# Patient Record
Sex: FEMALE | Race: WHITE | NOT HISPANIC OR LATINO | Employment: OTHER | URBAN - METROPOLITAN AREA
[De-identification: names, ages, dates, MRNs, and addresses within clinical notes are randomized per-mention and may not be internally consistent; named-entity substitution may affect disease eponyms.]

---

## 2017-01-04 ENCOUNTER — OFFICE VISIT - HEALTHEAST (OUTPATIENT)
Dept: PHYSICAL THERAPY | Facility: CLINIC | Age: 77
End: 2017-01-04

## 2017-01-04 DIAGNOSIS — M54.16 LEFT LUMBAR RADICULITIS: ICD-10-CM

## 2017-01-04 DIAGNOSIS — M48.061 SPINAL STENOSIS OF LUMBAR REGION: ICD-10-CM

## 2017-01-04 DIAGNOSIS — M25.552 LEFT HIP PAIN: ICD-10-CM

## 2017-01-04 DIAGNOSIS — M16.12 ARTHRITIS OF LEFT HIP: ICD-10-CM

## 2017-01-04 DIAGNOSIS — M79.18 MYOFASCIAL PAIN: ICD-10-CM

## 2017-01-11 ENCOUNTER — COMMUNICATION - HEALTHEAST (OUTPATIENT)
Dept: PHYSICAL THERAPY | Facility: CLINIC | Age: 77
End: 2017-01-11

## 2017-01-17 ENCOUNTER — COMMUNICATION - HEALTHEAST (OUTPATIENT)
Dept: PHYSICAL THERAPY | Facility: CLINIC | Age: 77
End: 2017-01-17

## 2017-01-24 ENCOUNTER — COMMUNICATION - HEALTHEAST (OUTPATIENT)
Dept: FAMILY MEDICINE | Facility: CLINIC | Age: 77
End: 2017-01-24

## 2017-01-26 ENCOUNTER — COMMUNICATION - HEALTHEAST (OUTPATIENT)
Dept: FAMILY MEDICINE | Facility: CLINIC | Age: 77
End: 2017-01-26

## 2017-02-28 ENCOUNTER — COMMUNICATION - HEALTHEAST (OUTPATIENT)
Dept: PHYSICAL THERAPY | Facility: CLINIC | Age: 77
End: 2017-02-28

## 2017-03-09 ENCOUNTER — COMMUNICATION - HEALTHEAST (OUTPATIENT)
Dept: FAMILY MEDICINE | Facility: CLINIC | Age: 77
End: 2017-03-09

## 2017-03-09 DIAGNOSIS — I10 ESSENTIAL HYPERTENSION: ICD-10-CM

## 2017-04-11 ENCOUNTER — COMMUNICATION - HEALTHEAST (OUTPATIENT)
Dept: FAMILY MEDICINE | Facility: CLINIC | Age: 77
End: 2017-04-11

## 2017-04-14 ENCOUNTER — COMMUNICATION - HEALTHEAST (OUTPATIENT)
Dept: PHYSICAL THERAPY | Facility: CLINIC | Age: 77
End: 2017-04-14

## 2017-04-28 ENCOUNTER — COMMUNICATION - HEALTHEAST (OUTPATIENT)
Dept: FAMILY MEDICINE | Facility: CLINIC | Age: 77
End: 2017-04-28

## 2017-04-28 DIAGNOSIS — I10 ESSENTIAL HYPERTENSION: ICD-10-CM

## 2017-06-02 ENCOUNTER — COMMUNICATION - HEALTHEAST (OUTPATIENT)
Dept: FAMILY MEDICINE | Facility: CLINIC | Age: 77
End: 2017-06-02

## 2017-06-28 ENCOUNTER — COMMUNICATION - HEALTHEAST (OUTPATIENT)
Dept: FAMILY MEDICINE | Facility: CLINIC | Age: 77
End: 2017-06-28

## 2017-07-31 ENCOUNTER — COMMUNICATION - HEALTHEAST (OUTPATIENT)
Dept: FAMILY MEDICINE | Facility: CLINIC | Age: 77
End: 2017-07-31

## 2017-07-31 DIAGNOSIS — I10 ESSENTIAL HYPERTENSION: ICD-10-CM

## 2017-08-18 ENCOUNTER — COMMUNICATION - HEALTHEAST (OUTPATIENT)
Dept: FAMILY MEDICINE | Facility: CLINIC | Age: 77
End: 2017-08-18

## 2017-09-08 ENCOUNTER — OFFICE VISIT - HEALTHEAST (OUTPATIENT)
Dept: FAMILY MEDICINE | Facility: CLINIC | Age: 77
End: 2017-09-08

## 2017-09-08 DIAGNOSIS — I10 ESSENTIAL HYPERTENSION: ICD-10-CM

## 2017-09-08 DIAGNOSIS — Z01.818 PREOPERATIVE EXAMINATION: ICD-10-CM

## 2017-09-08 LAB
ATRIAL RATE - MUSE: 60 BPM
CREAT SERPL-MCNC: 0.74 MG/DL (ref 0.6–1.1)
DIASTOLIC BLOOD PRESSURE - MUSE: NORMAL MMHG
GFR SERPL CREATININE-BSD FRML MDRD: >60 ML/MIN/1.73M2
INTERPRETATION ECG - MUSE: NORMAL
P AXIS - MUSE: 19 DEGREES
PR INTERVAL - MUSE: 208 MS
QRS DURATION - MUSE: 104 MS
QT - MUSE: 424 MS
QTC - MUSE: 424 MS
R AXIS - MUSE: 31 DEGREES
SYSTOLIC BLOOD PRESSURE - MUSE: NORMAL MMHG
T AXIS - MUSE: 12 DEGREES
VENTRICULAR RATE- MUSE: 60 BPM

## 2017-09-08 ASSESSMENT — MIFFLIN-ST. JEOR: SCORE: 1353.59

## 2017-09-12 ENCOUNTER — COMMUNICATION - HEALTHEAST (OUTPATIENT)
Dept: FAMILY MEDICINE | Facility: CLINIC | Age: 77
End: 2017-09-12

## 2017-09-12 DIAGNOSIS — I10 ESSENTIAL HYPERTENSION: ICD-10-CM

## 2017-09-13 ENCOUNTER — COMMUNICATION - HEALTHEAST (OUTPATIENT)
Dept: FAMILY MEDICINE | Facility: CLINIC | Age: 77
End: 2017-09-13

## 2017-09-28 ASSESSMENT — MIFFLIN-ST. JEOR: SCORE: 1353.59

## 2017-10-03 ENCOUNTER — ANESTHESIA - HEALTHEAST (OUTPATIENT)
Dept: SURGERY | Facility: CLINIC | Age: 77
End: 2017-10-03

## 2017-10-03 ENCOUNTER — SURGERY - HEALTHEAST (OUTPATIENT)
Dept: SURGERY | Facility: CLINIC | Age: 77
End: 2017-10-03

## 2017-10-03 ASSESSMENT — MIFFLIN-ST. JEOR
SCORE: 1362.66
SCORE: 1363.79

## 2017-10-09 ENCOUNTER — OFFICE VISIT - HEALTHEAST (OUTPATIENT)
Dept: FAMILY MEDICINE | Facility: CLINIC | Age: 77
End: 2017-10-09

## 2017-10-09 DIAGNOSIS — Z96.642 STATUS POST LEFT HIP REPLACEMENT: ICD-10-CM

## 2017-10-09 DIAGNOSIS — I10 HTN (HYPERTENSION): ICD-10-CM

## 2017-10-09 DIAGNOSIS — E83.42 HYPOMAGNESEMIA: ICD-10-CM

## 2017-10-09 DIAGNOSIS — Z00.00 HEALTHCARE MAINTENANCE: ICD-10-CM

## 2017-10-10 ENCOUNTER — COMMUNICATION - HEALTHEAST (OUTPATIENT)
Dept: FAMILY MEDICINE | Facility: CLINIC | Age: 77
End: 2017-10-10

## 2017-10-30 ENCOUNTER — COMMUNICATION - HEALTHEAST (OUTPATIENT)
Dept: FAMILY MEDICINE | Facility: CLINIC | Age: 77
End: 2017-10-30

## 2017-10-30 DIAGNOSIS — I10 ESSENTIAL HYPERTENSION: ICD-10-CM

## 2017-11-02 ENCOUNTER — COMMUNICATION - HEALTHEAST (OUTPATIENT)
Dept: FAMILY MEDICINE | Facility: CLINIC | Age: 77
End: 2017-11-02

## 2017-11-05 ENCOUNTER — COMMUNICATION - HEALTHEAST (OUTPATIENT)
Dept: FAMILY MEDICINE | Facility: CLINIC | Age: 77
End: 2017-11-05

## 2017-11-05 DIAGNOSIS — I10 HYPERTENSION GOAL BP (BLOOD PRESSURE) < 140/90: ICD-10-CM

## 2017-12-06 ENCOUNTER — COMMUNICATION - HEALTHEAST (OUTPATIENT)
Dept: FAMILY MEDICINE | Facility: CLINIC | Age: 77
End: 2017-12-06

## 2017-12-21 ENCOUNTER — COMMUNICATION - HEALTHEAST (OUTPATIENT)
Dept: FAMILY MEDICINE | Facility: CLINIC | Age: 77
End: 2017-12-21

## 2017-12-21 ENCOUNTER — OFFICE VISIT - HEALTHEAST (OUTPATIENT)
Dept: FAMILY MEDICINE | Facility: CLINIC | Age: 77
End: 2017-12-21

## 2017-12-21 DIAGNOSIS — M48.061 SPINAL STENOSIS OF LUMBAR REGION, UNSPECIFIED WHETHER NEUROGENIC CLAUDICATION PRESENT: ICD-10-CM

## 2017-12-21 DIAGNOSIS — J40 BRONCHITIS: ICD-10-CM

## 2018-02-12 ENCOUNTER — COMMUNICATION - HEALTHEAST (OUTPATIENT)
Dept: FAMILY MEDICINE | Facility: CLINIC | Age: 78
End: 2018-02-12

## 2018-03-19 ENCOUNTER — RECORDS - HEALTHEAST (OUTPATIENT)
Dept: ADMINISTRATIVE | Facility: OTHER | Age: 78
End: 2018-03-19

## 2018-04-14 ENCOUNTER — COMMUNICATION - HEALTHEAST (OUTPATIENT)
Dept: FAMILY MEDICINE | Facility: CLINIC | Age: 78
End: 2018-04-14

## 2018-06-14 ENCOUNTER — COMMUNICATION - HEALTHEAST (OUTPATIENT)
Dept: FAMILY MEDICINE | Facility: CLINIC | Age: 78
End: 2018-06-14

## 2018-06-14 DIAGNOSIS — M48.061 SPINAL STENOSIS OF LUMBAR REGION, UNSPECIFIED WHETHER NEUROGENIC CLAUDICATION PRESENT: ICD-10-CM

## 2018-06-22 ENCOUNTER — COMMUNICATION - HEALTHEAST (OUTPATIENT)
Dept: FAMILY MEDICINE | Facility: CLINIC | Age: 78
End: 2018-06-22

## 2018-08-02 ENCOUNTER — OFFICE VISIT - HEALTHEAST (OUTPATIENT)
Dept: FAMILY MEDICINE | Facility: CLINIC | Age: 78
End: 2018-08-02

## 2018-08-02 DIAGNOSIS — Z00.00 HEALTH CARE MAINTENANCE: ICD-10-CM

## 2018-08-02 DIAGNOSIS — Z86.73 HISTORY OF CVA (CEREBROVASCULAR ACCIDENT): ICD-10-CM

## 2018-08-02 DIAGNOSIS — I10 ESSENTIAL HYPERTENSION: ICD-10-CM

## 2018-08-10 ENCOUNTER — AMBULATORY - HEALTHEAST (OUTPATIENT)
Dept: NURSING | Facility: CLINIC | Age: 78
End: 2018-08-10

## 2018-08-10 ENCOUNTER — AMBULATORY - HEALTHEAST (OUTPATIENT)
Dept: FAMILY MEDICINE | Facility: CLINIC | Age: 78
End: 2018-08-10

## 2018-08-10 ENCOUNTER — COMMUNICATION - HEALTHEAST (OUTPATIENT)
Dept: FAMILY MEDICINE | Facility: CLINIC | Age: 78
End: 2018-08-10

## 2018-08-10 DIAGNOSIS — I10 ESSENTIAL HYPERTENSION: ICD-10-CM

## 2018-08-10 DIAGNOSIS — I10 HYPERTENSION GOAL BP (BLOOD PRESSURE) < 140/90: ICD-10-CM

## 2018-08-22 ENCOUNTER — COMMUNICATION - HEALTHEAST (OUTPATIENT)
Dept: FAMILY MEDICINE | Facility: CLINIC | Age: 78
End: 2018-08-22

## 2018-08-22 DIAGNOSIS — R47.01 EXPRESSIVE APHASIA: ICD-10-CM

## 2018-08-31 ENCOUNTER — AMBULATORY - HEALTHEAST (OUTPATIENT)
Dept: NURSING | Facility: CLINIC | Age: 78
End: 2018-08-31

## 2018-08-31 ENCOUNTER — COMMUNICATION - HEALTHEAST (OUTPATIENT)
Dept: FAMILY MEDICINE | Facility: CLINIC | Age: 78
End: 2018-08-31

## 2018-08-31 DIAGNOSIS — I10 HYPERTENSION GOAL BP (BLOOD PRESSURE) < 140/90: ICD-10-CM

## 2018-09-05 ENCOUNTER — COMMUNICATION - HEALTHEAST (OUTPATIENT)
Dept: FAMILY MEDICINE | Facility: CLINIC | Age: 78
End: 2018-09-05

## 2018-09-06 ENCOUNTER — COMMUNICATION - HEALTHEAST (OUTPATIENT)
Dept: FAMILY MEDICINE | Facility: CLINIC | Age: 78
End: 2018-09-06

## 2018-09-25 ENCOUNTER — COMMUNICATION - HEALTHEAST (OUTPATIENT)
Dept: FAMILY MEDICINE | Facility: CLINIC | Age: 78
End: 2018-09-25

## 2018-10-02 ENCOUNTER — OFFICE VISIT - HEALTHEAST (OUTPATIENT)
Dept: FAMILY MEDICINE | Facility: CLINIC | Age: 78
End: 2018-10-02

## 2018-10-02 DIAGNOSIS — H26.9 CATARACT: ICD-10-CM

## 2018-10-02 DIAGNOSIS — Z01.818 PREOPERATIVE EXAMINATION: ICD-10-CM

## 2018-10-02 LAB — POTASSIUM BLD-SCNC: 4.2 MMOL/L (ref 3.5–5)

## 2018-10-03 ENCOUNTER — COMMUNICATION - HEALTHEAST (OUTPATIENT)
Dept: FAMILY MEDICINE | Facility: CLINIC | Age: 78
End: 2018-10-03

## 2018-11-10 ENCOUNTER — COMMUNICATION - HEALTHEAST (OUTPATIENT)
Dept: FAMILY MEDICINE | Facility: CLINIC | Age: 78
End: 2018-11-10

## 2018-11-10 DIAGNOSIS — I10 ESSENTIAL HYPERTENSION: ICD-10-CM

## 2018-11-19 ENCOUNTER — COMMUNICATION - HEALTHEAST (OUTPATIENT)
Dept: FAMILY MEDICINE | Facility: CLINIC | Age: 78
End: 2018-11-19

## 2018-12-04 ENCOUNTER — COMMUNICATION - HEALTHEAST (OUTPATIENT)
Dept: FAMILY MEDICINE | Facility: CLINIC | Age: 78
End: 2018-12-04

## 2018-12-04 DIAGNOSIS — I10 ESSENTIAL HYPERTENSION: ICD-10-CM

## 2018-12-10 ENCOUNTER — COMMUNICATION - HEALTHEAST (OUTPATIENT)
Dept: FAMILY MEDICINE | Facility: CLINIC | Age: 78
End: 2018-12-10

## 2018-12-10 DIAGNOSIS — I10 ESSENTIAL HYPERTENSION: ICD-10-CM

## 2018-12-11 ENCOUNTER — COMMUNICATION - HEALTHEAST (OUTPATIENT)
Dept: FAMILY MEDICINE | Facility: CLINIC | Age: 78
End: 2018-12-11

## 2019-02-16 ENCOUNTER — COMMUNICATION - HEALTHEAST (OUTPATIENT)
Dept: FAMILY MEDICINE | Facility: CLINIC | Age: 79
End: 2019-02-16

## 2019-02-22 ENCOUNTER — COMMUNICATION - HEALTHEAST (OUTPATIENT)
Dept: FAMILY MEDICINE | Facility: CLINIC | Age: 79
End: 2019-02-22

## 2019-02-22 DIAGNOSIS — I10 HYPERTENSION GOAL BP (BLOOD PRESSURE) < 140/90: ICD-10-CM

## 2019-03-29 ENCOUNTER — COMMUNICATION - HEALTHEAST (OUTPATIENT)
Dept: FAMILY MEDICINE | Facility: CLINIC | Age: 79
End: 2019-03-29

## 2019-04-22 ENCOUNTER — COMMUNICATION - HEALTHEAST (OUTPATIENT)
Dept: FAMILY MEDICINE | Facility: CLINIC | Age: 79
End: 2019-04-22

## 2019-04-22 DIAGNOSIS — F41.9 ANXIETY: ICD-10-CM

## 2019-05-16 ENCOUNTER — AMBULATORY - HEALTHEAST (OUTPATIENT)
Dept: FAMILY MEDICINE | Facility: CLINIC | Age: 79
End: 2019-05-16

## 2019-05-16 DIAGNOSIS — I10 ESSENTIAL HYPERTENSION: ICD-10-CM

## 2019-06-05 ENCOUNTER — COMMUNICATION - HEALTHEAST (OUTPATIENT)
Dept: FAMILY MEDICINE | Facility: CLINIC | Age: 79
End: 2019-06-05

## 2019-06-19 ENCOUNTER — COMMUNICATION - HEALTHEAST (OUTPATIENT)
Dept: FAMILY MEDICINE | Facility: CLINIC | Age: 79
End: 2019-06-19

## 2019-06-19 DIAGNOSIS — F41.9 ANXIETY: ICD-10-CM

## 2019-07-26 ENCOUNTER — COMMUNICATION - HEALTHEAST (OUTPATIENT)
Dept: FAMILY MEDICINE | Facility: CLINIC | Age: 79
End: 2019-07-26

## 2019-08-04 ENCOUNTER — COMMUNICATION - HEALTHEAST (OUTPATIENT)
Dept: FAMILY MEDICINE | Facility: CLINIC | Age: 79
End: 2019-08-04

## 2019-08-04 DIAGNOSIS — R47.01 EXPRESSIVE APHASIA: ICD-10-CM

## 2019-08-07 ENCOUNTER — COMMUNICATION - HEALTHEAST (OUTPATIENT)
Dept: FAMILY MEDICINE | Facility: CLINIC | Age: 79
End: 2019-08-07

## 2019-08-16 ENCOUNTER — COMMUNICATION - HEALTHEAST (OUTPATIENT)
Dept: FAMILY MEDICINE | Facility: CLINIC | Age: 79
End: 2019-08-16

## 2019-08-16 ENCOUNTER — AMBULATORY - HEALTHEAST (OUTPATIENT)
Dept: NURSING | Facility: CLINIC | Age: 79
End: 2019-08-16

## 2019-08-16 DIAGNOSIS — I10 HYPERTENSION GOAL BP (BLOOD PRESSURE) < 140/90: ICD-10-CM

## 2019-08-29 ENCOUNTER — AMBULATORY - HEALTHEAST (OUTPATIENT)
Dept: NURSING | Facility: CLINIC | Age: 79
End: 2019-08-29

## 2019-08-29 ENCOUNTER — COMMUNICATION - HEALTHEAST (OUTPATIENT)
Dept: FAMILY MEDICINE | Facility: CLINIC | Age: 79
End: 2019-08-29

## 2019-08-29 DIAGNOSIS — I10 HYPERTENSION GOAL BP (BLOOD PRESSURE) < 140/90: ICD-10-CM

## 2019-08-29 DIAGNOSIS — F41.9 ANXIETY: ICD-10-CM

## 2019-08-30 ENCOUNTER — COMMUNICATION - HEALTHEAST (OUTPATIENT)
Dept: FAMILY MEDICINE | Facility: CLINIC | Age: 79
End: 2019-08-30

## 2019-09-02 ENCOUNTER — COMMUNICATION - HEALTHEAST (OUTPATIENT)
Dept: FAMILY MEDICINE | Facility: CLINIC | Age: 79
End: 2019-09-02

## 2019-09-02 DIAGNOSIS — I10 ESSENTIAL HYPERTENSION: ICD-10-CM

## 2019-10-17 ENCOUNTER — COMMUNICATION - HEALTHEAST (OUTPATIENT)
Dept: FAMILY MEDICINE | Facility: CLINIC | Age: 79
End: 2019-10-17

## 2019-10-17 DIAGNOSIS — I10 ESSENTIAL HYPERTENSION: ICD-10-CM

## 2019-10-25 ENCOUNTER — AMBULATORY - HEALTHEAST (OUTPATIENT)
Dept: FAMILY MEDICINE | Facility: CLINIC | Age: 79
End: 2019-10-25

## 2019-10-27 ENCOUNTER — COMMUNICATION - HEALTHEAST (OUTPATIENT)
Dept: FAMILY MEDICINE | Facility: CLINIC | Age: 79
End: 2019-10-27

## 2019-10-27 DIAGNOSIS — F41.9 ANXIETY: ICD-10-CM

## 2019-10-30 ENCOUNTER — COMMUNICATION - HEALTHEAST (OUTPATIENT)
Dept: FAMILY MEDICINE | Facility: CLINIC | Age: 79
End: 2019-10-30

## 2019-10-30 DIAGNOSIS — I10 ESSENTIAL HYPERTENSION: ICD-10-CM

## 2019-11-11 ENCOUNTER — COMMUNICATION - HEALTHEAST (OUTPATIENT)
Dept: FAMILY MEDICINE | Facility: CLINIC | Age: 79
End: 2019-11-11

## 2019-11-11 DIAGNOSIS — I10 ESSENTIAL HYPERTENSION: ICD-10-CM

## 2019-11-16 ENCOUNTER — COMMUNICATION - HEALTHEAST (OUTPATIENT)
Dept: FAMILY MEDICINE | Facility: CLINIC | Age: 79
End: 2019-11-16

## 2019-11-16 DIAGNOSIS — I10 ESSENTIAL HYPERTENSION: ICD-10-CM

## 2019-12-12 ENCOUNTER — COMMUNICATION - HEALTHEAST (OUTPATIENT)
Dept: LAB | Facility: CLINIC | Age: 79
End: 2019-12-12

## 2019-12-12 DIAGNOSIS — Z00.00 ROUTINE HISTORY AND PHYSICAL EXAMINATION OF ADULT: ICD-10-CM

## 2019-12-16 ENCOUNTER — AMBULATORY - HEALTHEAST (OUTPATIENT)
Dept: LAB | Facility: CLINIC | Age: 79
End: 2019-12-16

## 2019-12-16 DIAGNOSIS — Z00.00 ROUTINE HISTORY AND PHYSICAL EXAMINATION OF ADULT: ICD-10-CM

## 2019-12-16 LAB
ALBUMIN SERPL-MCNC: 3.8 G/DL (ref 3.5–5)
ALP SERPL-CCNC: 75 U/L (ref 45–120)
ALT SERPL W P-5'-P-CCNC: 26 U/L (ref 0–45)
ANION GAP SERPL CALCULATED.3IONS-SCNC: 11 MMOL/L (ref 5–18)
AST SERPL W P-5'-P-CCNC: 18 U/L (ref 0–40)
BILIRUB SERPL-MCNC: 1 MG/DL (ref 0–1)
BUN SERPL-MCNC: 19 MG/DL (ref 8–28)
CALCIUM SERPL-MCNC: 9.6 MG/DL (ref 8.5–10.5)
CHLORIDE BLD-SCNC: 105 MMOL/L (ref 98–107)
CHOLEST SERPL-MCNC: 139 MG/DL
CO2 SERPL-SCNC: 27 MMOL/L (ref 22–31)
CREAT SERPL-MCNC: 0.73 MG/DL (ref 0.6–1.1)
ERYTHROCYTE [DISTWIDTH] IN BLOOD BY AUTOMATED COUNT: 12 % (ref 11–14.5)
FASTING STATUS PATIENT QL REPORTED: YES
GFR SERPL CREATININE-BSD FRML MDRD: >60 ML/MIN/1.73M2
GLUCOSE BLD-MCNC: 124 MG/DL (ref 70–125)
HCT VFR BLD AUTO: 40.8 % (ref 35–47)
HDLC SERPL-MCNC: 61 MG/DL
HGB BLD-MCNC: 13.6 G/DL (ref 12–16)
LDLC SERPL CALC-MCNC: 62 MG/DL
MCH RBC QN AUTO: 29.2 PG (ref 27–34)
MCHC RBC AUTO-ENTMCNC: 33.3 G/DL (ref 32–36)
MCV RBC AUTO: 88 FL (ref 80–100)
PLATELET # BLD AUTO: 321 THOU/UL (ref 140–440)
PMV BLD AUTO: 7.6 FL (ref 7–10)
POTASSIUM BLD-SCNC: 4.3 MMOL/L (ref 3.5–5)
PROT SERPL-MCNC: 6.8 G/DL (ref 6–8)
RBC # BLD AUTO: 4.65 MILL/UL (ref 3.8–5.4)
SODIUM SERPL-SCNC: 143 MMOL/L (ref 136–145)
TRIGL SERPL-MCNC: 82 MG/DL
WBC: 8.1 THOU/UL (ref 4–11)

## 2019-12-17 ENCOUNTER — OFFICE VISIT - HEALTHEAST (OUTPATIENT)
Dept: FAMILY MEDICINE | Facility: CLINIC | Age: 79
End: 2019-12-17

## 2019-12-17 ENCOUNTER — COMMUNICATION - HEALTHEAST (OUTPATIENT)
Dept: FAMILY MEDICINE | Facility: CLINIC | Age: 79
End: 2019-12-17

## 2019-12-17 DIAGNOSIS — E66.01 MORBID OBESITY (H): ICD-10-CM

## 2019-12-17 DIAGNOSIS — F43.9 STRESS AT HOME: ICD-10-CM

## 2019-12-17 DIAGNOSIS — E78.2 MIXED HYPERLIPIDEMIA: ICD-10-CM

## 2019-12-17 DIAGNOSIS — I10 ESSENTIAL HYPERTENSION: ICD-10-CM

## 2019-12-17 DIAGNOSIS — J45.21 MILD INTERMITTENT ASTHMA WITH EXACERBATION: ICD-10-CM

## 2019-12-17 DIAGNOSIS — F41.9 ANXIETY: ICD-10-CM

## 2019-12-17 DIAGNOSIS — Z00.00 HEALTH CARE MAINTENANCE: ICD-10-CM

## 2019-12-17 RX ORDER — ALBUTEROL SULFATE 0.83 MG/ML
2.5 SOLUTION RESPIRATORY (INHALATION) EVERY 4 HOURS PRN
Qty: 75 ML | Refills: 1 | Status: SHIPPED | OUTPATIENT
Start: 2019-12-17

## 2019-12-17 ASSESSMENT — MIFFLIN-ST. JEOR: SCORE: 1404.96

## 2019-12-23 ENCOUNTER — OFFICE VISIT - HEALTHEAST (OUTPATIENT)
Dept: FAMILY MEDICINE | Facility: CLINIC | Age: 79
End: 2019-12-23

## 2019-12-23 DIAGNOSIS — H65.91 OME (OTITIS MEDIA WITH EFFUSION), RIGHT: ICD-10-CM

## 2019-12-23 ASSESSMENT — MIFFLIN-ST. JEOR: SCORE: 1401.21

## 2020-03-09 ENCOUNTER — COMMUNICATION - HEALTHEAST (OUTPATIENT)
Dept: FAMILY MEDICINE | Facility: CLINIC | Age: 80
End: 2020-03-09

## 2020-03-09 DIAGNOSIS — F41.9 ANXIETY: ICD-10-CM

## 2020-03-18 ENCOUNTER — COMMUNICATION - HEALTHEAST (OUTPATIENT)
Dept: FAMILY MEDICINE | Facility: CLINIC | Age: 80
End: 2020-03-18

## 2020-03-18 DIAGNOSIS — I10 ESSENTIAL HYPERTENSION: ICD-10-CM

## 2020-04-21 ENCOUNTER — COMMUNICATION - HEALTHEAST (OUTPATIENT)
Dept: FAMILY MEDICINE | Facility: CLINIC | Age: 80
End: 2020-04-21

## 2020-04-21 DIAGNOSIS — I10 ESSENTIAL HYPERTENSION: ICD-10-CM

## 2020-05-11 ENCOUNTER — COMMUNICATION - HEALTHEAST (OUTPATIENT)
Dept: FAMILY MEDICINE | Facility: CLINIC | Age: 80
End: 2020-05-11

## 2020-05-11 DIAGNOSIS — F41.9 ANXIETY: ICD-10-CM

## 2020-05-12 ENCOUNTER — COMMUNICATION - HEALTHEAST (OUTPATIENT)
Dept: FAMILY MEDICINE | Facility: CLINIC | Age: 80
End: 2020-05-12

## 2020-05-12 DIAGNOSIS — I10 ESSENTIAL HYPERTENSION: ICD-10-CM

## 2020-05-12 DIAGNOSIS — R47.01 EXPRESSIVE APHASIA: ICD-10-CM

## 2020-06-12 ENCOUNTER — COMMUNICATION - HEALTHEAST (OUTPATIENT)
Dept: FAMILY MEDICINE | Facility: CLINIC | Age: 80
End: 2020-06-12

## 2020-06-12 DIAGNOSIS — I10 ESSENTIAL HYPERTENSION: ICD-10-CM

## 2020-06-16 ENCOUNTER — COMMUNICATION - HEALTHEAST (OUTPATIENT)
Dept: FAMILY MEDICINE | Facility: CLINIC | Age: 80
End: 2020-06-16

## 2020-06-16 DIAGNOSIS — I10 ESSENTIAL HYPERTENSION: ICD-10-CM

## 2020-06-27 ENCOUNTER — COMMUNICATION - HEALTHEAST (OUTPATIENT)
Dept: FAMILY MEDICINE | Facility: CLINIC | Age: 80
End: 2020-06-27

## 2020-06-27 DIAGNOSIS — F41.9 ANXIETY: ICD-10-CM

## 2020-09-02 ENCOUNTER — COMMUNICATION - HEALTHEAST (OUTPATIENT)
Dept: FAMILY MEDICINE | Facility: CLINIC | Age: 80
End: 2020-09-02

## 2020-09-02 DIAGNOSIS — F41.9 ANXIETY: ICD-10-CM

## 2020-11-17 ENCOUNTER — COMMUNICATION - HEALTHEAST (OUTPATIENT)
Dept: FAMILY MEDICINE | Facility: CLINIC | Age: 80
End: 2020-11-17

## 2020-11-17 DIAGNOSIS — F41.9 ANXIETY: ICD-10-CM

## 2020-12-15 ENCOUNTER — COMMUNICATION - HEALTHEAST (OUTPATIENT)
Dept: FAMILY MEDICINE | Facility: CLINIC | Age: 80
End: 2020-12-15

## 2020-12-15 DIAGNOSIS — I10 ESSENTIAL HYPERTENSION: ICD-10-CM

## 2021-01-14 ENCOUNTER — COMMUNICATION - HEALTHEAST (OUTPATIENT)
Dept: FAMILY MEDICINE | Facility: CLINIC | Age: 81
End: 2021-01-14

## 2021-01-14 DIAGNOSIS — F41.9 ANXIETY: ICD-10-CM

## 2021-02-01 ENCOUNTER — COMMUNICATION - HEALTHEAST (OUTPATIENT)
Dept: FAMILY MEDICINE | Facility: CLINIC | Age: 81
End: 2021-02-01

## 2021-02-01 DIAGNOSIS — R47.01 EXPRESSIVE APHASIA: ICD-10-CM

## 2021-02-01 DIAGNOSIS — I10 ESSENTIAL HYPERTENSION: ICD-10-CM

## 2021-02-08 ENCOUNTER — AMBULATORY - HEALTHEAST (OUTPATIENT)
Dept: NURSING | Facility: CLINIC | Age: 81
End: 2021-02-08

## 2021-03-01 ENCOUNTER — AMBULATORY - HEALTHEAST (OUTPATIENT)
Dept: NURSING | Facility: CLINIC | Age: 81
End: 2021-03-01

## 2021-03-11 ENCOUNTER — COMMUNICATION - HEALTHEAST (OUTPATIENT)
Dept: FAMILY MEDICINE | Facility: CLINIC | Age: 81
End: 2021-03-11

## 2021-03-11 DIAGNOSIS — I10 ESSENTIAL HYPERTENSION: ICD-10-CM

## 2021-03-16 ENCOUNTER — COMMUNICATION - HEALTHEAST (OUTPATIENT)
Dept: FAMILY MEDICINE | Facility: CLINIC | Age: 81
End: 2021-03-16

## 2021-03-16 DIAGNOSIS — F41.9 ANXIETY: ICD-10-CM

## 2021-03-22 ENCOUNTER — COMMUNICATION - HEALTHEAST (OUTPATIENT)
Dept: FAMILY MEDICINE | Facility: CLINIC | Age: 81
End: 2021-03-22

## 2021-03-22 DIAGNOSIS — I10 ESSENTIAL HYPERTENSION: ICD-10-CM

## 2021-04-19 ENCOUNTER — OFFICE VISIT - HEALTHEAST (OUTPATIENT)
Dept: FAMILY MEDICINE | Facility: CLINIC | Age: 81
End: 2021-04-19

## 2021-04-19 DIAGNOSIS — Z00.00 HEALTH CARE MAINTENANCE: ICD-10-CM

## 2021-04-19 DIAGNOSIS — I63.9 ACUTE CVA (CEREBROVASCULAR ACCIDENT) (H): ICD-10-CM

## 2021-04-19 DIAGNOSIS — E66.01 MORBID OBESITY (H): ICD-10-CM

## 2021-04-19 DIAGNOSIS — I10 ESSENTIAL HYPERTENSION: ICD-10-CM

## 2021-04-19 RX ORDER — ATENOLOL 50 MG/1
75 TABLET ORAL DAILY
Qty: 135 TABLET | Refills: 2 | Status: SHIPPED | OUTPATIENT
Start: 2021-04-19 | End: 2022-02-02

## 2021-04-19 RX ORDER — AMLODIPINE BESYLATE 10 MG/1
10 TABLET ORAL DAILY
Qty: 90 TABLET | Refills: 2 | Status: SHIPPED | OUTPATIENT
Start: 2021-04-19 | End: 2022-02-02

## 2021-04-19 RX ORDER — LISINOPRIL AND HYDROCHLOROTHIAZIDE 20; 25 MG/1; MG/1
2 TABLET ORAL DAILY
Qty: 180 TABLET | Refills: 2 | Status: SHIPPED | OUTPATIENT
Start: 2021-04-19 | End: 2022-02-02

## 2021-04-19 RX ORDER — ATORVASTATIN CALCIUM 20 MG/1
20 TABLET, FILM COATED ORAL DAILY
Qty: 90 TABLET | Refills: 2 | Status: SHIPPED | OUTPATIENT
Start: 2021-04-19 | End: 2022-02-02

## 2021-04-19 RX ORDER — POTASSIUM CHLORIDE 1500 MG/1
TABLET, EXTENDED RELEASE ORAL
Qty: 270 TABLET | Refills: 2 | Status: SHIPPED | OUTPATIENT
Start: 2021-04-19 | End: 2022-02-02

## 2021-04-19 ASSESSMENT — MIFFLIN-ST. JEOR: SCORE: 1430.7

## 2021-04-21 ENCOUNTER — AMBULATORY - HEALTHEAST (OUTPATIENT)
Dept: LAB | Facility: CLINIC | Age: 81
End: 2021-04-21

## 2021-04-21 DIAGNOSIS — Z00.00 HEALTH CARE MAINTENANCE: ICD-10-CM

## 2021-04-21 LAB
ALBUMIN SERPL-MCNC: 3.8 G/DL (ref 3.5–5)
ALP SERPL-CCNC: 76 U/L (ref 45–120)
ALT SERPL W P-5'-P-CCNC: 22 U/L (ref 0–45)
ANION GAP SERPL CALCULATED.3IONS-SCNC: 14 MMOL/L (ref 5–18)
AST SERPL W P-5'-P-CCNC: 19 U/L (ref 0–40)
BILIRUB SERPL-MCNC: 1.1 MG/DL (ref 0–1)
BUN SERPL-MCNC: 15 MG/DL (ref 8–28)
CALCIUM SERPL-MCNC: 8.8 MG/DL (ref 8.5–10.5)
CHLORIDE BLD-SCNC: 105 MMOL/L (ref 98–107)
CHOLEST SERPL-MCNC: 151 MG/DL
CO2 SERPL-SCNC: 23 MMOL/L (ref 22–31)
CREAT SERPL-MCNC: 0.69 MG/DL (ref 0.6–1.1)
ERYTHROCYTE [DISTWIDTH] IN BLOOD BY AUTOMATED COUNT: 13 % (ref 11–14.5)
FASTING STATUS PATIENT QL REPORTED: YES
GFR SERPL CREATININE-BSD FRML MDRD: >60 ML/MIN/1.73M2
GLUCOSE BLD-MCNC: 120 MG/DL (ref 70–125)
HCT VFR BLD AUTO: 43.5 % (ref 35–47)
HDLC SERPL-MCNC: 67 MG/DL
HGB BLD-MCNC: 14.3 G/DL (ref 12–16)
LDLC SERPL CALC-MCNC: 61 MG/DL
MCH RBC QN AUTO: 29.1 PG (ref 27–34)
MCHC RBC AUTO-ENTMCNC: 32.9 G/DL (ref 32–36)
MCV RBC AUTO: 88 FL (ref 80–100)
PLATELET # BLD AUTO: 258 THOU/UL (ref 140–440)
PMV BLD AUTO: 10 FL (ref 7–10)
POTASSIUM BLD-SCNC: 3.5 MMOL/L (ref 3.5–5)
PROT SERPL-MCNC: 6.7 G/DL (ref 6–8)
RBC # BLD AUTO: 4.92 MILL/UL (ref 3.8–5.4)
SODIUM SERPL-SCNC: 142 MMOL/L (ref 136–145)
TRIGL SERPL-MCNC: 114 MG/DL
WBC: 6.7 THOU/UL (ref 4–11)

## 2021-05-11 ENCOUNTER — COMMUNICATION - HEALTHEAST (OUTPATIENT)
Dept: FAMILY MEDICINE | Facility: CLINIC | Age: 81
End: 2021-05-11

## 2021-05-11 DIAGNOSIS — I10 ESSENTIAL HYPERTENSION: ICD-10-CM

## 2021-05-26 ENCOUNTER — RECORDS - HEALTHEAST (OUTPATIENT)
Dept: ADMINISTRATIVE | Facility: CLINIC | Age: 81
End: 2021-05-26

## 2021-05-27 ENCOUNTER — COMMUNICATION - HEALTHEAST (OUTPATIENT)
Dept: FAMILY MEDICINE | Facility: CLINIC | Age: 81
End: 2021-05-27

## 2021-05-27 DIAGNOSIS — F41.9 ANXIETY: ICD-10-CM

## 2021-05-28 RX ORDER — DIAZEPAM 5 MG
5 TABLET ORAL DAILY PRN
Qty: 15 TABLET | Refills: 0 | Status: SHIPPED | OUTPATIENT
Start: 2021-05-28 | End: 2021-07-21

## 2021-05-28 ASSESSMENT — ASTHMA QUESTIONNAIRES
ACT_TOTALSCORE: 25
ACT_TOTALSCORE: 19

## 2021-05-28 NOTE — TELEPHONE ENCOUNTER
Controlled Substance Refill Request  Medication:   Requested Prescriptions     Pending Prescriptions Disp Refills     diazePAM (VALIUM) 5 MG tablet [Pharmacy Med Name: DIAZEPAM 5 MG TABLET] 15 tablet 0     Sig: TAKE 1 TABLET (5 MG TOTAL) BY MOUTH DAILY AS NEEDED.     Date Last Fill: 2/20/19  Ph[facility]   Submit electronically to pharmacy    Controlled Substance Agreement on File:   Encounter-Level CSA Scan Date:    There are no encounter-level csa scan date.       Last office visit with primary: 8/2/2018

## 2021-05-29 ENCOUNTER — RECORDS - HEALTHEAST (OUTPATIENT)
Dept: ADMINISTRATIVE | Facility: CLINIC | Age: 81
End: 2021-05-29

## 2021-05-29 NOTE — TELEPHONE ENCOUNTER
Reason contacted:  Refill  Information relayed:  Relayed message below from patient, PAtient states understanding and and will schedule an ov.   Additional questions:  No  Further follow-up needed:  No  Okay to leave a detailed message:  No

## 2021-05-29 NOTE — TELEPHONE ENCOUNTER
Controlled Substance Refill Request  Medication:   Requested Prescriptions     Pending Prescriptions Disp Refills     diazePAM (VALIUM) 5 MG tablet [Pharmacy Med Name: DIAZEPAM 5 MG TABLET] 15 tablet 0     Sig: TAKE 1 TABLET (5 MG TOTAL) BY MOUTH DAILY AS NEEDED.     Date Last Fill: 4/23/19  [id number]   Submit electronically to pharmacy  Controlled Substance Agreement on File:   Encounter-Level CSA Scan Date:    There are no encounter-level csa scan date.       Last office visit: Last office visit pertaining to requested medication was 10/2/18.

## 2021-05-31 VITALS — BODY MASS INDEX: 37.91 KG/M2 | HEIGHT: 62 IN | WEIGHT: 206 LBS

## 2021-05-31 VITALS — BODY MASS INDEX: 36.28 KG/M2 | WEIGHT: 198.38 LBS

## 2021-05-31 VITALS — WEIGHT: 204 LBS | BODY MASS INDEX: 37.54 KG/M2 | HEIGHT: 62 IN

## 2021-05-31 VITALS — BODY MASS INDEX: 37.95 KG/M2 | WEIGHT: 207.5 LBS

## 2021-05-31 NOTE — TELEPHONE ENCOUNTER
Refill Request  Did you contact pharmacy: No  Medication name:   Requested Prescriptions     Pending Prescriptions Disp Refills     diazePAM (VALIUM) 5 MG tablet 15 tablet 0     Sig: Take 1 tablet (5 mg total) by mouth daily as needed.     Who prescribed the medication: Joe  [facility]  Is patient out of medication: no  Patient notified refills processed in 72 hours:  no  Okay to leave a detailed message: no

## 2021-05-31 NOTE — TELEPHONE ENCOUNTER
----- Message from Eddie Diaz MD sent at 8/16/2019  3:08 PM CDT -----      ----- Message -----  From: Kaylie Garvin [facility]  Sent: 8/16/2019   2:25 PM  To: Eddie Diaz MD

## 2021-05-31 NOTE — TELEPHONE ENCOUNTER
----- Message from Eddie Diaz MD sent at 8/30/2019  6:58 AM CDT -----      ----- Message -----  From: Amee De La Torre CMA  Sent: 8/29/2019   9:27 AM  To: Eddie Diaz MD

## 2021-05-31 NOTE — TELEPHONE ENCOUNTER
Patient Returning Call  Reason for call:  Patient is returning a call  Information relayed to patient:  Message below with recommendations to double the Atenolol for 25 mg to 50 mg, as well as instruction to have a blood pressure recheck in 2 weeks relayed to the patient.    Patient was transferred to scheduling at the end of the call.  Patient has additional questions:  No  If YES, what are your questions/concerns:  N/a  Okay to leave a detailed message?: No call back needed

## 2021-05-31 NOTE — TELEPHONE ENCOUNTER
Refill Approved    Rx renewed per Medication Renewal Policy. Medication was last renewed on 12/12/18.    Sanam Marc, [facility] Triage/Med Refill 9/2/2019     Requested Prescriptions   Pending Prescriptions Disp Refills     atenolol (TENORMIN) 25 MG tablet 90 tablet 3     Sig: Take 1 tablet (25 mg total) by mouth daily.       Beta-Blockers Refill Protocol Passed - 9/2/2019  2:05 PM        Passed - PCP or prescribing provider visit in past 12 months or next 3 months     Last office visit with prescriber/PCP: 8/2/2018 Eddie Diaz MD OR same dept: Visit date not found OR same specialty: 8/2/2018 Eddie Diaz MD  Last physical: 10/2/2018 Last MTM visit: Visit date not found   Next visit within 3 mo: Visit date not found  Next physical within 3 mo: Visit date not found  Prescriber OR PCP: Eddie Diaz MD  Last diagnosis associated with med order: 1. Essential hypertension  - atenolol (TENORMIN) 25 MG tablet; Take 1 tablet (25 mg total) by mouth daily.  Dispense: 90 tablet; Refill: 3    If protocol passes may refill for 12 months if within 3 months of last provider visit (or a total of 15 months).             Passed - Blood pressure filed in past 12 months     BP Readings from Last 1 Encounters:   08/29/19 119/70

## 2021-05-31 NOTE — PROGRESS NOTES
I met with Lluvia Marrufo at the request of Joe to recheck her blood pressure.  Blood pressure medications on the MAR were reviewed with patient.    Patient has taken all medications as per usual regimen: Yes -increased atenolol since last bp check to 50mg  Patient reports tolerating them without any issues or concerns: Yes    Vitals:    08/29/19 0902 08/29/19 0911   BP: 144/74 119/70   Patient Site: Right Arm Right Arm   Patient Position: Sitting Sitting   Cuff Size: Adult Large Adult Large   Pulse: 62 62       After 5 minutes, the patient's blood pressure was < 140/90, the previous encounter was reviewed, recorded blood pressure below 140/90.  Patient was discharged and the note will be sent to the provider for final review.

## 2021-05-31 NOTE — TELEPHONE ENCOUNTER
Reason contacted:  Bp check  Information relayed:  Relayed message below from . Patient states understanding.   Additional questions:  No  Further follow-up needed:  No  Okay to leave a detailed message:  No      Eddie Diaz MD at 8/30/2019  6:58 AM     Status: Signed      Blood pressure at goal range continue with current medications

## 2021-05-31 NOTE — TELEPHONE ENCOUNTER
Left message to call back for: Bp check  Information to relay to patient:  LMTCB #1, Please relay message below from .                 Eddie Diaz MD at 8/16/2019  3:08 PM     Status: Signed      Please have patient double her dose of atenolol to 50 mg and recheck blood pressure in 2 weeks

## 2021-05-31 NOTE — TELEPHONE ENCOUNTER
Refill Approved    Rx renewed per Medication Renewal Policy. Medication was last renewed on 8/22/18.  OV 10/2/18  Hilda Owens, [facility] Triage/Med Refill 8/4/2019     Requested Prescriptions   Pending Prescriptions Disp Refills     atorvastatin (LIPITOR) 20 MG tablet 90 tablet 3     Sig: Take 1 tablet (20 mg total) by mouth daily.       Statins Refill Protocol (Hmg CoA Reductase Inhibitors) Passed - 8/4/2019 10:08 PM        Passed - PCP or prescribing provider visit in past 12 months      Last office visit with prescriber/PCP: 8/2/2018 Eddie Diaz MD OR same dept: Visit date not found OR same specialty: 8/2/2018 Eddie Diaz MD  Last physical: 10/2/2018 Last MTM visit: Visit date not found   Next visit within 3 mo: Visit date not found  Next physical within 3 mo: Visit date not found  Prescriber OR PCP: Eddie Diaz MD  Last diagnosis associated with med order: 1. Expressive aphasia  - atorvastatin (LIPITOR) 20 MG tablet; Take 1 tablet (20 mg total) by mouth daily.  Dispense: 90 tablet; Refill: 3    If protocol passes may refill for 12 months if within 3 months of last provider visit (or a total of 15 months).

## 2021-05-31 NOTE — PROGRESS NOTES
Follow Up Blood Pressure Check    Lluvia Marrufo is a 79 y.o. female recommended to follow up for blood pressure check by Eddie Diaz MD. Anihypertensive medications and adherence were verified: Yes.     Reason for visit: High blood pressure at last OV on 10/02/18     OV note from 10/02/18: Patient's blood pressure is running slightly elevated she is given a bring her blood pressure cuff in to clinic for nurse blood pressure check and will assess its accuracy-her numbers at home have been running in the normal range but she is elevated here today.  If she continues to run elevated consideration for increasing clonidine.       Today's Vitals:   Vitals:    08/16/19 1404 08/16/19 1418   BP: 157/86 151/74   Patient Site: Left Arm Left Arm   Patient Position: Sitting Sitting   Cuff Size: Adult Large Adult Large   Pulse: 79 72           Lowest blood pressure today is 151/74 and they deny signs or symptoms of new onset: none   Please inform patient of his/her blood pressure today.  If they are asymptomatic, the patient is to continue current medications.  This message will be routed to their provider, and they will be notified if a change in medication is recommended.        Kaylie Garvin    Current Outpatient Medications   Medication Sig Dispense Refill     albuterol (PROVENTIL HFA;VENTOLIN HFA) 90 mcg/actuation inhaler Inhale 2 puffs every 4 (four) hours as needed for wheezing or shortness of breath. 3 Inhaler 5     albuterol (PROVENTIL) 2.5 mg /3 mL (0.083 %) nebulizer solution Take 3 mL (2.5 mg total) by nebulization every 4 (four) hours as needed for wheezing or shortness of breath. 75 mL 1     amLODIPine (NORVASC) 10 MG tablet Take 1 tablet (10 mg total) by mouth daily. 90 tablet 3     aspirin 325 MG tablet Take 1 tablet (325 mg total) by mouth daily. 30 tablet 0     atenolol (TENORMIN) 25 MG tablet Take 25 mg by mouth daily.       atenolol (TENORMIN) 25 MG tablet TAKE 1 TABLET (25 MG TOTAL) BY MOUTH  DAILY. 90 tablet 3     atorvastatin (LIPITOR) 20 MG tablet Take 1 tablet (20 mg total) by mouth daily. 90 tablet 2     diazePAM (VALIUM) 5 MG tablet TAKE 1 TABLET (5 MG TOTAL) BY MOUTH DAILY AS NEEDED. 15 tablet 0     diphenhydrAMINE (BENADRYL) 25 mg tablet Take 25 mg by mouth at bedtime as needed for sleep.       glucosamine-chondroitin 500-400 mg cap Take 2 capsules by mouth daily.       lisinopril-hydrochlorothiazide (PRINZIDE,ZESTORETIC) 20-25 mg per tablet Take 2 tablets by mouth daily. 180 tablet 1     multivitamin with minerals (THERA-M) 9 mg iron-400 mcg Tab tablet Take 1 tablet by mouth daily.       potassium chloride (KLOR-CON M20) 20 MEQ tablet TAKE 1 TABLET 3 TIMES DAILY.. 270 tablet 3     No current facility-administered medications for this visit.

## 2021-06-01 VITALS — WEIGHT: 209.4 LBS | BODY MASS INDEX: 37.09 KG/M2

## 2021-06-02 VITALS — WEIGHT: 211 LBS | BODY MASS INDEX: 37.38 KG/M2

## 2021-06-02 NOTE — TELEPHONE ENCOUNTER
RN cannot approve Refill Request    RN can NOT refill this medication PCP messaged that patient is overdue for Labs and Office Visit. Last office visit: 8/2/2018 Eddie Diaz MD Last Physical: 10/2/2018 Last MTM visit: Visit date not found Last visit same specialty: 8/2/2018 Eddie Diaz MD.  Next visit within 3 mo: Visit date not found  Next physical within 3 mo: Visit date not found      Pool Epstein, [facility] Triage/Med Refill 10/30/2019    Requested Prescriptions   Pending Prescriptions Disp Refills     potassium chloride (KLOR-CON M20) 20 MEQ tablet 270 tablet 3     Sig: TAKE 1 TABLET 3 TIMES DAILY.       Potassium Supplements Refill Protocol Failed - 10/30/2019 10:24 AM        Failed - PCP or prescribing provider visit in past 12 months       Last office visit with prescriber/PCP: 8/2/2018 Eddie Diaz MD OR same dept: Visit date not found OR same specialty: 8/2/2018 Eddie Diaz MD  Last physical: 10/2/2018 Last MTM visit: Visit date not found   Next visit within 3 mo: Visit date not found  Next physical within 3 mo: Visit date not found  Prescriber OR PCP: Eddie Diaz MD  Last diagnosis associated with med order: 1. Essential hypertension  - potassium chloride (KLOR-CON M20) 20 MEQ tablet; TAKE 1 TABLET 3 TIMES DAILY.  Dispense: 270 tablet; Refill: 3    If protocol passes may refill for 12 months if within 3 months of last provider visit (or a total of 15 months).             Failed - Potassium level in last 12 months     No results found for: LN-POTASSIUM

## 2021-06-02 NOTE — TELEPHONE ENCOUNTER
Medication Request    Medication name:    Disp Refills Start End    atenolol (TENORMIN) 25 MG tablet            Pharmacy Name and Location:   [id number] - [place] [address]       Reason for request: Patient states dose change to 25mg, 2 tabs daily.  Needs dosing instruction corrected and new Rx sent to pharmacy listed.    When did you use medication last?:  10/25/19    Patient offered appointment:  Medication dose change     Okay to leave a detailed message: yes

## 2021-06-02 NOTE — TELEPHONE ENCOUNTER
Medication: diazepam 5 mg tab    Last Date Filled 08/29/19     pulled: No, [place] not authorized    Only PCP Prescribing?: unknown    Taken as prescribed from physician notes?: unknown and no recent offic visit note regarding requested RX.     CSA in last year: NO    Random Utox in last year: NO    Opioids + benzodiazepines? NO

## 2021-06-02 NOTE — TELEPHONE ENCOUNTER
Patient states that she has been taking 50 mg daily.     Received call from pharmacy. They wanted to clarify if they should fill  25 mg tablets or 50 mg. Per  patient can do the 50 mg tablets

## 2021-06-02 NOTE — TELEPHONE ENCOUNTER
Please clarify with patient- she has been taking 25mg daily or 50 mg daily?  Active on our list was 25mg daily with 50mg being historical?  Dr. Diaz

## 2021-06-02 NOTE — TELEPHONE ENCOUNTER
Controlled Substance Refill Request  Medication:   Requested Prescriptions     Pending Prescriptions Disp Refills     diazePAM (VALIUM) 5 MG tablet [Pharmacy Med Name: DIAZEPAM 5 MG TABLET] 15 tablet 0     Sig: TAKE 1 TABLET (5 MG TOTAL) BY MOUTH DAILY AS NEEDED.     Date Last Fill: 8/29/19  [id number]   Submit electronically to pharmacy  Controlled Substance Agreement on File:   Encounter-Level CSA Scan Date:    There are no encounter-level csa scan date.       Last office visit: Last office visit pertaining to requested medication was 10/2/18.

## 2021-06-02 NOTE — TELEPHONE ENCOUNTER
RN cannot approve Refill Request    RN can NOT refill this medication Protocol failed and NO refill given.       Polly Ryan, [facility] Triage/Med Refill 10/18/2019    Requested Prescriptions   Pending Prescriptions Disp Refills     atenolol (TENORMIN) 25 MG tablet [Pharmacy Med Name: ATENOLOL 25 MG TABLET] 90 tablet 0     Sig: TAKE 1 TABLET BY MOUTH EVERY DAY       Beta-Blockers Refill Protocol Failed - 10/17/2019  9:24 AM        Failed - PCP or prescribing provider visit in past 12 months or next 3 months     Last office visit with prescriber/PCP: 8/2/2018 Eddie Diaz MD OR same dept: Visit date not found OR same specialty: 8/2/2018 Eddie Diaz MD  Last physical: 10/2/2018 Last MTM visit: Visit date not found   Next visit within 3 mo: Visit date not found  Next physical within 3 mo: Visit date not found  Prescriber OR PCP: Eddie Diaz MD  Last diagnosis associated with med order: 1. Essential hypertension  - atenolol (TENORMIN) 25 MG tablet [Pharmacy Med Name: ATENOLOL 25 MG TABLET]; TAKE 1 TABLET BY MOUTH EVERY DAY  Dispense: 90 tablet; Refill: 0    If protocol passes may refill for 12 months if within 3 months of last provider visit (or a total of 15 months).             Passed - Blood pressure filed in past 12 months     BP Readings from Last 1 Encounters:   08/29/19 119/70

## 2021-06-03 ENCOUNTER — HOSPITAL ENCOUNTER (OUTPATIENT)
Dept: PHYSICAL MEDICINE AND REHAB | Facility: CLINIC | Age: 81
Discharge: HOME | End: 2021-06-03
Attending: NURSE PRACTITIONER

## 2021-06-03 DIAGNOSIS — G89.29 CHRONIC BILATERAL LOW BACK PAIN WITH RIGHT-SIDED SCIATICA: ICD-10-CM

## 2021-06-03 DIAGNOSIS — M43.16 SPONDYLOLISTHESIS OF LUMBAR REGION: ICD-10-CM

## 2021-06-03 DIAGNOSIS — M54.16 RIGHT LUMBAR RADICULITIS: ICD-10-CM

## 2021-06-03 DIAGNOSIS — M48.061 SPINAL STENOSIS OF LUMBAR REGION WITHOUT NEUROGENIC CLAUDICATION: ICD-10-CM

## 2021-06-03 DIAGNOSIS — M54.41 CHRONIC BILATERAL LOW BACK PAIN WITH RIGHT-SIDED SCIATICA: ICD-10-CM

## 2021-06-03 DIAGNOSIS — M51.369 DDD (DEGENERATIVE DISC DISEASE), LUMBAR: ICD-10-CM

## 2021-06-03 RX ORDER — GABAPENTIN 300 MG/1
300 CAPSULE ORAL 3 TIMES DAILY
Qty: 90 CAPSULE | Refills: 3 | Status: SHIPPED | OUTPATIENT
Start: 2021-06-03 | End: 2021-10-25

## 2021-06-03 ASSESSMENT — MIFFLIN-ST. JEOR: SCORE: 1455.65

## 2021-06-03 NOTE — TELEPHONE ENCOUNTER
RN cannot approve Refill Request    RN can NOT refill this medication PCP messaged that patient is overdue for Labs and Office Visit.     Last office visit:  Last Physical: 10/2/2018       Sara Toro, [facility] Connection Triage/Med Refill 11/12/2019    Requested Prescriptions   Pending Prescriptions Disp Refills     amLODIPine (NORVASC) 10 MG tablet 90 tablet 3     Sig: Take 1 tablet (10 mg total) by mouth daily.       Calcium-Channel Blockers Protocol Failed - 11/11/2019  2:48 PM        Failed - PCP or prescribing provider visit in past 12 months or next 3 months     Last office visit with prescriber/PCP: 8/2/2018 Eddie Diaz MD OR same dept: Visit date not found OR same specialty: 8/2/2018 Eddie Diaz MD  Last physical: 10/2/2018 Last MTM visit: Visit date not found   Next visit within 3 mo: Visit date not found  Next physical within 3 mo: Visit date not found  Prescriber OR PCP: Eddie Diaz MD  Last diagnosis associated with med order: There are no diagnoses linked to this encounter.  If protocol passes may refill for 12 months if within 3 months of last provider visit (or a total of 15 months).             Passed - Blood pressure filed in past 12 months     BP Readings from Last 1 Encounters:   08/29/19 119/70

## 2021-06-03 NOTE — TELEPHONE ENCOUNTER
First Attempt: LM for patient to call back to schedule an Annual Wellness Visit with fasting lab work.

## 2021-06-03 NOTE — TELEPHONE ENCOUNTER
RN cannot approve Refill Request    RN can NOT refill this medication Protocol failed and NO refill given.      Polly Ryan, [facility] Triage/Med Refill 11/17/2019    Requested Prescriptions   Pending Prescriptions Disp Refills     atenolol (TENORMIN) 50 MG tablet [Pharmacy Med Name: ATENOLOL 50 MG TABLET] 90 tablet 3     Sig: TAKE 1 TABLET BY MOUTH EVERY DAY       Beta-Blockers Refill Protocol Failed - 11/16/2019  9:32 AM        Failed - PCP or prescribing provider visit in past 12 months or next 3 months     Last office visit with prescriber/PCP: 8/2/2018 Eddie Diaz MD OR same dept: Visit date not found OR same specialty: 8/2/2018 Eddie Diaz MD  Last physical: 10/2/2018 Last MTM visit: Visit date not found   Next visit within 3 mo: Visit date not found  Next physical within 3 mo: Visit date not found  Prescriber OR PCP: Eddie Diaz MD  Last diagnosis associated with med order: There are no diagnoses linked to this encounter.  If protocol passes may refill for 12 months if within 3 months of last provider visit (or a total of 15 months).             Passed - Blood pressure filed in past 12 months     BP Readings from Last 1 Encounters:   08/29/19 119/70

## 2021-06-03 NOTE — TELEPHONE ENCOUNTER
Patient Returning Call  Reason for call:  Returning call  Information relayed to patient:    Relayed below information to patient.  Patient has additional questions:  No  If YES, what are your questions/concerns:    No additional questions at this time.  Okay to leave a detailed message?: No call back needed

## 2021-06-04 VITALS
WEIGHT: 216.2 LBS | HEIGHT: 62 IN | DIASTOLIC BLOOD PRESSURE: 107 MMHG | SYSTOLIC BLOOD PRESSURE: 147 MMHG | BODY MASS INDEX: 39.79 KG/M2 | HEART RATE: 91 BPM | TEMPERATURE: 97.8 F | RESPIRATION RATE: 16 BRPM

## 2021-06-04 VITALS
HEIGHT: 62 IN | WEIGHT: 215.38 LBS | RESPIRATION RATE: 16 BRPM | SYSTOLIC BLOOD PRESSURE: 156 MMHG | HEART RATE: 60 BPM | OXYGEN SATURATION: 95 % | TEMPERATURE: 97.6 F | DIASTOLIC BLOOD PRESSURE: 87 MMHG | BODY MASS INDEX: 39.63 KG/M2

## 2021-06-04 NOTE — TELEPHONE ENCOUNTER
----- Message from Eddie Diaz MD sent at 12/17/2019  7:22 AM CST -----  Please inform patient that kidney function and liver function are normal.  No signs of diabetes.  Glucose levels are in the prediabetic range.  Continue to work on getting regular exercise and lowering carbohydrate intake in the diet.  Cholesterol levels are at goal range.

## 2021-06-04 NOTE — PATIENT INSTRUCTIONS - HE
Fluid seen behind ears - occurs after cough/congestion illness.  Tylenol as needed.        Patient Education     Otitis Media (Middle-Ear Infection) in Adults  Otitis media is another name for a middle-ear infection. It means an infection behind your eardrum. This kind of ear infection can happen after any condition that keeps fluid from draining from the middle ear. These conditions include allergies, a cold, a sore throat, or a respiratory infection.  Middle-ear infections are common in children, but they can also happen in adults. An ear infection in an adult may mean a more serious problem than in a child. So you may need additional tests. If you have an ear infection, you should see your health care provider for treatment.  What are the types of middle-ear infections?  Infections can affect the middle ear in several ways. They are:    Acute otitis media. This middle-ear infection occurs suddenly. It causes swelling and redness. Fluid and mucus become trapped inside the ear. You can have a fever and ear pain.    Otitis media with effusion. Fluid (effusion) and mucus build up in the middle ear after the infection goes away. You may feel like your middle ear is full. This can continue for months and may affect your hearing.    Chronic otitis media with effusion. Fluid (effusion) remains in the middle ear for a long time. Or it builds up again and again, even though there is no infection. This type of middle-ear infection may be hard to treat. It may also affect your hearing.  Who is more likely to get a middle-ear infection?  You are more likely to get an ear infection if you:    Smoke or are around someone who smokes    Have seasonal or year-round allergy symptoms    Have a cold or other upper respiratory infection  What causes a middle-ear infection?  The middle ear connects to the throat by a canal called the eustachian tube. This tube helps even out the pressure between the outer ear and the inner ear. A cold  or allergy can irritate the tube or cause the area around it to swell. This can keep fluid from draining from the middle ear. The fluid builds up behind the eardrum. Bacteria and viruses can grow in this fluid. The bacteria and viruses cause the middle-ear infection.  What are the symptoms of a middle-ear infection?  Common symptoms of a middle-ear infection in adults are:    Pain in 1 or both ears    Drainage from the ear    Muffled hearing    Sore throat   You may also have a fever. Rarely, your balance can be affected.  These symptoms may be the same as for other conditions. It s important to talk with your health care provider if you think you have a middle-ear infection. If you have a high fever, severe pain behind your ear, or paralysis in your face, see your provider as soon as you can.  How is a middle-ear infection diagnosed?  Your health care provider will take a medical history and do a physical exam. He or she will look at the outer ear and eardrum with an otoscope. The otoscope is a lighted tool that lets your provider see inside the ear. A pneumatic otoscope blows a puff of air into the ear to check how well your eardrum moves. If you eardrum doesn t move well, it may mean you have fluid behind it.  Your provider may also do a test called tympanometry. This test tells how well the middle ear is working. It can find any changes in pressure in the middle ear. Your provider may test your hearing with a tuning fork.  How is a middle-ear infection treated?  A middle-ear infection may be treated with:    Antibiotics, taken by mouth or as ear drops    Medication for pain    Decongestants, antihistamines, or nasal steroids  Your health care provider may also have you try autoinsufflation. This helps adjust the air pressure in your ear. For this, you pinch your nose and gently exhale. This forces air back through the eustachian tube.  The exact treatment for your ear infection will depend on the type of  infection you have. In general, if your symptoms don t get better in 48 to 72 hours, contact your health care provider.  Middle-ear infections can cause long-term problems if not treated. They can lead to:    Infection in other parts of the head    Permanent hearing loss    Paralysis of a nerve in your face  If you have a middle-ear infection that doesn t get better, you may need to see an ear, nose, and throat specialist (otolaryngologist). You may need a CT scan or MRI to check for head and neck cancer.  Ear tubes  Sometimes fluid stays in the middle ear even after you take antibiotics and the infection goes away. In this case, your health care provider may suggest that a small tube be placed in your ear. The tube is put at the opening of the eardrum. The tube keeps fluid from building up and relieves pressure in the middle ear. It can also help you hear better. This surgery is called myringotomy. It is not often done in adults.  The tubes usually fall out on their own after 6 months to a year.    1534-0494 [employer]. [address] [place] [address], [place] [address]. All rights reserved. This information is not intended as a substitute for professional medical care. Always follow your healthcare professional's instructions.

## 2021-06-04 NOTE — TELEPHONE ENCOUNTER
Patient Returning Call  Reason for call:  Patient called back.  Information relayed to patient:  Informed of Dr Diaz's message listed below.  Patient states understanding and agrees with plan.  Patient has additional questions:  No  If YES, what are your questions/concerns:    Okay to leave a detailed message?: No call back needed

## 2021-06-04 NOTE — PROGRESS NOTES
Chief Complaint   Patient presents with     Ear Pain     x1d, bilateral, ST       ASSESSMENT & PLAN:   Diagnoses and all orders for this visit:    OME (otitis media with effusion), right        MDM:  Month-long cough/cold URI which has resolved.  Now having OME symptoms x a few hours.  Tylenol.  Reassurance.  Recheck if worsening, more steady pain, fevers.  Otherwise, in 8 weeks if ear pain is persistent and intermittent.    Supportive care discussed.  See discharge instructions below for specific recommendations given.    At the end of the encounter, I discussed results, diagnosis, medications. Discussed red flags for immediate return to clinic/ER, as well as indications for follow up if no improvement. Patient and/or caregiver understood and agreed to plan. Patient was stable for discharge.    SUBJECTIVE    HPI:  HPI  Lluvia Marrufo presents to the walk-in clinic with   Chief Complaint   Patient presents with     Ear Pain     x1d, bilateral, ST     Worried about infection due to Christmas holiday with multiple family members coming into town.    Associated with: stabbing pain in ears woke her up last night.  Cough around Thanksgiving, improved.      Symptoms started: last night     Denies: cough/congestion, fever    Known exposures: none     See ROS for additional symptoms and/or pertinent negatives.       History obtained from the patient.    Past Medical History:   Diagnosis Date     Arthritis      Asthma      Bronchitis      Earache      Fracture, foot      Hypertension      UTI (urinary tract infection)        Active Ambulatory (Non-Hospital) Problems    Diagnosis     Obesity (BMI 35.0-39.9) with comorbidity (H)     Acute CVA (cerebrovascular accident) (H)     Expressive aphasia     Hypomagnesemia     Hypokalemia     Primary osteoarthritis of left hip     Spinal stenosis of lumbar region     Mild intermittent asthma without complication     Obesity     Hyperlipidemia     Prediabetes     Lower Back Pain      "Headache     Hypertension goal BP (blood pressure) < 140/90     Dermatitis     Joint Pain, Localized In The Shoulder       Family History   Problem Relation Age of Onset     Hypertension Mother      Alcohol abuse Father      Cirrhosis Father      Anesthesia problems Neg Hx        Social History     Tobacco Use     Smoking status: Never Smoker     Smokeless tobacco: Never Used   Substance Use Topics     Alcohol use: Yes     Comment: 1 drink per day       Review of Systems   All other systems reviewed and are negative.      OBJECTIVE    Vitals:    19 1204   BP: 156/87   Patient Site: Right Arm   Patient Position: Sitting   Cuff Size: Adult Large   Pulse: 60   Resp: 16   Temp: 97.6  F (36.4  C)   TempSrc: Oral   SpO2: 95%   Weight: 215 lb 6 oz (97.7 kg)   Height: 5' 1.75\" (1.568 m)       Physical Exam  Constitutional:       General: She is not in acute distress.     Appearance: She is well-developed.   HENT:      Right Ear: External ear normal. A middle ear effusion is present.      Left Ear: Tympanic membrane and external ear normal.      Mouth/Throat:      Pharynx: No posterior oropharyngeal erythema.      Tonsils: No tonsillar exudate or tonsillar abscesses. Swellin+ on the right. 1+ on the left.   Eyes:      General:         Right eye: No discharge.         Left eye: No discharge.      Conjunctiva/sclera: Conjunctivae normal.   Pulmonary:      Effort: Pulmonary effort is normal.   Musculoskeletal: Normal range of motion.   Skin:     General: Skin is warm and dry.      Capillary Refill: Capillary refill takes less than 2 seconds.   Neurological:      Mental Status: She is alert and oriented to person, place, and time.   Psychiatric:         Mood and Affect: Mood normal.         Behavior: Behavior normal.         Thought Content: Thought content normal.         Judgment: Judgment normal.         Labs:        Radiology:    No results found.    PATIENT INSTRUCTIONS:   Patient Instructions   Fluid seen behind " ears - occurs after cough/congestion illness.  Tylenol as needed.        Patient Education     Otitis Media (Middle-Ear Infection) in Adults  Otitis media is another name for a middle-ear infection. It means an infection behind your eardrum. This kind of ear infection can happen after any condition that keeps fluid from draining from the middle ear. These conditions include allergies, a cold, a sore throat, or a respiratory infection.  Middle-ear infections are common in children, but they can also happen in adults. An ear infection in an adult may mean a more serious problem than in a child. So you may need additional tests. If you have an ear infection, you should see your health care provider for treatment.  What are the types of middle-ear infections?  Infections can affect the middle ear in several ways. They are:    Acute otitis media. This middle-ear infection occurs suddenly. It causes swelling and redness. Fluid and mucus become trapped inside the ear. You can have a fever and ear pain.    Otitis media with effusion. Fluid (effusion) and mucus build up in the middle ear after the infection goes away. You may feel like your middle ear is full. This can continue for months and may affect your hearing.    Chronic otitis media with effusion. Fluid (effusion) remains in the middle ear for a long time. Or it builds up again and again, even though there is no infection. This type of middle-ear infection may be hard to treat. It may also affect your hearing.  Who is more likely to get a middle-ear infection?  You are more likely to get an ear infection if you:    Smoke or are around someone who smokes    Have seasonal or year-round allergy symptoms    Have a cold or other upper respiratory infection  What causes a middle-ear infection?  The middle ear connects to the throat by a canal called the eustachian tube. This tube helps even out the pressure between the outer ear and the inner ear. A cold or allergy can  irritate the tube or cause the area around it to swell. This can keep fluid from draining from the middle ear. The fluid builds up behind the eardrum. Bacteria and viruses can grow in this fluid. The bacteria and viruses cause the middle-ear infection.  What are the symptoms of a middle-ear infection?  Common symptoms of a middle-ear infection in adults are:    Pain in 1 or both ears    Drainage from the ear    Muffled hearing    Sore throat   You may also have a fever. Rarely, your balance can be affected.  These symptoms may be the same as for other conditions. It s important to talk with your health care provider if you think you have a middle-ear infection. If you have a high fever, severe pain behind your ear, or paralysis in your face, see your provider as soon as you can.  How is a middle-ear infection diagnosed?  Your health care provider will take a medical history and do a physical exam. He or she will look at the outer ear and eardrum with an otoscope. The otoscope is a lighted tool that lets your provider see inside the ear. A pneumatic otoscope blows a puff of air into the ear to check how well your eardrum moves. If you eardrum doesn t move well, it may mean you have fluid behind it.  Your provider may also do a test called tympanometry. This test tells how well the middle ear is working. It can find any changes in pressure in the middle ear. Your provider may test your hearing with a tuning fork.  How is a middle-ear infection treated?  A middle-ear infection may be treated with:    Antibiotics, taken by mouth or as ear drops    Medication for pain    Decongestants, antihistamines, or nasal steroids  Your health care provider may also have you try autoinsufflation. This helps adjust the air pressure in your ear. For this, you pinch your nose and gently exhale. This forces air back through the eustachian tube.  The exact treatment for your ear infection will depend on the type of infection you have. In  general, if your symptoms don t get better in 48 to 72 hours, contact your health care provider.  Middle-ear infections can cause long-term problems if not treated. They can lead to:    Infection in other parts of the head    Permanent hearing loss    Paralysis of a nerve in your face  If you have a middle-ear infection that doesn t get better, you may need to see an ear, nose, and throat specialist (otolaryngologist). You may need a CT scan or MRI to check for head and neck cancer.  Ear tubes  Sometimes fluid stays in the middle ear even after you take antibiotics and the infection goes away. In this case, your health care provider may suggest that a small tube be placed in your ear. The tube is put at the opening of the eardrum. The tube keeps fluid from building up and relieves pressure in the middle ear. It can also help you hear better. This surgery is called myringotomy. It is not often done in adults.  The tubes usually fall out on their own after 6 months to a year.    8739-6794 [employer]. [address] [place] [address], [place] [address]. All rights reserved. This information is not intended as a substitute for professional medical care. Always follow your healthcare professional's instructions.

## 2021-06-04 NOTE — TELEPHONE ENCOUNTER
Left message to call back for: Results  Information to relay to patient:  LMTCB #2, Please relay message below from Dr. Diaz.

## 2021-06-04 NOTE — TELEPHONE ENCOUNTER
Left message to call back for: Results   Information to relay to patient:  LMTCB, please relay results and recommendations. Letter mailed.

## 2021-06-04 NOTE — PROGRESS NOTES
Assessment and Plan:       1. Health care maintenance  Patient here for annual exam  She is recently obtained her blood work and we reviewed the findings with her today  Discussed her elevated glucose levels and the need to lower carbohydrate intake in the diet  Discussed her status of prediabetes      2. Essential hypertension  Blood pressure is elevated  Discussed increasing atenolol to 75 mg and rechecking blood pressure in 2 weeks  - lisinopril-hydrochlorothiazide (PRINZIDE,ZESTORETIC) 20-25 mg per tablet; Take 2 tablets by mouth daily.  Dispense: 180 tablet; Refill: 1  - potassium chloride (KLOR-CON M20) 20 MEQ tablet; TAKE 1 TABLET 3 TIMES DAILY.  Dispense: 270 tablet; Refill: 0  - atenolol (TENORMIN) 50 MG tablet; Take 1.5 tablets (75 mg total) by mouth daily.  Dispense: 135 tablet; Refill: 0    3. Mild intermittent asthma with exacerbation  Refills and nebulizer sent to the pharmacy  - albuterol (PROVENTIL) 2.5 mg /3 mL (0.083 %) nebulizer solution; Take 3 mL (2.5 mg total) by nebulization every 4 (four) hours as needed for wheezing or shortness of breath.  Dispense: 75 mL; Refill: 1    4. 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  Dispense: 15 tablet; Refill: 0    5. Mixed hyperlipidemia  Patient is on statin therapy and we will check cholesterol levels which are at goal range continue with current dosing    6. Morbid obesity (H)  Discussed with patient her  weight and the need for weight loss  Discussed diet and exercise    7. Stress at home  Discussed with patient multiple stressors at home mainly including the health of her daughter  Her daughter is living with them at this time and she is helping care for her  She is aware of her likely advancing disease moving forward    The patient's current medical problems were reviewed.    The following high BMI interventions were performed this visit: encouragement to exercise and weight monitoring  The following health maintenance schedule was reviewed with the patient and provided in printed form in the after visit summary:   Health Maintenance   Topic Date Due     ASTHMA ACTION PLAN  1940     DXA SCAN  04/02/2007     ZOSTER VACCINES (2 of 3) 07/25/2008     MEDICARE ANNUAL WELLNESS VISIT  09/15/2017     ASTHMA CONTROL TEST  08/02/2019     FALL RISK ASSESSMENT  08/02/2019     ADVANCE CARE PLANNING  09/15/2021     LIPID  12/16/2024     TD 18+ HE  10/09/2027     PNEUMOCOCCAL IMMUNIZATION 65+ LOW/MEDIUM RISK  Completed     INFLUENZA VACCINE RULE BASED  Completed        Subjective:   Chief Complaint: Lluvia Marrufo is an 79 y.o. female here for an Annual Wellness visit.   HPI: Patient is here for annual exam.  Patient has elevated blood pressure outside of goal range-discussed with her increasing atenolol dosing.  Return in 2 weeks for blood pressure check  Patient on statin therapy and cholesterol levels are at goal range continue with current dosing  Patient has been under increased stress we discussed the health of her daughter who is living with him at this time and undergoing treatment for brain cancer  She had noted some increasing anxiety and has diazepam available-after recent seizures that her daughter has had she has noted herself to be sometimes hyperventilating and having increasing anxiety she is used half a tablet of diazepam during these times.  Discussed with her this possible side effects and addiction  and tolerance with these medications and if they are infrequent and use I think it is okay but otherwise if becoming more regular we should consider an SSRI daily.  She is requesting refills of albuterol nebulizer solution for possible asthma flareup  Patient is aware of her weight and the need for weight loss we discussed diet and exercise    Review of Systems:    Please see above.  The rest of the review of systems are negative for all systems.    Patient Care Team:  Eddie Diaz MD as PCP - General (Family Medicine)  Eddie Diaz MD as Assigned PCP     Patient Active Problem List   Diagnosis     Obesity     Hyperlipidemia     Prediabetes     Lower Back Pain     Headache     Hypertension goal BP (blood pressure) < 140/90     Dermatitis     Joint Pain, Localized In The Shoulder     Mild intermittent asthma without complication     Spinal stenosis of lumbar region     Primary osteoarthritis of left hip     Hypomagnesemia     Hypokalemia     Expressive aphasia     Acute CVA (cerebrovascular accident) (H)     Obesity (BMI 35.0-39.9) with comorbidity (H)     Past Medical History:   Diagnosis Date     Arthritis      Asthma      Bronchitis      Earache      Fracture, foot      Hypertension      UTI (urinary tract infection)       Past Surgical History:   Procedure Laterality Date     APPENDECTOMY       EYE SURGERY      Growth on lateral left eyelid removed.     HYSTERECTOMY      age 57     JOINT REPLACEMENT Left     knee     OOPHORECTOMY       NE HALLUX RIGIDUS W/CHEILECTOMY 1ST MP JT W/O IMPLT      Description: Hallux Rigidus Correction W/ Cheilectomy 1st MTP Joint;  Proc Date: 12/03/2010;     NE RECONSTR TOTAL SHOULDER IMPLANT Right 10/27/2015    Procedure: RIGHT SHOULDER TOTAL ARTHROPLASTY;  Surgeon: Eddie Payne MD;  Location: [facility];  Service: Orthopedics     NE TOTAL HIP ARTHROPLASTY Left 10/3/2017    Procedure:  LEFT TOTAL HIP ARTHROPLASTY;  Surgeon: Mahin Monsalve MD;   Location: [facility] OR;  Service: Orthopedics     TONSILLECTOMY       TOTAL KNEE ARTHROPLASTY      right     TOTAL SHOULDER ARTHROPLASTY      left      Family History   Problem Relation Age of Onset     Hypertension Mother      Alcohol abuse Father      Cirrhosis Father      Anesthesia problems Neg Hx       Social History     Socioeconomic History     Marital status:      Spouse name: Not on file     Number of children: Not on file     Years of education: Not on file     Highest education level: Not on file   Occupational History     Not on file   Social Needs     Financial resource strain: Not on file     Food insecurity:     Worry: Not on file     Inability: Not on file     Transportation needs:     Medical: Not on file     Non-medical: Not on file   Tobacco Use     Smoking status: Never Smoker     Smokeless tobacco: Never Used   Substance and Sexual Activity     Alcohol use: Yes     Comment: 1 drink per day     Drug use: No     Sexual activity: Not on file   Lifestyle     Physical activity:     Days per week: Not on file     Minutes per session: Not on file     Stress: Not on file   Relationships     Social connections:     Talks on phone: Not on file     Gets together: Not on file     Attends [employer] service: Not on file     Active member of club or organization: Not on file     Attends meetings of clubs or organizations: Not on file     Relationship status: Not on file     Intimate partner violence:     Fear of current or ex partner: Not on file     Emotionally abused: Not on file     Physically abused: Not on file     Forced sexual activity: Not on file   Other Topics Concern     Not on file   Social History Narrative    [place]  Son-in-law is a .      Current Outpatient Medications   Medication Sig Dispense Refill     albuterol (PROVENTIL) 2.5 mg /3 mL (0.083 %) nebulizer solution Take 3 mL (2.5 mg total) by nebulization every 4 (four) hours as needed for  "wheezing or shortness of breath. 75 mL 1     amLODIPine (NORVASC) 10 MG tablet Take 1 tablet (10 mg total) by mouth daily. 90 tablet 0     aspirin 325 MG tablet Take 1 tablet (325 mg total) by mouth daily. 30 tablet 0     atenolol (TENORMIN) 50 MG tablet Take 1.5 tablets (75 mg total) by mouth daily. 135 tablet 0     atorvastatin (LIPITOR) 20 MG tablet Take 1 tablet (20 mg total) by mouth daily. 90 tablet 2     diazePAM (VALIUM) 5 MG tablet Take 1 tablet (5 mg total) by mouth daily as needed. 15 tablet 0     diphenhydrAMINE (BENADRYL) 25 mg tablet Take 25 mg by mouth at bedtime as needed for sleep.       glucosamine-chondroitin 500-400 mg cap Take 2 capsules by mouth daily.       multivitamin with minerals (THERA-M) 9 mg iron-400 mcg Tab tablet Take 1 tablet by mouth daily.       lisinopril-hydrochlorothiazide (PRINZIDE,ZESTORETIC) 20-25 mg per tablet Take 2 tablets by mouth daily. 180 tablet 1     potassium chloride (KLOR-CON M20) 20 MEQ tablet TAKE 1 TABLET 3 TIMES DAILY. 270 tablet 0     No current facility-administered medications for this visit.       Objective:   Vital Signs:   Visit Vitals  BP (!) 147/107 (Patient Site: Left Arm, Patient Position: Sitting, Cuff Size: Adult Large)   Pulse 91   Temp 97.8  F (36.6  C) (Oral)   Resp 16   Ht 5' 1.75\" (1.568 m)   Wt 216 lb 3.2 oz (98.1 kg)   BMI 39.86 kg/m         VisionScreening:  No exam data present     PHYSICAL EXAM  Physical Examination: General appearance - alert, well appearing, and in no distress  Mental status - alert, oriented to person, place, and time  Eyes - pupils equal and reactive, extraocular eye movements intact  Ears - bilateral TM's and external ear canals normal  Neck - supple, no significant adenopathy  Chest - clear to auscultation, no wheezes, rales or rhonchi, symmetric air entry  Heart - normal rate, regular rhythm, normal S1, S2, no murmurs, rubs, clicks or gallops  Abdomen - soft, nontender, nondistended, no masses or " organomegaly  Neurological - alert, oriented, normal speech, no focal findings or movement disorder noted  Extremities - peripheral pulses normal, no pedal edema, no clubbing or cyanosis  Skin - normal coloration and turgor, no rashes, no suspicious skin lesions noted        Assessment Results 12/17/2019   Activities of Daily Living No help needed   Instrumental Activities of Daily Living No help needed   Mini Cog Total Score 5   Some recent data might be hidden     A Mini-Cog score of 0-2 suggests the possibility of dementia, score of 3-5 suggests no dementia    Identified Health Risks:     The patient was counseled and encouraged to consider modifying their diet and eating habits. She was provided with information on recommended healthy diet options.  The patient was provided with written information regarding signs of hearing loss.  Information on urinary incontinence and treatment options given to patient.  Patient's advanced directive was discussed and I am comfortable with the patient's wishes.

## 2021-06-04 NOTE — TELEPHONE ENCOUNTER
Patient coming in 12/16 for fasting labs prior to CPX with PD on 12/17. Please place orders, thanks!

## 2021-06-05 VITALS
HEART RATE: 90 BPM | WEIGHT: 221 LBS | BODY MASS INDEX: 40.67 KG/M2 | RESPIRATION RATE: 16 BRPM | HEIGHT: 62 IN | DIASTOLIC BLOOD PRESSURE: 85 MMHG | SYSTOLIC BLOOD PRESSURE: 150 MMHG

## 2021-06-06 NOTE — TELEPHONE ENCOUNTER
Controlled Substance Refill Request  Medication Name:   Requested Prescriptions     Pending Prescriptions Disp Refills     diazePAM (VALIUM) 5 MG tablet 15 tablet 0     Sig: Take 1 tablet (5 mg total) by mouth daily as needed.     Date Last Fill: 12/17/19  Is patient out of medication?:  Yes  Patient notified refills processed within 3 business days:  Yes  Requested Pharmacy: [facility] [place]   Submit electronically to pharmacy  Controlled Substance Agreement on file:   Encounter-Level CSA Scan Date:    There are no encounter-level csa scan date.        Last office visit:  12/17/19

## 2021-06-06 NOTE — TELEPHONE ENCOUNTER
Refill Request  Did you contact pharmacy: No  Medication name:   Requested Prescriptions     Pending Prescriptions Disp Refills     amLODIPine (NORVASC) 10 MG tablet [Pharmacy Med Name: AMLODIPINE BESYLATE 10 MG TAB] 90 tablet 0     Sig: TAKE 1 TABLET BY MOUTH EVERY DAY     Who prescribed the medication: Dr. Diaz  Requested Pharmacy: [facility]     Last OV: 12/23/19

## 2021-06-07 NOTE — TELEPHONE ENCOUNTER
Refill Approved    Rx renewed per Medication Renewal Policy. Medication was last renewed on 12/17/19.    Polly Ryan, [facility] Connection Triage/Med Refill 4/22/2020     Requested Prescriptions   Pending Prescriptions Disp Refills     atenoloL (TENORMIN) 50 MG tablet 135 tablet 0     Sig: Take 1.5 tablets (75 mg total) by mouth daily.       Beta-Blockers Refill Protocol Passed - 4/21/2020 11:47 AM        Passed - PCP or prescribing provider visit in past 12 months or next 3 months     Last office visit with prescriber/PCP: 8/2/2018 Eddie Diaz MD OR same dept: Visit date not found OR same specialty: 8/2/2018 Eddie Diaz MD  Last physical: 12/17/2019 Last MTM visit: Visit date not found   Next visit within 3 mo: Visit date not found  Next physical within 3 mo: Visit date not found  Prescriber OR PCP: Eddie Diaz MD  Last diagnosis associated with med order: 1. Essential hypertension  - atenoloL (TENORMIN) 50 MG tablet; Take 1.5 tablets (75 mg total) by mouth daily.  Dispense: 135 tablet; Refill: 0    If protocol passes may refill for 12 months if within 3 months of last provider visit (or a total of 15 months).             Passed - Blood pressure filed in past 12 months     BP Readings from Last 1 Encounters:   12/23/19 156/87

## 2021-06-08 NOTE — PROGRESS NOTES
[facility] Daily Progress     Patient Name: Lluvia Marrufo  Date: 1/4/2017  Visit #: 5  PTA visit #:0    Referral Diagnosis:   724.2 (ICD-9-CM) - M54.5 (ICD-10-CM) - Lumbalgia   724.4 (ICD-9-CM) - M54.16 (ICD-10-CM) - Lumbar radiculitis   719.45 (ICD-9-CM) - M25.552 (ICD-10-CM) - Left hip pain   716.95 (ICD-9-CM) - M19.90 (ICD-10-CM) - Arthritis of left hip   729.1 (ICD-9-CM) - M79.1 (ICD-10-CM) - Myofascial pain   780.50 (ICD-9-CM) - G47.9 (ICD-10-CM) - Sleep disturbance       Referring provider: Beverly Chaparro MD  Visit Diagnosis:     ICD-10-CM    1. Cervical radiculitis M54.12    2. Spinal stenosis of lumbar region M48.06    3. Left lumbar radiculitis M54.16    4. Left hip pain M25.552    5. Myofascial pain M79.1    6. Arthritis of left hip M19.90          Assessment:     The patient came in today with a mild antalgic gait.slight right rotation L5 very mild.  Mid to upper thoracic spine the patient has tightness in her thoracic paraspinals.  The patient has Increased tightness in the left buttock more than the right and slightly tilted sacrum with the left side beng posterior.        Tightness in her left buttock gluteus medius, sacrotuberous ligament, sacrospinous ligament, and sciatic nerve on the left today.    HEP/POC compliance is  good .  Patient demonstrates understanding/independence with home program.       Goal Status:  Pt. will be independent with home exercise program in : Progressing toward;12 weeks  Pt. will have improved quality of sleep: waking less times/night;getting 75-90% of required amount;in 12 weeks;Progressing toward  Patient will stand : 60 minutes;with no pain;in 12 weeks;Progressing toward  Patient will ascend / descend: stairs;with recipricol gait;with no pain;in 12 weeks;Partially met  Patient will sit: 60 minutes;with no pain;in 12 weeks;for driving;Comment  Comment: no change      Plan / Patient Education:     Continue with initial plan of care.  Progress with home  program as tolerated.   Progress her to more core stabilization as able and reassess the patient's spine position and mobility entire back.  Reasssess patient's hip mobility and check LE flexibility.  Progress to stabilization and hip strengthening as able especially gluteus medius. Check nerve gliding and add in adherent nerve stretch and or slump slider.  Subjective:   Had an injection Thursday December 15. Injection helped and is still happening.  She reports that she can tie her boots.  She reports that it helped about 50-60% better after the injection.  The patient reports that she was hanging for 2 hours and then had increased pain 2-3/10 pain.  The patient reports that she will be on the airplane on Saturday.  Discussed pushing versus pulling and to try to pull with her left hand more and see if that helps.   Pain Ratin/10 pain today.  When she first stands up and when she first stands up out of the bed she has increased pain        Objective:   Slight rotation to the right in the lumbar spine L5 and slight tightness in the upper back.  Significantly tight left buttock    Left buttock pain when walking properly lateral and some posterior hip pain. Mid to upper thoracic spine the patient has tightness in her thoracic paraspinals.  The patient has Increased tightness in the left buttock more than the right.      STM MFR left L5 S1 region and entire left side of the sacrum also neural mobilization to the sacrospinous ligament and sacrotuberous ligament as well as the sciatic nerve.  Treatment Today     TREATMENT MINUTES COMMENTS   Evaluation     Self-care/ Home management     Manual therapy 35 STM MFR left L5 S1 region and entire left side of the sacrum, also neural mobilization to the sacrospinous ligament and sacrotuberous ligament as well as the sciatic nerve.       Neuromuscular Re-education     Therapeutic Activity     Therapeutic Exercises 25 See HEP    Gait training     Modality__________________                 Total 60    Blank areas are intentional and mean the treatment did not include these items.       Josephine Heller PT  1/4/2017

## 2021-06-08 NOTE — TELEPHONE ENCOUNTER
Refill Approved    Rx renewed per Medication Renewal Policy. Medication was last renewed on 12/17/19.    Polly Ryan, [facility] Triage/Med Refill 6/15/2020     Requested Prescriptions   Pending Prescriptions Disp Refills     lisinopriL-hydrochlorothiazide (PRINZIDE,ZESTORETIC) 20-25 mg per tablet 180 tablet 1     Sig: Take 2 tablets by mouth daily.       Diuretics/Combination Diuretics Refill Protocol  Passed - 6/12/2020  3:22 PM        Passed - Visit with PCP or prescribing provider visit in past 12 months     Last office visit with prescriber/PCP: 8/2/2018 Eddie Diaz MD OR same dept: Visit date not found OR same specialty: 8/2/2018 Eddie Diaz MD  Last physical: 12/17/2019 Last MTM visit: Visit date not found   Next visit within 3 mo: Visit date not found  Next physical within 3 mo: Visit date not found  Prescriber OR PCP: Eddie Diaz MD  Last diagnosis associated with med order: 1. Essential hypertension  - lisinopriL-hydrochlorothiazide (PRINZIDE,ZESTORETIC) 20-25 mg per tablet; Take 2 tablets by mouth daily.  Dispense: 180 tablet; Refill: 1    If protocol passes may refill for 12 months if within 3 months of last provider visit (or a total of 15 months).             Passed - Serum Potassium in past 12 months      Lab Results   Component Value Date    Potassium 4.3 12/16/2019             Passed - Serum Sodium in past 12 months      Lab Results   Component Value Date    Sodium 143 12/16/2019             Passed - Blood pressure on file in past 12 months     BP Readings from Last 1 Encounters:   12/23/19 156/87             Passed - Serum Creatinine in past 12 months      Creatinine   Date Value Ref Range Status   12/16/2019 0.73 0.60 - 1.10 mg/dL Final

## 2021-06-08 NOTE — TELEPHONE ENCOUNTER
FYI - Status Update  Who is Calling: Patient  Update: Patient stated that she will be out on 5/15.  Okay to leave a detailed message?:  No return call needed

## 2021-06-08 NOTE — TELEPHONE ENCOUNTER
Controlled Substance Refill Request  Medication Name:   Requested Prescriptions     Pending Prescriptions Disp Refills     diazePAM (VALIUM) 5 MG tablet 15 tablet 0     Sig: Take 1 tablet (5 mg total) by mouth daily as needed.     Date Last Fill: 3/9/20  Is patient out of medication?: N/A - electronic request- The pharmacy is calling.  Patient notified refills processed within 3 business days: N/A - electronic request  Requested Pharmacy: [facility]  Submit electronically to pharmacy  Controlled Substance Agreement on file:   Encounter-Level CSA Scan Date:    There are no encounter-level csa scan date.        Last office visit:  12/23/19

## 2021-06-09 NOTE — TELEPHONE ENCOUNTER
Controlled Substance Refill Request  Medication Name:   Requested Prescriptions     Pending Prescriptions Disp Refills     diazePAM (VALIUM) 5 MG tablet [Pharmacy Med Name: DIAZEPAM 5 MG TABLET] 15 tablet 0     Sig: TAKE 1 TABLET (5 MG TOTAL) BY MOUTH DAILY AS NEEDED.     Date Last Fill: 5/12/20  Requested Pharmacy: [facility]  Submit electronically to pharmacy  Controlled Substance Agreement on file:   Encounter-Level CSA Scan Date:    There are no encounter-level csa scan date.        Last office visit:  12/17/19

## 2021-06-09 NOTE — TELEPHONE ENCOUNTER
Medication: diazepam 5 mg    Last Date Filled 05/12/20     pulled: No- [place] not authorized      Only PCP Prescribing?: YES    Taken as prescribed from physician notes?: YES  Patient uses diazepam infrequently for increased anxiety  She has not been using the medication on a regular basis but with increasing stress with her daughter who she is helping care for with brain cancer she is found herself periodically using a half a tablet after she has a seizure  She is requesting refills today  Discussed possible side effects tolerance and addiction with this medication  Discussed with patient if this becomes more frequent and use consideration for daily controlling medication  - diazePAM (VALIUM) 5 MG tablet; Take 1 tablet (5 mg total) by mouth daily as needed.  Dispense: 15 tablet; Refill: 0    CSA in last year: NO    Random Utox in last year: NO    Opioids + benzodiazepines? NO

## 2021-06-11 ENCOUNTER — OFFICE VISIT - HEALTHEAST (OUTPATIENT)
Dept: PHYSICAL THERAPY | Facility: REHABILITATION | Age: 81
End: 2021-06-11

## 2021-06-11 DIAGNOSIS — M54.41 CHRONIC BILATERAL LOW BACK PAIN WITH RIGHT-SIDED SCIATICA: ICD-10-CM

## 2021-06-11 DIAGNOSIS — M48.061 SPINAL STENOSIS OF LUMBAR REGION WITHOUT NEUROGENIC CLAUDICATION: ICD-10-CM

## 2021-06-11 DIAGNOSIS — M51.369 DDD (DEGENERATIVE DISC DISEASE), LUMBAR: ICD-10-CM

## 2021-06-11 DIAGNOSIS — G89.29 CHRONIC BILATERAL LOW BACK PAIN WITH RIGHT-SIDED SCIATICA: ICD-10-CM

## 2021-06-11 DIAGNOSIS — M54.50 LOW BACK PAIN POTENTIALLY ASSOCIATED WITH RADICULOPATHY: ICD-10-CM

## 2021-06-11 DIAGNOSIS — M54.16 RIGHT LUMBAR RADICULITIS: ICD-10-CM

## 2021-06-11 NOTE — TELEPHONE ENCOUNTER
Controlled Substance Refill Request  Medication Name:   Requested Prescriptions     Pending Prescriptions Disp Refills     diazePAM (VALIUM) 5 MG tablet [Pharmacy Med Name: DIAZEPAM 5 MG TABLET] 15 tablet 0     Sig: TAKE 1 TABLET (5 MG TOTAL) BY MOUTH DAILY AS NEEDED.     Date Last Fill: 6/29/20  Requested Pharmacy: [facility]  Submit electronically to pharmacy  Controlled Substance Agreement on file:   Encounter-Level CSA Scan Date:    There are no encounter-level csa scan date.        Last office visit:  12/17/19

## 2021-06-12 NOTE — PROGRESS NOTES
[facility] Discharge Summary  Patient Name: Lluvia Marrufo  Date: 9/1/2017  Referral Diagnosis: lumbar radiculitis, left hip pain , myofascial pain, spinal stenosis   Referring provider: Beverly Chaparro MD  Visit Diagnosis:   1. Left lumbar radiculitis     2. Spinal stenosis of lumbar region     3. Left hip pain     4. Myofascial pain     5. Arthritis of left hip         Goals:  See below for goal assessment    Patient was seen for 5 visits from 10/12/16 to 1/4/17 with 1 missed appointments.      Therapy will be discontinued at this time.  The patient will need a new referral to resume.    Thank you for your referral.  Josephine Licea  9/1/2017  1:15 PM

## 2021-06-12 NOTE — PROGRESS NOTES
Assessment:   Surgery: Left hip arthroplasty  Surgeon: Dr. Monsalve  Date of surgery: 10/3/2017  77 y.o.  female  with planned surgery as above.    Known risk factors for perioperative complications: None    Difficulty with intubation is not anticipated.    Cardiac Risk Estimation: low  Patient is cleared for surgery at this time with no anticipated complication.    Current medications which may produce withdrawal symptoms if withheld perioperatively: none      Plan:      1. Preoperative workup as follows ECG, hemoglobin, electrolytes, creatinine.  2. Change in medication regimen before surgery: Patient will hold aspirin 1 week prior to surgery and will not take lisinopril medication day of surgery.  3. Prophylaxis for cardiac events with perioperative beta-blockers: not indicated.  4. Deep vein thrombosis prophylaxis postoperatively:regimen to be chosen by surgical team.     Subjective:      Lluvia Marrufo is a 77 y.o.  female who presents to the office today for a preoperative consultation at the request of surgeon Gricel who plans on performing above surgery on October 3. This consultation is requested for the specific conditions prompting preoperative evaluation (i.e. because of potential affect on operative risk): none. Planned anesthesia: spinal. The patient has the following known anesthesia issues: none. Patients bleeding risk: no recent abnormal bleeding. .  Patient has had increasing pain in her left hip despite physical therapy and injections continues to have irritation starting to limit daily activities.  Patient is elected for surgical correction for improvement.  She has tolerated previous surgeries multiple times without complication.  The following portions of the patient's history were reviewed and updated as appropriate: allergies, current medications, past family history, past medical history, past social history, past surgical history and problem list.  Allergies   Allergen Reactions      Azithromycin Nausea Only     Review of Systems  Pertinent items are noted in HPI.      Objective:     Current Outpatient Prescriptions   Medication Sig Dispense Refill     albuterol (PROVENTIL HFA;VENTOLIN HFA) 90 mcg/actuation inhaler Inhale 2 puffs every 4 (four) hours as needed for wheezing or shortness of breath. 3 Inhaler 5     albuterol (PROVENTIL) 2.5 mg /3 mL (0.083 %) nebulizer solution Take 3 mL (2.5 mg total) by nebulization every 4 (four) hours as needed for wheezing or shortness of breath. 75 mL 1     amLODIPine (NORVASC) 10 MG tablet TAKE 1 TABLET (10 MG TOTAL) BY MOUTH DAILY. 90 tablet 3     atenolol (TENORMIN) 25 MG tablet TAKE 1 TABLET (25 MG TOTAL) BY MOUTH DAILY. 90 tablet 3     diazePAM (VALIUM) 5 MG tablet TAKE 1 TABLET BY MOUTH DAILY AS NEEDED 15 tablet 0     gabapentin (NEURONTIN) 100 MG capsule Increase dose as directed by chart.  May increase to 1 tabs 3 times daily 180 capsule 2     glucosamine-chondroitin 500-400 mg cap Take 2 capsules by mouth daily.       ibuprofen (ADVIL,MOTRIN) 200 MG tablet Take 400-600 mg by mouth every 6 (six) hours as needed for pain.       ibuprofen-diphenhydramine cit (ADVIL PM) 200-38 mg Tab Take 1 tablet by mouth bedtime as needed.       KLOR-CON M20 20 mEq tablet TAKE 1 TABLET 3 TIMES DAILY. 270 tablet 1     lisinopril-hydrochlorothiazide (PRINZIDE,ZESTORETIC) 20-25 mg per tablet Take 1 tablet by mouth daily. 90 tablet 3     multivitamin with minerals (THERA-M) 9 mg iron-400 mcg Tab tablet Take 1 tablet by mouth daily.       nebulizer accessories Misc use as directed, mask and tubing 1 each 1     No current facility-administered medications for this visit.      Past Medical History:   Diagnosis Date     Asthma      Bronchitis      Earache      Fracture, foot      Hypertension      UTI (urinary tract infection)      /88 (Patient Site: Left Arm, Patient Position: Sitting, Cuff Size: Adult Large)  Pulse 66  Temp 98.2  F (36.8  C) (Oral)   Resp 18  Ht 5'  "2\" (1.575 m)  Wt 204 lb (92.5 kg)  BMI 37.31 kg/m2       General Appearance:    Alert, cooperative, no distress, appears stated age, able to the aid of a cane   Head:    Normocephalic, without obvious abnormality, atraumatic   Eyes:    PERRL, conjunctiva/corneas clear, EOM's intact, fundi     benign, both eyes   Ears:    Normal TM's and external ear canals, both ears   Nose:   Nares normal, septum midline, mucosa normal, no drainage    or sinus tenderness   Throat:   Lips, mucosa, and tongue normal; teeth and gums normal   Neck:   Supple, symmetrical, trachea midline, no adenopathy;     thyroid:  no enlargement/tenderness/nodules; no carotid    bruit or JVD   Back:     Symmetric, no curvature, ROM normal, no CVA tenderness   Lungs:     Clear to auscultation bilaterally, respirations unlabored   Chest Wall:    No tenderness or deformity    Heart:    Regular rate and rhythm, S1 and S2 normal, no murmur, rub   or gallop       Abdomen:     Soft, non-tender, bowel sounds active all four quadrants,     no masses, no organomegaly           Extremities:   Extremities normal, atraumatic, no cyanosis or edema   Pulses:   2+ and symmetric all extremities   Skin:   Skin color, texture, turgor normal, no rashes or lesions   Lymph nodes:   Cervical, supraclavicular, and axillary nodes normal   Neurologic:   CNII-XII intact, normal strength, sensation and reflexes     throughout         Predictors of intubation difficulty:   Morbid obesity? no   Anatomically abnormal facies? no   Neck range of motion: normal   Dentition: No chipped, loose, or missing teeth.    Cardiographics  ECG: normal sinus rhythm, no blocks or conduction defects, no ischemic changes    Imaging  Reviewed x-ray imaging from last year    Lab Review Heme, Cr and K pending         "

## 2021-06-13 NOTE — ANESTHESIA POSTPROCEDURE EVALUATION
Patient: Lluvia Marrufo  #3 LEFT TOTAL HIP ARTHROPLASTY  Anesthesia type: spinal    Patient location: PACU  Last vitals:   Vitals:    10/03/17 1400   BP: 128/61   Pulse: 76   Resp: 21   Temp:    SpO2: 98%     Post vital signs: stable  Level of consciousness: awake and responds to simple questions  Post-anesthesia pain: pain controlled  Post-anesthesia nausea and vomiting: no  Pulmonary: unassisted, return to baseline  Cardiovascular: stable and blood pressure at baseline  Hydration: adequate  Anesthetic events: no    QCDR Measures:  ASA# 11 - Dora-op Cardiac Arrest: ASA11B - Patient did NOT experience unanticipated cardiac arrest  ASA# 12 - Dora-op Mortality Rate: ASA12B - Patient did NOT die  ASA# 13 - PACU Re-Intubation Rate: NA - No ETT / LMA used for case  ASA# 10 - Composite Anes Safety: ASA10A - No serious adverse event    Additional Notes:

## 2021-06-13 NOTE — ANESTHESIA PROCEDURE NOTES
Spinal Block    Patient location during procedure: OR  Start time: 10/3/2017 12:17 PM  End time: 10/3/2017 12:27 PM  Reason for block: primary anesthetic    Staffing:  Performing  Anesthesiologist: STEFANIA CORBETT    Preanesthetic Checklist  Completed: patient identified, risks, benefits, and alternatives discussed, timeout performed, consent obtained, airway assessed, oxygen available, suction available, emergency drugs available and hand hygiene performed  Spinal Block  Patient position: sitting  Prep: ChloraPrep and site prepped and draped  Patient monitoring: heart rate, cardiac monitor, continuous pulse ox and blood pressure  Approach: midline  Location: L3-4  Injection technique: single-shot  Needle type: pencil-tip   Needle gauge: 24 G

## 2021-06-13 NOTE — TELEPHONE ENCOUNTER
Due to be seen with labs    Rx renewed per Medication Renewal Policy. Medication was last renewed on 6/17/220.4/22/20.    Polly Ryan, [facility] Triage/Med Refill 12/16/2020     Requested Prescriptions   Pending Prescriptions Disp Refills     atenoloL (TENORMIN) 50 MG tablet [Pharmacy Med Name: ATENOLOL 50 MG TABLET] 135 tablet 2     Sig: TAKE 1 AND 1/2 TABLETS BY MOUTH DAILY       Beta-Blockers Refill Protocol Passed - 12/15/2020  3:14 PM        Passed - PCP or prescribing provider visit in past 12 months or next 3 months     Last office visit with prescriber/PCP: 8/2/2018 Eddie Diaz MD OR same dept: Visit date not found OR same specialty: 8/2/2018 Eddie Diaz MD  Last physical: 12/17/2019 Last MTM visit: Visit date not found   Next visit within 3 mo: Visit date not found  Next physical within 3 mo: Visit date not found  Prescriber OR PCP: Eddie Diaz MD  Last diagnosis associated with med order: 1. Essential hypertension  - atenoloL (TENORMIN) 50 MG tablet [Pharmacy Med Name: ATENOLOL 50 MG TABLET]; TAKE 1 AND 1/2 TABLETS BY MOUTH DAILY  Dispense: 135 tablet; Refill: 2  - amLODIPine (NORVASC) 10 MG tablet [Pharmacy Med Name: AMLODIPINE BESYLATE 10 MG TAB]; TAKE 1 TABLET BY MOUTH EVERY DAY  Dispense: 90 tablet; Refill: 1  - lisinopriL-hydrochlorothiazide (PRINZIDE,ZESTORETIC) 20-25 mg per tablet [Pharmacy Med Name: LISINOPRIL-HCTZ 20-25 MG TAB]; TAKE 2 TABLETS BY MOUTH EVERY DAY  Dispense: 180 tablet; Refill: 1    If protocol passes may refill for 12 months if within 3 months of last provider visit (or a total of 15 months).             Passed - Blood pressure filed in past 12 months     BP Readings from Last 1 Encounters:   12/23/19 156/87                amLODIPine (NORVASC) 10 MG tablet [Pharmacy Med Name: AMLODIPINE BESYLATE 10 MG TAB] 90 tablet 1     Sig: TAKE 1 TABLET BY MOUTH EVERY DAY       Calcium-Channel Blockers Protocol Passed - 12/15/2020  3:14 PM        Passed -  PCP or prescribing provider visit in past 12 months or next 3 months     Last office visit with prescriber/PCP: 8/2/2018 Eddie Diaz MD OR same dept: Visit date not found OR same specialty: 8/2/2018 Eddie Diaz MD  Last physical: 12/17/2019 Last MTM visit: Visit date not found   Next visit within 3 mo: Visit date not found  Next physical within 3 mo: Visit date not found  Prescriber OR PCP: Eddie Diaz MD  Last diagnosis associated with med order: 1. Essential hypertension  - atenoloL (TENORMIN) 50 MG tablet [Pharmacy Med Name: ATENOLOL 50 MG TABLET]; TAKE 1 AND 1/2 TABLETS BY MOUTH DAILY  Dispense: 135 tablet; Refill: 2  - amLODIPine (NORVASC) 10 MG tablet [Pharmacy Med Name: AMLODIPINE BESYLATE 10 MG TAB]; TAKE 1 TABLET BY MOUTH EVERY DAY  Dispense: 90 tablet; Refill: 1  - lisinopriL-hydrochlorothiazide (PRINZIDE,ZESTORETIC) 20-25 mg per tablet [Pharmacy Med Name: LISINOPRIL-HCTZ 20-25 MG TAB]; TAKE 2 TABLETS BY MOUTH EVERY DAY  Dispense: 180 tablet; Refill: 1    If protocol passes may refill for 12 months if within 3 months of last provider visit (or a total of 15 months).             Passed - Blood pressure filed in past 12 months     BP Readings from Last 1 Encounters:   12/23/19 156/87                lisinopriL-hydrochlorothiazide (PRINZIDE,ZESTORETIC) 20-25 mg per tablet [Pharmacy Med Name: LISINOPRIL-HCTZ 20-25 MG TAB] 180 tablet 1     Sig: TAKE 2 TABLETS BY MOUTH EVERY DAY       Diuretics/Combination Diuretics Refill Protocol  Passed - 12/15/2020  3:14 PM        Passed - Visit with PCP or prescribing provider visit in past 12 months     Last office visit with prescriber/PCP: 8/2/2018 Eddie Diaz MD OR same dept: Visit date not found OR same specialty: 8/2/2018 Eddie Diaz MD  Last physical: 12/17/2019 Last MTM visit: Visit date not found   Next visit within 3 mo: Visit date not found  Next physical within 3 mo: Visit date not found  Prescriber OR PCP:  Eddie Diaz MD  Last diagnosis associated with med order: 1. Essential hypertension  - atenoloL (TENORMIN) 50 MG tablet [Pharmacy Med Name: ATENOLOL 50 MG TABLET]; TAKE 1 AND 1/2 TABLETS BY MOUTH DAILY  Dispense: 135 tablet; Refill: 2  - amLODIPine (NORVASC) 10 MG tablet [Pharmacy Med Name: AMLODIPINE BESYLATE 10 MG TAB]; TAKE 1 TABLET BY MOUTH EVERY DAY  Dispense: 90 tablet; Refill: 1  - lisinopriL-hydrochlorothiazide (PRINZIDE,ZESTORETIC) 20-25 mg per tablet [Pharmacy Med Name: LISINOPRIL-HCTZ 20-25 MG TAB]; TAKE 2 TABLETS BY MOUTH EVERY DAY  Dispense: 180 tablet; Refill: 1    If protocol passes may refill for 12 months if within 3 months of last provider visit (or a total of 15 months).             Passed - Serum Potassium in past 12 months      No results found for: LN-POTASSIUM          Passed - Serum Sodium in past 12 months      No results found for: LN-SODIUM          Passed - Blood pressure on file in past 12 months     BP Readings from Last 1 Encounters:   12/23/19 156/87             Passed - Serum Creatinine in past 12 months      Creatinine   Date Value Ref Range Status   12/16/2019 0.73 0.60 - 1.10 mg/dL Final

## 2021-06-13 NOTE — PROGRESS NOTES
ASSESSMENT:  1. Healthcare maintenance  - Td, Preservative Free (green label)    2. Hypomagnesemia  She has not started her magnesium supplement due to some loose stools she experienced 4 nights ago.  Stools are now back to normal.  Encouraged her to start magnesium replacement supplement and we will recheck magnesium today as baseline.  She was instructed on examples of high magnesium foods.  - Magnesium    3. HTN (hypertension)  Blood pressure slightly elevated today, suspect related to pain.  Reports her blood pressure is typically well controlled.  Continue current medication regimen, and continue to monitor blood pressure.  - Basic Metabolic Panel    4.  Status post left total hip replacement  She has her orthopedics follow-up appointment scheduled.  Has some home exercises she is supposed to do until that time.  Recheck her blood count today due to postop anemia.    I have reconciled all of the medications.     PLAN:  Patient Instructions   For magnesium level, try increasing consumption of foods such as avocado, nuts, legumes, tofu, seeds, and whole grains.       Orders Placed This Encounter   Procedures     Td, Preservative Free (green label)     Magnesium     Basic Metabolic Panel     HM2(CBC w/o Differential)     There are no discontinued medications.    No Follow-up on file.    CHIEF COMPLAINT:  Chief Complaint   Patient presents with     Hospital Visit Follow Up     [facility] D/C 10/5/2017; Total Hip Replacement       HISTORY OF PRESENT ILLNESS:  Lluvia is a 77 y.o. female presenting to the clinic today for a hospital follow up. She was admitted on 10/3/2017 for left total hip arthroplasty. She was contacted after discharge on 10/5/2017 and was advised to follow up post discharge. She has been surprised by the amount of pain she has been in. She has had two shoulder surgeries and one knee replacement, and she tolerated those much better. She has been taking some oxycodone for pain control, but she tries not  to take too much of it. She has been doing basic exercises at home, but she will start formal therapy at [facility] on 10/23. She has not had a post operative check yet. She does not have any concerns about the incision other than the pain. Her pain is the worst in the mornings. She has been taking aspirin twice daily for anticoagulation.    Hypertension: Her systolic blood pressure is usually around 120-130. It is slightly higher today, but she attributes that to stress and pain.     Health Maintenance: She will get a tetanus booster today. She already got a flu shot this year.    REVIEW OF SYSTEMS:   She has not taken the extra magnesium since discharge because of diarrhea. She has been regular since then but will have a magnesium level rechecked. She has not been taking an iron supplement. All other systems are negative.    PFSH:  She had a great grandchild born this morning, but the bay is in the NICU.     TOBACCO USE:  History   Smoking Status     Never Smoker   Smokeless Tobacco     Never Used       VITALS:  Vitals:    10/09/17 1120 10/09/17 1148   BP: 142/78 142/68   Patient Site: Right Arm    Patient Position: Sitting    Cuff Size: Adult Large    Pulse: 92    Resp: 16    Temp: 97.7  F (36.5  C)    TempSrc: Oral    Weight: 207 lb 8 oz (94.1 kg)      Wt Readings from Last 3 Encounters:   10/09/17 207 lb 8 oz (94.1 kg)   10/03/17 206 lb (93.4 kg)   09/08/17 204 lb (92.5 kg)       PHYSICAL EXAM:  GENERAL:  Pleasant, well-appearing patient in no acute distress.  REPEAT BP BY MD: 142/68, RUE, sitting.   VITAL SIGNS:  Reviewed.  CARDIOVASCULAR:  Heart regular rate and rhythm without murmur.  Normal S1 and  S2.  LUNGS:  Clear to auscultation bilaterally without wheezes or crackles.  Good  air movement throughout.    EXTREMITIES:  Warm and well perfused without edema. Dorsalis pedis pulses easily palpable and symmetric bilaterally.  NEURO: Alert and oriented. Grossly nonfocal.  PSYCHIATRIC: Presents on time and well  groomed.  Normal speech and thought content.  Full affect.  No abnormal movements or behaviors noted.   Musculoskeletal: Exam of the left hip reveals adherent dressing in place along left hip incision, dry.    ADDITIONAL HISTORY SUMMARIZED (2): Reviewed hospital notes from 10/3 - 10/5 regarding NARCISO   DECISION TO OBTAIN EXTRA INFORMATION (1): None.   RADIOLOGY TESTS SUMMARIZED OR ORDERED (1): None.  LABS REVIEWED OR ORDERED (1): Labs from 10/3 - 10/5  reviewed. Labs ordered.   MEDICINE TESTS SUMMARIZED OR ORDERED (1): None.  INDEPENDENT REVIEW OF EKG OR X-RAY (2 each): None.     The visit lasted a total of 13 minutes face to face with the patient. Over 50% of the time was spent counseling and educating the patient about her recent NARCISO.    IOmar, am scribing for and in the presence of, Dr. Huber.    IDr. Huber, personally performed the services described in this documentation, as scribed by Omar Harmon in my presence, and it is both accurate and complete.    MEDICATIONS:  Current Outpatient Prescriptions   Medication Sig Dispense Refill     acetaminophen (TYLENOL) 500 MG tablet Take 2 tablets (1,000 mg total) by mouth 3 (three) times a day.  0     albuterol (PROVENTIL HFA;VENTOLIN HFA) 90 mcg/actuation inhaler Inhale 2 puffs every 4 (four) hours as needed for wheezing or shortness of breath. 3 Inhaler 5     albuterol (PROVENTIL) 2.5 mg /3 mL (0.083 %) nebulizer solution Take 3 mL (2.5 mg total) by nebulization every 4 (four) hours as needed for wheezing or shortness of breath. 75 mL 1     amLODIPine (NORVASC) 10 MG tablet Take 10 mg by mouth daily.       aspirin 325 MG tablet Take 1 tablet (325 mg total) by mouth 2 (two) times a day with meals. 60 tablet 0     atenolol (TENORMIN) 25 MG tablet Take 25 mg by mouth daily.       diazePAM (VALIUM) 5 MG tablet Take 5 mg by mouth daily as needed for anxiety or sleep.       diphenhydrAMINE (BENADRYL) 25 mg tablet Take 25 mg by mouth at bedtime as needed for  sleep.       docusate sodium (COLACE) 100 MG capsule Take 1 capsule (100 mg total) by mouth 2 (two) times a day as needed for constipation.  0     gabapentin (NEURONTIN) 100 MG capsule Take 100 mg by mouth 3 (three) times a day.       glucosamine-chondroitin 500-400 mg cap Take 2 capsules by mouth daily.       lisinopril-hydrochlorothiazide (PRINZIDE,ZESTORETIC) 20-25 mg per tablet Take 1 tablet by mouth daily. 90 tablet 3     multivitamin with minerals (THERA-M) 9 mg iron-400 mcg Tab tablet Take 1 tablet by mouth daily.       nebulizer accessories Misc use as directed, mask and tubing 1 each 1     oxyCODONE (ROXICODONE) 5 MG immediate release tablet Take 1-2 tablets (5-10 mg total) by mouth every 6 (six) hours as needed for pain. 60 tablet 0     polyethylene glycol (MIRALAX) 17 gram packet Take 1 packet (17 g total) by mouth daily as needed.  0     potassium chloride SA (K-DUR,KLOR-CON) 20 MEQ tablet Take 20 mEq by mouth 3 (three) times a day.       No current facility-administered medications for this visit.        Total data points: 3

## 2021-06-13 NOTE — TELEPHONE ENCOUNTER
Controlled Substance Refill Request  Medication Name:   Requested Prescriptions     Pending Prescriptions Disp Refills     diazePAM (VALIUM) 5 MG tablet [Pharmacy Med Name: DIAZEPAM 5 MG TABLET] 15 tablet 0     Sig: TAKE 1 TABLET (5 MG TOTAL) BY MOUTH DAILY AS NEEDED.     Date Last Fill: 9/9/20  Requested [facility]acy: [facility]  Submit electronically to pharmacy  Controlled Substance Agreement on file:   Encounter-Level CSA Scan Date:    There are no encounter-level csa scan date.        Last office visit:  12/17/19

## 2021-06-13 NOTE — ANESTHESIA CARE TRANSFER NOTE
Last vitals:   Vitals:    10/03/17 1353   BP: 128/61   Pulse: 73   Resp: 14   Temp: 37  C (98.6  F)   SpO2: 100%     Patient's level of consciousness is drowsy  Spontaneous respirations: yes  Maintains airway independently: yes  Dentition unchanged: yes  Oropharynx: oropharynx clear of all foreign objects    QCDR Measures:  ASA# 20 - Surgical Safety Checklist: WHO surgical safety checklist completed prior to induction  PQRS# 430 - Adult PONV Prevention: 4558F - Pt received => 2 anti-emetic agents (different classes) preop & intraop  ASA# 8 - Peds PONV Prevention: NA - Not pediatric patient, not GA or 2 or more risk factors NOT present  PQRS# 424 - Dora-op Temp Management: 4559F - At least one body temp DOCUMENTED => 35.5C or 95.9F within required timeframe  PQRS# 426 - PACU Transfer Protocol: - Transfer of care checklist used  ASA# 14 - Acute Post-op Pain: ASA14B - Patient did NOT experience pain >= 7 out of 10

## 2021-06-14 ENCOUNTER — HOSPITAL ENCOUNTER (OUTPATIENT)
Dept: MRI IMAGING | Facility: HOSPITAL | Age: 81
Discharge: HOME | End: 2021-06-14
Payer: COMMERCIAL

## 2021-06-14 DIAGNOSIS — G89.29 CHRONIC BILATERAL LOW BACK PAIN WITH RIGHT-SIDED SCIATICA: ICD-10-CM

## 2021-06-14 DIAGNOSIS — M51.369 DDD (DEGENERATIVE DISC DISEASE), LUMBAR: ICD-10-CM

## 2021-06-14 DIAGNOSIS — M54.16 RIGHT LUMBAR RADICULITIS: ICD-10-CM

## 2021-06-14 DIAGNOSIS — M48.061 SPINAL STENOSIS OF LUMBAR REGION WITHOUT NEUROGENIC CLAUDICATION: ICD-10-CM

## 2021-06-14 DIAGNOSIS — M43.16 SPONDYLOLISTHESIS OF LUMBAR REGION: ICD-10-CM

## 2021-06-14 DIAGNOSIS — M54.41 CHRONIC BILATERAL LOW BACK PAIN WITH RIGHT-SIDED SCIATICA: ICD-10-CM

## 2021-06-14 NOTE — TELEPHONE ENCOUNTER
RN cannot approve Refill Request    RN can NOT refill this medication Protocol failed and NO refill given. Last office visit: 8/2/2018 Eddie Diaz MD Last Physical: 12/17/2019 Last MTM visit: Visit date not found Last visit same specialty: 8/2/2018 Eddie Diaz MD.  Next visit within 3 mo: Visit date not found  Next physical within 3 mo: Visit date not found      Jamie Qiu, [facility] Triage/Med Refill 2/1/2021    Requested Prescriptions   Pending Prescriptions Disp Refills     potassium chloride (KLOR-CON M20) 20 MEQ tablet [Pharmacy Med Name: KLOR-CON M20 TABLET] 270 tablet 2     Sig: TAKE 1 TABLET BY MOUTH THREE TIMES A DAY       Potassium Supplements Refill Protocol Failed - 2/1/2021 12:40 AM        Failed - PCP or prescribing provider visit in past 12 months       Last office visit with prescriber/PCP: 8/2/2018 Eddie Diaz MD OR same dept: Visit date not found OR same specialty: 8/2/2018 Eddie Diaz MD  Last physical: 12/17/2019 Last MTM visit: Visit date not found   Next visit within 3 mo: Visit date not found  Next physical within 3 mo: Visit date not found  Prescriber OR PCP: Eddie Diaz MD  Last diagnosis associated with med order: 1. Essential hypertension  - KLOR-CON M20 20 mEq tablet [Pharmacy Med Name: KLOR-CON M20 TABLET]; TAKE 1 TABLET BY MOUTH THREE TIMES A DAY  Dispense: 270 tablet; Refill: 2    2. Expressive aphasia  - atorvastatin (LIPITOR) 20 MG tablet [Pharmacy Med Name: ATORVASTATIN 20 MG TABLET]; TAKE 1 TABLET BY MOUTH EVERY DAY  Dispense: 90 tablet; Refill: 2    If protocol passes may refill for 12 months if within 3 months of last provider visit (or a total of 15 months).             Failed - Potassium level in last 12 months     No results found for: LN-POTASSIUM             atorvastatin (LIPITOR) 20 MG tablet [Pharmacy Med Name: ATORVASTATIN 20 MG TABLET] 90 tablet 2     Sig: TAKE 1 TABLET BY MOUTH EVERY DAY       Statins Refill  Protocol (Hmg CoA Reductase Inhibitors) Failed - 2/1/2021 12:40 AM        Failed - PCP or prescribing provider visit in past 12 months      Last office visit with prescriber/PCP: 8/2/2018 Eddie Diaz MD OR same dept: Visit date not found OR same specialty: 8/2/2018 Eddie Diaz MD  Last physical: 12/17/2019 Last MTM visit: Visit date not found   Next visit within 3 mo: Visit date not found  Next physical within 3 mo: Visit date not found  Prescriber OR PCP: Eddie Diaz MD  Last diagnosis associated with med order: 1. Essential hypertension  - KLOR-CON M20 20 mEq tablet [Pharmacy Med Name: KLOR-CON M20 TABLET]; TAKE 1 TABLET BY MOUTH THREE TIMES A DAY  Dispense: 270 tablet; Refill: 2    2. Expressive aphasia  - atorvastatin (LIPITOR) 20 MG tablet [Pharmacy Med Name: ATORVASTATIN 20 MG TABLET]; TAKE 1 TABLET BY MOUTH EVERY DAY  Dispense: 90 tablet; Refill: 2    If protocol passes may refill for 12 months if within 3 months of last provider visit (or a total of 15 months).

## 2021-06-14 NOTE — TELEPHONE ENCOUNTER
Controlled Substance Refill Request  Medication Name:   Requested Prescriptions     Pending Prescriptions Disp Refills     diazePAM (VALIUM) 5 MG tablet [Pharmacy Med Name: DIAZEPAM 5 MG TABLET] 15 tablet 0     Sig: TAKE 1 TABLET (5 MG TOTAL) BY MOUTH DAILY AS NEEDED.     Date Last Fill:  11/18/2020  Is patient out of medication?: N/A - electronic request  Patient notified refills processed within 3 business days: N/A - electronic request  Requested Pharmacy: [id number], [place], [place]   Submit electronically to pharmacy  Controlled Substance Agreement on file:   Encounter-Level CSA Scan Date:    There are no encounter-level csa scan date.        Last office visit:  12/23/2019 with JOVITA Villegas CNP @ [facility]

## 2021-06-14 NOTE — PROGRESS NOTES
Assessment & Plan:  1. Bronchitis  - benzonatate (TESSALON) 200 MG capsule; Take 1 capsule (200 mg total) by mouth 3 (three) times a day as needed for cough.  Dispense: 30 capsule; Refill: 0  - doxycycline (MONODOX) 100 MG capsule; Take 1 capsule (100 mg total) by mouth 2 (two) times a day for 7 days.  Dispense: 14 capsule; Refill: 0  - predniSONE (DELTASONE) 10 mg tablet; Take 1 tablet (10 mg total) by mouth daily for 5 days.  Dispense: 5 tablet; Refill: 0    2. Spinal stenosis of lumbar region, unspecified whether neurogenic claudication present  - gabapentin (NEURONTIN) 100 MG capsule; Take 100 mg by mouth 3 (three) times a day.  Dispense: 270 capsule; Refill: 1    Here today with bronchial infection.  Poor air movement, given antibiotic, prednisone pack and cough suppressant.  Return if fever develops or if worsening symptoms.  Refill provided for gabapentin which patient takes for history of spinal stenosis with disc issues.    There are no Patient Instructions on file for this visit.    No orders of the defined types were placed in this encounter.    Medications Discontinued During This Encounter   Medication Reason     gabapentin (NEURONTIN) 100 MG capsule Reorder           Chief Complaint:   Chief Complaint   Patient presents with     URI     C/O cough, chest tightness, sore throat x 5 days.       History of Present Illness:  Lluvia is a 77 y.o. female presenting to the clinic today with 7-8 days of harsh nonproductive cough.  She has not had fever chills but typically gets bronchial infections fairly frequently.  Patient has a history of asthma which is typically well controlled with as needed inhaler use, however inhalers not controlling symptoms at this time and she has been using on a fairly regular basis about every 4 hours as needed.  Cough is harsh, barky.  She does feel short of breath with exertion.  She has been wheezing at times.  Review of Systems:  All other systems are negative except as  noted above.     PFSH:  Reviewed and updated.     Tobacco Use:  History   Smoking Status     Never Smoker   Smokeless Tobacco     Never Used       Vitals:  Vitals:    12/21/17 1337   BP: 142/76   Patient Site: Right Arm   Patient Position: Sitting   Cuff Size: Adult Large   Pulse: (!) 52   Resp: 14   Temp: 97.7  F (36.5  C)   TempSrc: Oral   Weight: 198 lb 6 oz (90 kg)     Wt Readings from Last 3 Encounters:   12/21/17 198 lb 6 oz (90 kg)   10/09/17 207 lb 8 oz (94.1 kg)   10/03/17 206 lb (93.4 kg)       Physical Exam:  Constitutional:  Reveals an alert, cooperative, 77 year old female in no acute distress.  Vitals:  Per nursing notes.  Eyes: PERRLA, funduscopic exam within normal limits.  HENT: TMs clear bilaterally,Oropharynx with erythema, clear posterior nasal drainage, no thrush. Neck supple,  thyroid not palpable.  Cardiovascular:  Regular rate and rhythm without murmurs, rubs, or gallops. Carotids without bruits. Legs without edema.   Respiratory: Clear.  Respiratory effort normal.  Lymph: Anterior cervical lymphadenopathy present, Posterior cervical lymphadenopathy not present, lymph nodes non tender to palpation.   Psychiatric:  Mood appropriate, alert and oriented x3.    Data Reviewed:  Additional History from Old Records or Another Person Summarized (2 total): None.     Decision to Obtain Extra information (1 total): None.     Radiology Tests Summarized and Ordered (XRAY/CT/MRI/DXA) (1 total): None.    Labs Reviewed and Ordered (1 total):0    Medicine Tests Summarized and Ordered (EKG/ECHO/COLONOSCOPY/EGD) (1 total): None.    Independent Review of EKG or X-Ray (2 each): None.    The visit lasted a total of 20 minutes face to face with the patient. Over 50% of the time was spent counseling and educating the patient about plan of care.    Medications:  Current Outpatient Prescriptions   Medication Sig Dispense Refill     albuterol (PROVENTIL HFA;VENTOLIN HFA) 90 mcg/actuation inhaler Inhale 2 puffs every 4  (four) hours as needed for wheezing or shortness of breath. 3 Inhaler 5     albuterol (PROVENTIL) 2.5 mg /3 mL (0.083 %) nebulizer solution Take 3 mL (2.5 mg total) by nebulization every 4 (four) hours as needed for wheezing or shortness of breath. 75 mL 1     amLODIPine (NORVASC) 10 MG tablet Take 10 mg by mouth daily.       atenolol (TENORMIN) 25 MG tablet Take 25 mg by mouth daily.       diazePAM (VALIUM) 5 MG tablet Take 5 mg by mouth daily as needed for anxiety or sleep.       gabapentin (NEURONTIN) 100 MG capsule Take 100 mg by mouth 3 (three) times a day. 270 capsule 1     glucosamine-chondroitin 500-400 mg cap Take 2 capsules by mouth daily.       lisinopril-hydrochlorothiazide (PRINZIDE,ZESTORETIC) 20-25 mg per tablet Take 1 tablet by mouth daily. 90 tablet 3     multivitamin with minerals (THERA-M) 9 mg iron-400 mcg Tab tablet Take 1 tablet by mouth daily.       nebulizer accessories Misc use as directed, mask and tubing 1 each 1     potassium chloride SA (K-DUR,KLOR-CON) 20 MEQ tablet Take 20 mEq by mouth 3 (three) times a day.       acetaminophen (TYLENOL) 500 MG tablet Take 2 tablets (1,000 mg total) by mouth 3 (three) times a day.  0     aspirin 325 MG tablet Take 1 tablet (325 mg total) by mouth 2 (two) times a day with meals. 60 tablet 0     atenolol (TENORMIN) 25 MG tablet TAKE 1 TABLET (25 MG TOTAL) BY MOUTH DAILY. 90 tablet 3     benzonatate (TESSALON) 200 MG capsule Take 1 capsule (200 mg total) by mouth 3 (three) times a day as needed for cough. 30 capsule 0     diazePAM (VALIUM) 5 MG tablet TAKE 1 TABLET BY MOUTH DAILY AS NEEDED 15 tablet 0     diphenhydrAMINE (BENADRYL) 25 mg tablet Take 25 mg by mouth at bedtime as needed for sleep.       docusate sodium (COLACE) 100 MG capsule Take 1 capsule (100 mg total) by mouth 2 (two) times a day as needed for constipation.  0     doxycycline (MONODOX) 100 MG capsule Take 1 capsule (100 mg total) by mouth 2 (two) times a day for 7 days. 14 capsule 0      oxyCODONE (ROXICODONE) 5 MG immediate release tablet Take 1-2 tablets (5-10 mg total) by mouth every 6 (six) hours as needed for pain. 60 tablet 0     polyethylene glycol (MIRALAX) 17 gram packet Take 1 packet (17 g total) by mouth daily as needed.  0     potassium chloride SA (KLOR-CON M20) 20 MEQ tablet Take 1 tablet (20 mEq total) by mouth 3 (three) times a day. 270 tablet 0     predniSONE (DELTASONE) 10 mg tablet Take 1 tablet (10 mg total) by mouth daily for 5 days. 5 tablet 0     No current facility-administered medications for this visit.        Total Data Points:     ARIELLA Barnes, CNP    This note has been dictated using voice recognition software. Any grammatical or context distortions are unintentional and inherent to the software

## 2021-06-15 NOTE — TELEPHONE ENCOUNTER
RN cannot approve Refill Request    RN can NOT refill this medication PCP messaged that patient is overdue for Office Visit. Last office visit: 8/2/2018 Eddie Diaz MD Last Physical: 12/17/2019 Last MTM visit: Visit date not found Last visit same specialty: 8/2/2018 Eddie Diaz MD.  Next visit within 3 mo: Visit date not found  Next physical within 3 mo: Visit date not found      Love Sanchez, [facility] Triage/Med Refill 3/11/2021    Requested Prescriptions   Pending Prescriptions Disp Refills     amLODIPine (NORVASC) 10 MG tablet [Pharmacy Med Name: AMLODIPINE BESYLATE 10 MG TAB] 90 tablet 0     Sig: TAKE 1 TABLET BY MOUTH EVERY DAY       Calcium-Channel Blockers Protocol Failed - 3/11/2021 12:55 AM        Failed - PCP or prescribing provider visit in past 12 months or next 3 months     Last office visit with prescriber/PCP: 8/2/2018 Eddie Diaz MD OR same dept: Visit date not found OR same specialty: 8/2/2018 Eddie Diaz MD  Last physical: 12/17/2019 Last MTM visit: Visit date not found   Next visit within 3 mo: Visit date not found  Next physical within 3 mo: Visit date not found  Prescriber OR PCP: Eddie Diaz MD  Last diagnosis associated with med order: 1. Essential hypertension  - amLODIPine (NORVASC) 10 MG tablet [Pharmacy Med Name: AMLODIPINE BESYLATE 10 MG TAB]; TAKE 1 TABLET BY MOUTH EVERY DAY  Dispense: 90 tablet; Refill: 0    If protocol passes may refill for 12 months if within 3 months of last provider visit (or a total of 15 months).             Failed - Blood pressure filed in past 12 months     BP Readings from Last 1 Encounters:   12/23/19 156/87

## 2021-06-16 ENCOUNTER — COMMUNICATION - HEALTHEAST (OUTPATIENT)
Dept: PHYSICAL MEDICINE AND REHAB | Facility: CLINIC | Age: 81
End: 2021-06-16

## 2021-06-16 ENCOUNTER — HOSPITAL ENCOUNTER (OUTPATIENT)
Dept: MAMMOGRAPHY | Facility: CLINIC | Age: 81
Discharge: HOME | End: 2021-06-16
Attending: FAMILY MEDICINE
Payer: COMMERCIAL

## 2021-06-16 DIAGNOSIS — N64.89 BREAST ASYMMETRY: ICD-10-CM

## 2021-06-16 DIAGNOSIS — Z12.31 SCREENING MAMMOGRAM, ENCOUNTER FOR: ICD-10-CM

## 2021-06-16 PROBLEM — I63.9 ACUTE CVA (CEREBROVASCULAR ACCIDENT) (H): Status: ACTIVE | Noted: 2018-07-30

## 2021-06-16 PROBLEM — R47.01 EXPRESSIVE APHASIA: Status: ACTIVE | Noted: 2018-07-29

## 2021-06-16 PROBLEM — M16.12 PRIMARY OSTEOARTHRITIS OF LEFT HIP: Status: ACTIVE | Noted: 2017-10-03

## 2021-06-16 PROBLEM — E66.01 MORBID OBESITY (H): Status: ACTIVE | Noted: 2019-12-17

## 2021-06-16 NOTE — TELEPHONE ENCOUNTER
Controlled Substance Refill Request  Medication Name:   Requested Prescriptions     Pending Prescriptions Disp Refills     diazePAM (VALIUM) 5 MG tablet [Pharmacy Med Name: DIAZEPAM 5 MG TABLET] 15 tablet 0     Sig: TAKE 1 TABLET (5 MG TOTAL) BY MOUTH DAILY AS NEEDED.     Date Last Fill: 1/15/21  [facility]ed [facility]  Submit electronically to pharmacy  Controlled Substance Agreement on file:   Encounter-Level CSA Scan Date:    There are no encounter-level csa scan date.        Last office visit:  12/17/19

## 2021-06-16 NOTE — TELEPHONE ENCOUNTER
Telephone Encounter by Kaylie Garvin CMA at 3/9/2020 11:05 AM     Author: Kaylie Garvin CMA Service: -- Author Type: Certified Medical Assistant    Filed: 3/9/2020 11:08 AM Encounter Date: 3/9/2020 Status: Signed    : Kaylie Garvin CMA (Certified Medical Assistant)       Medication:   diazePAM (VALIUM) 5 MG tablet  15 tablet         Last Date Filled 12/17/19     pulled: YES      Only PCP Prescribing?: YES    Taken as prescribed from physician notes?: YES  4. 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  Dispense: 15 tablet; Refill: 0     CSA in last year: NO    Random Utox in last year: NO    Opioids + benzodiazepines? NO

## 2021-06-16 NOTE — TELEPHONE ENCOUNTER
RN cannot approve Refill Request    RN can NOT refill this medication PCP messaged that patient is overdue for Office Visit. Last office visit: 8/2/2018 Eddie Diaz MD Last Physical: 12/17/2019 Last MTM visit: Visit date not found Last visit same specialty: 8/2/2018 Eddie Diaz MD.  Next visit within 3 mo: Visit date not found  Next physical within 3 mo: Visit date not found      Brook Lockett, [facility] Triage/Med Refill 3/22/2021    Requested Prescriptions   Pending Prescriptions Disp Refills     atenoloL (TENORMIN) 50 MG tablet 135 tablet 0     Sig: Take 1.5 tablets (75 mg total) by mouth daily.       Beta-Blockers Refill Protocol Failed - 3/22/2021 11:47 AM        Failed - PCP or prescribing provider visit in past 12 months or next 3 months     Last office visit with prescriber/PCP: 8/2/2018 Eddie Diaz MD OR same dept: Visit date not found OR same specialty: 8/2/2018 Eddie Diaz MD  Last physical: 12/17/2019 Last MTM visit: Visit date not found   Next visit within 3 mo: Visit date not found  Next physical within 3 mo: Visit date not found  Prescriber OR PCP: Eddie Diaz MD  Last diagnosis associated with med order: 1. Essential hypertension  - atenoloL (TENORMIN) 50 MG tablet; Take 1.5 tablets (75 mg total) by mouth daily.  Dispense: 135 tablet; Refill: 0    If protocol passes may refill for 12 months if within 3 months of last provider visit (or a total of 15 months).             Failed - Blood pressure filed in past 12 months     BP Readings from Last 1 Encounters:   12/23/19 156/87

## 2021-06-17 NOTE — TELEPHONE ENCOUNTER
Telephone Encounter by Latasha Yeh CMA at 9/8/2020 11:03 AM     Author: Latasha Yeh CMA Service: -- Author Type: Certified Medical Assistant    Filed: 9/8/2020 11:05 AM Encounter Date: 9/2/2020 Status: Signed    : Latasha Yeh CMA (Certified Medical Assistant)       Medication: Diazepam     Last Date Filled 6/29/2020      pulled: YES    Only PCP Prescribing?: NO    Taken as prescribed from physician notes?: YES  4. 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  Dispense: 15 tablet; Refill: 0    CSA in last year: NO    Random Utox in last year: NO    Opioids + benzodiazepines? NO

## 2021-06-17 NOTE — PROGRESS NOTES
Assessment and Plan:     Patient has been advised of split billing requirements and indicates understanding: Yes  1. Health care maintenance  Patient will return when fasting  Completed Covid vaccine  - Comprehensive Metabolic Panel; Future  - HM2(CBC w/o Differential); Future  - Lipid Cascade; Future  - Mammo Screening Bilateral; Future    2. Essential hypertension  Blood pressure not at goal range  Patient feels this is whitecoat hypertension and would like to monitor blood pressure levels at home she is send me readings in a couple weeks  - amLODIPine (NORVASC) 10 MG tablet; Take 1 tablet (10 mg total) by mouth daily.  Dispense: 90 tablet; Refill: 2  - atenoloL (TENORMIN) 50 MG tablet; Take 1.5 tablets (75 mg total) by mouth daily.  Dispense: 135 tablet; Refill: 2  - lisinopriL-hydrochlorothiazide (PRINZIDE,ZESTORETIC) 20-25 mg per tablet; Take 2 tablets by mouth daily.  Dispense: 180 tablet; Refill: 2  - potassium chloride (KLOR-CON M20) 20 MEQ tablet; TAKE 1 TABLET BY MOUTH THREE TIMES A DAY  Dispense: 270 tablet; Refill: 2    3. Acute CVA (cerebrovascular accident) (H)  History of a CVA  Discussed keeping tight blood pressure control and maintenance with cholesterol levels  - atorvastatin (LIPITOR) 20 MG tablet; Take 1 tablet (20 mg total) by mouth daily.  Dispense: 90 tablet; Refill: 2    4. Morbid obesity (H)  Discussed diet and exercise    The patient's current medical problems were reviewed.    The following high BMI interventions were performed this visit: encouragement to exercise  The following health maintenance schedule was reviewed with the patient and provided in printed form in the after visit summary:   Health Maintenance   Topic Date Due     ASTHMA ACTION PLAN  Never done     HEPATITIS C SCREENING  Never done     DEXA SCAN  Never done     Asthma Control Test  04/19/2022     MEDICARE ANNUAL WELLNESS VISIT  04/19/2022     FALL RISK ASSESSMENT  04/19/2022     ADVANCE CARE PLANNING  12/17/2024      LIPID  04/21/2026     TD 18+ HE  10/09/2027     Pneumococcal Vaccine: Pediatrics (0 to 5 Years) and At-Risk Patients (6 to 64 Years)  Completed     Pneumococcal Vaccine: 65+ Years  Completed     INFLUENZA VACCINE RULE BASED  Completed     ZOSTER VACCINES  Completed     COVID-19 Vaccine  Completed     HEPATITIS B VACCINES  Aged Out       Subjective:   Chief Complaint: Lluvia Marrufo is an 81 y.o. female here for an Annual Wellness visit.   HPI: She is here for annual exam.  Patient has elevated blood pressure today she feels this is whitecoat hypertension she like to monitor over the next couple weeks prior to making any changes.  Patient continues to have osteoarthritic changes in multiple joints.  They have been operated an rental room out of their home in the local area.  Patient is aware of her weight and we discussed diet and exercise.  Discussed Covid pandemic and vaccine.  Patient is completed to of 2 Pfizer vaccines.  Patient has a history of CVA we discussed the importance of tight cholesterol and blood pressure control.  Discussed diet and exercise.    Review of Systems:    Please see above.  The rest of the review of systems are negative for all systems.    Patient Care Team:  Eddie Diaz MD as PCP - General (Family Medicine)  Eddie Diaz MD as Assigned PCP     Patient Active Problem List   Diagnosis     Obesity     Hyperlipidemia     Prediabetes     Lower Back Pain     Headache     Hypertension goal BP (blood pressure) < 140/90     Dermatitis     Joint Pain, Localized In The Shoulder     Mild intermittent asthma without complication     Spinal stenosis of lumbar region     Primary osteoarthritis of left hip     Hypomagnesemia     Hypokalemia     Expressive aphasia     Acute CVA (cerebrovascular accident) (H)     Obesity (BMI 35.0-39.9) with comorbidity (H)     Past Medical History:   Diagnosis Date     Arthritis      Asthma      Bronchitis      Earache      Fracture, foot       Hypertension      UTI (urinary tract infection)       Past Surgical History:   Procedure Laterality Date     APPENDECTOMY       EYE SURGERY      Growth on lateral left eyelid removed.     HYSTERECTOMY      age 57     JOINT REPLACEMENT Left     knee     OOPHORECTOMY       MO HALLUX RIGIDUS W/CHEILECTOMY 1ST MP JT W/O IMPLT      Description: Hallux Rigidus Correction W/ Cheilectomy 1st MTP Joint;  Proc Date: 12/03/2010;     MO RECONSTR TOTAL SHOULDER IMPLANT Right 10/27/2015    Procedure: RIGHT SHOULDER TOTAL ARTHROPLASTY;  Surgeon: Eddie Payne MD;  Location: [facility] OR;  Service: Orthopedics     MO TOTAL HIP ARTHROPLASTY Left 10/3/2017    Procedure:  LEFT TOTAL HIP ARTHROPLASTY;  Surgeon: Mahin Monsalve MD;  Location: [facility] OR;  Service: Orthopedics     TONSILLECTOMY       TOTAL KNEE ARTHROPLASTY      right     TOTAL SHOULDER ARTHROPLASTY      left      Family History   Problem Relation Age of Onset     Hypertension Mother      Alcohol abuse Father      Cirrhosis Father      Anesthesia problems Neg Hx       Social History     Socioeconomic History     Marital status:      Spouse name: Not on file     Number of children: Not on file     Years of education: Not on file     Highest education level: Not on file   Occupational History     Not on file   Social Needs     Financial resource strain: Not on file     Food insecurity     Worry: Not on file     Inability: Not on file     Transportation needs     Medical: Not on file     Non-medical: Not on file   Tobacco Use     Smoking status: Never Smoker     Smokeless tobacco: Never Used   Substance and Sexual Activity     Alcohol use: Yes     Comment: 1 drink per day     Drug use: No     Sexual activity: Not on file   Lifestyle     Physical activity     Days per week: Not on file     Minutes per session: Not on file     Stress: Not on file   Relationships     Social connections     Talks on phone: Not on file     Gets together: Not on file      Attends Christian service: Not on file     Active member of club or organization: Not on file     Attends meetings of clubs or organizations: Not on file     Relationship status: Not on file     Intimate partner violence     Fear of current or ex partner: Not on file     Emotionally abused: Not on file     Physically abused: Not on file     Forced sexual activity: Not on file   Other Topics Concern     Not on file   Social History Narrative    [place]  Son-in-law is a .      Current Outpatient Medications   Medication Sig Dispense Refill     albuterol (PROVENTIL) 2.5 mg /3 mL (0.083 %) nebulizer solution Take 3 mL (2.5 mg total) by nebulization every 4 (four) hours as needed for wheezing or shortness of breath. 75 mL 1     amLODIPine (NORVASC) 10 MG tablet Take 1 tablet (10 mg total) by mouth daily. 90 tablet 2     aspirin 325 MG tablet Take 1 tablet (325 mg total) by mouth daily. 30 tablet 0     atenoloL (TENORMIN) 50 MG tablet Take 1.5 tablets (75 mg total) by mouth daily. 135 tablet 2     atorvastatin (LIPITOR) 20 MG tablet Take 1 tablet (20 mg total) by mouth daily. 90 tablet 2     diazePAM (VALIUM) 5 MG tablet TAKE 1 TABLET (5 MG TOTAL) BY MOUTH DAILY AS NEEDED. 15 tablet 0     diphenhydrAMINE (BENADRYL) 25 mg tablet Take 25 mg by mouth at bedtime as needed for sleep.       glucosamine-chondroitin 500-400 mg cap Take 2 capsules by mouth daily.       lisinopriL-hydrochlorothiazide (PRINZIDE,ZESTORETIC) 20-25 mg per tablet Take 2 tablets by mouth daily. 180 tablet 2     multivitamin with minerals (THERA-M) 9 mg iron-400 mcg Tab tablet Take 1 tablet by mouth daily.       potassium chloride (KLOR-CON M20) 20 MEQ tablet TAKE 1 TABLET BY MOUTH THREE TIMES A  tablet 2     No current facility-administered medications for this visit.       Objective:   Vital Signs:   Visit Vitals  /85 (Patient Site: Left Arm, Patient Position: Sitting, Cuff Size: Adult Large)   Pulse  "90   Resp 16   Ht 5' 2\" (1.575 m)   Wt 221 lb (100.2 kg)   BMI 40.42 kg/m           VisionScreening:  No exam data present     PHYSICAL EXAM  Physical Examination: General appearance - alert, well appearing, and in no distress  Mental status - alert, oriented to person, place, and time  Eyes - pupils equal and reactive, extraocular eye movements intact  Chest - clear to auscultation, no wheezes, rales or rhonchi, symmetric air entry  Heart - normal rate, regular rhythm, normal S1, S2, no murmurs, rubs, clicks or gallops  Abdomen - soft, nontender, nondistended, no masses or organomegaly  Neurological - alert, oriented, normal speech, no focal findings or movement disorder noted  Musculoskeletal - no joint tenderness, deformity or swelling  Skin - normal coloration and turgor, no rashes, no suspicious skin lesions noted      Assessment Results 4/19/2021   Activities of Daily Living No help needed   Instrumental Activities of Daily Living No help needed   Get Up and Go Score Less than 12 seconds   Mini Cog Total Score 5   Some recent data might be hidden     A Mini-Cog score of 0-2 suggests the possibility of dementia, score of 3-5 suggests no dementia    Identified Health Risks:     The patient was provided with written information regarding signs of hearing loss.  Patient's advanced directive was discussed and I am comfortable with the patient's wishes.        "

## 2021-06-17 NOTE — TELEPHONE ENCOUNTER
Telephone Encounter by Latasha Yeh CMA at 5/11/2020  3:16 PM     Author: Latasha Yeh CMA Service: -- Author Type: Certified Medical Assistant    Filed: 5/11/2020  3:18 PM Encounter Date: 5/11/2020 Status: Signed    : Latasha Yeh CMA (Certified Medical Assistant)       Medication: Diazepam    Last Date Filled 3/9/20      pulled: YES      Only PCP Prescribing?: NO    Taken as prescribed from physician notes?: YES  4. 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  Dispense: 15 tablet; Refill: 0    CSA in last year: NO    Random Utox in last year: NO    Opioids + benzodiazepines? NO

## 2021-06-17 NOTE — PATIENT INSTRUCTIONS - HE
Patient Instructions by Eddie Diaz MD at 12/17/2019  1:10 PM     Author: Eddie Diaz MD Service: -- Author Type: Physician    Filed: 12/17/2019  4:50 PM Encounter Date: 12/17/2019 Status: Addendum    : Eddie Diaz MD (Physician)    Related Notes: Original Note by Eddie Diaz MD (Physician) filed at 12/17/2019  1:52 PM       Recheck blood pressure in 2 weeks  Increasing atenolol to 75mg  Patient Education   Understanding [employer] MyPlate  The USDA (US Department of Agriculture) has guidelines to help you make healthy food choices. These are called MyPlate. MyPlate shows the food groups that make up healthy meals using the image of a place setting. Before you eat, think about the healthiest choices for what to put onto your plate or into your cup or bowl. To learn more about building a healthy plate, visit www.choosemyplate.gov.       The Food Groups    Fruits: Any fruit or 100% fruit juice counts as part of the Fruit Group. Fruits may be fresh, canned, frozen, or dried, and may be whole, cut-up, or pureed. Make half your plate fruits and vegetables.    Vegetables: Any vegetable or 100% vegetable juice counts as a member of the Vegetable Group. Vegetables may be fresh, frozen, canned, or dried. They can be served raw or cooked and may be whole, cut-up, or mashed. Make half your plate fruits and vegetables.     Grains: All foods made from grains are part of the Grains Group. These include wheat, rice, oats, cornmeal, and barley such as bread, pasta, oatmeal, cereal, tortillas, and grits. Grains should be no more than a quarter of your plate. At least half of your grains should be whole grains.    Protein: This group includes meat, poultry, seafood, beans and peas, eggs, processed soy products (like tofu), nuts (including nut butters), and seeds. Make protein choices no more than a quarter of your plate. Meat and poultry choices should be lean or low fat.    Dairy: All  fluid milk products and foods made from milk that contain calcium, like yogurt and cheese are part of the Dairy Group. (Foods that have little calcium, such as cream, butter, and cream cheese, are not part of the group.) Most dairy choices should be low-fat or fat-free.    Oils: These are fats that are liquid at room temperature. They include canola, corn, olive, soybean, and sunflower oil. Foods that are mainly oil include mayonnaise, certain salad dressings, and soft margarines. You should have only 5 to 7 teaspoons of oils a day. You probably already get this much from the food you eat.  Use [employer] to Help Build Your Meals  The SuperTracker can help you plan and track your meals and activity. You can look up individual foods to see or compare their nutritional value. You can get guidelines for what and how much you should eat. You can compare your food choices. And you can assess personal physical activities and see ways you can improve. Go to www.My Dog Bowl.gov/AudiencePointcker/.    3911-5794 [employer]. [address] [place] [address]. All rights reserved. This information is not intended as a substitute for professional medical care. Always follow your healthcare professional's instructions.           Patient Education   Signs of Hearing Loss  Hearing loss is a problem shared by many people. In fact, it is one of the most common health conditions, particularly as people age. Most people over age 65 have some hearing loss, and by age 80, almost everyone does. Because hearing loss usually occurs slowly over the years, you may not realize your hearing ability has gotten worse.       Have your hearing checked  Contact your [facility] care provider if you:    Have to strain to hear normal conversation.    Have to watch other peoples faces very carefully to follow what theyre saying.    Need to ask people to repeat what theyve said.    Often misunderstand what people are saying.    Turn the  volume of the television or radio up so high that others complain.    Feel that people are mumbling when theyre talking to you.    Find that the effort to hear leaves you feeling tired and irritated.    Notice, when using the phone, that you hear better with 1 ear than the other.    4526-6358 [employer]. [address] [place] [address]. All rights reserved. This information is not intended as a substitute for professional medical care. Always follow your healthcare professional's instructions.         Patient Education   Urinary Incontinence, Female (Adult)  Urinary incontinence means loss of control of the bladder. This problem affects many women, especially as they get older. If you have incontinence, you may be embarrassed to ask for help. But know that this problem can be treated.  Types of Incontinence  There are different types of incontinence. Two of the main types are described here. You can have more than one type.    Stress incontinence. With this type, urine leaks when pressure (stress) is put on the bladder. This may happen when you cough, sneeze, or laugh. Stress incontinence most often occurs because the pelvic floor muscles that support the bladder and urethra are weak. This can happen after pregnancy and vaginal childbirth or a hysterectomy. It can also be due to excess body weight or hormone changes.    Urge incontinence (also called overactive bladder). With this type, a sudden urge to urinate is felt often. This may happen even though there may not be much urine in the bladder. The need to urinate often during the night is common. Urge incontinence most often occurs because of bladder spasms. This may be due to bladder irritation or infection. Damage to bladder nerves or pelvic muscles, constipation, and certain medicines can also lead to urge incontinence.  Treatment of urinary incontinence depends on the cause. Further evaluation is needed to find the type you have. This  will likely include an exam and certain tests. Based on the results, you and your healthcare provider can then plan treatment. Until a diagnosis is made, the home care tips below can help relieve symptoms.  Home care    Do pelvic floor muscle exercises, if they are prescribed. The pelvic floor muscles help support the bladder and urethra. Many women find that their symptoms improve when doing special exercises that strengthen these muscles. To do the exercises contract the muscles you would use to stop your stream of urine, but do this when youre not urinating. Hold for 10 seconds, then relax. Repeat 10 to 20 times in a row, at least 3 times a day. Your provider may give you other instructions for how to do the exercises and how often.    Keep a bladder diary. This helps track how often and how much you urinate over a set period of time. Bring this diary with you to your next visit with the provider. The information can help your provider learn more about your bladder problem.    Lose weight, if advised to by your provider. Excess weight puts pressure on the bladder. Your provider can help you create a weight-loss plan thats right for you. This may include exercising more and making certain diet changes.    Don't consume foods and drinks that may irritate the bladder. These can include alcohol and caffeinated drinks.    Quit smoking. Smoking and other tobacco use can lead to chronic cough that strains the pelvic floor muscles. Smoking may also damage the bladder and urethra. Talk with your provider about treatments or methods you can use to quit smoking.    If drinking large amounts of fluid causes you to have symptoms, you may be advised to limit your fluid intake. You may also be advised to drink most of your fluids during the day and to limit fluids at night.    If youre worried about urine leakage or accidents, you may wear absorbent pads to catch urine. Change the pads often. This helps reduce discomfort. It  may also reduce the risk of skin or bladder infections.  Follow-up care  Follow up with your healthcare provider, or as directed. It may take some to find the right treatment for your problem. Your treatment plan may include special therapies or medicines. Certain procedures or surgery may also be options. Be sure to discuss any questions you have with your provider.  When to seek medical advice  Call the healthcare provider right away if any of these occur:    Fever of 100.4 F (38 C) or higher, or as directed by your provider    Bladder pain or fullness    Abdominal swelling    Nausea or vomiting    Back pain    Weakness, dizziness or fainting  Date Last Reviewed: 10/1/2017    3226-1204 [employer]. [address] [place] [address]. All rights reserved. This information is not intended as a substitute for professional medical care. Always follow your healthcare professional's instructions.       Advance Directive  Patients advance directive was discussed and I am comfortable with the patients wishes.  Patient Education   Personalized Prevention Plan  You are due for the preventive services outlined below.  Your care team is available to assist you in scheduling these services.  If you have already completed any of these items, please share that information with your care team to update in your medical record.  Health Maintenance   Topic Date Due   ? ASTHMA ACTION PLAN  1940   ? DXA SCAN  04/02/2007   ? ZOSTER VACCINES (2 of 3) 07/25/2008   ? MEDICARE ANNUAL WELLNESS VISIT  09/15/2017   ? ASTHMA CONTROL TEST  08/02/2019   ? FALL RISK ASSESSMENT  08/02/2019   ? ADVANCE CARE PLANNING  09/15/2021   ? LIPID  12/16/2024   ? TD 18+ HE  10/09/2027   ? PNEUMOCOCCAL IMMUNIZATION 65+ LOW/MEDIUM RISK  Completed   ? INFLUENZA VACCINE RULE BASED  Completed

## 2021-06-18 NOTE — PATIENT INSTRUCTIONS - HE
Patient Instructions by Eddie Diaz MD at 4/19/2021  3:10 PM     Author: Eddie Diaz MD Service: -- Author Type: Physician    Filed: 4/26/2021  1:36 PM Encounter Date: 4/19/2021 Status: Addendum    : Eddie Diaz MD (Physician)    Related Notes: Original Note by Eddie Diaz MD (Physician) filed at 4/19/2021  3:48 PM       Send Dr. Diaz Blood pressure readings in 2 weeks or when you return from the trip  Patient Education   Signs of Hearing Loss  Hearing loss is a problem shared by many people. In fact, it is one of the most common health conditions, particularly as people age. Most people over age 65 have some hearing loss, and by age 80, almost everyone does. Because hearing loss usually occurs slowly over the years, you may not realize your hearing ability has gotten worse.       Have your hearing checked  Contact your [facility] care provider if you:    Have to strain to hear normal conversation.    Have to watch other peoples faces very carefully to follow what theyre saying.    Need to ask people to repeat what theyve said.    Often misunderstand what people are saying.    Turn the volume of the television or radio up so high that others complain.    Feel that people are mumbling when theyre talking to you.    Find that the effort to hear leaves you feeling tired and irritated.    Notice, when using the phone, that you hear better with 1 ear than the other.    9905-1495 [employer]. [address] [place] [address], [place] [address]. All rights reserved. This information is not intended as a substitute for professional medical care. Always follow your healthcare professional's instructions.           Advance Directive  Patients advance directive was discussed and I am comfortable with the patients wishes.  Patient Education   Personalized Prevention Plan  You are due for the preventive services outlined below.  Your care team is available to assist you in  scheduling these services.  If you have already completed any of these items, please share that information with your care team to update in your medical record.  Health Maintenance Due   Topic Date Due   ? ASTHMA ACTION PLAN  Never done   ? HEPATITIS C SCREENING  Never done   ? DEXA SCAN  Never done

## 2021-06-19 ENCOUNTER — OFFICE VISIT - HEALTHEAST (OUTPATIENT)
Dept: FAMILY MEDICINE | Facility: CLINIC | Age: 81
End: 2021-06-19

## 2021-06-19 DIAGNOSIS — I49.9 CARDIAC ARRHYTHMIA, UNSPECIFIED CARDIAC ARRHYTHMIA TYPE: ICD-10-CM

## 2021-06-19 DIAGNOSIS — R05.9 COUGH: ICD-10-CM

## 2021-06-19 DIAGNOSIS — I48.91 ATRIAL FIBRILLATION, NEW ONSET (H): ICD-10-CM

## 2021-06-19 LAB
SARS-COV-2 PCR COMMENT: NORMAL
SARS-COV-2 RNA SPEC QL NAA+PROBE: NEGATIVE
SARS-COV-2 VIRUS SPECIMEN SOURCE: NORMAL

## 2021-06-19 RX ORDER — CODEINE PHOSPHATE AND GUAIFENESIN 10; 100 MG/5ML; MG/5ML
10 SOLUTION ORAL
Qty: 70 ML | Refills: 0 | Status: ON HOLD | OUTPATIENT
Start: 2021-06-19 | End: 2021-08-11

## 2021-06-19 NOTE — LETTER
Letter by Eddie Diaz MD at      Author: Eddie Diaz MD Service: -- Author Type: --    Filed:  Encounter Date: 3/29/2019 Status: (Other)       [place]lu[place] [address]rufo  [address] [facility] [address]                 March 29, 2019      Dear Lluvia:     At your previous appointment with us your blood pressure was elevated and I would like for you to schedule a nurse only appointment to recheck your blood pressure.    We have included a personalized hypertension report card on the following page that lists your most recent blood pressure readings along with your ideal values. Please message us through [employer] or call us at [phone] to schedule your nurse only appointment.    Thank you for including us as members of your health care team.      Sincerely,         Eddie Diaz MD    Enclosure    Hypertension Report Card for Lluvia Marrufo  March 29, 2019:     Below is a summary of recent tests related to your hypertension.     Blood Pressure:   Normal blood pressure values are 120/80 or lower. Your blood pressure values should be less than 120/80.     Your most recent blood pressure readings at our clinic were:   BP Readings from Last 3 Encounters:   10/02/18 153/84   08/31/18 140/88   08/10/18 160/78

## 2021-06-19 NOTE — PROGRESS NOTES
I met with Lluvia Marrufo at the request of Dr. Diaz to recheck her blood pressure.  Blood pressure medications on the MAR were reviewed with patient.    Patient has taken all medications as per usual regimen: Yes  Patient reports tolerating them without any issues or concerns: Yes    Vitals:    08/10/18 0920 08/10/18 0932   BP: 179/86 160/78   Patient Site: Left Arm Left Arm   Patient Position: Sitting Sitting   Cuff Size: Adult Large Adult Large   Pulse: 61 62       Blood pressure was taken, previous encounter was reviewed, recorded blood pressure below 140/90.  Patient was discharged and the note will be sent to the provider for final review.

## 2021-06-19 NOTE — PROGRESS NOTES
Medication will be returned to previous dosing due to side effects at higher dosing.  Blood pressure not improved will start clonidine 0.1 mg twice daily

## 2021-06-19 NOTE — LETTER
Letter by Eddie Diaz MD at      Author: Eddie Diaz MD Service: -- Author Type: --    Filed:  Encounter Date: 6/5/2019 Status: (Other)       Lluvia Marrufo  [address] [facility] [address]                 June 5, 2019      Dear Lluvia:     At your previous appointment with us your blood pressure was elevated and I would like for you to schedule a nurse only appointment to recheck your blood pressure.    We have included a personalized hypertension report card on the following page that lists your most recent blood pressure readings along with your ideal values. Please message us through [employer] or call us at [phone] to schedule your nurse only appointment.    Thank you for including us as members of your health care team.      Sincerely,         Eddie Diaz MD    Enclosure    Hypertension Report Card for Lluvia Marrufo  June 5, 2019:     Below is a summary of recent tests related to your hypertension.     Blood Pressure:   Normal blood pressure values are 120/80 or lower. Your blood pressure values should be less than 120/80.      Your most recent blood pressure readings at our clinic were:   BP Readings from Last 3 Encounters:   10/02/18 153/84   08/31/18 140/88   08/10/18 160/78

## 2021-06-19 NOTE — PROGRESS NOTES
ASSESSMENT/PLAN  1. Health care maintenance  Patient is due for bone density will order this for her today  - DXA Bone Density Scan; Future    2. History of CVA (cerebrovascular accident)  Patient is here for hospital follow-up  Reviewed hospitalization consultation notes laboratory results and imaging with her today  She is motivated but also concerned about prevention and not have a recurrence obviously of future strokes  She has been fortunate that she has very little residual physical effect from the stroke she has a little bit of weakness in her right hand but it is improving  We discussed the importance of the aspirin blood pressure control and cholesterol  With a long discussion over her levels of cholesterol and why she needs statin therapy-her question mainly related around the aspect that her cholesterol levels are in the normal range  We need to adjust her blood pressure medication today see #3 below  All medications were reconciled  All questions answered by the patient today    3. Essential hypertension  Blood pressure not at goal we discussed the necessity of tight blood pressure control  We will increase her atenolol from 25 mg to 50 mg and have her follow-up in 7-10 days for blood pressure recheck      All medications were reconciled        SUBJECTIVE:   Chief Complaint   Patient presents with     Hospital Visit Follow Up     [facility] 7/29/18-7/30/18 CVA, Concerns of BP today     Lluvia COLE Marileealexandre 78 y.o. female    Current Outpatient Prescriptions   Medication Sig Dispense Refill     albuterol (PROVENTIL) 2.5 mg /3 mL (0.083 %) nebulizer solution Take 3 mL (2.5 mg total) by nebulization every 4 (four) hours as needed for wheezing or shortness of breath. 75 mL 1     amLODIPine (NORVASC) 10 MG tablet Take 10 mg by mouth daily.       aspirin 325 MG tablet Take 1 tablet (325 mg total) by mouth daily. 30 tablet 0     atenolol (TENORMIN) 25 MG tablet Take 50 mg by mouth daily.        atorvastatin (LIPITOR) 20 MG tablet Take 1 tablet (20 mg total) by mouth daily. 30 tablet 0     diazePAM (VALIUM) 5 MG tablet Take 5 mg by mouth daily as needed for sleep or muscle spasms.        diphenhydrAMINE (BENADRYL) 25 mg tablet Take 25 mg by mouth at bedtime as needed for sleep.       glucosamine-chondroitin 500-400 mg cap Take 2 capsules by mouth daily.       lisinopril-hydrochlorothiazide (PRINZIDE,ZESTORETIC) 20-25 mg per tablet Take 1 tablet by mouth daily. 90 tablet 3     multivitamin with minerals (THERA-M) 9 mg iron-400 mcg Tab tablet Take 1 tablet by mouth daily.       potassium chloride SA (K-DUR,KLOR-CON) 20 MEQ tablet Take 20 mEq by mouth 3 (three) times a day.       albuterol (PROVENTIL HFA;VENTOLIN HFA) 90 mcg/actuation inhaler Inhale 2 puffs every 4 (four) hours as needed for wheezing or shortness of breath. 3 Inhaler 5     No current facility-administered medications for this visit.      Allergies: Azithromycin   No LMP recorded. Patient has had a hysterectomy.    HPI:   Patient is here for hospital follow-up  Patient was seen after an episode of slight ataxia and expressive aphasia-was admitted from the ER after visualization of an acute stroke noted on CTA and MRI imaging  Reviewed imaging notes labs and consultations with the patient today she had questions about her new medications that we discussed today and the reasoning for having them on board to reduce her risk of stroke events in the future  Discussed her blood pressure not being at goal range and we discussed increasing beta-blocker today which is in agreement  We discussed close follow-up in another week for blood pressure recheck  Reviewed all her medications  All medications reconciled    Patient had questions in regards to exercising and other ways to prevent strokes in the future  We discussed exercise and the need for weight loss    ROS: negative except as per HPI    OBJECTIVE:   The patient appears well, alert, oriented x 3,  in no distress.  /90 (Patient Site: Left Arm, Patient Position: Sitting, Cuff Size: Adult Large) Comment: Manual BP Cuff  Pulse 65  Temp (!) 96.4  F (35.8  C) (Oral)   Resp 16  Wt 209 lb 6.4 oz (95 kg)  BMI 37.09 kg/m2    Lungs: clear, good air entry, no wheezes, rhonchi or rales.   Cardiac: S1 and S2 normal, no murmurs, regular rate and rhythm.   Abdomen: normal bowel sounds, soft without tenderness, guarding, mass or organomegaly.   Extremities: show no edema, normal peripheral pulses.   Neurological: normal, no focal findings.  Patient expresses some slight abnormalities that she feels that her right hand but she has good range of motion and strength on examination  Skin: clear, dry, no rashes/lesions  Psych- normal mood and affect      Pt states an understanding and agrees to the above plan.  Greater than 40 minutes was spent today in interview and examination with Lluvia Marrufo with more than 50% of that time in counseling and coordination of care.

## 2021-06-19 NOTE — LETTER
Letter by Eddie Diaz MD at      Author: Eddie Diaz MD Service: -- Author Type: --    Filed:  Encounter Date: 7/26/2019 Status: (Other)       [place] [facility]  [address] [facility] [address]    July 26, 2019    Dear Lluvia    In reviewing your records, we have determined a gap in your preventive services. Based on your age and health history, we recommend the follow service.     ? General Physical  ? Physical with a Pap Smear  ? Colon cancer screening  ? Mammogram  ? Immunization  ? Diabetic check  ? Blood pressure/cardiovascular check  ? Asthma check  ? Cholesterol test  ? Lab work  ? Med check    If you have had the service elsewhere, please contact us so we can update our records. Please let us know if you have transferred your care to another clinic.    Please call [phone] to schedule a nurse only appointment.    We believe that a strong preventive care program, including regular physicals and follow-up care is an important part of a healthy lifestyle and we are committed to helping you maintain your health.    Thank you for choosing us as your health care provider.    Sincerely,   [facility]  [address] [facility] [address]  Phone Number:  [phone]

## 2021-06-19 NOTE — LETTER
Letter by Eddie Diaz MD at      Author: Eddie Diaz MD Service: -- Author Type: --    Filed:  Encounter Date: 8/7/2019 Status: (Other)       [place]luvia Marrufo  [address] [facility] [address]    August 7, 2019    Dear Lluvia    In reviewing your records, we have determined a gap in your preventive services. Based on your age and health history, we recommend the follow service.     ? General Physical  ? Physical with a Pap Smear  ? Colon cancer screening  ? Mammogram  ? Immunization  ? Diabetic check  ? Blood pressure  ? Asthma check  ? Cholesterol test  ? Lab work  ? Med check    If you have had the service elsewhere, please contact us so we can update our records. Please let us know if you have transferred your care to another clinic.    Please call [phone] to schedule a nurse only appointment.    We believe that a strong preventive care program, including regular physicals and follow-up care is an important part of a healthy lifestyle and we are committed to helping you maintain your health.    Thank you for choosing us as your health care provider.    Sincerely,   [facility]  [address] [facility] [address]  Phone Number:  [phone]

## 2021-06-20 ENCOUNTER — COMMUNICATION - HEALTHEAST (OUTPATIENT)
Dept: SCHEDULING | Facility: CLINIC | Age: 81
End: 2021-06-20

## 2021-06-20 NOTE — PROGRESS NOTES
Please inform patient that blood pressure is still running too elevated  I have sent a refill of the medication to her pharmacy but have changed the dose to 0.2 mg  Please have a recheck blood pressure in a couple weeks.

## 2021-06-20 NOTE — PROGRESS NOTES
Preoperative Exam    Scheduled Procedure: Bilateral cataract surgery   Surgery Date:  Left eye 10/8/2018, Right eye 10/22/2018   Surgery Location: [facility], [place]     Surgeon:  Dr Banda     Assessment/Plan:     1. Preoperative examination  Patient is cleared for surgery at this time with no anticipated complication.  - Potassium    2. Cataract  Due to worsening vision patient has elected for surgical correction of bilateral cataracts    Surgical Procedure Risk: Low (reported cardiac risk generally < 1%)  Have you had prior anesthesia?: Yes  Have you or any family members had a previous anesthesia reaction:  No  Do you or any family members have a history of a clotting or bleeding disorder?: No  Cardiac Risk Assessment: no increased risk for major cardiac complications    Patient approved for surgery with general or local anesthesia.      Functional Status: Independent  Patient plans to recover at home with family.     Subjective:      Lluvia Marrufo is a 78 y.o. female who presents for a preoperative consultation.  Patient is tolerated previous surgeries without complication.  Patient has noted worsening vision in the last 6 months and found to have cataracts-patient has elected for surgical correction.  Patient has started an exercise regimen with a focus for weight loss.  Patient's blood pressure is running slightly elevated she is given a bring her blood pressure cuff in to clinic for nurse blood pressure check and will assess its accuracy-her numbers at home have been running in the normal range but she is elevated here today.  If she continues to run elevated consideration for increasing clonidine.  Patient has been under considerable amount of stress recently due to a multitude of different factors.  Patient is aware of the need to tightly control her blood pressure due to her history of cerebrovascular disease.    All other systems reviewed and are negative, other than those listed in the  HPI.    Pertinent History  Do you have difficulty breathing or chest pain after walking up a flight of stairs: Yes: pt has started exrecising and feels deconditioned  History of obstructive sleep apnea: No  Steroid use in the last 6 months: No  Frequent Aspirin/NSAID use: Yes: will hold for surgery  Prior Blood Transfusion: No  Prior Blood Transfusion Reaction: No  If for some reason prior to, during or after the procedure, if it is medically indicated, would you be willing to have a blood transfusion?:  There is no transfusion refusal.    Current Outpatient Prescriptions   Medication Sig Dispense Refill     albuterol (PROVENTIL HFA;VENTOLIN HFA) 90 mcg/actuation inhaler Inhale 2 puffs every 4 (four) hours as needed for wheezing or shortness of breath. 3 Inhaler 5     albuterol (PROVENTIL) 2.5 mg /3 mL (0.083 %) nebulizer solution Take 3 mL (2.5 mg total) by nebulization every 4 (four) hours as needed for wheezing or shortness of breath. 75 mL 1     amLODIPine (NORVASC) 10 MG tablet Take 10 mg by mouth daily.       aspirin 325 MG tablet Take 1 tablet (325 mg total) by mouth daily. 30 tablet 0     atenolol (TENORMIN) 25 MG tablet Take 25 mg by mouth daily.       atorvastatin (LIPITOR) 20 MG tablet Take 1 tablet (20 mg total) by mouth daily. 90 tablet 3     cloNIDine HCl (CATAPRES) 0.2 MG tablet Take 1 tablet (0.2 mg total) by mouth 2 (two) times a day. 180 tablet 1     diazePAM (VALIUM) 5 MG tablet TAKE 1 TABLET BY MOUTH EVERY DAY AS NEEDED 15 tablet 0     diphenhydrAMINE (BENADRYL) 25 mg tablet Take 25 mg by mouth at bedtime as needed for sleep.       glucosamine-chondroitin 500-400 mg cap Take 2 capsules by mouth daily.       KLOR-CON M20 20 mEq tablet TAKE 1 TABLET 3 TIMES DAILY. 270 tablet 0     lisinopril-hydrochlorothiazide (PRINZIDE,ZESTORETIC) 20-25 mg per tablet Take 1 tablet by mouth daily. 90 tablet 3     multivitamin with minerals (THERA-M) 9 mg iron-400 mcg Tab tablet Take 1 tablet by mouth daily.        diazePAM (VALIUM) 5 MG tablet Take 5 mg by mouth daily as needed for sleep or muscle spasms.        potassium chloride SA (K-DUR,KLOR-CON) 20 MEQ tablet Take 20 mEq by mouth 3 (three) times a day.       No current facility-administered medications for this visit.         Allergies   Allergen Reactions     Azithromycin Nausea Only       Patient Active Problem List   Diagnosis     Obesity     Hyperlipidemia     Prediabetes     Lower Back Pain     Facial Pain     Hypertension goal BP (blood pressure) < 140/90     Dermatitis     Joint Pain, Localized In The Shoulder     Mild intermittent asthma without complication     Spinal stenosis of lumbar region     Primary osteoarthritis of left hip     Hypomagnesemia     Hypokalemia     Expressive aphasia     Acute CVA (cerebrovascular accident) (H)       Past Medical History:   Diagnosis Date     Arthritis      Asthma      Bronchitis      Earache      Fracture, foot      Hypertension      UTI (urinary tract infection)        Past Surgical History:   Procedure Laterality Date     APPENDECTOMY       EYE SURGERY      Growth on lateral left eyelid removed.     HYSTERECTOMY      age 57     JOINT REPLACEMENT Left     knee     OOPHORECTOMY       ID HALLUX RIGIDUS W/CHEILECTOMY 1ST MP JT W/O IMPLT      Description: Hallux Rigidus Correction W/ Cheilectomy 1st MTP Joint;  Proc Date: 12/03/2010;     ID RECONSTR TOTAL SHOULDER IMPLANT Right 10/27/2015    Procedure: RIGHT SHOULDER TOTAL ARTHROPLASTY;  Surgeon: Eddie Payne MD;  Location: [facility] OR;  Service: Orthopedics     ID TOTAL HIP ARTHROPLASTY Left 10/3/2017    Procedure:  LEFT TOTAL HIP ARTHROPLASTY;  Surgeon: Mahin Monsalve MD;  Location: [facility] OR;  Service: Orthopedics     TONSILLECTOMY       TOTAL KNEE ARTHROPLASTY      right     TOTAL SHOULDER ARTHROPLASTY      left       Social History     Social History     Marital status:      Spouse name: N/A     Number of children: N/A     Years of education:  N/A     Occupational History     Not on file.     Social History Main Topics     Smoking status: Never Smoker     Smokeless tobacco: Never Used     Alcohol use Yes      Comment: 1 drink per day     Drug use: No     Sexual activity: Not on file     Other Topics Concern     Not on file     Social History Narrative    [place]  Son-in-law is a .       Patient Care Team:  Eddie Diaz MD as PCP - General (Family Medicine)          Objective:     Vitals:    10/02/18 1241   BP: 153/84   Pulse: 62   Resp: 16   Temp: 97  F (36.1  C)   TempSrc: Oral   Weight: 211 lb (95.7 kg)         Physical Exam:  Physical Examination: General appearance - alert, well appearing, and in no distress  Mental status - alert, oriented to person, place, and time  Eyes - pupils equal and reactive, extraocular eye movements intact  Ears - bilateral TM's and external ear canals normal  Nose - normal and patent, no erythema, discharge or polyps  Mouth - mucous membranes moist, pharynx normal without lesions  Neck - supple, no significant adenopathy  Lymphatics - no palpable lymphadenopathy, no hepatosplenomegaly  Chest - clear to auscultation, no wheezes, rales or rhonchi, symmetric air entry  Heart - normal rate, regular rhythm, normal S1, S2, no murmurs, rubs, clicks or gallops  Abdomen - soft, nontender, nondistended, no masses or organomegaly  Back exam - full range of motion, no tenderness, palpable spasm or pain on motion  Neurological - alert, oriented, normal speech, no focal findings or movement disorder noted  Musculoskeletal - no joint tenderness, deformity or swelling  Extremities - peripheral pulses normal, no pedal edema, no clubbing or cyanosis  Skin - normal coloration and turgor, no rashes, no suspicious skin lesions noted      There are no Patient Instructions on file for this visit.    Labs:  Labs pending at this time.  Results will be reviewed when available.    Immunization  History   Administered Date(s) Administered     DT (pediatric) 07/09/1991     Hep A, historic 05/30/2008, 12/26/2008     Influenza high dose, seasonal 10/11/2016, 10/05/2017     Influenza, inj, historic,unspecified 09/24/2014     Influenza, seasonal,quad inj 36+ mos 09/21/2015     Influenza, seasonal,quad inj 6-35 mos 11/13/2008, 10/05/2009, 10/27/2010, 10/07/2011, 09/27/2012, 10/04/2013, 09/24/2014     Pneumo Conj 13-V (2010&after) 10/21/2015     Pneumo Polysac 23-V 01/01/2000, 10/30/2003, 10/27/2010     Td, adult adsorbed, PF 10/09/2017     Tdap 10/11/2006     ZOSTER, LIVE 05/30/2008           Electronically signed by Eddie Diaz MD 10/02/18 12:43 PM

## 2021-06-20 NOTE — LETTER
Letter by Eddie Diaz MD at      Author: Eddie Diaz MD Service: -- Author Type: --    Filed:  Encounter Date: 12/17/2019 Status: Signed         Lluvia Marrufo  [address] [facility] [address]             December 17, 2019         Dear Ms. Marrufo,    Below are the results from your recent visit:    Resulted Orders   Comprehensive Metabolic Panel   Result Value Ref Range    Sodium 143 136 - 145 mmol/L    Potassium 4.3 3.5 - 5.0 mmol/L    Chloride 105 98 - 107 mmol/L    CO2 27 22 - 31 mmol/L    Anion Gap, Calculation 11 5 - 18 mmol/L    Glucose 124 70 - 125 mg/dL    BUN 19 8 - 28 mg/dL    Creatinine 0.73 0.60 - 1.10 mg/dL    GFR MDRD Af Amer >60 >60 mL/min/1.73m2    GFR MDRD Non Af Amer >60 >60 mL/min/1.73m2    Bilirubin, Total 1.0 0.0 - 1.0 mg/dL    Calcium 9.6 8.5 - 10.5 mg/dL    Protein, Total 6.8 6.0 - 8.0 g/dL    Albumin 3.8 3.5 - 5.0 g/dL    Alkaline Phosphatase 75 45 - 120 U/L    AST 18 0 - 40 U/L    ALT 26 0 - 45 U/L    Narrative    Fasting Glucose reference range is 70-99 mg/dL per  American Diabetes Association (ADA) guidelines.   HM2(CBC w/o Differential)   Result Value Ref Range    WBC 8.1 4.0 - 11.0 thou/uL    RBC 4.65 3.80 - 5.40 mill/uL    Hemoglobin 13.6 12.0 - 16.0 g/dL    Hematocrit 40.8 35.0 - 47.0 %    MCV 88 80 - 100 fL    MCH 29.2 27.0 - 34.0 pg    MCHC 33.3 32.0 - 36.0 g/dL    RDW 12.0 11.0 - 14.5 %    Platelets 321 140 - 440 thou/uL    MPV 7.6 7.0 - 10.0 fL   Lipid Cascade   Result Value Ref Range    Cholesterol 139 <=199 mg/dL    Triglycerides 82 <=149 mg/dL    HDL Cholesterol 61 >=50 mg/dL    LDL Calculated 62 <=129 mg/dL    Patient Fasting > 8hrs? Yes        Your kidney function and liver function are normal.  No signs of diabetes.  Glucose levels are in the prediabetic range.  Continue to work on getting regular exercise and lowering carbohydrate intake in the diet.  Cholesterol levels are at goal range.    Please call with questions or contact us  using [employer].    Sincerely,        Electronically signed by Eddie Diaz MD

## 2021-06-20 NOTE — PROGRESS NOTES
I met with Lluvia Marrufo at the request of Dr Diaz to recheck her blood pressure.  Blood pressure medications on the MAR were reviewed with patient.    Patient has taken all medications as per usual regimen: Yes; Clonidine 0.1 mg two times a day was added 8/10/18   Patient reports tolerating them without any issues or concerns: Yes    Vitals:    08/31/18 1140 08/31/18 1149   BP: 164/74 140/88   Patient Site: Right Arm Right Arm   Patient Position: Sitting Sitting   Cuff Size: Adult Large Adult Large   Pulse: (!) 59 (!) 58       After 5 minutes, the patient's blood pressure remained greater than or equal to 140/90.    Is the patient currently having any chest pain?  No  Does the patient currently have a headache?   No  Does the patient currently have any vision changes? No  Does the patient currently have any nausea? No  Does the patient currently have any abdominal pain? No     The previous encounter was reviewed.  The patient was discharged and the note will be sent to the provider for final review.

## 2021-06-21 LAB
ATRIAL RATE - MUSE: 326 BPM
DIASTOLIC BLOOD PRESSURE - MUSE: NORMAL
INTERPRETATION ECG - MUSE: NORMAL
P AXIS - MUSE: NORMAL
PR INTERVAL - MUSE: NORMAL
QRS DURATION - MUSE: 102 MS
QT - MUSE: 394 MS
QTC - MUSE: 445 MS
R AXIS - MUSE: 73 DEGREES
SYSTOLIC BLOOD PRESSURE - MUSE: NORMAL
T AXIS - MUSE: 27 DEGREES
VENTRICULAR RATE- MUSE: 77 BPM

## 2021-06-22 ENCOUNTER — COMMUNICATION - HEALTHEAST (OUTPATIENT)
Dept: SCHEDULING | Facility: CLINIC | Age: 81
End: 2021-06-22

## 2021-06-24 NOTE — TELEPHONE ENCOUNTER
Controlled Substance Refill Request  Medication:   Requested Prescriptions     Pending Prescriptions Disp Refills     diazePAM (VALIUM) 5 MG tablet [Pharmacy Med Name: DIAZEPAM 5 MG TABLET] 15 tablet 0     Sig: TAKE 1 TABLET (5 MG TOTAL) BY MOUTH DAILY AS NEEDED.     Date Last Fill: 12/11/18  Ph[id number]   Submit electronically to pharmacy  Controlled Substance Agreement on File:   Encounter-Level CSA Scan Date:    There are no encounter-level csa scan date.       Last office visit: Last office visit pertaining to requested medication was 10/2/18.

## 2021-06-24 NOTE — TELEPHONE ENCOUNTER
Refill Approved    Rx renewed per Medication Renewal Policy. Medication was last renewed on 8/31/18.    Polly Ryan, [facility] Triage/Med Refill 2/22/2019     Requested Prescriptions   Pending Prescriptions Disp Refills     cloNIDine HCl (CATAPRES) 0.2 MG tablet 180 tablet 1     Sig: Take 1 tablet (0.2 mg total) by mouth 2 (two) times a day.    Clonidine/Hydralazine Refill Protocol Passed - 2/22/2019 12:55 PM       Passed - PCP or prescribing provider visit in past 12 months      Last office visit with prescriber/PCP: 8/2/2018 Eddie Diaz MD OR same dept: 8/2/2018 Eddie Diaz MD OR same specialty: 8/2/2018 Eddie Diaz MD  Last physical: 10/2/2018 Last MTM visit: Visit date not found   Next visit within 3 mo: Visit date not found  Next physical within 3 mo: Visit date not found  Prescriber OR PCP: Eddie Diaz MD  Last diagnosis associated with med order: There are no diagnoses linked to this encounter.  If protocol passes may refill for 12 months if within 3 months of last provider visit (or a total of 15 months).            Passed - Blood pressure filed in past 12 months    BP Readings from Last 1 Encounters:   10/02/18 153/84

## 2021-06-25 NOTE — TELEPHONE ENCOUNTER
Phone call to patient to review results and provider's recommendations. Results given and explained. Discussed treatment options of continuing with PT, injections if no relief with PT and then surgical consultation if conservative treatments fail to improve symptoms. Stated understanding. She will continue with PT at this time.     Encouraged pt to call nurse navigation line if questions or concerns arise or he pain worsens/fails to improve with PT. Stated understanding.

## 2021-06-25 NOTE — TELEPHONE ENCOUNTER
----- Message from Reyna Weldon CNP sent at 6/16/2021  8:17 AM CDT -----  Please call patient and notify her that I did review her lumbar spine MRI.  There is degenerative changes greatest at L3-4 with advanced arthritis contributing to severe spinal stenosis likely contributing to her symptoms.  If symptoms or not improving with physical therapy we could consider injection options at any time.  If symptoms or not improving with conservative treatment she would be a surgical candidate.

## 2021-06-25 NOTE — PROGRESS NOTES
ASSESSMENT: Lluvia Marrufo is a 81 y.o. female who presents for consultation at the request of PCP Eddie Diaz MD, with a past medical history significant for hyperlipidemia, hypertension, mild intermittent asthma, prediabetes, hypomagnesia, hypokalemia, acute CVA 2018 with expressive aphasia, obesity, headache, history of right total knee replacement, left hip replacement, history of bilateral shoulder replacement who presents today for new patient evaluation of:    -Chronic bilateral low back pain lower lumbar spine with right lumbar radiculitis L5 dermatomal pattern, progressive in the last 4 months.    No increased pain with SI provocative maneuvers today however she does have some tenderness over the SI notch today as well as slightly elevated left iliac crest on exam.    Patient is neurologically intact on exam. No myelopathic or red flag symptoms.     JACKELIN Score: 24    WHO 5: 14     Diagnoses and all orders for this visit:    Chronic bilateral low back pain with right-sided sciatica  -     MR Lumbar Spine Without Contrast; Future; Expected date: 06/03/2021  -     Ambulatory referral to PT/OT  -     gabapentin (NEURONTIN) 300 MG capsule; Take 1 capsule (300 mg total) by mouth 3 (three) times a day.  Dispense: 90 capsule; Refill: 3    DDD (degenerative disc disease), lumbar  -     MR Lumbar Spine Without Contrast; Future; Expected date: 06/03/2021  -     Ambulatory referral to PT/OT    Spondylolisthesis of lumbar region  -     MR Lumbar Spine Without Contrast; Future; Expected date: 06/03/2021  -     Ambulatory referral to PT/OT    Spinal stenosis of lumbar region without neurogenic claudication  -     MR Lumbar Spine Without Contrast; Future; Expected date: 06/03/2021  -     Ambulatory referral to PT/OT    Right lumbar radiculitis  -     MR Lumbar Spine Without Contrast; Future; Expected date: 06/03/2021  -     Ambulatory referral to PT/OT  -     gabapentin (NEURONTIN) 300 MG capsule; Take 1  capsule (300 mg total) by mouth 3 (three) times a day.  Dispense: 90 capsule; Refill: 3      PLAN:  Reviewed spine anatomy and disease process. Discussed diagnosis and treatment options with the patient today. A shared decision making model was used.  The patient's values and choices were respected. The following represents what was discussed and decided upon by the provider and the patient.      -DIAGNOSTIC TESTS:  Images were personally reviewed and interpreted and explained to patient today using spine model.   --Ordered updated lumbar spine MRI to further evaluate radicular pain, for potential intervention.  --Lumbar spine MRI 2016 with multilevel spondylolisthesis L2-3, L3-4, L4-5.  Advanced disc height loss at T12-L1.  Moderate to advanced disc height loss at L5-S1, however no nerve compression noted at this level.  L3-4 moderate spinal stenosis with left disc bulge along with facet arthropathy.  L4-5 mild spinal stenosis with mild left foraminal stenosis.    -PHYSICAL THERAPY: Referral to physical therapy also placed to [facility] to establish home exercises for lumbar core strengthening and nerve glides.  Discussed the importance of core strengthening, ROM, stretching exercises with the patient and how each of these entities is important in decreasing pain.  Explained to the patient that the purpose of physical therapy is to teach the patient a home exercise program.  These exercises need to be performed every day in order to decrease pain and prevent future occurrences of pain.        -MEDICATIONS: Prescribed gabapentin 300 mg to titrate up to 1 tablet 3 times daily as tolerated.  She was previously on 1 tablet twice daily with benefit years ago.  Discussed multiple medication options today with patient. Discussed risks, side effects, and proper use of medications. Patient verbalized understanding.    -INTERVENTIONS: Could consider right lumbar WILBUR pending MRI review.  Discussed risks and benefits of  injections with patient today.    -PATIENT EDUCATION:  Total time of 48 minutes spent with the patient, reviewing the chart, placing orders, and documenting.   -Today we also discussed the issues related to the current COVID-19 pandemic, the pros and cons of the current treatment plan, the CDC guidelines such as social distancing, washing hands, masking, and covering the cough.    -FOLLOW-UP:   Follow-up after obtaining MRI to review results and ongoing plan.  For many years however progressive in the last 4 months bilaterally at the belt line with radiation of pain into the right lower extremity posterior thigh posterior calf as well as radiating pain into the lateral    Advised patient to call the [facility] if symptoms worsen or you have problems controlling bladder and bowel function.   ______________________________________________________________________    SUBJECTIVE:  HPI:  Lluvia Marrufo  Is a 81 y.o. female who presents today for new patient evaluation of low back pain ongoing for many years that has been progressive in the last 4 months with radiation of pain into the right lower extremity into the posterior buttock posterior thigh with lateral lower extremity pain and anterior shin pain that stops at the ankle she does report that any bending or twisting is aggravating as well as standing in 1 spot and walking, pain does not completely go away with sitting but does improve with sitting and she usually feels very comfortable with minimal pain in a sitting position.  Currently her pain with movement today is a 7/10 up to 9 at its worst, 2 at its best typically in a sitting position.  Patient otherwise denies any lower extremity weakness, denies numbness or tingling sensations, denies recent trips falls or balance changes.    Patient reports that she has had a right total knee replacement and has had some instability in that knee ever since surgery that is unchanged.    -Treatment to Date: No prior  spinal surgery.  Physical therapy 2017.    Left L5-S1 TFESI 12/15/2016.    -Medications:  Advil at bedtime    Current Outpatient Medications on File Prior to Encounter   Medication Sig Dispense Refill     albuterol (PROVENTIL) 2.5 mg /3 mL (0.083 %) nebulizer solution Take 3 mL (2.5 mg total) by nebulization every 4 (four) hours as needed for wheezing or shortness of breath. 75 mL 1     amLODIPine (NORVASC) 10 MG tablet Take 1 tablet (10 mg total) by mouth daily. 90 tablet 2     aspirin 325 MG tablet Take 1 tablet (325 mg total) by mouth daily. 30 tablet 0     atenoloL (TENORMIN) 50 MG tablet Take 1.5 tablets (75 mg total) by mouth daily. 135 tablet 2     atorvastatin (LIPITOR) 20 MG tablet Take 1 tablet (20 mg total) by mouth daily. 90 tablet 2     diazePAM (VALIUM) 5 MG tablet TAKE 1 TABLET (5 MG TOTAL) BY MOUTH DAILY AS NEEDED. 15 tablet 0     diphenhydrAMINE (BENADRYL) 25 mg tablet Take 25 mg by mouth at bedtime as needed for sleep.       glucosamine-chondroitin 500-400 mg cap Take 2 capsules by mouth daily.       lisinopriL-hydrochlorothiazide (PRINZIDE,ZESTORETIC) 20-25 mg per tablet Take 2 tablets by mouth daily. 180 tablet 2     multivitamin with minerals (THERA-M) 9 mg iron-400 mcg Tab tablet Take 1 tablet by mouth daily.       potassium chloride (KLOR-CON M20) 20 MEQ tablet TAKE 1 TABLET BY MOUTH THREE TIMES A  tablet 2     No current facility-administered medications on file prior to encounter.        Allergies   Allergen Reactions     Azithromycin Nausea Only     Other Drug Allergy (See Comments)      Zicam       Past Medical History:   Diagnosis Date     Arthritis      Asthma      Bronchitis      Earache      Fracture, foot      Hypertension      UTI (urinary tract infection)         Patient Active Problem List   Diagnosis     Obesity     Hyperlipidemia     Prediabetes     Lower Back Pain     Headache     Hypertension goal BP (blood pressure) < 140/90     Dermatitis     Joint Pain, Localized In  The Shoulder     Mild intermittent asthma without complication     Spinal stenosis of lumbar region     Primary osteoarthritis of left hip     Hypomagnesemia     Hypokalemia     Expressive aphasia     Acute CVA (cerebrovascular accident) (H)     Obesity (BMI 35.0-39.9) with comorbidity (H)       Past Surgical History:   Procedure Laterality Date     APPENDECTOMY       EYE SURGERY      Growth on lateral left eyelid removed.     HYSTERECTOMY      age 57     JOINT REPLACEMENT Left     knee     OOPHORECTOMY       ME HALLUX RIGIDUS W/CHEILECTOMY 1ST MP JT W/O IMPLT      Description: Hallux Rigidus Correction W/ Cheilectomy 1st MTP Joint;  Proc Date: 12/03/2010;     ME RECONSTR TOTAL SHOULDER IMPLANT Right 10/27/2015    Procedure: RIGHT SHOULDER TOTAL ARTHROPLASTY;  Surgeon: Eddie Payne MD;  Location: [facility] OR;  Service: Orthopedics     ME TOTAL HIP ARTHROPLASTY Left 10/3/2017    Procedure:  LEFT TOTAL HIP ARTHROPLASTY;  Surgeon: Mahin Monsalve MD;  Location: [facility] OR;  Service: Orthopedics     TONSILLECTOMY       TOTAL KNEE ARTHROPLASTY      right     TOTAL SHOULDER ARTHROPLASTY      left       Family History   Problem Relation Age of Onset     Hypertension Mother      Alcohol abuse Father      Cirrhosis Father      Anesthesia problems Neg Hx        Reviewed past medical, surgical, and family history with patient found on new patient intake packet located in EMR Media tab.     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  She is .  Patient denies smoking/tobacco use.  Does report drinking 1 cocktail a day.  Denies history being heavy drinker, denies recreational drug use.    ROS: Patient does report joint pain, depression, cough, wheezing, weight gain in the last 1 year.  Specifically negative for bowel/bladder dysfunction, balance changes, headache, dizziness, foot drop, fevers, chills, appetite changes,  "nausea/vomiting, unexplained weight loss. Otherwise 13 systems reviewed are negative. Please see the patient's intake questionnaire from today for details.    OBJECTIVE:  /72 (Patient Site: Right Arm, Patient Position: Sitting)   Ht 5' 3\" (1.6 m)   Wt (!) 223 lb (101.2 kg)   BMI 39.50 kg/m      PHYSICAL EXAMINATION:    --CONSTITUTIONAL:  Vital signs as above.  No acute distress.  The patient is well nourished and well groomed.  --PSYCHIATRIC:  Appropriate mood and affect. The patient is awake, alert, oriented to person, place, time and answering questions appropriately with clear speech.    --SKIN:  Skin over the face, bilateral lower extremities, and posterior torso is clean, dry, intact without rashes.    --RESPIRATORY: Normal rhythm and effort. No abnormal accessory muscle breathing patterns noted.   --ABDOMINAL:  Non-distended.  --STANDING EXAMINATION:  Normal lumbar lordosis noted, no lateral shift.  --MUSCULOSKELETAL: Lumbar spine inspection reveals no evidence of deformity. Range of motion is not limited in lumbar flexion, extension, lateral rotation. No tenderness to palpation lumbar spine. Straight leg raising in the supine position is negative to radicular pain. Sciatic notch non-tender.  --SACROILIAC JOINT: Negative distraction.  Negative Bill's with reproduction of pain to affected extremity.  Negative Gaenslen's Test with reproduction of pain to affected extremity. One Finger point test negative.  --GROSS MOTOR: Gait is non-antalgic. Easily arises from a seated position. Toe walking and heel walking are normal without significant difficulty.    --LOWER EXTREMITY MOTOR TESTING:  Plantar flexion left 5/5, right 5/5   Dorsiflexion left 5/5, right 5/5   Great toe MTP extension left 5/5, right 5/5  Knee flexion left 5/5, right 5/5  Knee extension left 5/5, right 5/5   Hip flexion left 5/5, right 5/5  Hip abduction left 5/5, right 5/5  Hip adduction left 5/5, right 5/5   --HIPS: Full range of motion " bilaterally. Negative FABERs on the involved lower extremity.  Negative scour maneuver.  --NEUROLOGICAL:  2/4 patellar, medial hamstring, and achilles reflexes bilaterally.  Sensation to light touch is intact in the bilateral L4, L5, and S1 dermatomes. Babinski is negative. No clonus.  Negative Gisselle reflex bilaterally.  --VASCULAR:  2/4 dorsalis pedis and posterior tibialsi pulses bilaterally.  Bilateral lower extremities are warm.  There is no pitting edema of the bilateral lower extremities.    RESULTS: Prior medical records from [facility] were reviewed today.    Imaging: Lumbar spine Imaging was personally reviewed and interpreted today. The images were shown to the patient and the findings were explained using a spine model.

## 2021-06-25 NOTE — TELEPHONE ENCOUNTER
Controlled Substance Refill Request  Medication Name:   Requested Prescriptions     Pending Prescriptions Disp Refills     diazePAM (VALIUM) 5 MG tablet [Pharmacy Med Name: DIAZEPAM 5 MG TABLET] 15 tablet 0     Sig: TAKE 1 TABLET (5 MG TOTAL) BY MOUTH DAILY AS NEEDED.     Date Last Fill: 3/17/21  Requested Pharmacy: [facility]  Submit electronically to pharmacy  Controlled Substance Agreement on file:   Encounter-Level CSA Scan Date:    There are no encounter-level csa scan date.        Last office visit:  4/19/21         Love Sanchez RN, BSN Nurse Triage Advisor 12:01 PM 5/27/2021

## 2021-06-26 NOTE — PATIENT INSTRUCTIONS - HE
~Please call our [facility] Nurse Navigation line [phone] with any questions or concerns about your treatment plan, if symptoms worsen and you would like to be seen urgently, or if you have problems controlling bladder and bowel function.      You have been referred for Physical Therapy to [facility]. They will call you to schedule an appointment.  Scheduling phone number is [phone].  Discussed the importance of core strengthening, ROM, stretching exercises and how each of these entities is important in decreasing pain and improving long term spine health.  The purpose of physical therapy is to teach you an individualized home exercise program.  These exercises need to be performed every day in order to decrease pain and prevent future occurrences of pain.          Imaging has been ordered. Radiology will call you to schedule. Please call below if you do not hear from them in the next couple of days.     [facility] Radiology Scheduling  Please call [phone] to schedule your image(s) (select option #1). There are 2 different locations, see below.     [facility]  [address] [place] [address]      Prescribed Gabapentin today, 300 mg tablets, to be titrated up to 1 tablets 3 times a day as tolerated for your nerve pain. Please follow Gabapentin dosing chart below.    **Okay to stop at 1 tablet twice daily as well.    Gabapentin 300mg Dosing Chart    DATE  MORNING AFTERNOON BEDTIME    Day 1 0 0 1    Day 2 0 0 1    Day 3 0 0 1    Day 4 1 0 1    Day 5 1 0 1    Day 6 1 0 1    Day 7 1 1 1     Continue medication, taking 1 capsules three times daily  Please call if you have any questions regarding how to take your medication

## 2021-06-26 NOTE — PROGRESS NOTES
Chief Complaint   Patient presents with     Cough     COUGH WORSENED IN THE LAST FEW DAYS      Sore Throat     DUE TO COUGH          Clinical Decision Making: Patient has relatively sudden onset of cough 2 days ago.  Patient is vitally stable and lungs are clear to auscultation.  I have low suspicion for any bacterial infection such as pneumonia.  Patient does have history of bronchitis and asthma, but there are no wheezes noted on examination today.  Patient has a rescue inhaler.  Incidental finding of irregular rhythm noted on heart exam.  EKG shows new diagnosis of a fib. Patient referred to cardiology for further evaluation. Patient is currently taking daily ASA. Patient treated for her cough with Prednisone and Guaifenesin AC.     1. Atrial fibrillation, new onset (H)  Ambulatory referral to Rapid Access Clin   2. Cardiac arrhythmia, unspecified cardiac arrhythmia type  Electrocardiogram Perform - Clinic   3. Cough  predniSONE (DELTASONE) 20 MG tablet    codeine-guaiFENesin (GUAIFENESIN AC)  mg/5 mL liquid    Symptomatic COVID-19 Virus (CORONAVIRUS) PCR         Patient Instructions   1.  Recommend increasing fluids and rest.  2.  If you have a significant cough and or feeling rundown I do not advise having a gathering for Father's Day.  3.  You may continue to use Mucinex to help keep your mucus thin and easy to clear.  4. Your EKG shows a new diagnosis of A fib. This is an irregular rhythm. Currently your heart rate is under control. I am placing a referral for you to be seen by our cardiac rapid access clinic. Someone should contact you to set up an appointment. Please call the main clinic number if nobody had contacted you by Monday.   5. Begin taking Prednisone in the morning with food.   6. May take Guaifenesin with codeine at night.   7. Your covid test result will be available in about 24 hours on your MyChart.        HPI:  Lluvia Marrufo is a 81 y.o. female with PMHx of prediabetes, CVA,  asthma, HTN, and bronchitis who presents today complaining of cough x 2 days. Patient also reports a ST that's secondary to the cough.  Patient states that she is fully vaccinated against COVID-19.  Her cough was mucousy, but that she started taking Mucinex and it seemed to dry out.  She reports some runny nose and nasal congestion.  Her mucus is light yellow color.  She reports some nausea, but has been equating that to her dieting.  She has not had any episodes of vomiting or severe abdominal pain.  She denies any known fevers.  She has history of environmental allergies, but she is not exactly sure what she is allergic to.  She denies any ear pain, sinus pain, or sinus pressure.  She states that previously when she gets a cough like this is under a lot less she has been given an oral antibiotic.  Upon chart review not seeing any antibiotics within the past few years.  Patient has azithromycin listed as an allergy.  I did explain that given the duration of her symptoms it is unlikely for there to be a bacterial infection.  Patient did state that she just wants to feel better for Father's Day.    History obtained from the patient.    Problem List:  2019-12: Obesity (BMI 35.0-39.9) with comorbidity (H)  2018-07: Acute CVA (cerebrovascular accident) (H)  2018-07: Expressive aphasia  2017-10: Primary osteoarthritis of left hip  2016-09: Spinal stenosis of lumbar region  2015-10: Acute blood loss anemia  2015-10: Status post total shoulder arthroplasty, right  2015-10: Mild intermittent asthma without complication  Earache In The Left Ear  Obesity  Hyperlipidemia  Pain During Urination (Dysuria)  Prediabetes  Lower Back Pain  Headache  Acute bronchitis  Urinary Tract Infection  Hypertension goal BP (blood pressure) < 140/90  Cellulitis  Dermatitis  Otitis Externa  Joint Pain, Localized In The Shoulder  Hypomagnesemia  Hypokalemia      Past Medical History:   Diagnosis Date     Arthritis      Asthma      Bronchitis       Earache      Fracture, foot      Hypertension      UTI (urinary tract infection)        Social History     Tobacco Use     Smoking status: Never Smoker     Smokeless tobacco: Never Used   Substance Use Topics     Alcohol use: Yes     Comment: 1 drink per day       Review of Systems   Constitutional: Positive for fatigue. Negative for fever.   HENT: Positive for congestion, rhinorrhea and sore throat. Negative for ear pain, sinus pressure and sinus pain.    Respiratory: Positive for cough and wheezing.    Cardiovascular: Negative for chest pain.   Gastrointestinal: Positive for nausea. Negative for abdominal pain and vomiting.   Allergic/Immunologic: Positive for environmental allergies.       Vitals:    06/19/21 0919   BP: 118/75   Patient Site: Right Arm   Patient Position: Sitting   Cuff Size: Adult Large   Pulse: 90   Resp: 20   Temp: 98.1  F (36.7  C)   SpO2: 96%       Physical Exam  Vitals signs and nursing note reviewed.   Constitutional:       General: She is not in acute distress.     Appearance: She is well-developed. She is not diaphoretic.   HENT:      Head: Normocephalic and atraumatic.      Right Ear: External ear normal.      Left Ear: External ear normal.   Eyes:      General:         Right eye: No discharge.         Left eye: No discharge.      Conjunctiva/sclera: Conjunctivae normal.   Cardiovascular:      Rate and Rhythm: Normal rate. Rhythm irregular.      Heart sounds: Normal heart sounds. No murmur.   Pulmonary:      Effort: Pulmonary effort is normal. No respiratory distress.      Breath sounds: Normal breath sounds. No wheezing, rhonchi or rales.      Comments: Intermittent hacking dry cough.  Neurological:      Mental Status: She is alert.   Psychiatric:         Behavior: Behavior normal.         Thought Content: Thought content normal.         Judgment: Judgment normal.         Labs:    I have personally ordered and preliminarily reviewed the following EKG, and noted new A fib with  controlled rate. No signs of new ischemic changes.     Recent Results (from the past 72 hour(s))   Electrocardiogram Perform - Clinic   Result Value Ref Range    SYSTOLIC BLOOD PRESSURE      DIASTOLIC BLOOD PRESSURE      VENTRICULAR RATE 77 BPM    ATRIAL RATE 326 BPM    P-R INTERVAL      QRS DURATION 102 ms    Q-T INTERVAL 394 ms    QTC CALCULATION (BEZET) 445 ms    P Axis      R AXIS 73 degrees    T AXIS 27 degrees    MUSE DIAGNOSIS       Atrial fibrillation  Cannot rule out Anterior infarct (cited on or before 29-JUL-2018)  Abnormal ECG  When compared with ECG of 29-JUL-2018 20:15,  Atrial fibrillation has replaced Sinus rhythm  Nonspecific T wave abnormality, worse in Inferior leads  Nonspecific T wave abnormality now evident in Lateral leads         At the end of the encounter, I discussed results, diagnosis, medications. Discussed red flags for immediate return to clinic/ER, as well as indications for follow up if no improvement. Patient understood and agreed to plan. Patient was stable for discharge.

## 2021-06-26 NOTE — PATIENT INSTRUCTIONS - HE
1.  Recommend increasing fluids and rest.  2.  If you have a significant cough and or feeling rundown I do not advise having a gathering for Father's Day.  3.  You may continue to use Mucinex to help keep your mucus thin and easy to clear.  4. Your EKG shows a new diagnosis of A fib. This is an irregular rhythm. Currently your heart rate is under control. I am placing a referral for you to be seen by our cardiac rapid access clinic. Someone should contact you to set up an appointment. Please call the main clinic number if nobody had contacted you by Monday.   5. Begin taking Prednisone in the morning with food.   6. May take Guaifenesin with codeine at night.   7. Your covid test result will be available in about 24 hours on your MyChart.

## 2021-06-27 ENCOUNTER — HEALTH MAINTENANCE LETTER (OUTPATIENT)
Age: 81
End: 2021-06-27

## 2021-06-30 ENCOUNTER — COMMUNICATION - HEALTHEAST (OUTPATIENT)
Dept: CARDIOLOGY | Facility: CLINIC | Age: 81
End: 2021-06-30

## 2021-07-03 NOTE — ANESTHESIA PREPROCEDURE EVALUATION
Anesthesia Preprocedure Evaluation by Crystal Galicia MD at 10/3/2017 11:24 AM     Author: Crystal Galicia MD Service: -- Author Type: Physician    Filed: 10/3/2017 12:05 PM Date of Service: 10/3/2017 11:24 AM Status: Signed    : Crystal Galicia MD (Physician)       Anesthesia Evaluation      Patient summary reviewed   No history of anesthetic complications     Airway   Mallampati: II  Neck ROM: full   Pulmonary - normal exam    breath sounds clear to auscultation  (+) asthma  mild,  well controlled,   (-) shortness of breath, sleep apnea, not a smoker                         Cardiovascular   Exercise tolerance: > or = 4 METS  (+) hypertension, , hypercholesterolemia,     (-) angina, murmur  ECG reviewed  Rhythm: regular  Rate: normal,    no murmur      Neuro/Psych      Comments: SS    Endo/Other    (+) obesity,   (-) not pregnant     GI/Hepatic/Renal    (+) GERD well controlled,             Dental    (+) chipped                           Anesthesia Plan  Planned anesthetic: spinal    ASA 3   Induction: intravenous   Anesthetic plan and risks discussed with: patient and spouse  Anesthesia plan special considerations: antiemetics,   Post-op plan: routine recovery

## 2021-07-04 NOTE — TELEPHONE ENCOUNTER
Telephone Encounter by Polly Matthews, RN at 6/30/2021  1:00 PM     Author: Polly Matthews, RN Service: -- Author Type: Registered Nurse    Filed: 6/30/2021  1:01 PM Encounter Date: 6/30/2021 Status: Signed    : Polly Matthews, RN (Registered Nurse)       Pt will use 30 day free card and will pay out her deductible and will eval insurance in the fall for next year.

## 2021-07-04 NOTE — PROGRESS NOTES
Progress Notes by Josephine Heller PT at 6/11/2021  8:30 AM     Author: Josephine Heller PT Service: -- Author Type: Physical Therapist    Filed: 6/11/2021  3:52 PM Encounter Date: 6/11/2021 Status: Attested    : Josephine Heller PT (Physical Therapist) Cosigner: Eddie Diaz MD at 6/14/2021 10:44 AM    Attestation signed by Eddie Diaz MD at 6/14/2021 10:44 AM    agree                    [facility] Certification Request    June 11, 2021      Patient: Lluvia Marrufo  MR Number: [id number]  YOB: 1940  Date of Visit: 6/11/2021      Dear Eddie Ortega,:    Thank you for this referral.   We are seeing Lluvia Marrufo for Physical Therapy of   M54.41, G89.29 (ICD-10-CM) - Chronic bilateral low back pain with right-sided sciatica   M51.36 (ICD-10-CM) - DDD (degenerative disc disease), lumbar   M43.16 (ICD-10-CM) - Spondylolisthesis of lumbar region   M48.061 (ICD-10-CM) - Spinal stenosis of lumbar region without neurogenic claudication   M54.16 (ICD-10-CM) - Right lumbar radiculitis   .    Medicare and/or Medicaid requires physician review and approval of the treatment plan. Please review the plan of care and verify that you agree with the therapy plan of care by co-signing this note.      Plan of Care  Authorization / Certification Start Date: 06/11/21  Authorization / Certification End Date: 09/09/21  Authorization / Certification Number of Visits: 12  Communication with: Referral Source  Patient Related Instruction: Nature of Condition;Treatment plan and rationale;Self Care instruction;Basis of treatment;Body mechanics;Posture;Precautions;Next steps;Expected outcome  Times per Week: 1-2  Number of Weeks: 6-12  Number of Visits: 12  Discharge Planning: When goals are met or plateau of progress  Precautions / Restrictions : none  Therapeutic Exercise: ROM;Stretching;Strengthening  Neuromuscular Reeducation: kinesio  tape;posture;balance/proprioception;TNE;core  Manual Therapy: soft tissue mobilization;myofascial release;joint mobilization;muscle energy;strain counterstrain;other  Manual Therapy: neural mobilization  Modalities: electrical stimulation;traction;TENS;ultrasound;cold pack;hot pack;other  Modalities: assess precautions before starting any modalities if needed.  Gait Training: decrease left hard heel strike initially and eventually changing as needed  Equipment: theraband      Goals:  Pt. will demonstrate/verbalize independence in self-management of condition in : 12 weeks;Comment  Comment:: To aid in home management of symptoms  Pt. will be independent with home exercise program in : 12 weeks;Comment  Comment:: To aid in home management of symptoms.  Pt. will improve posture : and maintain posture for;30 minutes;in sitting;for eating;in 6 weeks;in 12 weeks;Comment  Comment:: patient will not have increased pain when going from sit to stnad after sitting 30-45 minutes  Pt. will be able to walk : 30 minutes;with FWB;with less pain;with less difficulty;for community mobility;in 12 weeks;Comment  Comment:: The patient will have pain at a 3/10 or less to be able to walk outside for exercise.  Patient will stand : other time;with no pain;with less difficultty;for home chores;in 12 weeks  Other Time: 15 minutes        If you have any questions or concerns, please don't hesitate to call.    Sincerely,      Josephine Heller, PT        Physician recommendation:     ___ Follow therapist's recommendation        ___ Modify therapy      *Physician co-signature indicates they certify the need for these services furnished within this plan and while under their care.  Evaluate and Treat     Treatment Goals:  Improve posture  Increase strength  Decrease pain  Increase ROM  Improve function     1-2 x/week for 6-12 weeks     Exercise:  As indicated by therapy evaluation     Modalities:  As indicated by therapy  evaluation     Procedures:  As indicated by therapy evaluation           M54.41, G89.29 (ICD-10-CM) - Chronic bilateral low back pain with right-sided sciatica   M51.36 (ICD-10-CM) - DDD (degenerative disc disease), lumbar   M43.16 (ICD-10-CM) - Spondylolisthesis of lumbar region   M48.061 (ICD-10-CM) - Spinal stenosis of lumbar region without neurogenic claudication   M54.16 (ICD-10-CM) - Right lumbar radiculitis       [facility]   [place] Initial Evaluation    Patient Name: Lluvia Marrufo  Date of evaluation: 6/11/2021  Referral Diagnosis: Chronic bilateral low back pain with right-sided sciatica     M54.41, G89.29 (ICD-10-CM) - Chronic bilateral low back pain with right-sided sciatica   M51.36 (ICD-10-CM) - DDD (degenerative disc disease), lumbar   M43.16 (ICD-10-CM) - Spondylolisthesis of lumbar region   M48.061 (ICD-10-CM) - Spinal stenosis of lumbar region without neurogenic claudication   M54.16 (ICD-10-CM) - Right lumbar radiculitis     Referring provider: Eddie Diaz, *  Visit Diagnosis:     ICD-10-CM    1. Low back pain potentially associated with radiculopathy  M54.5        Assessment:    The patient has decreased nerve gliding on the right with a +SLR on the right. She has limitation in making beds walking and standing and transitional movements.  Also reaching and climbing stairs cause increased pain.  Today she presented with a pubic tubercle dysfunction and low back radiculopathy.  Tightness in her hamstrings and poor posture in sitting and tendency to put her left foot down much harder than the right.    Pt. is appropriate for skilled PT intervention as outlined in the Plan of Care (POC).  Pt. is a good candidate for skilled PT services to improve pain levels and function.  Skilled Physical Therapy needed to increase ROM, increase strength, decrease pain and improve functional status.     Goals:  Pt. will demonstrate/verbalize independence in self-management  of condition in : 12 weeks;Comment  Comment:: To aid in home management of symptoms  Pt. will be independent with home exercise program in : 12 weeks;Comment  Comment:: To aid in home management of symptoms.  Pt. will improve posture : and maintain posture for;30 minutes;in sitting;for eating;in 6 weeks;in 12 weeks;Comment  Comment:: patient will not have increased pain when going from sit to stnad after sitting 30-45 minutes  Pt. will be able to walk : 30 minutes;with FWB;with less pain;with less difficulty;for community mobility;in 12 weeks;Comment  Comment:: The patient will have pain at a 3/10 or less to be able to walk outside for exercise.  Patient will stand : other time;with no pain;with less difficultty;for home chores;in 12 weeks  Other Time: 15 minutes    The patient was involved in setting the goals.    Patient's expectations/goals are realistic.    Barriers to Learning or Achieving Goals:  Injections work for her but she does not know when she had the last injection.    No Barriers.  Got worse in Feb 2021,  Gained weigt gained 30 pounds walking on ice causes increased pain.  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     currently pain 2-3/10 pain bad when she got up in the AM 6/10 pain.  Stand 30 minutes is pushing it take weight off then it is better.      Sometime sharp pain when moving too quick and pressure pain.    Plan / Patient Instructions:       Plan of Care:   Authorization / Certification Start Date: 06/11/21  Authorization / Certification End Date: 09/09/21  Authorization / Certification Number of Visits: 12  Communication with: Referral Source  Patient Related Instruction: Nature of Condition;Treatment plan and rationale;Self Care instruction;Basis of treatment;Body mechanics;Posture;Precautions;Next steps;Expected outcome  Times per Week: 1-2  Number of Weeks: 6-12  Number of Visits: 12  Discharge Planning: When goals are met or plateau of progress  Precautions / Restrictions : none  Therapeutic  Exercise: ROM;Stretching;Strengthening  Neuromuscular Reeducation: kinesio tape;posture;balance/proprioception;TNE;core  Manual Therapy: soft tissue mobilization;myofascial release;joint mobilization;muscle energy;strain counterstrain;other  Manual Therapy: neural mobilization  Modalities: electrical stimulation;traction;TENS;ultrasound;cold pack;hot pack;other  Modalities: assess precautions before starting any modalities if needed.  Gait Training: decrease left hard heel strike initially and eventually changing as needed  Equipment: theraband      Plan for next visit:  Check leg length supine as well as standing flexed forward.  Check latissimus dorsi. Check repeated flexion and extension.  Check spinal mobility.  Check dural gliding.  Add in nerve gliding, reassess spine position.  Add posture and body mechanics as well as core as needed.     Subjective:           History of Present Illness:    Lluvia is a 81 y.o. female who presents to therapy today with complaints of 1.5 years ago daughter  from terminal brain cancer also in a short period of time had a relative with a still born baby and her dog .  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 They are now doing a [employer]. 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   2 beds.  Went to the spine clinic. Increased pain in 2021 Pain is progressing into the right buttock and into the right leg posterior and anterior and lateral thigh and sometimes into the posterior calf. Retired end of September.. Date of onset/duration of symptoms is 2021. Onset was gradual.  Pain Ratin  Pain rating at best: 3  Pain rating at worst: 8  Pain description:   Pressure pain and some sharp pain.  Walk 15- 20 minutes outside or up and down hills is harder due to her asthma more than her back.    Functional limitations are described as occurring with:   ascending stairs or curbs  lifting  reaching overhead  standing 15  minutes  transitional movements sit to stand  walking 20-30 minutes then increased pain    Patient reports benefit from:  anti-inflammatory     advil and scotch  Sitting is ok when she is there but when she gets up after sitting 45 minutes -1 hour and then will have pain.        Objective:      Note: Items left blank indicates the item was not performed or not indicated at the time of the evaluation.    Patient Outcome Measures :    Modified Oswestry Low Back Pain Disablity Questionnaire  in %: 30     Scores range from 0-100%, where a score of 0% represents minimal pain and maximal function. The minimal clinically important difference is a score reduction of 12%.    Examination  1. Low back pain potentially associated with radiculopathy       Involved side: Right  Posture Observation:      General sitting posture is  poor.  General standing posture is fair.    T2 - T7 left rotated and   Right rotatted T11 and T10  Right SI stuck + forward bend  Right posterior SI  Right high pubic tubercle  Tight lower abdominals just above the pubic tubercles.  Lumbar ROM:    Date: 6/11/21     *Indicate scale AROM AROM AROM   Lumbar Flexion One hand above knees  Major movement loss  2-3/10     Lumbar Extension Moderate movement loss pain 4/10       Right Left Right Left Right Left   Lumbar Sidebending         Lumbar Rotation         Thoracic Flexion      Thoracic Extension      Thoracic Sidebending         Thoracic Rotation           Lower Extremity Strength:       General listening to the deep abdomins and back      Date:      LE strength/5 Right Left Right Left Right Left   Hip Flexion (L1-3) 5/5 5/5       Hip Extension (L5-S1)         Hip Abduction (L4-5)         Hip Adduction (L2-3)         Hip External Rotation         Hip Internal Rotation         Knee Extension (L3-4) 5/5 5/5       Knee Flexion 5/5 5/5       Ankle Dorsiflexion (L4-5) 5/5 5/5       Great Toe Extension (L5) 5/5 5/5       Ankle Plantar flexion (S1)          Abdominals      ER hip pain in the lateral hip on the right.  Pain with hamstrings on the right.    Sensation           Reflex Testing  Lumbar Dermatomes Right Left UE Reflexes Right Left   Iliac Crest and Groin (L1)   Biceps (C5-6)     Anterior Medial Thigh (L2)   Brachioradialis (C5-6)     Anterior Thigh, Medial Epicondyle Femur (L3)   Triceps (C7-8)     Lateral Thigh, Anterior Knee, Medial Leg/Malleolus (L4)   Hoffmanns test     Lateral Leg, Dorsal Foot (L5)   LE Reflexes     Lateral Foot (S1)   Patellar (L3-4)     Posterior Leg (S2)   Achilles (S1-2)     Other:   Babinski Response       Palpation:    Lumbar Special Tests:     Lumbar Special Tests Right Left SI Tests Right  Left   Quadrant test   SI Compression     Straight leg raise 50 pain in the back 80   SI Distraction     Crossover response   POSH Test     Slump   Sacral Thrust     Sit-up test  FADIR     Trunk extensor endurance test  Resisted Abduction     Prone instability test  Other:FW bend + -   Pubic shotgun  Other:         Gait:  Stomping on her left foot  With walking short steps but going past her other foot.     Treatment Today     TREATMENT MINUTES COMMENTS   Evaluation 30 See evaluation   Self-care/ Home management     Manual therapy 15 STM,MFR to lower abdomen   Neuromuscular Re-education     Therapeutic Activity     Therapeutic Exercises 15 Standing abdominal and gluteal isometrics, proper posture in sitting/ pelvic tilt, prone over a pillow, LIBERTY    Gait training     Modality__________________                Total 60    Blank areas are intentional and mean the treatment did not include these items.     PT Evaluation Code: (Please list factors)  Patient History/Comorbidities: asthma  Examination: SI dysfunction and LBP with radiculopathy  Clinical Presentation: stable  Clinical Decision Making: low    Patient History/  Comorbidities Examination  (body structures and functions, activity limitations, and/or participation restrictions) Clinical  Presentation Clinical Decision Making (Complexity)   No documented Comorbidities or personal factors 1-2 Elements Stable and/or uncomplicated Low   1-2 documented comorbidities or personal factor 3 Elements Evolving clinical presentation with changing characteristics Moderate   3-4 documented comorbidities or personal factors 4 or more Unstable and unpredictable High              Josephine Heller PT  6/11/2021  8:41 AM

## 2021-07-04 NOTE — TELEPHONE ENCOUNTER
Telephone Encounter by Polly Matthews, RN at 6/30/2021  9:08 AM     Author: Polly Matthews, RN Service: -- Author Type: Registered Nurse    Filed: 6/30/2021  9:11 AM Encounter Date: 6/30/2021 Status: Signed    : Polly Matthews, RN (Registered Nurse)       Received note from pharm that Eliquis was expensive and when looking into with pharmacist    [9:07 AM] Hilda Barraza      well it looks like she's meeting a deductible or in the gap of coverage her cost will be $322 now but after her part is met it will be $47, [phone] but honestly the only other drug is coumadin/warfarin, Xarelto is the same exact copay as Eliquis. wish I had better info.  Left  for pt.

## 2021-07-05 PROBLEM — R07.2 PRECORDIAL PAIN: Status: ACTIVE | Noted: 2021-06-29

## 2021-07-05 PROBLEM — I48.0 PAF (PAROXYSMAL ATRIAL FIBRILLATION) (H): Status: ACTIVE | Noted: 2021-06-29

## 2021-07-05 PROBLEM — R06.09 DYSPNEA ON EXERTION: Status: ACTIVE | Noted: 2021-06-29

## 2021-07-06 VITALS — HEIGHT: 63 IN | BODY MASS INDEX: 39.51 KG/M2 | WEIGHT: 223 LBS

## 2021-07-06 VITALS
RESPIRATION RATE: 20 BRPM | SYSTOLIC BLOOD PRESSURE: 118 MMHG | OXYGEN SATURATION: 96 % | HEART RATE: 90 BPM | TEMPERATURE: 98.1 F | DIASTOLIC BLOOD PRESSURE: 75 MMHG

## 2021-07-13 ENCOUNTER — HOSPITAL ENCOUNTER (OUTPATIENT)
Dept: PHYSICAL THERAPY | Facility: REHABILITATION | Age: 81
End: 2021-07-13
Payer: COMMERCIAL

## 2021-07-13 ENCOUNTER — RECORDS - HEALTHEAST (OUTPATIENT)
Dept: ADMINISTRATIVE | Facility: CLINIC | Age: 81
End: 2021-07-13

## 2021-07-13 DIAGNOSIS — M54.41 CHRONIC BILATERAL LOW BACK PAIN WITH RIGHT-SIDED SCIATICA: ICD-10-CM

## 2021-07-13 DIAGNOSIS — M54.16 RIGHT LUMBAR RADICULITIS: ICD-10-CM

## 2021-07-13 DIAGNOSIS — M54.50 LOW BACK PAIN POTENTIALLY ASSOCIATED WITH RADICULOPATHY: ICD-10-CM

## 2021-07-13 DIAGNOSIS — M51.369 DDD (DEGENERATIVE DISC DISEASE), LUMBAR: Primary | ICD-10-CM

## 2021-07-13 DIAGNOSIS — M48.061 SPINAL STENOSIS OF LUMBAR REGION WITHOUT NEUROGENIC CLAUDICATION: ICD-10-CM

## 2021-07-13 DIAGNOSIS — G89.29 CHRONIC BILATERAL LOW BACK PAIN WITH RIGHT-SIDED SCIATICA: ICD-10-CM

## 2021-07-13 PROCEDURE — 97140 MANUAL THERAPY 1/> REGIONS: CPT | Mod: GP | Performed by: PHYSICAL THERAPIST

## 2021-07-13 PROCEDURE — 97110 THERAPEUTIC EXERCISES: CPT | Mod: GP | Performed by: PHYSICAL THERAPIST

## 2021-07-13 NOTE — PROGRESS NOTES
07/13/21 0700   Signing Clinician's Name / Credentials   Signing clinician's name / credentials Lauren Wallteri PT   Session Number   Session Number 2   Authorization status 9/9/21   Session Tracking, Supervision and Quick Adds   PT Assistant Visit Number 0   AT Visit Number 0   Progress Note/Recertification   Progress Note Due Date 07/11/21   Recertification Due Date 09/09/21   Adult Goals   PT Ortho Eval Goals 3   Goal 1   Goal Identifier sit 30 minutes wiht good posture   Goal Description Pt. will improve posture : and maintain posture for;30 minutes;in sitting;for eating;in 6 weeks;in 12 weeks;Comment   Target Date 09/13/21   Goal 3   Goal Identifier walk 30 minutes with only slight increase in pain   Goal Description Pt. will be able to walk : 30 minutes;with FWB;with less pain;with less difficulty;for community mobility;in 12 weeks;Comment   Target Date 10/11/21   Ortho Goal 3   Goal Identifier get up after sitting 30-45 minutes with pain only 3/10 pain   Goal Description Comment:: patient will not have increased pain when going from sit to stnad after sitting 30-45 minutes   Target Date 09/13/21   Subjective Report   Subjective Report Pain in the neck at C7 and having difficulty with cervical extension and it radoated to the shoulders bilaterally.  Pain in the low back side bending causes increased pain.pain currently 3/10 with standing.    Objective Measures   Objective Measures Objective Measure 1   Objective Measure 1   Objective Measure Lumbar ROM   Details pain with right and left sidebending today   Treatment Interventions   Interventions Therapeutic Procedure/Exercise;Manual Therapy   Therapeutic Procedure/exercise   Therapeutic Procedures: strength, endurance, ROM, flexibillity minutes (51657) 15   Skilled Intervention patient was instructed in the [place] cervical thoracic joint mobilization, sidelying lateral fascial line stretching as well as anterior and posterior fascial X stretching    Patient Response felt a strong stretch and needed to flex elbow as well as the knee with stretching only doing the [place] gently as not to overdo.   Manual Therapy   Manual Therapy: Mobilization, MFR, MLD, friction massage minutes (13401) 40   Skilled Intervention dural mobilization medially and laterally to the entire spine as well as superior and inferior from rectus capitus posterior minor  to sacral base.  STM,MFR to lateral fascial line especially the superior portion of the illiac crest.     Patient Response significant decrease in tightness in the dura as well as the fascia in all the areas treated.   Plan   Home program patient was instructed in the [place] cervical thoracic joint mobilization, sidelying lateral fascial line stretching as well as anterior and posterior fascial X stretching   Plan for next session assess leg length and see of a ell lift is needed.  Reassess dura mobility as well as lateral fascial lines.  check latissimus length as able to go over posture and body mechanics, assess spine position and mobility.   Comments   Comments right rotated L4 up to T11.  SI on the left sacral base is posterior.  + FW bend on the left. General listening to left lateral hip.The patient stands on her right leg in standing.   Total Session Time   Total Treatment Time (sum of timed and untimed services) 55

## 2021-07-16 DIAGNOSIS — F41.9 ANXIETY: ICD-10-CM

## 2021-07-20 ENCOUNTER — HOSPITAL ENCOUNTER (OUTPATIENT)
Dept: PHYSICAL THERAPY | Facility: REHABILITATION | Age: 81
End: 2021-07-20
Payer: COMMERCIAL

## 2021-07-20 DIAGNOSIS — M48.061 SPINAL STENOSIS OF LUMBAR REGION WITHOUT NEUROGENIC CLAUDICATION: ICD-10-CM

## 2021-07-20 DIAGNOSIS — M51.369 DDD (DEGENERATIVE DISC DISEASE), LUMBAR: Primary | ICD-10-CM

## 2021-07-20 DIAGNOSIS — M54.41 CHRONIC BILATERAL LOW BACK PAIN WITH RIGHT-SIDED SCIATICA: ICD-10-CM

## 2021-07-20 DIAGNOSIS — M54.50 LOW BACK PAIN POTENTIALLY ASSOCIATED WITH RADICULOPATHY: ICD-10-CM

## 2021-07-20 DIAGNOSIS — M54.16 RIGHT LUMBAR RADICULITIS: ICD-10-CM

## 2021-07-20 DIAGNOSIS — G89.29 CHRONIC BILATERAL LOW BACK PAIN WITH RIGHT-SIDED SCIATICA: ICD-10-CM

## 2021-07-20 PROCEDURE — 97140 MANUAL THERAPY 1/> REGIONS: CPT | Mod: GP | Performed by: PHYSICAL THERAPIST

## 2021-07-20 PROCEDURE — 97110 THERAPEUTIC EXERCISES: CPT | Mod: GP | Performed by: PHYSICAL THERAPIST

## 2021-07-20 NOTE — TELEPHONE ENCOUNTER
Routing refill request to provider for review/approval because:  Controlled substance request    Last Written Prescription Date:  5/28/21  Last Fill Quantity: 15,  # refills: 0   Last office visit provider:  4/19/21     Requested Prescriptions   Pending Prescriptions Disp Refills     diazepam (VALIUM) 5 MG tablet [Pharmacy Med Name: DIAZEPAM 5 MG TABLET] 15 tablet 0     Sig: TAKE 1 TABLET (5 MG TOTAL) BY MOUTH DAILY AS NEEDED.       There is no refill protocol information for this order          Jamie Qiu RN 07/20/21 2:34 PM

## 2021-07-20 NOTE — PROGRESS NOTES
07/20/21 0903   Signing Clinician's Name / Credentials   Signing clinician's name / credentials Lauren Shipmanleela PT   Session Number   Session Number 3   Additional Session Number 8-12   Authorization status 9/9/21   Session Tracking, Supervision and Quick Adds   PT Assistant Visit Number 0   AT Visit Number 0   Progress Note/Recertification   Progress Note Due Date 07/11/21   Recertification Due Date 09/09/21   Adult Goals   PT Ortho Eval Goals 3   Goal 1   Goal Identifier sit 30 minutes with good posture   Goal Description Pt. will improve posture : and maintain posture for;30 minutes;in sitting;for eating;in 6 weeks;in 12 weeks;Comment   Target Date 09/13/21   Goal Progress goal improving   Goal 3   Goal Identifier walk 30 minutes with only slight increase in pain   Goal Description Pt. will be able to walk : 30 minutes;with FWB;with less pain;with less difficulty;for community mobility;in 12 weeks;Comment   Target Date 10/11/21   Goal Progress goal unknown   Ortho Goal 3   Goal Identifier get up after sitting 30-45 minutes with pain only 3/10 pain   Goal Description Comment:: patient will not have increased pain when going from sit to stnad after sitting 30-45 minutes   Goal Progress goal unknown   Target Date 09/13/21   Subjective Report   Subjective Report Low back pain started last night.  Tend to seth off to the right with walking and other people feel like she will fall down. Did a lot of cleaning on monday 6loads of laundry and changed 2 beds and cleaned the kitchen    Objective Measures   Objective Measures Objective Measure 1   Objective Measure 1   Objective Measure spine position   Details L4,5 rght rotated T6 left rotated, T1,2 left rotated, C7,6,5,4,3, slightly General listening to left lateral lhip.   Treatment Interventions   Interventions Therapeutic Procedure/Exercise;Manual Therapy   Therapeutic Procedure/exercise   Therapeutic Procedures: strength, endurance, ROM, flexibillity minutes  (99787) 15   Skilled Intervention patient was instructed in the [place] cervical thoracic joint mobilization, sidelying lateral fascial line stretching as well as anterior and posterior fascial X stretching   Patient Response felt a strong stretch and needed to flex elbow as well as the knee with stretching only doing the Maur[place] gently as not to overdo.   Manual Therapy   Manual Therapy: Mobilization, MFR, MLD, friction massage minutes (09034) 40   Skilled Intervention STM MFR to the left hip  laterally, listening to the sacrum duradural mobilization medially and laterally to the entire spine as well as superior and inferior from rectus capitus posterior minor  to sacral base.  STM,MFR to lateral fascial line especially the superior portion of the illiac crest.     Patient Response  after manual therapy to the hip then the patient's spine is straight in the spine except L4,5  right rotated.  significant decrease in tightness in the dura as well as the fascia in all the areas treated.   Plan   Home program patient was instructed in the [place] cervical thoracic joint mobilization, sidelying lateral fascial line stretching as well as anterior and posterior fascial X stretching   Plan for next session assess leg length and see of a ell lift is needed.  Reassess dura mobility as well as lateral fascial lines.  check latissimus length as able to go over posture and body mechanics, assess spine position and mobility.   Comments   Comments spine neutral after    Total Session Time   Total Treatment Time (sum of timed and untimed services) 55

## 2021-07-21 ENCOUNTER — RECORDS - HEALTHEAST (OUTPATIENT)
Dept: ADMINISTRATIVE | Facility: CLINIC | Age: 81
End: 2021-07-21

## 2021-07-21 RX ORDER — DIAZEPAM 5 MG
TABLET ORAL
Qty: 15 TABLET | Refills: 0 | Status: SHIPPED | OUTPATIENT
Start: 2021-07-21 | End: 2021-10-27

## 2021-07-22 ENCOUNTER — HOSPITAL ENCOUNTER (OUTPATIENT)
Dept: CT IMAGING | Facility: CLINIC | Age: 81
Discharge: HOME | End: 2021-07-22
Attending: INTERNAL MEDICINE | Admitting: INTERNAL MEDICINE
Payer: COMMERCIAL

## 2021-07-22 VITALS
BODY MASS INDEX: 38.09 KG/M2 | RESPIRATION RATE: 16 BRPM | HEART RATE: 82 BPM | SYSTOLIC BLOOD PRESSURE: 116 MMHG | WEIGHT: 215 LBS | HEIGHT: 63 IN | DIASTOLIC BLOOD PRESSURE: 68 MMHG

## 2021-07-22 DIAGNOSIS — R07.2 PRECORDIAL PAIN: ICD-10-CM

## 2021-07-22 DIAGNOSIS — I48.0 PAF (PAROXYSMAL ATRIAL FIBRILLATION) (H): Primary | ICD-10-CM

## 2021-07-22 LAB
BSA FOR ECHO PROCEDURE: 1.99 M2
CCTA ASCENDING AORTA: 3.3
CCTA SINUS: 3.4
CREAT BLD-MCNC: 0.6 MG/DL (ref 0.6–1.1)
CV CALCIUM SCORE AGATSTON LM: 0
CV CALCIUM SCORING AGATSON LAD: 0
CV CALCIUM SCORING AGATSTON CX: 0
CV CALCIUM SCORING AGATSTON RCA: 0
CV CALCIUM SCORING AGATSTON TOTAL: 0
GFR SERPL CREATININE-BSD FRML MDRD: >60 ML/MIN/1.73M2

## 2021-07-22 PROCEDURE — 82565 ASSAY OF CREATININE: CPT

## 2021-07-22 PROCEDURE — 75574 CT ANGIO HRT W/3D IMAGE: CPT | Mod: 26 | Performed by: GENERAL ACUTE CARE HOSPITAL

## 2021-07-22 PROCEDURE — 250N000011 HC RX IP 250 OP 636: Performed by: INTERNAL MEDICINE

## 2021-07-22 PROCEDURE — 250N000009 HC RX 250: Performed by: INTERNAL MEDICINE

## 2021-07-22 PROCEDURE — 75574 CT ANGIO HRT W/3D IMAGE: CPT

## 2021-07-22 PROCEDURE — 250N000013 HC RX MED GY IP 250 OP 250 PS 637: Performed by: INTERNAL MEDICINE

## 2021-07-22 RX ORDER — DILTIAZEM HYDROCHLORIDE 5 MG/ML
10 INJECTION INTRAVENOUS
Status: COMPLETED | OUTPATIENT
Start: 2021-07-22 | End: 2021-07-22

## 2021-07-22 RX ORDER — NITROGLYCERIN 0.4 MG/1
0.4 TABLET SUBLINGUAL ONCE
Status: COMPLETED | OUTPATIENT
Start: 2021-07-22 | End: 2021-07-22

## 2021-07-22 RX ORDER — METOPROLOL TARTRATE 1 MG/ML
5-20 INJECTION, SOLUTION INTRAVENOUS
Status: DISCONTINUED | OUTPATIENT
Start: 2021-07-22 | End: 2021-07-23 | Stop reason: HOSPADM

## 2021-07-22 RX ORDER — DILTIAZEM HYDROCHLORIDE 5 MG/ML
5-25 INJECTION INTRAVENOUS
Status: DISCONTINUED | OUTPATIENT
Start: 2021-07-22 | End: 2021-07-23 | Stop reason: HOSPADM

## 2021-07-22 RX ORDER — IOPAMIDOL 755 MG/ML
100 INJECTION, SOLUTION INTRAVASCULAR ONCE
Status: COMPLETED | OUTPATIENT
Start: 2021-07-22 | End: 2021-07-22

## 2021-07-22 RX ADMIN — METOPROLOL TARTRATE 5 MG: 5 INJECTION, SOLUTION INTRAVENOUS at 10:10

## 2021-07-22 RX ADMIN — NITROGLYCERIN 0.4 MG: 0.4 TABLET SUBLINGUAL at 10:42

## 2021-07-22 RX ADMIN — METOPROLOL TARTRATE 5 MG: 5 INJECTION, SOLUTION INTRAVENOUS at 10:04

## 2021-07-22 RX ADMIN — IOPAMIDOL 100 ML: 755 INJECTION, SOLUTION INTRAVENOUS at 10:53

## 2021-07-22 RX ADMIN — METOPROLOL TARTRATE 5 MG: 5 INJECTION, SOLUTION INTRAVENOUS at 10:16

## 2021-07-22 RX ADMIN — DILTIAZEM HYDROCHLORIDE 10 MG: 5 INJECTION, SOLUTION INTRAVENOUS at 10:38

## 2021-07-22 ASSESSMENT — MIFFLIN-ST. JEOR: SCORE: 1419.36

## 2021-07-27 ENCOUNTER — HOSPITAL ENCOUNTER (OUTPATIENT)
Dept: CARDIOLOGY | Facility: HOSPITAL | Age: 81
Discharge: HOME | End: 2021-07-27
Attending: INTERNAL MEDICINE | Admitting: INTERNAL MEDICINE
Payer: COMMERCIAL

## 2021-07-27 DIAGNOSIS — I48.0 PAF (PAROXYSMAL ATRIAL FIBRILLATION) (H): ICD-10-CM

## 2021-07-27 LAB — LVEF ECHO: NORMAL

## 2021-07-27 PROCEDURE — 93306 TTE W/DOPPLER COMPLETE: CPT

## 2021-07-27 PROCEDURE — 93306 TTE W/DOPPLER COMPLETE: CPT | Mod: 26 | Performed by: INTERNAL MEDICINE

## 2021-07-30 ENCOUNTER — TELEPHONE (OUTPATIENT)
Dept: CARDIOLOGY | Facility: CLINIC | Age: 81
End: 2021-07-30

## 2021-07-30 DIAGNOSIS — I48.0 PAF (PAROXYSMAL ATRIAL FIBRILLATION) (H): Primary | ICD-10-CM

## 2021-07-30 NOTE — TELEPHONE ENCOUNTER
Praveen Bourgeois MD Caswell, Mallory J, RN  Have her see afib np         ----- Message from Praveen Bourgeois MD sent at 7/30/2021 11:08 AM CDT -----  Have her see afib np  ----- Message -----  From: Janel Jackson, RN  Sent: 7/29/2021   4:31 PM CDT  To: Praveen Bourgeois MD    Called patient to update on results and recommendations from [facility]. She reports that she is still dyspneic and symptomatic. She is open to cardioversion. We discussed the procedure in detail. Will place order. She has been compliant with her Eliquis with reports of no missed doses. She last saw [facility] 6/29- she will need an updated H&P. She does have further questions about atrial fibrillation, pathophysiology (discussed) and chances of returning back to afib after shock. Will set up in Afib clinic due to this and then can also obtain history and physical.       Dr. Bourgeois,  Pt will need an updated H&P and also has questions regarding afib- ok to set up with Afib NP prior to cardioversion?  Thanks,  Praveen Ellis MD   7/28/2021 10:21 AM CDT       No significant abnormalities. If still symptomatic/dyspneic, should schedule cardioversion.           Called patient and LVM that  would reach out to arrange Afib NP appt. Msg sent to Casi for cardioversion following. Order placed for cardioversion. -[facility]

## 2021-08-02 ENCOUNTER — TELEPHONE (OUTPATIENT)
Dept: CARDIOLOGY | Facility: CLINIC | Age: 81
End: 2021-08-02

## 2021-08-02 DIAGNOSIS — I48.0 PAROXYSMAL ATRIAL FIBRILLATION (H): Primary | ICD-10-CM

## 2021-08-02 NOTE — TELEPHONE ENCOUNTER
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

## 2021-08-09 ENCOUNTER — OFFICE VISIT (OUTPATIENT)
Dept: CARDIOLOGY | Facility: CLINIC | Age: 81
End: 2021-08-09
Payer: COMMERCIAL

## 2021-08-09 ENCOUNTER — LAB (OUTPATIENT)
Dept: LAB | Facility: CLINIC | Age: 81
End: 2021-08-09

## 2021-08-09 VITALS
DIASTOLIC BLOOD PRESSURE: 78 MMHG | HEART RATE: 94 BPM | WEIGHT: 215 LBS | SYSTOLIC BLOOD PRESSURE: 122 MMHG | HEIGHT: 63 IN | BODY MASS INDEX: 38.09 KG/M2 | RESPIRATION RATE: 18 BRPM

## 2021-08-09 DIAGNOSIS — J45.20 MILD INTERMITTENT ASTHMA WITHOUT COMPLICATION: Chronic | ICD-10-CM

## 2021-08-09 DIAGNOSIS — I10 HYPERTENSION GOAL BP (BLOOD PRESSURE) < 140/90: Primary | Chronic | ICD-10-CM

## 2021-08-09 DIAGNOSIS — I48.19 PERSISTENT ATRIAL FIBRILLATION (H): ICD-10-CM

## 2021-08-09 DIAGNOSIS — I48.0 PAROXYSMAL ATRIAL FIBRILLATION (H): ICD-10-CM

## 2021-08-09 PROCEDURE — U0003 INFECTIOUS AGENT DETECTION BY NUCLEIC ACID (DNA OR RNA); SEVERE ACUTE RESPIRATORY SYNDROME CORONAVIRUS 2 (SARS-COV-2) (CORONAVIRUS DISEASE [COVID-19]), AMPLIFIED PROBE TECHNIQUE, MAKING USE OF HIGH THROUGHPUT TECHNOLOGIES AS DESCRIBED BY CMS-2020-01-R: HCPCS

## 2021-08-09 PROCEDURE — 99215 OFFICE O/P EST HI 40 MIN: CPT | Performed by: NURSE PRACTITIONER

## 2021-08-09 PROCEDURE — U0005 INFEC AGEN DETEC AMPLI PROBE: HCPCS

## 2021-08-09 ASSESSMENT — MIFFLIN-ST. JEOR: SCORE: 1419.36

## 2021-08-09 NOTE — H&P (VIEW-ONLY)
Thank you, , for asking the [facility] team to see Ms. Lluvia Marrufo to evaluate persistent atrial fibrillation.    Assessment/Recommendations     Assessment/Plan:    Diagnoses and all orders for this visit:      Persistent atrial fibrillation-6/21 patient went to urgent care for cough and was noted to be in atrial fibrillation.  Ventricular response is well controlled; recommended to have cardioversion which will occur in 2 days for symptom clarification.  -Educated patient about pathophysiology and chronic nature of atrial fibrillation.  Talked about management styles including rate versus rhythm control.  Explained elevated thromboembolic risk and [id number] score, recommended patient stay on lifelong blood thinners or pursue watchman device if intolerant of OAC.  -Continue atenolol 75 mg daily  -Continue Eliquis 5 mg p.o. twice daily  -Patient is aware of side effects of Eliquis including elevated risk of bleeding and warning signs to watch for  -Cardioversion scheduled to occur in 2 days  -Plan-heart rate was elevated in office today; after cardioversion if resting heart rate continues to be elevated recommend increasing the atenolol while cautiously watching for any change in shortness of breath related to her asthma    Mild intermittent asthma without complication-patient reports ongoing shortness of breath for 1.5 to 2 years.    Hypertension goal BP (blood pressure) < 140/90-patient reported being diagnosed with hypertension at age 20.  -Continue amlodipine, atenolol, lisinopril/hydrochlorothiazide  -Blood pressure 122/78 today    [id number] score of 8: 2 age, 1 gender, 1 HTN, 2 stroke and on Eliquis 5 mg p.o. twice daily.  Follow up in 3 months or sooner if atrial fibrillation recurs.     History of Present Illness/Subjective     Lluvia Marrufo is a very pleasant 81 year old female who comes in today for EP evaluation of persistent atrial fibrillation.  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    Met with Lexii today to discuss upcoming cardioversion.  Overall she is very active daily, [place] and routinely cooks and cares for up to 18 family members with no issues.  She has had shortness of breath for approximately 1.5 to 2 years.  It is unclear if it is linked to atrial fibrillation; cardioversion has been recommended for symptom clarification.  She denies chest pain, syncope, jaw or arm pain.    Cardiographics (reviewed):  CT coronary angio/calcium score Silvestrean/22/21  Interpretation Summary      Total calcium score of 0. A calcium score of zero places the individual in the lowest quartile when compared to an age and gender matched control group.    Minimal nonobstructive atherosclerotic coronary artery disease.     Echo 7/27/2021  Interpretation Summary     1.Left ventricular size, wall motion and function are normal. The ejection  fraction is 60-65%.  2.TAPSE is normal, which is consistent with normal right ventricular systolic  function.  3.IVC diameter and respiratory changes fall into an intermediate range  suggesting an RA pressure of 8 mmHg.  4.No hemodynamically significant valvular abnormalities on 2D or color flow  imaging.  5.Compared to the prior study dated 7/13/2018, there have been no changes  other then A Fib now present.         Problem List:  Patient Active Problem List   Diagnosis     Obesity     Hyperlipidemia     Prediabetes     Lower Back Pain     Headache     Hypertension goal BP (blood pressure) < 140/90     Dermatitis     Joint Pain, Localized In The Shoulder     Mild intermittent asthma without complication     Spinal stenosis of lumbar region     Primary osteoarthritis of left hip     Hypomagnesemia     Hypokalemia     Expressive aphasia     Acute CVA (cerebrovascular accident) (H)     Obesity (BMI 35.0-39.9) with comorbidity (H)     Dyspnea on exertion     Precordial  "pain     Persistent atrial fibrillation (H)     Revi  e  Physical Examination Review of Systems   narendra tello  /78 (BP Location: Left arm, Patient Position: Sitting, Cuff Size: Adult Large)   Pulse 94   Resp 18   Ht 1.6 m (5' 3\")   Wt 97.5 kg (215 lb)   BMI 38.09 kg/m    Body mass index is 38.09 kg/m .  Wt Readings from Last 3 Encounters:   08/09/21 97.5 kg (215 lb)   07/22/21 97.5 kg (215 lb)   06/29/21 97.5 kg (215 lb)     General Appearance:   Alert, well-appearing and in no acute distress.   HEENT: Atraumatic, normocephalic.  No scleral icterus, normal conjunctivae; mucous membranes pink and moist.     Chest: Chest symmetric, spine straight.   Lungs:   Respirations unlabored: Lungs sounds clear   Cardiovascular:   Normal first and second heart sounds with no murmurs, rubs, or gallops.  Irregular.  Radial and posterior tibial pulses are intact.  No edema.       Extremities: No cyanosis or clubbing.    Musculoskeletal: Moves all extremities   Skin: Warm, dry, intact.    Neurologic: Mood and affect are appropriate, alert and oriented to person, place, time, and situation     ROS: 10 point ROS neg other than the symptoms noted above in the HPI.     Medical History  Surgical History Family History Social History     Past Medical History:   Diagnosis Date     Arthritis      Asthma      Bronchitis      Earache      Fracture, foot      Hypertension      UTI (urinary tract infection)     Past Surgical History:   Procedure Laterality Date     APPENDECTOMY       C RECONSTR TOTAL SHOULDER IMPLANT Right 10/27/2015    Procedure: RIGHT SHOULDER TOTAL ARTHROPLASTY;  Surgeon: Eddie Payne MD;  Location: [facility];  Service: Orthopedics     C TOTAL HIP ARTHROPLASTY Left 10/3/2017    Procedure:  LEFT TOTAL HIP ARTHROPLASTY;  Surgeon: Mahin Monsalve MD;  Location: [facility];  Service: Orthopedics     EYE SURGERY      Growth on lateral left eyelid removed.     HYSTERECTOMY      age 57     JOINT " REPLACEMENT Left     knee     OOPHORECTOMY       ND HALLUX RIGIDUS W/CHEILECTOMY 1ST MP JT W/O IMPLT      Description: Hallux Rigidus Correction W/ Cheilectomy 1st MTP Joint;  Proc Date: 12/03/2010;     TONSILLECTOMY       TOTAL KNEE ARTHROPLASTY      right     TOTAL SHOULDER ARTHROPLASTY      left    Family History   Problem Relation Age of Onset     Hypertension Mother      Alcoholism Father      Cirrhosis Father      Anesthesia Reaction No family hx of     History   Smoking Status     Never Smoker   Smokeless Tobacco     Never Used     Social History    Substance and Sexual Activity      Alcohol use: Yes        Comment: Alcoholic Drinks/day: 1 drink per day       Medications  Allergies     Current Outpatient Medications   Medication Sig Dispense Refill     albuterol (PROVENTIL) 2.5 mg /3 mL (0.083 %) nebulizer solution [ALBUTEROL (PROVENTIL) 2.5 MG /3 ML (0.083 %) NEBULIZER SOLUTION] Take 3 mL (2.5 mg total) by nebulization every 4 (four) hours as needed for wheezing or shortness of breath. 75 mL 1     amLODIPine (NORVASC) 10 MG tablet [AMLODIPINE (NORVASC) 10 MG TABLET] Take 1 tablet (10 mg total) by mouth daily. 90 tablet 2     apixaban ANTICOAGULANT (ELIQUIS) 5 mg Tab tablet [APIXABAN ANTICOAGULANT (ELIQUIS) 5 MG TAB TABLET] Take 1 tablet (5 mg total) by mouth 2 (two) times a day. 60 tablet 12     atenoloL (TENORMIN) 50 MG tablet [ATENOLOL (TENORMIN) 50 MG TABLET] Take 1.5 tablets (75 mg total) by mouth daily. 135 tablet 2     atorvastatin (LIPITOR) 20 MG tablet [ATORVASTATIN (LIPITOR) 20 MG TABLET] Take 1 tablet (20 mg total) by mouth daily. 90 tablet 2     codeine-guaiFENesin (GUAIFENESIN AC)  mg/5 mL liquid [CODEINE-GUAIFENESIN (GUAIFENESIN AC)  MG/5 ML LIQUID] Take 10 mL by mouth at bedtime as needed for cough. 70 mL 0     diazepam (VALIUM) 5 MG tablet TAKE 1 TABLET (5 MG TOTAL) BY MOUTH DAILY AS NEEDED. 15 tablet 0     diphenhydrAMINE (BENADRYL) 25 mg tablet [DIPHENHYDRAMINE (BENADRYL) 25 MG  TABLET] Take 25 mg by mouth at bedtime as needed for sleep.       gabapentin (NEURONTIN) 300 MG capsule [GABAPENTIN (NEURONTIN) 300 MG CAPSULE] Take 1 capsule (300 mg total) by mouth 3 (three) times a day. 90 capsule 3     glucosamine-chondroitin 500-400 mg cap [GLUCOSAMINE-CHONDROITIN 500-400 MG CAP] Take 2 capsules by mouth daily.       lisinopriL-hydrochlorothiazide (PRINZIDE,ZESTORETIC) 20-25 mg per tablet [LISINOPRIL-HYDROCHLOROTHIAZIDE (PRINZIDE,ZESTORETIC) 20-25 MG PER TABLET] Take 2 tablets by mouth daily. 180 tablet 2     multivitamin with minerals (THERA-M) 9 mg iron-400 mcg Tab tablet [MULTIVITAMIN WITH MINERALS (THERA-M) 9 MG IRON-400 MCG TAB TABLET] Take 1 tablet by mouth daily.       potassium chloride (KLOR-CON M20) 20 MEQ tablet [POTASSIUM CHLORIDE (KLOR-CON M20) 20 MEQ TABLET] TAKE 1 TABLET BY MOUTH THREE TIMES A  tablet 2      Allergies   Allergen Reactions     Azithromycin Nausea     Other Drug Allergy (See Comments) Unknown     Zicam; stomach upset, vomiting.      Medical, surgical, family, social history, and medications were all reviewed and updated as necessary.   Lab Results    Chemistry/lipid CBC Cardiac Enzymes/BNP/TSH/INR     [unfilled]  No results found for: BNP Lab Results   Component Value Date    WBC 6.7 04/21/2021    HGB 14.3 04/21/2021    HCT 43.5 04/21/2021    MCV 88 04/21/2021     04/21/2021        Lab Results   Component Value Date    CHOL 151 04/21/2021    HDL 67 04/21/2021    TRIG 114 04/21/2021          Total Time- 50 minutes spent on date of encounter doing chart review, history and exam, documentation and further activities as noted above.  This note has been dictated using voice recognition software. Any grammatical, typographical, or context distortions are unintentional and inherent to the software.    Chio Davis [facility]

## 2021-08-09 NOTE — PATIENT INSTRUCTIONS
Lluvia Marrufo,    It was a pleasure to see you today at the [facility].     My recommendations after this visit include:    No medication changes needed today.    After the cardioversion please take your BP and pulse daily and report it at the after visit follow up.      Please call us if you go back into a fib after your cardioversion    Lluvia Marrufo,    It was a pleasure to see you today at the [facility].     My recommendations after this visit include:    *Please call my nurse if you have questions about the cardioversion or you have missed tablets of your blood thinner.  Keep these instructions to review before your cardioversion.  You will get a call from a COVID team to schedule a COVID test near your home several days before your procedure.    *Instructions for the day of cardioversion:  #1 Nothing to eat or drink for 8 hours before your procedure.  #2 Okay to take all prescription meds in the morning with water.  If you take your blood thinner in the morning, please take it.  #3 Please hold vitamins, supplements the day of cardioversion.  #4 You will need a  to drop you off and pick you up after procedure.  It will take 3-4 hrs.  #5 This is an outpatient procedure and done at [facility].   #6  Bathe or shower before coming in.  #7 Leave jewelry at home.    You need 21 days of continuous use of Eliquis before cardioversion.       To follow up 4 weeks after cardioversion with Dr. Bourgeois    Follow up with me in 3 months    My contact information:  Chio Davis CNP  After Hours or Scheduling  [phone]  My Nurses phone number [phone]- normal business hours

## 2021-08-09 NOTE — LETTER
8/9/2021    Eddie Diaz MD  [address]  [facility] [address]    RE: Lluvia Marrufo       Dear Colleague,    I had the pleasure of seeing Lluvia Marrufo in the [facility].      Thank you, , for asking the [facility] team to see Ms. Lluvia Marrufo to evaluate persistent atrial fibrillation.    Assessment/Recommendations     Assessment/Plan:    Diagnoses and all orders for this visit:      Persistent atrial fibrillation-6/21 patient went to urgent care for cough and was noted to be in atrial fibrillation.  Ventricular response is well controlled; recommended to have cardioversion which will occur in 2 days for symptom clarification.  -Educated patient about pathophysiology and chronic nature of atrial fibrillation.  Talked about management styles including rate versus rhythm control.  Explained elevated thromboembolic risk and [id number] score, recommended patient stay on lifelong blood thinners or pursue watchman device if intolerant of OAC.  -Continue atenolol 75 mg daily  -Continue Eliquis 5 mg p.o. twice daily  -Patient is aware of side effects of Eliquis including elevated risk of bleeding and warning signs to watch for  -Cardioversion scheduled to occur in 2 days  -Plan-heart rate was elevated in office today; after cardioversion if resting heart rate continues to be elevated recommend increasing the atenolol while cautiously watching for any change in shortness of breath related to her asthma    Mild intermittent asthma without complication-patient reports ongoing shortness of breath for 1.5 to 2 years.    Hypertension goal BP (blood pressure) < 140/90-patient reported being diagnosed with hypertension at age 20.  -Continue amlodipine, atenolol, lisinopril/hydrochlorothiazide  -Blood pressure 122/78 today    [id number] score of 8: 2 age, 1 gender, 1 HTN, 2 stroke and on Eliquis 5 mg p.o. twice  daily.  Follow up in 3 months or sooner if atrial fibrillation recurs.     History of Present Illness/Subjective     Lluvia Marrufo is a very pleasant 81 year old female who comes in today for EP evaluation of persistent atrial fibrillation.  Lluvia Marrufo has a known history of asthma, CVA without residual, shortness of breath x2 years, obesity, CAD.    Met with Lexii today to discuss upcoming cardioversion.  Overall she is very active daily, [place] and routinely cooks and cares for up to 18 family members with no issues.  She has had shortness of breath for approximately 1.5 to 2 years.  It is unclear if it is linked to atrial fibrillation; cardioversion has been recommended for symptom clarification.  She denies chest pain, syncope, jaw or arm pain.    Cardiographics (reviewed):  CT coronary angio/calcium score Silvestrean/22/21  Interpretation Summary      Total calcium score of 0. A calcium score of zero places the individual in the lowest quartile when compared to an age and gender matched control group.    Minimal nonobstructive atherosclerotic coronary artery disease.     Echo 7/27/2021  Interpretation Summary     1.Left ventricular size, wall motion and function are normal. The ejection  fraction is 60-65%.  2.TAPSE is normal, which is consistent with normal right ventricular systolic  function.  3.IVC diameter and respiratory changes fall into an intermediate range  suggesting an RA pressure of 8 mmHg.  4.No hemodynamically significant valvular abnormalities on 2D or color flow  imaging.  5.Compared to the prior study dated 7/13/2018, there have been no changes  other then A Fib now present.         Problem List:  Patient Active Problem List   Diagnosis     Obesity     Hyperlipidemia     Prediabetes     Lower Back Pain     Headache     Hypertension goal BP (blood pressure) < 140/90     Dermatitis     Joint Pain, Localized In The Shoulder     Mild intermittent asthma without complication      "Spinal stenosis of lumbar region     Primary osteoarthritis of left hip     Hypomagnesemia     Hypokalemia     Expressive aphasia     Acute CVA (cerebrovascular accident) (H)     Obesity (BMI 35.0-39.9) with comorbidity (H)     Dyspnea on exertion     Precordial pain     Persistent atrial fibrillation (H)     Revi  e  Physical Examination Review of Systems   narendra tello  /78 (BP Location: Left arm, Patient Position: Sitting, Cuff Size: Adult Large)   Pulse 94   Resp 18   Ht 1.6 m (5' 3\")   Wt 97.5 kg (215 lb)   BMI 38.09 kg/m    Body mass index is 38.09 kg/m .  Wt Readings from Last 3 Encounters:   08/09/21 97.5 kg (215 lb)   07/22/21 97.5 kg (215 lb)   06/29/21 97.5 kg (215 lb)     General Appearance:   Alert, well-appearing and in no acute distress.   HEENT: Atraumatic, normocephalic.  No scleral icterus, normal conjunctivae; mucous membranes pink and moist.     Chest: Chest symmetric, spine straight.   Lungs:   Respirations unlabored: Lungs sounds clear   Cardiovascular:   Normal first and second heart sounds with no murmurs, rubs, or gallops.  Irregular.  Radial and posterior tibial pulses are intact.  No edema.       Extremities: No cyanosis or clubbing.    Musculoskeletal: Moves all extremities   Skin: Warm, dry, intact.    Neurologic: Mood and affect are appropriate, alert and oriented to person, place, time, and situation     ROS: 10 point ROS neg other than the symptoms noted above in the HPI.     Medical History  Surgical History Family History Social History     Past Medical History:   Diagnosis Date     Arthritis      Asthma      Bronchitis      Earache      Fracture, foot      Hypertension      UTI (urinary tract infection)     Past Surgical History:   Procedure Laterality Date     APPENDECTOMY       C RECONSTR TOTAL SHOULDER IMPLANT Right 10/27/2015    Procedure: RIGHT SHOULDER TOTAL ARTHROPLASTY;  Surgeon: Eddie Payne MD;  Location: [facility];  Service: Orthopedics     C TOTAL HIP " ARTHROPLASTY Left 10/3/2017    Procedure:  LEFT TOTAL HIP ARTHROPLASTY;  Surgeon: Mahin Monsalve MD;  Location: [facility] OR;  Service: Orthopedics     EYE SURGERY      Growth on lateral left eyelid removed.     HYSTERECTOMY      age 57     JOINT REPLACEMENT Left     knee     OOPHORECTOMY       MI HALLUX RIGIDUS W/CHEILECTOMY 1ST MP JT W/O IMPLT      Description: Hallux Rigidus Correction W/ Cheilectomy 1st MTP Joint;  Proc Date: 12/03/2010;     TONSILLECTOMY       TOTAL KNEE ARTHROPLASTY      right     TOTAL SHOULDER ARTHROPLASTY      left    Family History   Problem Relation Age of Onset     Hypertension Mother      Alcoholism Father      Cirrhosis Father      Anesthesia Reaction No family hx of     History   Smoking Status     Never Smoker   Smokeless Tobacco     Never Used     Social History    Substance and Sexual Activity      Alcohol use: Yes        Comment: Alcoholic Drinks/day: 1 drink per day       Medications  Allergies     Current Outpatient Medications   Medication Sig Dispense Refill     albuterol (PROVENTIL) 2.5 mg /3 mL (0.083 %) nebulizer solution [ALBUTEROL (PROVENTIL) 2.5 MG /3 ML (0.083 %) NEBULIZER SOLUTION] Take 3 mL (2.5 mg total) by nebulization every 4 (four) hours as needed for wheezing or shortness of breath. 75 mL 1     amLODIPine (NORVASC) 10 MG tablet [AMLODIPINE (NORVASC) 10 MG TABLET] Take 1 tablet (10 mg total) by mouth daily. 90 tablet 2     apixaban ANTICOAGULANT (ELIQUIS) 5 mg Tab tablet [APIXABAN ANTICOAGULANT (ELIQUIS) 5 MG TAB TABLET] Take 1 tablet (5 mg total) by mouth 2 (two) times a day. 60 tablet 12     atenoloL (TENORMIN) 50 MG tablet [ATENOLOL (TENORMIN) 50 MG TABLET] Take 1.5 tablets (75 mg total) by mouth daily. 135 tablet 2     atorvastatin (LIPITOR) 20 MG tablet [ATORVASTATIN (LIPITOR) 20 MG TABLET] Take 1 tablet (20 mg total) by mouth daily. 90 tablet 2     codeine-guaiFENesin (GUAIFENESIN AC)  mg/5 mL liquid [CODEINE-GUAIFENESIN (GUAIFENESIN AC)   MG/5 ML LIQUID] Take 10 mL by mouth at bedtime as needed for cough. 70 mL 0     diazepam (VALIUM) 5 MG tablet TAKE 1 TABLET (5 MG TOTAL) BY MOUTH DAILY AS NEEDED. 15 tablet 0     diphenhydrAMINE (BENADRYL) 25 mg tablet [DIPHENHYDRAMINE (BENADRYL) 25 MG TABLET] Take 25 mg by mouth at bedtime as needed for sleep.       gabapentin (NEURONTIN) 300 MG capsule [GABAPENTIN (NEURONTIN) 300 MG CAPSULE] Take 1 capsule (300 mg total) by mouth 3 (three) times a day. 90 capsule 3     glucosamine-chondroitin 500-400 mg cap [GLUCOSAMINE-CHONDROITIN 500-400 MG CAP] Take 2 capsules by mouth daily.       lisinopriL-hydrochlorothiazide (PRINZIDE,ZESTORETIC) 20-25 mg per tablet [LISINOPRIL-HYDROCHLOROTHIAZIDE (PRINZIDE,ZESTORETIC) 20-25 MG PER TABLET] Take 2 tablets by mouth daily. 180 tablet 2     multivitamin with minerals (THERA-M) 9 mg iron-400 mcg Tab tablet [MULTIVITAMIN WITH MINERALS (THERA-M) 9 MG IRON-400 MCG TAB TABLET] Take 1 tablet by mouth daily.       potassium chloride (KLOR-CON M20) 20 MEQ tablet [POTASSIUM CHLORIDE (KLOR-CON M20) 20 MEQ TABLET] TAKE 1 TABLET BY MOUTH THREE TIMES A  tablet 2      Allergies   Allergen Reactions     Azithromycin Nausea     Other Drug Allergy (See Comments) Unknown     Zicam; stomach upset, vomiting.      Medical, surgical, family, social history, and medications were all reviewed and updated as necessary.   Lab Results    Chemistry/lipid CBC Cardiac Enzymes/BNP/TSH/INR     [unfilled]  No results found for: BNP Lab Results   Component Value Date    WBC 6.7 04/21/2021    HGB 14.3 04/21/2021    HCT 43.5 04/21/2021    MCV 88 04/21/2021     04/21/2021        Lab Results   Component Value Date    CHOL 151 04/21/2021    HDL 67 04/21/2021    TRIG 114 04/21/2021          Total Time- 50 minutes spent on date of encounter doing chart review, history and exam, documentation and further activities as noted above.  This note has been dictated using voice recognition software. Any  grammatical, typographical, or context distortions are unintentional and inherent to the software.    Chio DACOSTA [facility]            Thank you for allowing me to participate in the care of your patient.      Sincerely,     ARIELLA Paulson CNP [facility]  cc:   No referring provider defined for this encounter.

## 2021-08-09 NOTE — PROGRESS NOTES
Thank you, , for asking the [facility] team to see Ms. Lluvia Marrufo to evaluate persistent atrial fibrillation.    Assessment/Recommendations     Assessment/Plan:    Diagnoses and all orders for this visit:      Persistent atrial fibrillation-6/21 patient went to urgent care for cough and was noted to be in atrial fibrillation.  Ventricular response is well controlled; recommended to have cardioversion which will occur in 2 days for symptom clarification.  -Educated patient about pathophysiology and chronic nature of atrial fibrillation.  Talked about management styles including rate versus rhythm control.  Explained elevated thromboembolic risk and [id number] score, recommended patient stay on lifelong blood thinners or pursue watchman device if intolerant of OAC.  -Continue atenolol 75 mg daily  -Continue Eliquis 5 mg p.o. twice daily  -Patient is aware of side effects of Eliquis including elevated risk of bleeding and warning signs to watch for  -Cardioversion scheduled to occur in 2 days  -Plan-heart rate was elevated in office today; after cardioversion if resting heart rate continues to be elevated recommend increasing the atenolol while cautiously watching for any change in shortness of breath related to her asthma    Mild intermittent asthma without complication-patient reports ongoing shortness of breath for 1.5 to 2 years.    Hypertension goal BP (blood pressure) < 140/90-patient reported being diagnosed with hypertension at age 20.  -Continue amlodipine, atenolol, lisinopril/hydrochlorothiazide  -Blood pressure 122/78 today    [id number] score of 8: 2 age, 1 gender, 1 HTN, 2 stroke and on Eliquis 5 mg p.o. twice daily.  Follow up in 3 months or sooner if atrial fibrillation recurs.     History of Present Illness/Subjective     Lluvia Marrufo is a very pleasant 81 year old female who comes in today for EP evaluation of persistent atrial fibrillation.  Lluvia COLE  Niru has a known history of asthma, CVA without residual, shortness of breath x2 years, obesity, CAD.    Met with Lexii today to discuss upcoming cardioversion.  Overall she is very active daily, [place] and routinely cooks and cares for up to 18 family members with no issues.  She has had shortness of breath for approximately 1.5 to 2 years.  It is unclear if it is linked to atrial fibrillation; cardioversion has been recommended for symptom clarification.  She denies chest pain, syncope, jaw or arm pain.    Cardiographics (reviewed):  CT coronary angio/calcium score Silvestrean/22/21  Interpretation Summary      Total calcium score of 0. A calcium score of zero places the individual in the lowest quartile when compared to an age and gender matched control group.    Minimal nonobstructive atherosclerotic coronary artery disease.     Echo 7/27/2021  Interpretation Summary     1.Left ventricular size, wall motion and function are normal. The ejection  fraction is 60-65%.  2.TAPSE is normal, which is consistent with normal right ventricular systolic  function.  3.IVC diameter and respiratory changes fall into an intermediate range  suggesting an RA pressure of 8 mmHg.  4.No hemodynamically significant valvular abnormalities on 2D or color flow  imaging.  5.Compared to the prior study dated 7/13/2018, there have been no changes  other then A Fib now present.         Problem List:  Patient Active Problem List   Diagnosis     Obesity     Hyperlipidemia     Prediabetes     Lower Back Pain     Headache     Hypertension goal BP (blood pressure) < 140/90     Dermatitis     Joint Pain, Localized In The Shoulder     Mild intermittent asthma without complication     Spinal stenosis of lumbar region     Primary osteoarthritis of left hip     Hypomagnesemia     Hypokalemia     Expressive aphasia     Acute CVA (cerebrovascular accident) (H)     Obesity (BMI 35.0-39.9) with comorbidity (H)     Dyspnea on exertion     Precordial  "pain     Persistent atrial fibrillation (H)     Revi  e  Physical Examination Review of Systems   narendra tello  /78 (BP Location: Left arm, Patient Position: Sitting, Cuff Size: Adult Large)   Pulse 94   Resp 18   Ht 1.6 m (5' 3\")   Wt 97.5 kg (215 lb)   BMI 38.09 kg/m    Body mass index is 38.09 kg/m .  Wt Readings from Last 3 Encounters:   08/09/21 97.5 kg (215 lb)   07/22/21 97.5 kg (215 lb)   06/29/21 97.5 kg (215 lb)     General Appearance:   Alert, well-appearing and in no acute distress.   HEENT: Atraumatic, normocephalic.  No scleral icterus, normal conjunctivae; mucous membranes pink and moist.     Chest: Chest symmetric, spine straight.   Lungs:   Respirations unlabored: Lungs sounds clear   Cardiovascular:   Normal first and second heart sounds with no murmurs, rubs, or gallops.  Irregular.  Radial and posterior tibial pulses are intact.  No edema.       Extremities: No cyanosis or clubbing.    Musculoskeletal: Moves all extremities   Skin: Warm, dry, intact.    Neurologic: Mood and affect are appropriate, alert and oriented to person, place, time, and situation     ROS: 10 point ROS neg other than the symptoms noted above in the HPI.     Medical History  Surgical History Family History Social History     Past Medical History:   Diagnosis Date     Arthritis      Asthma      Bronchitis      Earache      Fracture, foot      Hypertension      UTI (urinary tract infection)     Past Surgical History:   Procedure Laterality Date     APPENDECTOMY       C RECONSTR TOTAL SHOULDER IMPLANT Right 10/27/2015    Procedure: RIGHT SHOULDER TOTAL ARTHROPLASTY;  Surgeon: Eddie Payne MD;  Location: [facility];  Service: Orthopedics     C TOTAL HIP ARTHROPLASTY Left 10/3/2017    Procedure:  LEFT TOTAL HIP ARTHROPLASTY;  Surgeon: Mahin Monsalve MD;  Location: [facility];  Service: Orthopedics     EYE SURGERY      Growth on lateral left eyelid removed.     HYSTERECTOMY      age 57     JOINT " REPLACEMENT Left     knee     OOPHORECTOMY       MO HALLUX RIGIDUS W/CHEILECTOMY 1ST MP JT W/O IMPLT      Description: Hallux Rigidus Correction W/ Cheilectomy 1st MTP Joint;  Proc Date: 12/03/2010;     TONSILLECTOMY       TOTAL KNEE ARTHROPLASTY      right     TOTAL SHOULDER ARTHROPLASTY      left    Family History   Problem Relation Age of Onset     Hypertension Mother      Alcoholism Father      Cirrhosis Father      Anesthesia Reaction No family hx of     History   Smoking Status     Never Smoker   Smokeless Tobacco     Never Used     Social History    Substance and Sexual Activity      Alcohol use: Yes        Comment: Alcoholic Drinks/day: 1 drink per day       Medications  Allergies     Current Outpatient Medications   Medication Sig Dispense Refill     albuterol (PROVENTIL) 2.5 mg /3 mL (0.083 %) nebulizer solution [ALBUTEROL (PROVENTIL) 2.5 MG /3 ML (0.083 %) NEBULIZER SOLUTION] Take 3 mL (2.5 mg total) by nebulization every 4 (four) hours as needed for wheezing or shortness of breath. 75 mL 1     amLODIPine (NORVASC) 10 MG tablet [AMLODIPINE (NORVASC) 10 MG TABLET] Take 1 tablet (10 mg total) by mouth daily. 90 tablet 2     apixaban ANTICOAGULANT (ELIQUIS) 5 mg Tab tablet [APIXABAN ANTICOAGULANT (ELIQUIS) 5 MG TAB TABLET] Take 1 tablet (5 mg total) by mouth 2 (two) times a day. 60 tablet 12     atenoloL (TENORMIN) 50 MG tablet [ATENOLOL (TENORMIN) 50 MG TABLET] Take 1.5 tablets (75 mg total) by mouth daily. 135 tablet 2     atorvastatin (LIPITOR) 20 MG tablet [ATORVASTATIN (LIPITOR) 20 MG TABLET] Take 1 tablet (20 mg total) by mouth daily. 90 tablet 2     codeine-guaiFENesin (GUAIFENESIN AC)  mg/5 mL liquid [CODEINE-GUAIFENESIN (GUAIFENESIN AC)  MG/5 ML LIQUID] Take 10 mL by mouth at bedtime as needed for cough. 70 mL 0     diazepam (VALIUM) 5 MG tablet TAKE 1 TABLET (5 MG TOTAL) BY MOUTH DAILY AS NEEDED. 15 tablet 0     diphenhydrAMINE (BENADRYL) 25 mg tablet [DIPHENHYDRAMINE (BENADRYL) 25 MG  TABLET] Take 25 mg by mouth at bedtime as needed for sleep.       gabapentin (NEURONTIN) 300 MG capsule [GABAPENTIN (NEURONTIN) 300 MG CAPSULE] Take 1 capsule (300 mg total) by mouth 3 (three) times a day. 90 capsule 3     glucosamine-chondroitin 500-400 mg cap [GLUCOSAMINE-CHONDROITIN 500-400 MG CAP] Take 2 capsules by mouth daily.       lisinopriL-hydrochlorothiazide (PRINZIDE,ZESTORETIC) 20-25 mg per tablet [LISINOPRIL-HYDROCHLOROTHIAZIDE (PRINZIDE,ZESTORETIC) 20-25 MG PER TABLET] Take 2 tablets by mouth daily. 180 tablet 2     multivitamin with minerals (THERA-M) 9 mg iron-400 mcg Tab tablet [MULTIVITAMIN WITH MINERALS (THERA-M) 9 MG IRON-400 MCG TAB TABLET] Take 1 tablet by mouth daily.       potassium chloride (KLOR-CON M20) 20 MEQ tablet [POTASSIUM CHLORIDE (KLOR-CON M20) 20 MEQ TABLET] TAKE 1 TABLET BY MOUTH THREE TIMES A  tablet 2      Allergies   Allergen Reactions     Azithromycin Nausea     Other Drug Allergy (See Comments) Unknown     Zicam; stomach upset, vomiting.      Medical, surgical, family, social history, and medications were all reviewed and updated as necessary.   Lab Results    Chemistry/lipid CBC Cardiac Enzymes/BNP/TSH/INR     [unfilled]  No results found for: BNP Lab Results   Component Value Date    WBC 6.7 04/21/2021    HGB 14.3 04/21/2021    HCT 43.5 04/21/2021    MCV 88 04/21/2021     04/21/2021        Lab Results   Component Value Date    CHOL 151 04/21/2021    HDL 67 04/21/2021    TRIG 114 04/21/2021          Total Time- 50 minutes spent on date of encounter doing chart review, history and exam, documentation and further activities as noted above.  This note has been dictated using voice recognition software. Any grammatical, typographical, or context distortions are unintentional and inherent to the software.    Chio Davis [facility]

## 2021-08-10 ENCOUNTER — HOSPITAL ENCOUNTER (OUTPATIENT)
Dept: PHYSICAL THERAPY | Facility: REHABILITATION | Age: 81
End: 2021-08-10
Payer: COMMERCIAL

## 2021-08-10 ENCOUNTER — PREP FOR PROCEDURE (OUTPATIENT)
Dept: CARDIOLOGY | Facility: CLINIC | Age: 81
End: 2021-08-10

## 2021-08-10 DIAGNOSIS — G89.29 CHRONIC BILATERAL LOW BACK PAIN WITH RIGHT-SIDED SCIATICA: ICD-10-CM

## 2021-08-10 DIAGNOSIS — M54.16 RIGHT LUMBAR RADICULITIS: ICD-10-CM

## 2021-08-10 DIAGNOSIS — M54.41 CHRONIC BILATERAL LOW BACK PAIN WITH RIGHT-SIDED SCIATICA: ICD-10-CM

## 2021-08-10 DIAGNOSIS — M48.061 SPINAL STENOSIS OF LUMBAR REGION WITHOUT NEUROGENIC CLAUDICATION: ICD-10-CM

## 2021-08-10 DIAGNOSIS — M51.369 DDD (DEGENERATIVE DISC DISEASE), LUMBAR: Primary | ICD-10-CM

## 2021-08-10 DIAGNOSIS — I48.19 PERSISTENT ATRIAL FIBRILLATION (H): Primary | ICD-10-CM

## 2021-08-10 DIAGNOSIS — M54.50 LOW BACK PAIN POTENTIALLY ASSOCIATED WITH RADICULOPATHY: ICD-10-CM

## 2021-08-10 LAB — SARS-COV-2 RNA RESP QL NAA+PROBE: NEGATIVE

## 2021-08-10 PROCEDURE — 97110 THERAPEUTIC EXERCISES: CPT | Mod: GP | Performed by: PHYSICAL THERAPIST

## 2021-08-10 PROCEDURE — 97140 MANUAL THERAPY 1/> REGIONS: CPT | Mod: GP | Performed by: PHYSICAL THERAPIST

## 2021-08-10 RX ORDER — LIDOCAINE 40 MG/G
CREAM TOPICAL
Status: CANCELLED | OUTPATIENT
Start: 2021-08-10

## 2021-08-10 NOTE — PROGRESS NOTES
08/10/21 0900   Signing Clinician's Name / Credentials   Signing clinician's name / credentials Lauren Wallteri PT   Session Number   Session Number 3   Additional Session Number 8-12   Authorization status 9/9/21   Session Tracking, Supervision and Quick Adds   PT Assistant Visit Number 0   AT Visit Number 0   Progress Note/Recertification   Progress Note Due Date 07/11/21   Progress Note Completed Date 08/10/21   Recertification Due Date 09/09/21   Adult Goals   PT Ortho Eval Goals 3;4   Goal 1   Goal Identifier sit 30 minutes with good posture   Goal Description Pt. will improve posture : and maintain posture for;30 minutes;in sitting;for eating;in 6 weeks;in 12 weeks;Comment   Goal Progress goal met   Target Date 09/13/21   Date Met 08/10/21   Goal 3   Goal Identifier walk 30 minutes with only slight increase in pain   Goal Description Pt. will be able to walk : 30 minutes;with FWB;with less pain;with less difficulty;for community mobility;in 12 weeks;Comment   Goal Progress goal met if she can breathe more the afib and the asthma   Target Date 10/11/21   Date Met 08/10/21   Ortho Goal 3   Goal Identifier get up after sitting 30-45 minutes with pain only 3/10 pain   Goal Description Comment:: patient will not have increased pain when going from sit to stnad after sitting 30-45 minutes   Goal Progress goal met takes 3 steps to walk it out   Target Date 09/13/21   Date Met 08/10/21   Ortho Goal 4   Goal Identifier Standing for 1 hour with pain less than 3/10 pain.   Subjective Report   Subjective Report Now more stiffness than pain she reprts that she will get a twinge when first extendidn her spine and then she will get past it and no pain.   Objective Measures   Objective Measures Objective Measure 1   Objective Measure 1   Objective Measure spine position   Details right rotated L4 and left rotated T7,6   Treatment Interventions   Interventions Therapeutic Procedure/Exercise;Manual Therapy   Therapeutic  Procedure/exercise   Therapeutic Procedures: strength, endurance, ROM, flexibillity minutes (84704) 15   Manual Therapy   Manual Therapy: Mobilization, MFR, MLD, friction massage minutes (38819) 40   Skilled Intervention STM MFR to the left hip  laterally, listening to the sacrum duradural mobilization medially and laterally to the entire spine as well as superior and inferior from rectus capitus posterior minor  to sacral base.  STM,MFR to lateral fascial line especially the superior portion of the illiac crest.     Patient Response  after manual therapy to the hip then the patient's spine is straight in the spine except L4,5  right rotated.  significant decrease in tightness in the dura as well as the fascia in all the areas treated.   Progress The patient reports that the sitting pelvic tilt sitting helps the most.    Plan   Home program patient was instructed in the [place] cervical thoracic joint mobilization, sidelying lateral fascial line stretching as well as anterior and posterior fascial X stretching   Plan for next session assess leg length and see of a ell lift is needed.  Reassess dura mobility as well as lateral fascial lines.  check latissimus length as able to go over posture and body mechanics, assess spine position and mobility.   Comments   Comments spine neutral after    Total Session Time   Total Treatment Time (sum of timed and untimed services) 55     Outpatient Physical Therapy Progress Note     Patient: Lluvia Marrufo  : 1940    Beginning/End Dates of Reporting Period:  21 to 8/10/21    Referring Provider: Eddie Diaz MD      Therapy Diagnosis:   M54.41, G89.29 (ICD-10-CM) - Chronic bilateral low back pain with right-sided sciatica   M51.36 (ICD-10-CM) - DDD (degenerative disc disease), lumbar   M43.16 (ICD-10-CM) - Spondylolisthesis of lumbar region   M48.061 (ICD-10-CM) - Spinal stenosis of lumbar region without neurogenic claudication   M54.16 (ICD-10-CM) - Right  lumbar radiculitis          Client Self Report: Now more stiffness than pain she reprts that she will get a twinge when first extending her spine and then she will get past it and no pain.    Objective Measurements:  Objective Measure: spine position  Details: right rotated L4 and left rotated T7,6            Goals:  Goal Identifier sit 30 minutes with good posture   Goal Description Pt. will improve posture : and maintain posture for;30 minutes;in sitting;for eating;in 6 weeks;in 12 weeks;Comment   Target Date 09/13/21   Date Met  08/10/21   Progress (detail required for progress note):     Goal Identifier     Goal Description     Target Date     Date Met      Progress (detail required for progress note):     Goal Identifier walk 30 minutes with only slight increase in pain   Goal Description Pt. will be able to walk : 30 minutes;with FWB;with less pain;with less difficulty;for community mobility;in 12 weeks;Comment   Target Date 10/11/21   Date Met  08/10/21   Progress (detail required for progress note):     Goal Identifier     Goal Description     Target Date     Date Met      Progress (detail required for progress note):     Goal Identifier     Goal Description     Target Date     Date Met      Progress (detail required for progress note):     Goal Identifier     Goal Description     Target Date     Date Met      Progress (detail required for progress note):     Goal Identifier     Goal Description     Target Date     Date Met      Progress (detail required for progress note):     Goal Identifier     Goal Description     Target Date     Date Met      Progress (detail required for progress note):       Plan:  Continue therapy per current plan of care.    Discharge:  No

## 2021-08-10 NOTE — PROGRESS NOTES
[facility] EP Cath Lab Prep   Patient Alerts for CV : PT IS A CV, IS ON ELIQUIS       Important patient information for [facility]/Cath Lab staff : PT IS A CV SET FOR 8/11/2021 AT  8 30 WITH DANYELLE PÉREZ CNP     PT IS ON  ELIQUIS AND NO MISSED DOSES    Ordering Provider: DR CHRISTIANSON  Ordering Date: 7/30/2021  Orders Status: Intial order placed and Order set placed  EP NC Contact: Casi Laboy LPN    H&P:  Compled by DANYELLE PÉREZ CNP on 8/9/2021  PCP: Eddie Diaz, [phone]    Pre-op Labs: N/A for procedure    Medical Records Pertinent for Procedure:  N/A    Patient Education:             ORDER FOR COVID TESTING T BE DONE PRIOR CV DATE 8/11/2021    PT HAS A  FOR PROCEDURE THAT WILL STAY WITH HER  PT INSTRUCTED TO HOLD ANY VITAMINS, MINERALS CALCIUM,IRON OR SUPPLEMENTS THE MORNING OF CV  PT INSTRUCTED NO GUM CHEWING MINTS OR CANDY THE MORNING OF CV  PT INSTRUCTED TO LEAVE JEWELRY AT  HOME  PT INSTRUCTED TO BATHE OR SHOWER BEFORE COMING IN  PT IS ON ELIQUIS  PT WILL HAVE FOLLOW UP WITH DANYELLE  9/1        Teach with Patient: Completed via phone on 8/10/2021    Risks Reviewed:     Cardioversion    >90% acute success rate, <10% failure to convert or   reverts shortly after cardioversion.    <1% embolic event of (CVA, pulmonary embolism, or   other site).    75% risk for superficial burn.    Risks associated with general anesthesia will be addressed by the Anesthesiology Department      Consent: Will be obtained in [facility] day of procedure    Pre-Procedure Instructions that were Reviewed with Patient:  NPO after midnight, Transportation arrangements needed s/p procedure, Post-procedure follow up process and Sedation plan/orders    Pre-Procedure Medication Instructions:  Instructions given to pt regarding anticoagulants: Eliquis- instructed to continue anticoagulation uninterrupted through their procedure  Instructions given to pt regarding antiarrhythmic medication: Beta Blocker; Pt instructed to continue  medication prior to procedure  Instructions for medication, other than anticoagulants/antiarrhythmics listed above, given to pt: to take all morning medications with small sips of water, with the exception of OTC supplements and MVI    Allergies   Allergen Reactions     Azithromycin Nausea     Other Drug Allergy (See Comments) Unknown     Zicam; stomach upset, vomiting.       Current Outpatient Medications:      albuterol (PROVENTIL) 2.5 mg /3 mL (0.083 %) nebulizer solution, [ALBUTEROL (PROVENTIL) 2.5 MG /3 ML (0.083 %) NEBULIZER SOLUTION] Take 3 mL (2.5 mg total) by nebulization every 4 (four) hours as needed for wheezing or shortness of breath., Disp: 75 mL, Rfl: 1     amLODIPine (NORVASC) 10 MG tablet, [AMLODIPINE (NORVASC) 10 MG TABLET] Take 1 tablet (10 mg total) by mouth daily., Disp: 90 tablet, Rfl: 2     apixaban ANTICOAGULANT (ELIQUIS) 5 mg Tab tablet, [APIXABAN ANTICOAGULANT (ELIQUIS) 5 MG TAB TABLET] Take 1 tablet (5 mg total) by mouth 2 (two) times a day., Disp: 60 tablet, Rfl: 12     atenoloL (TENORMIN) 50 MG tablet, [ATENOLOL (TENORMIN) 50 MG TABLET] Take 1.5 tablets (75 mg total) by mouth daily., Disp: 135 tablet, Rfl: 2     atorvastatin (LIPITOR) 20 MG tablet, [ATORVASTATIN (LIPITOR) 20 MG TABLET] Take 1 tablet (20 mg total) by mouth daily., Disp: 90 tablet, Rfl: 2     codeine-guaiFENesin (GUAIFENESIN AC)  mg/5 mL liquid, [CODEINE-GUAIFENESIN (GUAIFENESIN AC)  MG/5 ML LIQUID] Take 10 mL by mouth at bedtime as needed for cough., Disp: 70 mL, Rfl: 0     diazepam (VALIUM) 5 MG tablet, TAKE 1 TABLET (5 MG TOTAL) BY MOUTH DAILY AS NEEDED., Disp: 15 tablet, Rfl: 0     diphenhydrAMINE (BENADRYL) 25 mg tablet, [DIPHENHYDRAMINE (BENADRYL) 25 MG TABLET] Take 25 mg by mouth at bedtime as needed for sleep., Disp: , Rfl:      gabapentin (NEURONTIN) 300 MG capsule, [GABAPENTIN (NEURONTIN) 300 MG CAPSULE] Take 1 capsule (300 mg total) by mouth 3 (three) times a day., Disp: 90 capsule, Rfl: 3      glucosamine-chondroitin 500-400 mg cap, [GLUCOSAMINE-CHONDROITIN 500-400 MG CAP] Take 2 capsules by mouth daily., Disp: , Rfl:      lisinopriL-hydrochlorothiazide (PRINZIDE,ZESTORETIC) 20-25 mg per tablet, [LISINOPRIL-HYDROCHLOROTHIAZIDE (PRINZIDE,ZESTORETIC) 20-25 MG PER TABLET] Take 2 tablets by mouth daily., Disp: 180 tablet, Rfl: 2     multivitamin with minerals (THERA-M) 9 mg iron-400 mcg Tab tablet, [MULTIVITAMIN WITH MINERALS (THERA-M) 9 MG IRON-400 MCG TAB TABLET] Take 1 tablet by mouth daily., Disp: , Rfl:      potassium chloride (KLOR-CON M20) 20 MEQ tablet, [POTASSIUM CHLORIDE (KLOR-CON M20) 20 MEQ TABLET] TAKE 1 TABLET BY MOUTH THREE TIMES A DAY, Disp: 270 tablet, Rfl: 2

## 2021-08-11 ENCOUNTER — ANESTHESIA (OUTPATIENT)
Dept: CARDIOLOGY | Facility: HOSPITAL | Age: 81
End: 2021-08-11
Payer: COMMERCIAL

## 2021-08-11 ENCOUNTER — ANESTHESIA EVENT (OUTPATIENT)
Dept: CARDIOLOGY | Facility: HOSPITAL | Age: 81
End: 2021-08-11
Payer: COMMERCIAL

## 2021-08-11 ENCOUNTER — HOSPITAL ENCOUNTER (OUTPATIENT)
Dept: CARDIOLOGY | Facility: HOSPITAL | Age: 81
Discharge: HOME | End: 2021-08-11
Attending: INTERNAL MEDICINE | Admitting: NURSE PRACTITIONER
Payer: COMMERCIAL

## 2021-08-11 VITALS
DIASTOLIC BLOOD PRESSURE: 57 MMHG | WEIGHT: 215 LBS | HEART RATE: 63 BPM | RESPIRATION RATE: 20 BRPM | OXYGEN SATURATION: 95 % | SYSTOLIC BLOOD PRESSURE: 113 MMHG | HEIGHT: 63 IN | TEMPERATURE: 97.8 F | BODY MASS INDEX: 38.09 KG/M2

## 2021-08-11 DIAGNOSIS — I48.0 PAF (PAROXYSMAL ATRIAL FIBRILLATION) (H): ICD-10-CM

## 2021-08-11 DIAGNOSIS — I48.19 PERSISTENT ATRIAL FIBRILLATION (H): ICD-10-CM

## 2021-08-11 LAB
ATRIAL RATE - MUSE: 59 BPM
DIASTOLIC BLOOD PRESSURE - MUSE: NORMAL MMHG
INTERPRETATION ECG - MUSE: NORMAL
P AXIS - MUSE: NORMAL DEGREES
POTASSIUM BLD-SCNC: 3.4 MMOL/L (ref 3.5–5)
PR INTERVAL - MUSE: 166 MS
QRS DURATION - MUSE: 94 MS
QT - MUSE: 432 MS
QTC - MUSE: 427 MS
R AXIS - MUSE: 60 DEGREES
SYSTOLIC BLOOD PRESSURE - MUSE: NORMAL MMHG
T AXIS - MUSE: 59 DEGREES
VENTRICULAR RATE- MUSE: 59 BPM

## 2021-08-11 PROCEDURE — 92960 CARDIOVERSION ELECTRIC EXT: CPT | Performed by: NURSE PRACTITIONER

## 2021-08-11 PROCEDURE — 92960 CARDIOVERSION ELECTRIC EXT: CPT

## 2021-08-11 PROCEDURE — 84132 ASSAY OF SERUM POTASSIUM: CPT | Performed by: INTERNAL MEDICINE

## 2021-08-11 PROCEDURE — 93005 ELECTROCARDIOGRAM TRACING: CPT

## 2021-08-11 PROCEDURE — 93010 ELECTROCARDIOGRAM REPORT: CPT | Performed by: INTERNAL MEDICINE

## 2021-08-11 PROCEDURE — 93005 ELECTROCARDIOGRAM TRACING: CPT | Performed by: INTERNAL MEDICINE

## 2021-08-11 PROCEDURE — 250N000009 HC RX 250: Performed by: NURSE ANESTHETIST, CERTIFIED REGISTERED

## 2021-08-11 PROCEDURE — 370N000017 HC ANESTHESIA TECHNICAL FEE, PER MIN

## 2021-08-11 PROCEDURE — 999N000054 HC STATISTIC EKG NON-CHARGEABLE

## 2021-08-11 PROCEDURE — 36415 COLL VENOUS BLD VENIPUNCTURE: CPT | Performed by: INTERNAL MEDICINE

## 2021-08-11 RX ORDER — ONDANSETRON 2 MG/ML
4 INJECTION INTRAMUSCULAR; INTRAVENOUS EVERY 30 MIN PRN
Status: CANCELLED | OUTPATIENT
Start: 2021-08-11

## 2021-08-11 RX ORDER — LIDOCAINE 40 MG/G
CREAM TOPICAL
Status: DISCONTINUED | OUTPATIENT
Start: 2021-08-11 | End: 2021-08-11 | Stop reason: HOSPADM

## 2021-08-11 RX ORDER — SODIUM CHLORIDE, SODIUM LACTATE, POTASSIUM CHLORIDE, CALCIUM CHLORIDE 600; 310; 30; 20 MG/100ML; MG/100ML; MG/100ML; MG/100ML
INJECTION, SOLUTION INTRAVENOUS CONTINUOUS
Status: CANCELLED | OUTPATIENT
Start: 2021-08-11

## 2021-08-11 RX ORDER — ONDANSETRON 4 MG/1
4 TABLET, ORALLY DISINTEGRATING ORAL EVERY 30 MIN PRN
Status: CANCELLED | OUTPATIENT
Start: 2021-08-11

## 2021-08-11 RX ADMIN — METHOHEXITAL SODIUM 30 MG: 500 INJECTION, POWDER, LYOPHILIZED, FOR SOLUTION INTRAMUSCULAR; INTRAVENOUS; RECTAL at 09:40

## 2021-08-11 RX ADMIN — METHOHEXITAL SODIUM 60 MG: 500 INJECTION, POWDER, LYOPHILIZED, FOR SOLUTION INTRAMUSCULAR; INTRAVENOUS; RECTAL at 09:37

## 2021-08-11 ASSESSMENT — ENCOUNTER SYMPTOMS: DYSRHYTHMIAS: 1

## 2021-08-11 ASSESSMENT — MIFFLIN-ST. JEOR: SCORE: 1419.36

## 2021-08-11 NOTE — DISCHARGE INSTRUCTIONS
Cardioversion    Cardioversion is a procedure to restore your heart's normal rhythm from a fast or irregular rhythm (arrhythmia) in the top or bottom chambers of your heart. You may have the procedure in a hospital or surgery center. It's often done on an outpatient (same day) basis. During the procedure, your doctor will give you medication to keep you free from pain. Then the doctor gives you a brief electric shock. This helps your heartbeat become normal again. In most cases, you can go home within hours of the procedure.    Before Your Procedure    Tell your doctor what over-the-counter and prescription medications, herbs, and supplements you are taking.  Take medication as directed. Your doctor may prescribe anticoagulants (blood thinners), depending on your situation. They help prevent blood clots from forming.  Ask your doctor about the risks and benefits of cardioversion.  Sign your consent form.  Don't eat or drink anything for 8  hours before your procedure.  Follow any other instructions your doctor gives you.   Arrange for an adult to drive you home after the procedure.    During Your Procedure    Your health care provider will place small pads (electrodes) on your chest to record your heartbeat at all times.  Your health care provider will place an intravenous (IV) line in your arm. This gives you medication (sedation) that keeps you free of pain. You'll feel sleepy.  Your health care provider will give you oxygen through a soft, plastic tube in your nose.  Pads will be placed on your chest and back. Your health care provider will give you a very brief electric shock through the pads. Remember, because of sedation, you won't feel the shock.  Your doctor will watch your heartbeat to make sure your normal rhythm has been restored.           During cardioversion, you are fully sedated and won't feel a thing    After Your Procedure    Your health care provider will monitor you until you are fully awake.  Then you'll be able to sit up, walk, and eat.  In most cases, you'll be able to go home after the sedation wears off. This usually takes a few hours.  For a few days, the skin on your chest may feel a little sore, like a mild sunburn.  DO NOT  drive or operate heavy machinery for 24 hours after the procedure.  The day after your procedure, try to take it easy. Take medication as directed.  Call your doctor if you notice skipped beats, a rapid heartbeat, or chest tightness. These may be signs that an irregular heartbeat has returned.        Be sure to have someone else drive you home.

## 2021-08-11 NOTE — PROCEDURES
[facility]    Procedure: Cardioversion    Date/Time: 8/11/2021 11:08 AM  Performed by: Marion Gross APRN CNP  Authorized by: Praveen Bourgeois MD     UNIVERSAL PROTOCOL   Site Marked: NA  Prior Images Obtained and Reviewed:  NA  Required items: Required blood products, implants, devices and special equipment available    Patient identity confirmed:  Verbally with patient and arm band  Patient was reevaluated immediately before administering moderate or deep sedation or anesthesia  Confirmation Checklist:  Patient's identity using two indicators and relevant allergies  Time out: Immediately prior to the procedure a time out was called    Universal Protocol: the Joint Commission Universal Protocol was followed    Preparation: Patient was prepped and draped in usual sterile fashion (na)           ANESTHESIA  Anesthesia was administered and monitored by anesthesiology.  See anesthesia documentation for details.    SEDATION    Patient Sedated: Yes    Vital signs: Vital signs monitored during sedation      PROCEDURE DETAILS  Cardioversion basis: elective  Pre-procedure rhythm: atrial fibrillation  Patient position: patient was placed in a supine position  Chest area: chest area exposed  Electrodes: pads  Electrodes placed: anterior-posterior  Post-procedure rhythm: normal sinus rhythm  Complications: no complications    PROCEDURE   Patient Tolerance:  Patient tolerated the procedure well with no immediate complications        Date: 8/11/2021  Preprocedure Dx: Persistent atrial fibrillation  Postprocedure: Successful conversion to normal sinus rhythm from persistent atrial fibrillation    Brief History: No prior history of atrial fibrillation and found incidentally.  Unclear if any symptoms.  Trial of sinus is being done for symptom clarification.  On Eliquis and denies any missed doses in the last 3 weeks.  On atenolol 75 mg orally every day at night.  This is a medication that was used for  rate control in atrial fibrillation.    : Jairo Gross RN, CNP  Methods:  Pause for cause done.  Then Lluvia Huntac taken in fasting nonsedated state and underwent brief deep anesthesia per anesthesia service.    At 939 received 300 joules of single, biphasic, synch DC CV shock between right parasternal and left infrascapular paddle position with hands free pads and had prompt restoration of sinus rhythm with frequent PACs and at 941 back in atrial fibrillation with controlled ventricular response.  She was resedated and once asleep she had repeat synchronized cardioversion shock with same pads in the same pad positions at 942 with 360 J and converted to sinus rhythm with frequent PACs.  She is continued to frequently be in sinus rhythm with bigeminy PACs.  She was in sinus rhythm at time of discharge.    Impression:  Successful conversion to sinus rhythm  Full neurological recovery after procedure.  Pt and family updated after procedure.  Plan:  Followup in clinic with Chio Davis NP as planned on September 1.  Prior to Admission medications    Medication Sig Start Date End Date Taking? Authorizing Provider   amLODIPine (NORVASC) 10 MG tablet [AMLODIPINE (NORVASC) 10 MG TABLET] Take 1 tablet (10 mg total) by mouth daily. 4/19/21  Yes Eddie Diaz MD   apixaban ANTICOAGULANT (ELIQUIS) 5 mg Tab tablet [APIXABAN ANTICOAGULANT (ELIQUIS) 5 MG TAB TABLET] Take 1 tablet (5 mg total) by mouth 2 (two) times a day. 6/29/21  Yes Praveen Bourgeois MD   atenoloL (TENORMIN) 50 MG tablet [ATENOLOL (TENORMIN) 50 MG TABLET] Take 1.5 tablets (75 mg total) by mouth daily. 4/19/21  Yes Eddie Diaz MD   atorvastatin (LIPITOR) 20 MG tablet [ATORVASTATIN (LIPITOR) 20 MG TABLET] Take 1 tablet (20 mg total) by mouth daily. 4/19/21  Yes Eddie Diaz MD   diazepam (VALIUM) 5 MG tablet TAKE 1 TABLET (5 MG TOTAL) BY MOUTH DAILY AS NEEDED. 7/21/21  Yes Eddie Diaz MD   diphenhydrAMINE (BENADRYL) 25  mg tablet [DIPHENHYDRAMINE (BENADRYL) 25 MG TABLET] Take 25 mg by mouth at bedtime as needed for sleep. 10/3/17  Yes Provider, Historical   gabapentin (NEURONTIN) 300 MG capsule [GABAPENTIN (NEURONTIN) 300 MG CAPSULE] Take 1 capsule (300 mg total) by mouth 3 (three) times a day. 6/3/21  Yes Reyna Weldon CNP   glucosamine-chondroitin 500-400 mg cap [GLUCOSAMINE-CHONDROITIN 500-400 MG CAP] Take 2 capsules by mouth daily. 10/29/14  Yes Provider, Historical   lisinopriL-hydrochlorothiazide (PRINZIDE,ZESTORETIC) 20-25 mg per tablet [LISINOPRIL-HYDROCHLOROTHIAZIDE (PRINZIDE,ZESTORETIC) 20-25 MG PER TABLET] Take 2 tablets by mouth daily. 4/19/21  Yes Eddie Diaz MD   multivitamin with minerals (THERA-M) 9 mg iron-400 mcg Tab tablet [MULTIVITAMIN WITH MINERALS (THERA-M) 9 MG IRON-400 MCG TAB TABLET] Take 1 tablet by mouth daily. 10/29/14  Yes Provider, Historical   potassium chloride (KLOR-CON M20) 20 MEQ tablet [POTASSIUM CHLORIDE (KLOR-CON M20) 20 MEQ TABLET] TAKE 1 TABLET BY MOUTH THREE TIMES A DAY 4/19/21  Yes Eddie Diaz MD   albuterol (PROVENTIL) 2.5 mg /3 mL (0.083 %) nebulizer solution [ALBUTEROL (PROVENTIL) 2.5 MG /3 ML (0.083 %) NEBULIZER SOLUTION] Take 3 mL (2.5 mg total) by nebulization every 4 (four) hours as needed for wheezing or shortness of breath. 12/17/19   Eddie Diaz MD     Continue anticoagulation of Eliquis.  Continue atenolol 75 mg orally every day.  Continue all other meds as before.  Twelve-lead EKG post cardioversion shows sinus bradycardia 59 with PACs in a pattern of bigeminy.   ms.  Discharge to home when stable and at least 1 hr after discharge.      Marion Gross, ARIELLA SHAFER, RN, CNP  8/11/2021

## 2021-08-11 NOTE — INTERVAL H&P NOTE
I have reviewed the note and took atenolol 75 mg last night.  On Eliquis and no missed doses in the last 3 weeks.

## 2021-08-11 NOTE — ANESTHESIA PREPROCEDURE EVALUATION
Anesthesia Pre-Procedure Evaluation    Patient: Lluvia Marrufo   MRN: [id number] : 1940        Preoperative Diagnosis: * No pre-op diagnosis entered *   Procedure : * No procedures listed *     Past Medical History:   Diagnosis Date     Arthritis      Asthma      Bronchitis      Earache      Fracture, foot      Hypertension      UTI (urinary tract infection)       Past Surgical History:   Procedure Laterality Date     APPENDECTOMY       C RECONSTR TOTAL SHOULDER IMPLANT Right 10/27/2015    Procedure: RIGHT SHOULDER TOTAL ARTHROPLASTY;  Surgeon: Eddie Payne MD;  Location: [facility];  Service: Orthopedics     C TOTAL HIP ARTHROPLASTY Left 10/3/2017    Procedure:  LEFT TOTAL HIP ARTHROPLASTY;  Surgeon: Mahin Monsalve MD;  Location: [facility];  Service: Orthopedics     EYE SURGERY      Growth on lateral left eyelid removed.     HYSTERECTOMY      age 57     JOINT REPLACEMENT Left     knee     OOPHORECTOMY       CA HALLUX RIGIDUS W/CHEILECTOMY 1ST MP JT W/O IMPLT      Description: Hallux Rigidus Correction W/ Cheilectomy 1st MTP Joint;  Proc Date: 2010;     TONSILLECTOMY       TOTAL KNEE ARTHROPLASTY      right     TOTAL SHOULDER ARTHROPLASTY      left      Allergies   Allergen Reactions     Azithromycin Nausea     Other Drug Allergy (See Comments) Unknown     Zicam; stomach upset, vomiting.      Social History     Tobacco Use     Smoking status: Never Smoker     Smokeless tobacco: Never Used   Substance Use Topics     Alcohol use: Yes     Comment: Alcoholic Drinks/day: 1 drink per day      Wt Readings from Last 1 Encounters:   21 97.5 kg (215 lb)        Anesthesia Evaluation   Pt has had prior anesthetic.     No history of anesthetic complications       ROS/MED HX  ENT/Pulmonary:     (+) MARTÍN risk factors, Intermittent, asthma     Neurologic: Comment: Expressive aphasia    (+) CVA, TIA,     Cardiovascular: Comment: 21 Echo  Interpretation Summary 1.Left ventricular size,  wall motion and function are normal. The ejection fraction is 60-65%. 2.TAPSE is normal, which is consistent with normal right ventricular systolic function. 3.IVC diameter and respiratory changes fall into an intermediate range suggesting an RA pressure of 8 mmHg. 4.No hemodynamically significant valvular abnormalities on 2D or color flow imaging. 5.Compared to the prior study dated 7/13/2018, there have been no changes other then A Fib now present      (+) Dyslipidemia hypertension-----NAIK. dysrhythmias, a-fib,     METS/Exercise Tolerance:     Hematologic:  - neg hematologic  ROS     Musculoskeletal:  - neg musculoskeletal ROS     GI/Hepatic:    (-) GERD   Renal/Genitourinary:  - neg Renal ROS     Endo: Comment: Pre DM    (+) Obesity (BMI 38),     Psychiatric/Substance Use:       Infectious Disease:  - neg infectious disease ROS     Malignancy:  - neg malignancy ROS     Other:            Physical Exam    Airway  airway exam normal      Mallampati: II   TM distance: > 3 FB   Neck ROM: full   Mouth opening: > 3 cm    Respiratory Devices and Support         Dental  no notable dental history         Cardiovascular   cardiovascular exam normal       Rhythm and rate: irregular and normal     Pulmonary   pulmonary exam normal        breath sounds clear to auscultation           OUTSIDE LABS:  CBC:   Lab Results   Component Value Date    WBC 6.7 04/21/2021    WBC 8.1 12/16/2019    HGB 14.3 04/21/2021    HGB 13.6 12/16/2019    HCT 43.5 04/21/2021    HCT 40.8 12/16/2019     04/21/2021     12/16/2019     BMP:   Lab Results   Component Value Date     04/21/2021     12/16/2019    POTASSIUM 3.5 04/21/2021    POTASSIUM 4.3 12/16/2019    CHLORIDE 105 04/21/2021    CHLORIDE 105 12/16/2019    CO2 23 04/21/2021    CO2 27 12/16/2019    BUN 15 04/21/2021    BUN 19 12/16/2019    CR 0.6 07/22/2021    CR 0.69 04/21/2021     04/21/2021     12/16/2019     COAGS:   Lab Results   Component Value Date     PTT 29 07/29/2018    INR 0.98 07/29/2018     POC: No results found for: BGM, HCG, HCGS  HEPATIC:   Lab Results   Component Value Date    ALBUMIN 3.8 04/21/2021    PROTTOTAL 6.7 04/21/2021    ALT 22 04/21/2021    AST 19 04/21/2021    ALKPHOS 76 04/21/2021    BILITOTAL 1.1 (H) 04/21/2021     OTHER:   Lab Results   Component Value Date    A1C 5.4 10/04/2017    ANNA 8.8 04/21/2021    MAG 1.9 07/29/2018       Anesthesia Plan    ASA Status:  3      Anesthesia Type: General.     - Airway: Mask Only      Maintenance: TIVA.        Consents            Postoperative Care            Comments:                Ebony Rivas MD

## 2021-08-11 NOTE — ANESTHESIA POSTPROCEDURE EVALUATION
Patient: Lluvia Marrufo    * No procedures listed *    Diagnosis:* No pre-op diagnosis entered *  Diagnosis Additional Information: No value filed.    Anesthesia Type:  No value filed.    Note:  Disposition: Outpatient   Postop Pain Control: Uneventful            Sign Out: Well controlled pain   PONV: No   Neuro/Psych: Uneventful            Sign Out: Acceptable/Baseline neuro status   Airway/Respiratory: Uneventful            Sign Out: Acceptable/Baseline resp. status   CV/Hemodynamics: Uneventful            Sign Out: Acceptable CV status; No obvious hypovolemia; No obvious fluid overload   Other NRE: NONE   DID A NON-ROUTINE EVENT OCCUR? No           Last vitals:  Vitals Value Taken Time   /68 08/11/21 1001   Temp 36.6  C (97.8  F) 08/11/21 0955   Pulse 82 08/11/21 1006   Resp 15 08/11/21 1006   SpO2 95 % 08/11/21 1006   Vitals shown include unvalidated device data.    Electronically Signed By: Crystal Galicia MD  August 11, 2021  10:08 AM

## 2021-08-11 NOTE — ANESTHESIA CARE TRANSFER NOTE
Patient: Lluvia Marrufo    * No procedures listed *    Diagnosis: * No pre-op diagnosis entered *  Diagnosis Additional Information: No value filed.    Anesthesia Type:   No value filed.     Note:    Oropharynx: oropharynx clear of all foreign objects and spontaneously breathing  Level of Consciousness: drowsy  Oxygen Supplementation: face mask  Level of Supplemental Oxygen (L/min / FiO2): 6  Independent Airway: airway patency satisfactory and stable  Dentition: dentition unchanged  Vital Signs Stable: post-procedure vital signs reviewed and stable  Report to RN Given: handoff report given  Patient transferred to: Telemetry/Step Down Unit (CSC)    Handoff Report: Identifed the Patient, Identified the Reponsible Provider, Reviewed the pertinent medical history, Discussed the surgical course, Reviewed Intra-OP anesthesia mangement and issues during anesthesia, Set expectations for post-procedure period and Allowed opportunity for questions and acknowledgement of understanding      Vitals:  Vitals Value Taken Time   /60    Temp     Pulse 97    Resp 20    SpO2 97 %    Vitals shown include unvalidated device data.    Electronically Signed By: ARIELLA Taveras CRNA  August 11, 2021  9:44 AM

## 2021-08-17 ENCOUNTER — HOSPITAL ENCOUNTER (OUTPATIENT)
Dept: PHYSICAL THERAPY | Facility: REHABILITATION | Age: 81
End: 2021-08-17
Payer: COMMERCIAL

## 2021-08-17 DIAGNOSIS — G89.29 CHRONIC BILATERAL LOW BACK PAIN WITH RIGHT-SIDED SCIATICA: ICD-10-CM

## 2021-08-17 DIAGNOSIS — M51.369 DDD (DEGENERATIVE DISC DISEASE), LUMBAR: Primary | ICD-10-CM

## 2021-08-17 DIAGNOSIS — M54.41 CHRONIC BILATERAL LOW BACK PAIN WITH RIGHT-SIDED SCIATICA: ICD-10-CM

## 2021-08-17 DIAGNOSIS — M54.16 RIGHT LUMBAR RADICULITIS: ICD-10-CM

## 2021-08-17 DIAGNOSIS — M48.061 SPINAL STENOSIS OF LUMBAR REGION WITHOUT NEUROGENIC CLAUDICATION: ICD-10-CM

## 2021-08-17 DIAGNOSIS — M54.50 LOW BACK PAIN POTENTIALLY ASSOCIATED WITH RADICULOPATHY: ICD-10-CM

## 2021-08-17 PROCEDURE — 97140 MANUAL THERAPY 1/> REGIONS: CPT | Mod: GP | Performed by: PHYSICAL THERAPIST

## 2021-08-17 PROCEDURE — 97110 THERAPEUTIC EXERCISES: CPT | Mod: GP | Performed by: PHYSICAL THERAPIST

## 2021-08-17 NOTE — PROGRESS NOTES
08/17/21 0929   Signing Clinician's Name / Credentials   Signing clinician's name / credentials Lauren Wallteri PT   Session Number   Session Number 4   Additional Session Number 8-12   Authorization status 9/9/21   Session Tracking, Supervision and Quick Adds   PT Assistant Visit Number 0   AT Visit Number 0   Progress Note/Recertification   Progress Note Due Date 09/10/21   Progress Note Completed Date 08/10/21   Recertification Due Date 09/09/21   Adult Goals   PT Ortho Eval Goals 3;4   Goal 1   Goal Identifier sit 30 minutes with good posture   Goal Description Pt. will improve posture : and maintain posture for;30 minutes;in sitting;for eating;in 6 weeks;in 12 weeks;Comment   Goal Progress goal met   Target Date 09/13/21   Date Met 08/10/21   Goal 3   Goal Identifier walk 30 minutes with only slight increase in pain   Goal Description Pt. will be able to walk : 30 minutes;with FWB;with less pain;with less difficulty;for community mobility;in 12 weeks;Comment   Goal Progress goal met if she can breathe more the afib and the asthma   Target Date 10/11/21   Date Met 08/10/21   Ortho Goal 3   Goal Identifier get up after sitting 30-45 minutes with pain only 3/10 pain   Goal Description Comment:: patient will not have increased pain when going from sit to stnad after sitting 30-45 minutes   Goal Progress pain is a 4-5/10 and then it  takes 3 steps to walk it out   Target Date 09/13/21   Date Met 08/10/21   Ortho Goal 4   Goal Identifier Standing for 1 hour with pain less than 3/10 pain.   Goal Description Standing for 1 hour with pain less than 3/10 pain.   Goal Progress The patient reports that she can stand for 30-45 minutes to cook without pain as long as she keeps her feet apart goal progressing.   Target Date 10/11/21   Subjective Report   Subjective Report The patient reports that she has had more pain in the evening and because she was more sedentary after having a cardio version and doing her taxes..SOre  by the end of the day with going up and down 20 steps 3 times 4-5/10 pain. then when she sits down to relax pain is gone after 2-3 minutes aproximately.   Objective Measures   Objective Measures Objective Measure 1   Objective Measure 1   Objective Measure spine position   Details muscular tightness in the sacrum lower on the right and higher on the left and then tightness in the upper thoracic spine, spine straight after manual therapy today.   Treatment Interventions   Interventions Therapeutic Procedure/Exercise;Manual Therapy   Therapeutic Procedure/exercise   Therapeutic Procedures: strength, endurance, ROM, flexibillity minutes (14723) 25   Skilled Intervention .supine abdominal heel slides using transverse abdominus and standing multifidus and rotatories with green exercise band   Manual Therapy   Manual Therapy: Mobilization, MFR, MLD, friction massage minutes (46467) 45   Skilled Intervention STM MFR to the low back and thoracic spine pressure to release muscle spasm on the spine.  STM to left buttock dural mobilization from rectus capitus posterior minr t sacral base,  STM to left buttock STM, MFR to lower abdomen, dural mobilization to the spine from rigt to left and left to right lower lumbar and sacral dura,   Patient Response spine is straight after treatment   Plan   Home program Added.supine abdominal heel slides using transverse abdominus and standing multifidus and rotatories with green exercise band  brief verbal review of  the [place] cervical thoracic joint mobilization, sidelying lateral fascial line stretching as well as anterior and posterior fascial X stretching    Plan for next session assess leg length and see of a ell lift is needed.  Reassess dura mobility as well as lateral fascial lines.  check latissimus length as able to go over posture and body mechanics, assess spine position and mobility.Assess response to progression of stabilization.   Comments   Comments spine neutral after  treatment today.   Total Session Time   Total Treatment Time (sum of timed and untimed services) 40     Outpatient Physical Therapy Progress Note     Patient: Lluvia Marrufo  : 1940    Beginning/End Dates of Reporting Period:  21 to 21    Referring Provider: Dr. Eddie Diaz    Therapy Diagnosis: chronic back and SI pain     Client Self Report: The patient reports that she has had more pain in the evening and because she was more sedentary after having a cardio version and doing her taxes..Sore by the end of the day with going up and down 20 steps 3 times 4-5/10 pain. then when she sits down to relax pain is gone after 2-3 minutes aproximately.    Objective Measurements:  Objective Measure: spine position  Details: muscular tightness in the sacrum lower on the right and higher on the left and then tightness in the upper thoracic spine, spine straight after manual therapy today.       Goals:  Goal Identifier     Goal Description     Target Date     Date Met      Progress (detail required for progress note):     Goal Identifier     Goal Description     Target Date     Date Met      Progress (detail required for progress note):     Goal Identifier get up after sitting 30-45 minutes with pain only 3/10 pain   Goal Description Comment:: patient will not have increased pain when going from sit to stnad after sitting 30-45 minutes   Target Date 21   Date Met  08/10/21   Progress (detail required for progress note):     Goal Identifier Standing for 1 hour with pain less than 3/10 pain.   Goal Description Standing for 1 hour with pain less than 3/10 pain.   Target Date 10/11/21   Date Met      Progress (detail required for progress note):     Goal Identifier     Goal Description     Target Date     Date Met      Progress (detail required for progress note):     Goal Identifier     Goal Description     Target Date     Date Met      Progress (detail required for progress note):     Goal Identifier      Goal Description     Target Date     Date Met      Progress (detail required for progress note):     Goal Identifier     Goal Description     Target Date     Date Met      Progress (detail required for progress note):           Plan:  Other: Patient to call regarding status in 1 month if no call back then will discharge PT.    Discharge:  No

## 2021-09-01 ENCOUNTER — OFFICE VISIT (OUTPATIENT)
Dept: CARDIOLOGY | Facility: CLINIC | Age: 81
End: 2021-09-01
Payer: COMMERCIAL

## 2021-09-01 VITALS
WEIGHT: 217 LBS | HEART RATE: 89 BPM | RESPIRATION RATE: 16 BRPM | BODY MASS INDEX: 38.45 KG/M2 | DIASTOLIC BLOOD PRESSURE: 90 MMHG | SYSTOLIC BLOOD PRESSURE: 142 MMHG | HEIGHT: 63 IN

## 2021-09-01 DIAGNOSIS — I10 HYPERTENSION GOAL BP (BLOOD PRESSURE) < 140/90: Chronic | ICD-10-CM

## 2021-09-01 DIAGNOSIS — I48.19 PERSISTENT ATRIAL FIBRILLATION (H): Primary | ICD-10-CM

## 2021-09-01 DIAGNOSIS — E66.01 MORBID OBESITY (H): ICD-10-CM

## 2021-09-01 PROCEDURE — 99215 OFFICE O/P EST HI 40 MIN: CPT | Performed by: NURSE PRACTITIONER

## 2021-09-01 ASSESSMENT — MIFFLIN-ST. JEOR: SCORE: 1428.44

## 2021-09-01 NOTE — LETTER
9/1/2021    Eddie Diaz MD  [address]  [facility] [address]    RE: Lluvia Marrufo       Dear Colleague,    I had the pleasure of seeing Lluvia Marrufo in the [facility].      Thank you, , for asking the [facility] team to see Ms. Lluvia Marrufo to evaluate persistent atrial fibrillation.    Assessment/Recommendations     Assessment/Plan:    Diagnoses and all orders for this visit:      Persistent atrial fibrillation-6/21 patient went to urgent care for cough and was noted to be in atrial fibrillation.  Ventricular response is well controlled; cardioversion done 8/11/2021 requiring 2 cardioversions and noted to have multiple PACs post cardioversion on monitor.  Seen today and pt is back in atrial fib but unaware.    -Educated patient about pathophysiology and chronic nature of atrial fibrillation.  Talked about management styles including rate versus rhythm control.  Explained elevated thromboembolic risk and [id number] score, recommended patient stay on lifelong blood thinners or pursue watchman device if intolerant of OAC.  Patient reports she is tolerating OAC fine and would not like to move forward with watchman device at this time.  -Continue atenolol 75 mg daily-patient monitors pulse daily; 55 to 89 bpm.  -Continue Eliquis 5 mg p.o. twice daily  -Patient is aware of side effects of Eliquis including elevated risk of bleeding and warning signs to watch for  -Holter monitor ordered to ensure rate control  -Plan-due to controlled heart rates, controlled blood pressure, and asymptomatic recurrence of atrial fibrillation I have recommended patient moving forward with rate control method.  She is not willing to undergo medication changes with repeat cardioversion.    Mild intermittent asthma without complication-patient reports ongoing shortness of breath for 1.5 to 2 years.    Hypertension goal BP  (blood pressure) < 140/90-patient reported being diagnosed with hypertension at age 20.  -Continue amlodipine, atenolol, lisinopril/hydrochlorothiazide  -Blood pressure 142/90 today.  This is unusual as patient presents blood pressure log for the last 3 weeks and blood pressure has always been <140/90.  -Recommended increasing daily activity and exercising daily    [id number] score of 8: 2 age, 1 gender, 1 HTN, 2 stroke and on Eliquis 5 mg p.o. twice daily.    Follow up in 4 to 8 weeks or sooner if needed     History of Present Illness/Subjective     Lluvia Marrufo is a very pleasant 81 year old female who comes in today for EP evaluation of persistent atrial fibrillation.  Lluvia Marrufo has a known history of asthma, CVA without residual, shortness of breath x2 years, obesity, CAD.    Met with Lexii today to discuss upcoming cardioversion.  Overall she is very active daily, [place] and routinely cooks and cares for up to 18 family members with no issues.  She has had shortness of breath for approximately 1.5 to 2 years.  It is unclear if it is linked to atrial fibrillation; cardioversion has been recommended for symptom clarification.  She has some waxing and waning about if the cardioversion helped her feel better.  She reports her stamina is decreased but she has also had weight gain.  She would like to move forward with rate control strategy but also follow-up in 1 to 2 months and discuss her options one other time before deciding on committing to rate control.  She denies chest pain, syncope, jaw or arm pain.    Cardiographics (reviewed):  CT coronary angio/calcium score Silvestrean/22/21  Interpretation Summary      Total calcium score of 0. A calcium score of zero places the individual in the lowest quartile when compared to an age and gender matched control group.    Minimal nonobstructive atherosclerotic coronary artery disease.     Echo 7/27/2021  Interpretation Summary     1.Left ventricular  "size, wall motion and function are normal. The ejection  fraction is 60-65%.  2.TAPSE is normal, which is consistent with normal right ventricular systolic  function.  3.IVC diameter and respiratory changes fall into an intermediate range  suggesting an RA pressure of 8 mmHg.  4.No hemodynamically significant valvular abnormalities on 2D or color flow  imaging.  5.Compared to the prior study dated 7/13/2018, there have been no changes  other then A Fib now present.             Problem List:  Patient Active Problem List   Diagnosis     Obesity     Hyperlipidemia     Prediabetes     Lower Back Pain     Headache     Hypertension goal BP (blood pressure) < 140/90     Dermatitis     Joint Pain, Localized In The Shoulder     Mild intermittent asthma without complication     Spinal stenosis of lumbar region     Primary osteoarthritis of left hip     Hypomagnesemia     Hypokalemia     Expressive aphasia     Acute CVA (cerebrovascular accident) (H)     Obesity (BMI 35.0-39.9) with comorbidity (H)     Dyspnea on exertion     Precordial pain     Persistent atrial fibrillation (H)     Revi  e  Physical Examination Review of Systems   w [facility]  BP (!) 142/90 (BP Location: Left arm, Patient Position: Sitting, Cuff Size: Adult Regular)   Pulse 89   Resp 16   Ht 1.6 m (5' 3\")   Wt 98.4 kg (217 lb)   BMI 38.44 kg/m    Body mass index is 38.44 kg/m .  Wt Readings from Last 3 Encounters:   09/01/21 98.4 kg (217 lb)   08/11/21 97.5 kg (215 lb)   08/09/21 97.5 kg (215 lb)     General Appearance:   Alert, well-appearing and in no acute distress.   HEENT: Atraumatic, normocephalic.  No scleral icterus, normal conjunctivae; mucous membranes pink and moist.     Chest: Chest symmetric, spine straight.   Lungs:   Respirations unlabored: Lungs sounds clear   Cardiovascular:   Normal first and second heart sounds with no murmurs, rubs, or gallops.  Irregular.  Radial and posterior tibial pulses are intact.  No edema.       Extremities: No " cyanosis or clubbing.    Musculoskeletal: Moves all extremities   Skin: Warm, dry, intact.    Neurologic: Mood and affect are appropriate, alert and oriented to person, place, time, and situation     ROS: 10 point ROS neg other than the symptoms noted above in the HPI.     Medical History  Surgical History Family History Social History     Past Medical History:   Diagnosis Date     Arthritis      Asthma      Atrial fibrillation (H)      Bronchitis      Earache      Fracture, foot      History of CVA (cerebrovascular accident)      Hyperlipidemia      Hypertension      UTI (urinary tract infection)     Past Surgical History:   Procedure Laterality Date     APPENDECTOMY       C RECONSTR TOTAL SHOULDER IMPLANT Right 10/27/2015    Procedure: RIGHT SHOULDER TOTAL ARTHROPLASTY;  Surgeon: Eddie Payne MD;  Location: [facility];  Service: Orthopedics     C TOTAL HIP ARTHROPLASTY Left 10/3/2017    Procedure:  LEFT TOTAL HIP ARTHROPLASTY;  Surgeon: Mahin Monsalve MD;  Location: [facility];  Service: Orthopedics     EYE SURGERY      Growth on lateral left eyelid removed.     HYSTERECTOMY      age 57     JOINT REPLACEMENT Left     knee     OOPHORECTOMY       MS HALLUX RIGIDUS W/CHEILECTOMY 1ST MP JT W/O IMPLT      Description: Hallux Rigidus Correction W/ Cheilectomy 1st MTP Joint;  Proc Date: 12/03/2010;     TONSILLECTOMY       TOTAL KNEE ARTHROPLASTY      right     TOTAL SHOULDER ARTHROPLASTY      left    Family History   Problem Relation Age of Onset     Hypertension Mother      Alcoholism Father      Cirrhosis Father      Anesthesia Reaction No family hx of     History   Smoking Status     Never Smoker   Smokeless Tobacco     Never Used     Social History    Substance and Sexual Activity      Alcohol use: Yes        Comment: Alcoholic Drinks/day: 1 drink per day       Medications  Allergies     Current Outpatient Medications   Medication Sig Dispense Refill     albuterol (PROVENTIL) 2.5 mg /3 mL (0.083  %) nebulizer solution [ALBUTEROL (PROVENTIL) 2.5 MG /3 ML (0.083 %) NEBULIZER SOLUTION] Take 3 mL (2.5 mg total) by nebulization every 4 (four) hours as needed for wheezing or shortness of breath. 75 mL 1     amLODIPine (NORVASC) 10 MG tablet [AMLODIPINE (NORVASC) 10 MG TABLET] Take 1 tablet (10 mg total) by mouth daily. 90 tablet 2     apixaban ANTICOAGULANT (ELIQUIS) 5 mg Tab tablet [APIXABAN ANTICOAGULANT (ELIQUIS) 5 MG TAB TABLET] Take 1 tablet (5 mg total) by mouth 2 (two) times a day. 60 tablet 12     atenoloL (TENORMIN) 50 MG tablet [ATENOLOL (TENORMIN) 50 MG TABLET] Take 1.5 tablets (75 mg total) by mouth daily. 135 tablet 2     atorvastatin (LIPITOR) 20 MG tablet [ATORVASTATIN (LIPITOR) 20 MG TABLET] Take 1 tablet (20 mg total) by mouth daily. 90 tablet 2     diazepam (VALIUM) 5 MG tablet TAKE 1 TABLET (5 MG TOTAL) BY MOUTH DAILY AS NEEDED. 15 tablet 0     diphenhydrAMINE (BENADRYL) 25 mg tablet [DIPHENHYDRAMINE (BENADRYL) 25 MG TABLET] Take 25 mg by mouth at bedtime as needed for sleep.       gabapentin (NEURONTIN) 300 MG capsule [GABAPENTIN (NEURONTIN) 300 MG CAPSULE] Take 1 capsule (300 mg total) by mouth 3 (three) times a day. 90 capsule 3     glucosamine-chondroitin 500-400 mg cap [GLUCOSAMINE-CHONDROITIN 500-400 MG CAP] Take 2 capsules by mouth daily.       lisinopriL-hydrochlorothiazide (PRINZIDE,ZESTORETIC) 20-25 mg per tablet [LISINOPRIL-HYDROCHLOROTHIAZIDE (PRINZIDE,ZESTORETIC) 20-25 MG PER TABLET] Take 2 tablets by mouth daily. 180 tablet 2     multivitamin with minerals (THERA-M) 9 mg iron-400 mcg Tab tablet [MULTIVITAMIN WITH MINERALS (THERA-M) 9 MG IRON-400 MCG TAB TABLET] Take 1 tablet by mouth daily.       potassium chloride (KLOR-CON M20) 20 MEQ tablet [POTASSIUM CHLORIDE (KLOR-CON M20) 20 MEQ TABLET] TAKE 1 TABLET BY MOUTH THREE TIMES A  tablet 2      Allergies   Allergen Reactions     Azithromycin Nausea     Other Drug Allergy (See Comments) Unknown     Zicam; stomach upset,  vomiting.      Medical, surgical, family, social history, and medications were all reviewed and updated as necessary.   Lab Results    Chemistry/lipid CBC Cardiac Enzymes/BNP/TSH/INR     [unfilled]  No results found for: BNP Lab Results   Component Value Date    WBC 6.7 04/21/2021    HGB 14.3 04/21/2021    HCT 43.5 04/21/2021    MCV 88 04/21/2021     04/21/2021        Lab Results   Component Value Date    CHOL 151 04/21/2021    HDL 67 04/21/2021    TRIG 114 04/21/2021          Total Time- 50 minutes spent on date of encounter doing chart review, history and exam, documentation and further activities as noted above.  This note has been dictated using voice recognition software. Any grammatical, typographical, or context distortions are unintentional and inherent to the software.    Chio DACOSTA [facility]            Thank you for allowing me to participate in the care of your patient.      Sincerely,     ARIELLA Paulson CNP [facility]  cc:   No referring provider defined for this encounter.

## 2021-09-01 NOTE — PATIENT INSTRUCTIONS
Lexii Marrufo,    It was a pleasure to see you today at the [facility].     My recommendations after this visit include:    Get a Holter monitor for one day to assess your heart rates.      No medication changes.      My contact information:  Chio Davis CNP  After Hours or Scheduling  [phone]  My Nurses phone number [phone]- normal business hours

## 2021-09-01 NOTE — PROGRESS NOTES
Thank you, , for asking the [facility] team to see Ms. Lluvia Marrufo to evaluate persistent atrial fibrillation.    Assessment/Recommendations     Assessment/Plan:    Diagnoses and all orders for this visit:      Persistent atrial fibrillation-6/21 patient went to urgent care for cough and was noted to be in atrial fibrillation.  Ventricular response is well controlled; cardioversion done 8/11/2021 requiring 2 cardioversions and noted to have multiple PACs post cardioversion on monitor.  Seen today and pt is back in atrial fib but unaware.    -Educated patient about pathophysiology and chronic nature of atrial fibrillation.  Talked about management styles including rate versus rhythm control.  Explained elevated thromboembolic risk and [id number] score, recommended patient stay on lifelong blood thinners or pursue watchman device if intolerant of OAC.  Patient reports she is tolerating OAC fine and would not like to move forward with watchman device at this time.  -Continue atenolol 75 mg daily-patient monitors pulse daily; 55 to 89 bpm.  -Continue Eliquis 5 mg p.o. twice daily  -Patient is aware of side effects of Eliquis including elevated risk of bleeding and warning signs to watch for  -Holter monitor ordered to ensure rate control  -Plan-due to controlled heart rates, controlled blood pressure, and asymptomatic recurrence of atrial fibrillation I have recommended patient moving forward with rate control method.  She is not willing to undergo medication changes with repeat cardioversion.    Mild intermittent asthma without complication-patient reports ongoing shortness of breath for 1.5 to 2 years.    Hypertension goal BP (blood pressure) < 140/90-patient reported being diagnosed with hypertension at age 20.  -Continue amlodipine, atenolol, lisinopril/hydrochlorothiazide  -Blood pressure 142/90 today.  This is unusual as patient presents blood pressure log for the last 3  weeks and blood pressure has always been <140/90.  -Recommended increasing daily activity and exercising daily    [id number] score of 8: 2 age, 1 gender, 1 HTN, 2 stroke and on Eliquis 5 mg p.o. twice daily.    Follow up in 4 to 8 weeks or sooner if needed     History of Present Illness/Subjective     Lluvia Marrufo is a very pleasant 81 year old female who comes in today for EP evaluation of persistent atrial fibrillation.  Lluvia Marrufo has a known history of asthma, CVA without residual, shortness of breath x2 years, obesity, CAD.    Met with Lexii today to discuss upcoming cardioversion.  Overall she is very active daily, [place] and routinely cooks and cares for up to 18 family members with no issues.  She has had shortness of breath for approximately 1.5 to 2 years.  It is unclear if it is linked to atrial fibrillation; cardioversion has been recommended for symptom clarification.  She has some waxing and waning about if the cardioversion helped her feel better.  She reports her stamina is decreased but she has also had weight gain.  She would like to move forward with rate control strategy but also follow-up in 1 to 2 months and discuss her options one other time before deciding on committing to rate control.  She denies chest pain, syncope, jaw or arm pain.    Cardiographics (reviewed):  CT coronary angio/calcium score Silvestrean/22/21  Interpretation Summary      Total calcium score of 0. A calcium score of zero places the individual in the lowest quartile when compared to an age and gender matched control group.    Minimal nonobstructive atherosclerotic coronary artery disease.     Echo 7/27/2021  Interpretation Summary     1.Left ventricular size, wall motion and function are normal. The ejection  fraction is 60-65%.  2.TAPSE is normal, which is consistent with normal right ventricular systolic  function.  3.IVC diameter and respiratory changes fall into an intermediate range  suggesting an RA  "pressure of 8 mmHg.  4.No hemodynamically significant valvular abnormalities on 2D or color flow  imaging.  5.Compared to the prior study dated 7/13/2018, there have been no changes  other then A Fib now present.             Problem List:  Patient Active Problem List   Diagnosis     Obesity     Hyperlipidemia     Prediabetes     Lower Back Pain     Headache     Hypertension goal BP (blood pressure) < 140/90     Dermatitis     Joint Pain, Localized In The Shoulder     Mild intermittent asthma without complication     Spinal stenosis of lumbar region     Primary osteoarthritis of left hip     Hypomagnesemia     Hypokalemia     Expressive aphasia     Acute CVA (cerebrovascular accident) (H)     Obesity (BMI 35.0-39.9) with comorbidity (H)     Dyspnea on exertion     Precordial pain     Persistent atrial fibrillation (H)     Revi  e  Physical Examination Review of Systems   w [facility]  BP (!) 142/90 (BP Location: Left arm, Patient Position: Sitting, Cuff Size: Adult Regular)   Pulse 89   Resp 16   Ht 1.6 m (5' 3\")   Wt 98.4 kg (217 lb)   BMI 38.44 kg/m    Body mass index is 38.44 kg/m .  Wt Readings from Last 3 Encounters:   09/01/21 98.4 kg (217 lb)   08/11/21 97.5 kg (215 lb)   08/09/21 97.5 kg (215 lb)     General Appearance:   Alert, well-appearing and in no acute distress.   HEENT: Atraumatic, normocephalic.  No scleral icterus, normal conjunctivae; mucous membranes pink and moist.     Chest: Chest symmetric, spine straight.   Lungs:   Respirations unlabored: Lungs sounds clear   Cardiovascular:   Normal first and second heart sounds with no murmurs, rubs, or gallops.  Irregular.  Radial and posterior tibial pulses are intact.  No edema.       Extremities: No cyanosis or clubbing.    Musculoskeletal: Moves all extremities   Skin: Warm, dry, intact.    Neurologic: Mood and affect are appropriate, alert and oriented to person, place, time, and situation     ROS: 10 point ROS neg other than the symptoms noted " above in the HPI.     Medical History  Surgical History Family History Social History     Past Medical History:   Diagnosis Date     Arthritis      Asthma      Atrial fibrillation (H)      Bronchitis      Earache      Fracture, foot      History of CVA (cerebrovascular accident)      Hyperlipidemia      Hypertension      UTI (urinary tract infection)     Past Surgical History:   Procedure Laterality Date     APPENDECTOMY       C RECONSTR TOTAL SHOULDER IMPLANT Right 10/27/2015    Procedure: RIGHT SHOULDER TOTAL ARTHROPLASTY;  Surgeon: Eddie Payne MD;  Location: [facility];  Service: Orthopedics     C TOTAL HIP ARTHROPLASTY Left 10/3/2017    Procedure:  LEFT TOTAL HIP ARTHROPLASTY;  Surgeon: Mahin Monsalve MD;  Location: [facility];  Service: Orthopedics     EYE SURGERY      Growth on lateral left eyelid removed.     HYSTERECTOMY      age 57     JOINT REPLACEMENT Left     knee     OOPHORECTOMY       CT HALLUX RIGIDUS W/CHEILECTOMY 1ST MP JT W/O IMPLT      Description: Hallux Rigidus Correction W/ Cheilectomy 1st MTP Joint;  Proc Date: 12/03/2010;     TONSILLECTOMY       TOTAL KNEE ARTHROPLASTY      right     TOTAL SHOULDER ARTHROPLASTY      left    Family History   Problem Relation Age of Onset     Hypertension Mother      Alcoholism Father      Cirrhosis Father      Anesthesia Reaction No family hx of     History   Smoking Status     Never Smoker   Smokeless Tobacco     Never Used     Social History    Substance and Sexual Activity      Alcohol use: Yes        Comment: Alcoholic Drinks/day: 1 drink per day       Medications  Allergies     Current Outpatient Medications   Medication Sig Dispense Refill     albuterol (PROVENTIL) 2.5 mg /3 mL (0.083 %) nebulizer solution [ALBUTEROL (PROVENTIL) 2.5 MG /3 ML (0.083 %) NEBULIZER SOLUTION] Take 3 mL (2.5 mg total) by nebulization every 4 (four) hours as needed for wheezing or shortness of breath. 75 mL 1     amLODIPine (NORVASC) 10 MG tablet  [AMLODIPINE (NORVASC) 10 MG TABLET] Take 1 tablet (10 mg total) by mouth daily. 90 tablet 2     apixaban ANTICOAGULANT (ELIQUIS) 5 mg Tab tablet [APIXABAN ANTICOAGULANT (ELIQUIS) 5 MG TAB TABLET] Take 1 tablet (5 mg total) by mouth 2 (two) times a day. 60 tablet 12     atenoloL (TENORMIN) 50 MG tablet [ATENOLOL (TENORMIN) 50 MG TABLET] Take 1.5 tablets (75 mg total) by mouth daily. 135 tablet 2     atorvastatin (LIPITOR) 20 MG tablet [ATORVASTATIN (LIPITOR) 20 MG TABLET] Take 1 tablet (20 mg total) by mouth daily. 90 tablet 2     diazepam (VALIUM) 5 MG tablet TAKE 1 TABLET (5 MG TOTAL) BY MOUTH DAILY AS NEEDED. 15 tablet 0     diphenhydrAMINE (BENADRYL) 25 mg tablet [DIPHENHYDRAMINE (BENADRYL) 25 MG TABLET] Take 25 mg by mouth at bedtime as needed for sleep.       gabapentin (NEURONTIN) 300 MG capsule [GABAPENTIN (NEURONTIN) 300 MG CAPSULE] Take 1 capsule (300 mg total) by mouth 3 (three) times a day. 90 capsule 3     glucosamine-chondroitin 500-400 mg cap [GLUCOSAMINE-CHONDROITIN 500-400 MG CAP] Take 2 capsules by mouth daily.       lisinopriL-hydrochlorothiazide (PRINZIDE,ZESTORETIC) 20-25 mg per tablet [LISINOPRIL-HYDROCHLOROTHIAZIDE (PRINZIDE,ZESTORETIC) 20-25 MG PER TABLET] Take 2 tablets by mouth daily. 180 tablet 2     multivitamin with minerals (THERA-M) 9 mg iron-400 mcg Tab tablet [MULTIVITAMIN WITH MINERALS (THERA-M) 9 MG IRON-400 MCG TAB TABLET] Take 1 tablet by mouth daily.       potassium chloride (KLOR-CON M20) 20 MEQ tablet [POTASSIUM CHLORIDE (KLOR-CON M20) 20 MEQ TABLET] TAKE 1 TABLET BY MOUTH THREE TIMES A  tablet 2      Allergies   Allergen Reactions     Azithromycin Nausea     Other Drug Allergy (See Comments) Unknown     Zicam; stomach upset, vomiting.      Medical, surgical, family, social history, and medications were all reviewed and updated as necessary.   Lab Results    Chemistry/lipid CBC Cardiac Enzymes/BNP/TSH/INR     [unfilled]  No results found for: BNP Lab Results   Component  Value Date    WBC 6.7 04/21/2021    HGB 14.3 04/21/2021    HCT 43.5 04/21/2021    MCV 88 04/21/2021     04/21/2021        Lab Results   Component Value Date    CHOL 151 04/21/2021    HDL 67 04/21/2021    TRIG 114 04/21/2021          Total Time- 50 minutes spent on date of encounter doing chart review, history and exam, documentation and further activities as noted above.  This note has been dictated using voice recognition software. Any grammatical, typographical, or context distortions are unintentional and inherent to the software.    Chio Davis [facility]

## 2021-09-08 ENCOUNTER — OFFICE VISIT (OUTPATIENT)
Dept: FAMILY MEDICINE | Facility: CLINIC | Age: 81
End: 2021-09-08
Payer: COMMERCIAL

## 2021-09-08 VITALS
WEIGHT: 213.7 LBS | BODY MASS INDEX: 37.86 KG/M2 | DIASTOLIC BLOOD PRESSURE: 75 MMHG | OXYGEN SATURATION: 96 % | SYSTOLIC BLOOD PRESSURE: 118 MMHG | TEMPERATURE: 97.8 F | HEART RATE: 87 BPM

## 2021-09-08 DIAGNOSIS — R30.0 DYSURIA: ICD-10-CM

## 2021-09-08 DIAGNOSIS — N39.0 URINARY TRACT INFECTION WITHOUT HEMATURIA, SITE UNSPECIFIED: Primary | ICD-10-CM

## 2021-09-08 LAB
ALBUMIN UR-MCNC: NEGATIVE MG/DL
APPEARANCE UR: ABNORMAL
BACTERIA #/AREA URNS HPF: ABNORMAL /HPF
BILIRUB UR QL STRIP: NEGATIVE
COLOR UR AUTO: YELLOW
GLUCOSE UR STRIP-MCNC: NEGATIVE MG/DL
HGB UR QL STRIP: ABNORMAL
KETONES UR STRIP-MCNC: NEGATIVE MG/DL
LEUKOCYTE ESTERASE UR QL STRIP: ABNORMAL
NITRATE UR QL: NEGATIVE
PH UR STRIP: 6.5 [PH] (ref 5–8)
RBC #/AREA URNS AUTO: ABNORMAL /HPF
SP GR UR STRIP: 1.02 (ref 1–1.03)
SQUAMOUS #/AREA URNS AUTO: ABNORMAL /LPF
UROBILINOGEN UR STRIP-ACNC: 1 E.U./DL
WBC #/AREA URNS AUTO: ABNORMAL /HPF

## 2021-09-08 PROCEDURE — 99213 OFFICE O/P EST LOW 20 MIN: CPT | Performed by: PHYSICIAN ASSISTANT

## 2021-09-08 PROCEDURE — 81001 URINALYSIS AUTO W/SCOPE: CPT | Performed by: PHYSICIAN ASSISTANT

## 2021-09-08 PROCEDURE — 87186 SC STD MICRODIL/AGAR DIL: CPT | Performed by: PHYSICIAN ASSISTANT

## 2021-09-08 PROCEDURE — 87086 URINE CULTURE/COLONY COUNT: CPT | Performed by: PHYSICIAN ASSISTANT

## 2021-09-08 RX ORDER — CEPHALEXIN 500 MG/1
500 CAPSULE ORAL 2 TIMES DAILY
Qty: 20 CAPSULE | Refills: 0 | Status: SHIPPED | OUTPATIENT
Start: 2021-09-08 | End: 2021-09-18

## 2021-09-08 NOTE — PATIENT INSTRUCTIONS
Increased fluids and rest.  Discussed signs and symptoms of ascending urinary tract infection symptoms to include pyelonephritis. Instructed to turn to clinic if there are increased fever chills night sweats fatigue abdominal pain or flank pain  Antibiotic as written. Risks and benefits of medication discussed.  Indication for return to clinic.        Urinary Tract Infections in Women    Urinary tract infections (UTIs) are most often caused by bacteria (germs). These bacteria enter the urinary tract. The bacteria may come from outside the body. Or they may travel from the skin outside the rectum or vagina into the urethra. Female anatomy makes it easier for bacteria from the bowel to enter a woman s urinary tract, which is the most common source of UTI. This means women develop UTIs more often than men. Pain in or around the urinary tract is a common UTI symptom. But the only way to know for sure if you have a UTI for the health care provider to test your urine. The two tests that may be done are the urinalysis and urine culture.  Types of UTIs    Cystitis: A bladder infection (cystitis) is the most common UTI in women. You may have urgent or frequent urination. You may also have pain, burning when you urinate, and bloody urine.    Urethritis: This is an inflamed urethra, which is the tube that carries urine from the bladder to outside the body. You may have lower stomach or back pain. You may also have urgent or frequent urination.    Pyelonephritis: This is a kidney infection. If not treated, it can be serious and damage your kidneys. In severe cases, you may be hospitalized. You may have a fever and lower back pain.  Medications to treat a UTI  Most UTIs are treated with antibiotics. These kill the bacteria. The length of time you need to take them depends on the type of infection. It may be as short as 3 days. If you have repeated UTIs, a low-dose antibiotic may be needed for several months. Take antibiotics  exactly as directed. Don t stop taking them until all of the medication is gone. If you stop taking the antibiotic too soon, the infection may not go away, and you may develop a resistance to the antibiotic. This can make it much harder to treat.  Lifestyle changes to treat and prevent UTIs  The lifestyle changes below will help get rid of your UTI. They may also help prevent future UTIs.    Drink plenty of fluids. This includes water, juice, or other caffeine-free drinks. Fluids help flush bacteria out of your body.    Empty your bladder. Always empty your bladder when you feel the urge to urinate. And always urinate before going to sleep. Urine that stays in your bladder can lead to infection. Try to urinate before and after sex as well.    Practice good personal hygiene. Wipe yourself from front to back after using the toilet. This helps keep bacteria from getting into the urethra.    Use condoms during sex. These help prevent UTIs caused by sexually transmitted bacteria. Also, avoid using spermicides during sex. These can increase the risk of UTIs. Choose other forms of birth control instead. For women who tend to get UTIs after sex, a low-dose of a preventive antibiotic may be used. Be sure to discuss this option with your health care provider.    Follow up with your health care provider as directed. He or she may test to make sure the infection has cleared. If necessary, additional treatment may be started.  Date Last Reviewed: 9/8/2014 2000-2016 [employer]. [address] [place] [address], [place] [address]. All rights reserved. This information is not intended as a substitute for professional medical care. Always follow your healthcare professional's instructions.

## 2021-09-08 NOTE — PROGRESS NOTES
Patient presents with:  Urinary Problem: Painful uriantion.  Started yesterday.  Took AZO today.  Hx of UTI.       Clinical Decision Making:  Multiple etiologies and diagnoses were considered to include but not limited to urinary tract infection, dysuria, hyperglycemia.  Patient is treated for a long course of treatment based on her age and other inclusion criteria.  Patient is treated with antibiotic and creatinine clearance and allergies were reviewed and expected course of resolution and indication for return was gone over.        ICD-10-CM    1. Urinary tract infection without hematuria, site unspecified  N39.0 cephALEXin (KEFLEX) 500 MG capsule   2. Dysuria  R30.0 UA with Microscopic reflex to Culture - Clinic Collect     Urine Microscopic     Urine Culture       Patient Instructions   Increased fluids and rest.  Discussed signs and symptoms of ascending urinary tract infection symptoms to include pyelonephritis. Instructed to turn to clinic if there are increased fever chills night sweats fatigue abdominal pain or flank pain  Antibiotic as written. Risks and benefits of medication discussed.  Indication for return to clinic.        Urinary Tract Infections in Women    Urinary tract infections (UTIs) are most often caused by bacteria (germs). These bacteria enter the urinary tract. The bacteria may come from outside the body. Or they may travel from the skin outside the rectum or vagina into the urethra. Female anatomy makes it easier for bacteria from the bowel to enter a woman s urinary tract, which is the most common source of UTI. This means women develop UTIs more often than men. Pain in or around the urinary tract is a common UTI symptom. But the only way to know for sure if you have a UTI for the health care provider to test your urine. The two tests that may be done are the urinalysis and urine culture.  Types of UTIs    Cystitis: A bladder infection (cystitis) is the most common UTI in women. You may  have urgent or frequent urination. You may also have pain, burning when you urinate, and bloody urine.    Urethritis: This is an inflamed urethra, which is the tube that carries urine from the bladder to outside the body. You may have lower stomach or back pain. You may also have urgent or frequent urination.    Pyelonephritis: This is a kidney infection. If not treated, it can be serious and damage your kidneys. In severe cases, you may be hospitalized. You may have a fever and lower back pain.  Medications to treat a UTI  Most UTIs are treated with antibiotics. These kill the bacteria. The length of time you need to take them depends on the type of infection. It may be as short as 3 days. If you have repeated UTIs, a low-dose antibiotic may be needed for several months. Take antibiotics exactly as directed. Don t stop taking them until all of the medication is gone. If you stop taking the antibiotic too soon, the infection may not go away, and you may develop a resistance to the antibiotic. This can make it much harder to treat.  Lifestyle changes to treat and prevent UTIs  The lifestyle changes below will help get rid of your UTI. They may also help prevent future UTIs.    Drink plenty of fluids. This includes water, juice, or other caffeine-free drinks. Fluids help flush bacteria out of your body.    Empty your bladder. Always empty your bladder when you feel the urge to urinate. And always urinate before going to sleep. Urine that stays in your bladder can lead to infection. Try to urinate before and after sex as well.    Practice good personal hygiene. Wipe yourself from front to back after using the toilet. This helps keep bacteria from getting into the urethra.    Use condoms during sex. These help prevent UTIs caused by sexually transmitted bacteria. Also, avoid using spermicides during sex. These can increase the risk of UTIs. Choose other forms of birth control instead. For women who tend to get UTIs  after sex, a low-dose of a preventive antibiotic may be used. Be sure to discuss this option with your health care provider.    Follow up with your health care provider as directed. He or she may test to make sure the infection has cleared. If necessary, additional treatment may be started.  Date Last Reviewed: 9/8/2014 2000-2016 [employer]. [address] [place] [address], [place] [address]. All rights reserved. This information is not intended as a substitute for professional medical care. Always follow your healthcare professional's instructions.                          HPI:  Lluvia Marrufo is a 81 year old female who presents today for a one day history of irritative voiding symptoms to include urinary hesitancy urgency frequency and dysuria.  Patient denies gross hematuria.  Denies fever chills night sweats fatigue or other red flag symptoms to include back or flank pain and no vaginal discharge.  She has had a recent urinary tract infections does feel similar to how her other symptoms have been.      History obtained from chart review and the patient.    Problem List:  2021-08: Persistent atrial fibrillation (H)  2021-06: Dyspnea on exertion  2021-06: PAF (paroxysmal atrial fibrillation) (H)  2021-06: Precordial pain  2019-12: Obesity (BMI 35.0-39.9) with comorbidity (H)  2018-07: Acute CVA (cerebrovascular accident) (H)  2018-07: Expressive aphasia  2017-10: Primary osteoarthritis of left hip  2016-09: Spinal stenosis of lumbar region  2015-10: Mild intermittent asthma without complication  Obesity  Hyperlipidemia  Prediabetes  Lower Back Pain  Headache  Hypertension goal BP (blood pressure) < 140/90  Dermatitis  Joint Pain, Localized In The Shoulder  Hypomagnesemia  Hypokalemia      Past Medical History:   Diagnosis Date     Arthritis      Asthma      Atrial fibrillation (H)      Bronchitis      Earache      Fracture, foot      History of CVA (cerebrovascular accident)      Hyperlipidemia       Hypertension      UTI (urinary tract infection)        Social History     Tobacco Use     Smoking status: Never Smoker     Smokeless tobacco: Never Used   Substance Use Topics     Alcohol use: Yes     Comment: Alcoholic Drinks/day: 1 drink per day       Review of Systems  As above in HPI otherwise negative.    Vitals:    09/08/21 0957   BP: 118/75   BP Location: Left arm   Patient Position: Sitting   Cuff Size: Adult Large   Pulse: 87   Temp: 97.8  F (36.6  C)   TempSrc: Oral   SpO2: 96%   Weight: 96.9 kg (213 lb 11.2 oz)     General: Patient is resting comfortably no acute distress is afebrile  HEENT: Head is normocephalic atraumatic   eyes are PERRL EOMI sclera anicteric   Abdomen: Suprapubic tenderness to palpation which reproduces her sense to urinate but no CVA tenderness to percussion no rebound or guarding no masses.  Skin: Without rash non-diaphoretic    Physical Exam    Labs:    Results for orders placed or performed in visit on 09/08/21   UA with Microscopic reflex to Culture - Clinic Collect     Status: Abnormal    Specimen: Urine, Clean Catch   Result Value Ref Range    Color Urine Yellow Colorless, Straw, Light Yellow, Yellow    Appearance Urine Slightly Cloudy (A) Clear    Glucose Urine Negative Negative mg/dL    Bilirubin Urine Negative Negative    Ketones Urine Negative Negative mg/dL    Specific Gravity Urine 1.020 1.005 - 1.030    Blood Urine Trace (A) Negative    pH Urine 6.5 5.0 - 8.0    Protein Albumin Urine Negative Negative mg/dL    Urobilinogen Urine 1.0 0.2, 1.0 E.U./dL    Nitrite Urine Negative Negative    Leukocyte Esterase Urine Small (A) Negative   Urine Microscopic     Status: Abnormal   Result Value Ref Range    Bacteria Urine Few (A) None Seen /HPF    RBC Urine 0-2 0-2 /HPF /HPF    WBC Urine 10-25 (A) 0-5 /HPF /HPF    Squamous Epithelials Urine Few (A) None Seen /LPF   Urine Culture     Status: Abnormal    Specimen: Urine, Clean Catch     Urine culture is pending.     At the end of  the encounter, I discussed results, diagnosis, medications. Discussed red flags for immediate return to clinic/ER, as well as indications for follow up if no improvement. Patient understood and agreed to plan. Patient was stable for discharge.

## 2021-09-09 ENCOUNTER — HOSPITAL ENCOUNTER (OUTPATIENT)
Dept: CARDIOLOGY | Facility: HOSPITAL | Age: 81
Discharge: HOME | End: 2021-09-09
Attending: NURSE PRACTITIONER | Admitting: NURSE PRACTITIONER
Payer: COMMERCIAL

## 2021-09-09 DIAGNOSIS — I48.19 PERSISTENT ATRIAL FIBRILLATION (H): ICD-10-CM

## 2021-09-09 PROCEDURE — 93227 XTRNL ECG REC<48 HR R&I: CPT | Performed by: INTERNAL MEDICINE

## 2021-09-09 PROCEDURE — 93225 XTRNL ECG REC<48 HRS REC: CPT

## 2021-09-10 LAB — BACTERIA UR CULT: ABNORMAL

## 2021-10-17 ENCOUNTER — HEALTH MAINTENANCE LETTER (OUTPATIENT)
Age: 81
End: 2021-10-17

## 2021-10-25 DIAGNOSIS — G89.29 CHRONIC BILATERAL LOW BACK PAIN WITH RIGHT-SIDED SCIATICA: ICD-10-CM

## 2021-10-25 DIAGNOSIS — F41.9 ANXIETY: ICD-10-CM

## 2021-10-25 DIAGNOSIS — M54.41 CHRONIC BILATERAL LOW BACK PAIN WITH RIGHT-SIDED SCIATICA: ICD-10-CM

## 2021-10-25 DIAGNOSIS — M54.16 RIGHT LUMBAR RADICULITIS: ICD-10-CM

## 2021-10-25 RX ORDER — GABAPENTIN 300 MG/1
CAPSULE ORAL
Qty: 90 CAPSULE | Refills: 3 | Status: SHIPPED | OUTPATIENT
Start: 2021-10-25 | End: 2022-11-08

## 2021-10-26 ENCOUNTER — TELEPHONE (OUTPATIENT)
Dept: FAMILY MEDICINE | Facility: CLINIC | Age: 81
End: 2021-10-26

## 2021-10-26 NOTE — TELEPHONE ENCOUNTER
Routing refill request to provider for review/approval because:  Controlled substance request  System reports patient not taking medication.    Last Written Prescription Date:  7/21/21  Last Fill Quantity: 15,  # refills: 0   Last office visit provider:  4/19/21     Requested Prescriptions   Pending Prescriptions Disp Refills     diazepam (VALIUM) 5 MG tablet [Pharmacy Med Name: DIAZEPAM 5 MG TABLET] 15 tablet 0     Sig: TAKE 1 TABLET (5 MG TOTAL) BY MOUTH DAILY AS NEEDED.       There is no refill protocol information for this order          Jamie Qiu RN 10/26/21 2:42 PM

## 2021-10-26 NOTE — TELEPHONE ENCOUNTER
Reason for Call:  Other appointment    Detailed comments: found a lump in her breast yesterday and wants to be soon earlier than first available in November    Phone Number Patient can be reached at: Cell number on file:    Telephone Information:   Mobile [phone]       Best Time: any    Can we leave a detailed message on this number? YES    Call taken on 10/26/2021 at 8:37 AM by Cassie Lea

## 2021-10-27 RX ORDER — DIAZEPAM 5 MG
TABLET ORAL
Qty: 15 TABLET | Refills: 0 | Status: SHIPPED | OUTPATIENT
Start: 2021-10-27 | End: 2022-01-07

## 2021-10-28 ENCOUNTER — OFFICE VISIT (OUTPATIENT)
Dept: FAMILY MEDICINE | Facility: CLINIC | Age: 81
End: 2021-10-28
Payer: COMMERCIAL

## 2021-10-28 VITALS
WEIGHT: 218.5 LBS | HEART RATE: 80 BPM | BODY MASS INDEX: 38.71 KG/M2 | DIASTOLIC BLOOD PRESSURE: 76 MMHG | SYSTOLIC BLOOD PRESSURE: 139 MMHG | HEIGHT: 63 IN

## 2021-10-28 DIAGNOSIS — F41.9 ANXIETY: ICD-10-CM

## 2021-10-28 DIAGNOSIS — N63.0 LUMP OR MASS IN BREAST: Primary | ICD-10-CM

## 2021-10-28 DIAGNOSIS — N60.01 CYST OF RIGHT BREAST: ICD-10-CM

## 2021-10-28 PROCEDURE — 99213 OFFICE O/P EST LOW 20 MIN: CPT | Performed by: FAMILY MEDICINE

## 2021-10-28 RX ORDER — DIAZEPAM 5 MG
TABLET ORAL
Qty: 15 TABLET | Refills: 0 | OUTPATIENT
Start: 2021-10-28

## 2021-10-28 ASSESSMENT — ASTHMA QUESTIONNAIRES
QUESTION_3 LAST FOUR WEEKS HOW OFTEN DID YOUR ASTHMA SYMPTOMS (WHEEZING, COUGHING, SHORTNESS OF BREATH, CHEST TIGHTNESS OR PAIN) WAKE YOU UP AT NIGHT OR EARLIER THAN USUAL IN THE MORNING: ONCE OR TWICE
QUESTION_5 LAST FOUR WEEKS HOW WOULD YOU RATE YOUR ASTHMA CONTROL: WELL CONTROLLED
QUESTION_1 LAST FOUR WEEKS HOW MUCH OF THE TIME DID YOUR ASTHMA KEEP YOU FROM GETTING AS MUCH DONE AT WORK, SCHOOL OR AT HOME: SOME OF THE TIME
ACT_TOTALSCORE: 19
QUESTION_4 LAST FOUR WEEKS HOW OFTEN HAVE YOU USED YOUR RESCUE INHALER OR NEBULIZER MEDICATION (SUCH AS ALBUTEROL): NOT AT ALL
QUESTION_2 LAST FOUR WEEKS HOW OFTEN HAVE YOU HAD SHORTNESS OF BREATH: THREE TO SIX TIMES A WEEK

## 2021-10-28 ASSESSMENT — MIFFLIN-ST. JEOR: SCORE: 1435.24

## 2021-10-28 NOTE — PATIENT INSTRUCTIONS
Lets follow up with repeat imaging to see if lump is stable or changing.   They should call you to schedule, but you can call [phone].

## 2021-10-28 NOTE — PROGRESS NOTES
"  Assessment & Plan     1. Lump or mass in breast  2. Cyst of right breast  - US Breast Right Limited 1-3 Quadrants; Future     Reviewed mammogram and US from June 2021, benign cyst vs cluster of cysts under right areola.   Given new finding of patient reported palpable lump, will repeat ultrasound to assess for change.   Ultrasound report has returned and is stable from last. Reassuring. Recommend returning to routine screening.  If right breast findings are changing, will reassess.    Return in about 6 months (around 4/19/2022) for Physical Exam.    Addie Rodrigues, [facility]    Subjective   Lexii is a 81 year old who presents for the following health issues    Chief Complaints and History of Present Illnesses   Patient presents with     Breast Mass     Right breast, under nipple, can be painful        HPI     Routine imaging this morning.  Recently felt a lump under nipple in right breast. Noticed it on Tuesday. Not tender.   1.5 years ago lost oldest daughter to terminal brain cancer. Grand-daughter in law is an NP, was concerned about them due to loss. Doesn't regularly do breast exams, not sure how long it has been present.     Review of Systems   See HPI above.       Objective    /76   Pulse 80   Ht 1.6 m (5' 3\")   Wt 99.1 kg (218 lb 8 oz)   LMP  (LMP Unknown)   BMI 38.71 kg/m    Body mass index is 38.71 kg/m .  Physical Exam   GENERAL: Lexii is a pleasant, obese female, in no acute distress.  HEART: Regular rate and rhythm, no murmurs.  LUNGS: Clear to auscultation bilaterally, unlabored.   BREAST: Palpable mass under right areola which is non-tender to palpation, soft, smooth, not fixed. No axillary lymphadenopathy, no skin changes, no nipple drainage.         "

## 2021-10-29 ENCOUNTER — ANCILLARY PROCEDURE (OUTPATIENT)
Dept: MAMMOGRAPHY | Facility: CLINIC | Age: 81
End: 2021-10-29
Attending: FAMILY MEDICINE
Payer: COMMERCIAL

## 2021-10-29 DIAGNOSIS — N60.01 CYST OF RIGHT BREAST: ICD-10-CM

## 2021-10-29 DIAGNOSIS — N63.0 LUMP OR MASS IN BREAST: ICD-10-CM

## 2021-10-29 PROCEDURE — 76642 ULTRASOUND BREAST LIMITED: CPT | Mod: RT

## 2021-10-29 ASSESSMENT — ASTHMA QUESTIONNAIRES: ACT_TOTALSCORE: 19

## 2021-10-29 NOTE — RESULT ENCOUNTER NOTE
Stable cysts, no findings to suggest malignancy. [facility].   Recommend return to routine screening. If area is changing, return for re-evaluation.

## 2022-01-04 ENCOUNTER — NURSE TRIAGE (OUTPATIENT)
Dept: NURSING | Facility: CLINIC | Age: 82
End: 2022-01-04
Payer: COMMERCIAL

## 2022-01-04 ENCOUNTER — E-VISIT (OUTPATIENT)
Dept: FAMILY MEDICINE | Facility: CLINIC | Age: 82
End: 2022-01-04
Payer: COMMERCIAL

## 2022-01-04 DIAGNOSIS — Z20.822 SUSPECTED COVID-19 VIRUS INFECTION: Primary | ICD-10-CM

## 2022-01-04 PROCEDURE — 99421 OL DIG E/M SVC 5-10 MIN: CPT | Performed by: PHYSICIAN ASSISTANT

## 2022-01-04 NOTE — PATIENT INSTRUCTIONS
Lexii,      Based on your responses, you may have coronavirus (COVID-19). This illness can cause fever, cough and trouble breathing. Many people get a mild case and get better on their own. Some people can get very sick.    Will I be tested for COVID-19?  We would like to test you for COVID-19 virus. I have placed orders for this test.     To schedule: go to your [employer] home page and scroll down to the section that says  You have an appointment that needs to be scheduled  and click the large green button that says  Schedule Now  and follow the steps to find the next available openings.    If you are unable to complete these [employer] scheduling steps, please call [phone] to schedule your testing.     Return to work/school/ guidance:  Please let your workplace manager and staffing office know when your isolation ends.       If you receive a positive COVID-19 test result, follow the guidance of the those who are giving you the results. Usually the return to work is 10 days from symptom onset or positive test date, (or in some cases 20 days if you are immunocompromised). If your symptoms started after your positive test, the 10 days should start when your symptoms started.   o If you work at [employer], you must also be cleared by Employee Occupational Health and Safety to return to work.      If you receive a negative COVID-19 test result and did not have a high risk exposure to someone with a known positive COVID-19 test, you can return to work once you're free of fever for 24 hours without fever-reducing medication and your symptoms are improving or resolved.    If you receive a negative COVID-19 test and had a high-risk exposure to someone who has tested positive for COVID-19 then you can return to work 14 days after your last contact with the positive individual. Follow quarantine guidance given by your doctor or public health officials.     Sign up for GetWell Loop:  We know it's scary to hear  that you might have COVID-19. We want to track your symptoms to make sure you're okay over the next 2 weeks. Please look for an email from [employer]--this is a free, online program that we'll use to keep in touch. To sign up, follow the link in the email you will receive. Learn more at [email or web address]    How can I take care of myself?  Over the counter medications may help with your symptoms like congestion, cough, chills, or fever.    There are not many effective prescription treatments for early COVID-19. Hydroxychloroquine, ivermectin, and azithromycin are not effective or recommended for COVID-19.    If your symptoms started in the last 10 days, you may be able to receive a treatment with monoclonal antibodies. This treatment can lower your risk of severe illness and going to the hospital. It is given through an IV or under your skin (subcutaneous) and must be given at an infusion center. You must be 12 or older, weight at least 88 pounds, and have a positive COVID-19 test.     If you would like to sign up to be considered to receive the monoclonal antibody medicine, please complete a participation form through the [facility] here: MNRAP ([email or web address].[facility].[place].us/diseases/[email or web address]/[email or web address].html). You may also call the [facility] COVID-19 Public Hotline at 1-297.600.7925 (open Mon-Fri: 9am-7pm and Sat: 10am-6pm).     Not all people who are eligible will receive the medicine, since supply is limited. You will be contacted in the next 1 to 2 business days only if you are selected. If you do not receive a call, you have not been selected to receive the medicine. If you have any questions about this medication, please contact your primary care provider. For more information, see [email or web address].[facility].[place]/diseases/coronavirus/meds.pdf      Get lots of rest. Drink extra fluids (unless a doctor has told you not to)    Take Tylenol (acetaminophen) or ibuprofen for fever or  pain. If you have liver or kidney problems, ask your family doctor if it's okay to take Tylenol o ibuprofen    Take over the counter medications for your symptoms, as directed by your doctor. You may also talk to your pharmacist.      If you have other health problems (like cancer, heart failure, an organ transplant or severe kidney disease): Call your specialty clinic if you don't feel better in the next 2 days.    Know when to call 911. Emergency warning signs include:  o Trouble breathing or shortness of breath  o Pain or pressure in the chest that doesn't go away  o Feeling confused like you haven't felt before, or not being able to wake up  o Bluish-colored lips or face    Where can I get more information?    [facility] - About COVID-19: [email or web address]     CDC - What to Do If You're Sick:     www.cdc.gov/coronavirus/2019-ncov/about/steps-when-sick.html    CDC - Ending Home Isolation:  https://www.cdc.gov/coronavirus/2019-ncov/your-health/quarantine-isolation.html    CDC - Caring for Someone:  www.cdc.gov/coronavirus/2019-ncov/if-you-are-sick/care-for-someone.html    [facility] clinical trials (COVID-19 research studies): clinicalaffairs.[facility]-clinical-trials    Below are the COVID-19 hotlines at the [place] Department of Health ([facility]). Interpreters are available.  o For health questions: Call 984-909-0302 or 1-476.704.7516 (7 a.m. to 7 p.m.)  o For questions about schools and childcare: Call 596-925-6841 or 1-347.224.5988 (7 a.m. to 7 p.m.)  January 4, 2022  RE:  Lluvia COLE Niru                                                                                                                  [address] [facility] [address]      To whom it may concern:    I evaluated Lluvia Huntalexandre on January 4, 2022. Lluvia Marrufo should be excused from work/school.     They should let their workplace manager and staffing office know when their quarantine  ends.    We can not give an exact date as it depends on the information below. They can calculate this on their own or work with their manager/staffing office to calculate this. (For example if they were exposed on 10/04, they would have to quarantine for 14 full days. That would be through 10/18. They could return on 10/19.)    Quarantine Guidelines:    If patient receives a positive COVID-19 test result, they should follow the guidance of those who are giving the results. Usually the return to work is 10 (or in some cases 20 days from symptom onset.) If they work at [employer], they must be cleared by Employee Occupational Health and Safety to return to work.      If patient receives a negative COVID-19 test result and did not have a high risk exposure to someone with a known positive COVID-19 test, they can return to work once they're free of fever for 24 hours without fever-reducing medication and their symptoms are improving or resolved.    If patient receives a negative COVID-19 test and if they had a high risk exposure to someone who has tested positive for COVID-19 then they can return to work 14 days after their last contact with the positive individual    Note: If there is ongoing exposure to the covid positive person, this quarantine period may be longer than 14 days. (For example, if they are continually exposed to their child, who tested positive and cannot isolate from them, then the quarantine of 7-14 days can't start until their child is no longer contagious. This is typically 10 days from onset to the child's symptoms. So the total duration may be 17-24 days in this case.)     Sincerely,  CECELIA Campoverde

## 2022-01-04 NOTE — TELEPHONE ENCOUNTER
Lexii is calling and states that she feels she needs a covid test.  Symptoms are a cough and runny nose and fatigue.  Denies fever.  Symptoms started on Dec 30th.  Patient is requesting covid testing.   Patient states that she also has asthma.  Denies severe shortness of breath and denies any chest pain.    COVID 19 Nurse Triage Plan/Patient Instructions    Please be aware that novel coronavirus (COVID-19) may be circulating in the community. If you develop symptoms such as fever, cough, or SOB or if you have concerns about the presence of another infection including coronavirus (COVID-19), please contact your health care provider or visit [email or web address].     Disposition/Instructions    Virtual Visit with provider recommended. Reference Visit Selection Guide.    Thank you for taking steps to prevent the spread of this virus.  o Limit your contact with others.  o Wear a simple mask to cover your cough.  o Wash your hands well and often.    Resources    [facility]: About COVID-19: [email or web address]    CDC: What to Do If You're Sick: www.cdc.gov/coronavirus/2019-ncov/about/steps-when-sick.html    CDC: Ending Home Isolation: www.cdc.gov/coronavirus/2019-ncov/hcp/disposition-in-home-patients.html     CDC: Caring for Someone: www.cdc.gov/coronavirus/2019-ncov/if-you-are-sick/care-for-someone.html     [facility]: Interim Guidance for Hospital Discharge to Home: www.health.[facility].mn.us/diseases/coronavirus/hcp/hospdischarge.pdf    [facility] clinical trials (COVID-19 research studies): clinicalaffairs.[facility]/umn-clinical-trials     Below are the COVID-19 hotlines at the [place] Department of Health ([facility]). Interpreters are available.   o For health questions: Call 093-412-9356 or 1-996.865.6555 (7 a.m. to 7 p.m.)  o For questions about schools and childcare: Call 311-362-5737 or 1-351.310.9904 (7 a.m. to 7 p.m.)                       Reason for Disposition    HIGH RISK for severe  COVID complications (e.g., age > 64 years, obesity with BMI > 25, pregnant, chronic lung disease or other chronic medical condition)  (Exception: Already seen by PCP and no new or worsening symptoms.)    Additional Information    Negative: SEVERE difficulty breathing (e.g., struggling for each breath, speaks in single words)    Negative: Difficult to awaken or acting confused (e.g., disoriented, slurred speech)    Negative: Bluish (or gray) lips or face now    Negative: Shock suspected (e.g., cold/pale/clammy skin, too weak to stand, low BP, rapid pulse)    Negative: Sounds like a life-threatening emergency to the triager    Negative: SEVERE or constant chest pain or pressure (Exception: mild central chest pain, present only when coughing)    Negative: MODERATE difficulty breathing (e.g., speaks in phrases, SOB even at rest, pulse 100-120)    Negative: [1] Headache AND [2] stiff neck (can't touch chin to chest)    Negative: MILD difficulty breathing (e.g., minimal/no SOB at rest, SOB with walking, pulse <100)    Negative: Chest pain or pressure    Negative: Patient sounds very sick or weak to the triager    Negative: Fever > 103 F (39.4 C)    Negative: [1] Fever > 101 F (38.3 C) AND [2] age > 60 years    Negative: [1] Fever > 100.0 F (37.8 C) AND [2] bedridden (e.g., nursing home patient, CVA, chronic illness, recovering from surgery)    Protocols used: CORONAVIRUS (COVID-19) DIAGNOSED OR [id number] 8.25.2021  COVID-19 testing at [facility] is by appointment only. You'll need to schedule a time to get tested. If you have symptoms (signs) of COVID, please log in to [employer] to complete an e-visit (virtual visit). This is the first step to getting tested.    If you don't have COVID symptoms and want to get tested, you should also log in to [employer] for an e-visit. This includes people who:    have had close contact with a COVID-positive person    want to be tested before or after travel    have taken part in  high-risk activities    have a school testing mandate, or     were told to get tested by their care team or the health department.     A [facility] e-visit is the fastest way for you to be seen by our care team. Please choose  Next available provider  to complete an e-visit. When you choose this option, the average response time is less than one hour.  After the e-visit, you'll be able to self-schedule your test at one of our testing locations. To learn more about our testing locations or for other details, please visit our COVID-19 Resource Hub.    [facility] is also the fastest way to get your test results. You'll get your results in [facility] within 3 days. If you don't use [employer], you'll get your results in the mail in 7 to 10 days. If your test is positive and you don't view your result in [employer] within 1 business day, you'll get a phone call with your result. A positive result means that you have COVID-19.    If you have an upcoming procedure at [facility], you'll need to be tested for COVID. The test needs to happen 2 to 4 days before your procedure. If you have an upcoming procedure, we will contact you to schedule a COVID test.    If you don't have a [facility] account, please call [phone] to set up a virtual visit. You can also find community testing sites in [place] at [place]/covid19/get-tested/testing-locations. If you live in [place], please visit [email or web address].[place].gov/covid-19/community-testing.htm.

## 2022-01-07 DIAGNOSIS — F41.9 ANXIETY: ICD-10-CM

## 2022-01-07 RX ORDER — DIAZEPAM 5 MG
TABLET ORAL
Qty: 15 TABLET | Refills: 0 | Status: SHIPPED | OUTPATIENT
Start: 2022-01-07 | End: 2022-03-09

## 2022-02-25 NOTE — PROGRESS NOTES
[facility]    Outpatient Physical Therapy Discharge Note  Patient: Lluvia Marrufo  : 1940    Beginning/End Dates of Reporting Period:  21 to 21 5 visits    Referring Provider: Dr. Eddie Diaz    Therapy Diagnosis: SI dysfunction and LBP with radiculopathy     Client Self Report: The patient reports that she has had more pain in the evening and because she was more sedentary after having a cardio version and doing her taxes..Sore by the end of the day with going up and down 20 steps 3 times 4-5/10 pain. then when she sits down to relax pain is gone after 2-3 minutes aproximately.    Objective Measurements:  Objective Measure: spine position  Details: muscular tightness in the sacrum lower on the right and higher on the left and then tightness in the upper thoracic spine, spine straight after manual therapy today.       Goals:  Goal Identifier     Goal Description     Target Date     Date Met      Progress (detail required for progress note):       Goal Identifier     Goal Description     Target Date     Date Met      Progress (detail required for progress note):       Goal Identifier get up after sitting 30-45 minutes with pain only 3/10 pain   Goal Description Comment:: patient will not have increased pain when going from sit to stnad after sitting 30-45 minutes   Target Date 21   Date Met  08/10/21   Progress (detail required for progress note): pain is a 4-5/10 and then it  takes 3 steps to walk it out     Goal Identifier Standing for 1 hour with pain less than 3/10 pain.   Goal Description Standing for 1 hour with pain less than 3/10 pain.   Target Date 10/11/21   Date Met      Progress (detail required for progress note): The patient reports that she can stand for 30-45 minutes to cook without pain as long as she keeps her feet apart goal progressing.     Goal Identifier      Goal Description     Target Date     Date Met      Progress (detail required for progress note):       Goal Identifier     Goal Description     Target Date     Date Met      Progress (detail required for progress note):       Goal Identifier     Goal Description     Target Date     Date Met      Progress (detail required for progress note):       Goal Identifier     Goal Description     Target Date     Date Met      Progress (detail required for progress note):       All goals were progressing.      Plan:  Discharge from therapy.    Discharge:    Reason for Discharge: Patient has failed to schedule further appointments.    Equipment Issued:     Discharge Plan: Patient to continue home program.

## 2022-02-25 NOTE — ADDENDUM NOTE
Encounter addended by: Josephine Heller, PT on: 2/25/2022 12:46 PM   Actions taken: Episode resolved, Pend clinical note, Flowsheet accepted, Clinical Note Signed

## 2022-03-03 ENCOUNTER — APPOINTMENT (OUTPATIENT)
Dept: RADIOLOGY | Facility: HOSPITAL | Age: 82
DRG: 871 | End: 2022-03-03
Attending: EMERGENCY MEDICINE
Payer: COMMERCIAL

## 2022-03-03 ENCOUNTER — HOSPITAL ENCOUNTER (INPATIENT)
Facility: HOSPITAL | Age: 82
LOS: 4 days | Discharge: HOME | DRG: 871 | End: 2022-03-07
Attending: EMERGENCY MEDICINE | Admitting: EMERGENCY MEDICINE
Payer: COMMERCIAL

## 2022-03-03 ENCOUNTER — APPOINTMENT (OUTPATIENT)
Dept: CT IMAGING | Facility: HOSPITAL | Age: 82
DRG: 871 | End: 2022-03-03
Attending: EMERGENCY MEDICINE
Payer: COMMERCIAL

## 2022-03-03 DIAGNOSIS — R78.81 GRAM-POSITIVE BACTEREMIA: ICD-10-CM

## 2022-03-03 DIAGNOSIS — J45.21 MILD INTERMITTENT ASTHMA WITH EXACERBATION: ICD-10-CM

## 2022-03-03 DIAGNOSIS — J96.01 ACUTE RESPIRATORY FAILURE WITH HYPOXIA (H): ICD-10-CM

## 2022-03-03 DIAGNOSIS — E83.42 HYPOMAGNESEMIA: ICD-10-CM

## 2022-03-03 DIAGNOSIS — A41.9 SEPSIS, DUE TO UNSPECIFIED ORGANISM, UNSPECIFIED WHETHER ACUTE ORGAN DYSFUNCTION PRESENT (H): ICD-10-CM

## 2022-03-03 DIAGNOSIS — J45.20 MILD INTERMITTENT ASTHMA WITHOUT COMPLICATION: Primary | Chronic | ICD-10-CM

## 2022-03-03 DIAGNOSIS — I48.19 PERSISTENT ATRIAL FIBRILLATION (H): ICD-10-CM

## 2022-03-03 DIAGNOSIS — E87.6 HYPOKALEMIA: ICD-10-CM

## 2022-03-03 LAB
ALBUMIN SERPL-MCNC: 3.6 G/DL (ref 3.5–5)
ALBUMIN UR-MCNC: 50 MG/DL
ALP SERPL-CCNC: 79 U/L (ref 45–120)
ALT SERPL W P-5'-P-CCNC: 22 U/L (ref 0–45)
ANION GAP SERPL CALCULATED.3IONS-SCNC: 14 MMOL/L (ref 5–18)
APPEARANCE UR: ABNORMAL
AST SERPL W P-5'-P-CCNC: 18 U/L (ref 0–40)
BACTERIA #/AREA URNS HPF: ABNORMAL /HPF
BASOPHILS # BLD AUTO: 0 10E3/UL (ref 0–0.2)
BASOPHILS NFR BLD AUTO: 0 %
BILIRUB DIRECT SERPL-MCNC: 0.4 MG/DL
BILIRUB SERPL-MCNC: 1.1 MG/DL (ref 0–1)
BILIRUB UR QL STRIP: NEGATIVE
BNP SERPL-MCNC: 152 PG/ML (ref 0–151)
BUN SERPL-MCNC: 15 MG/DL (ref 8–28)
C COLI+JEJUNI+LARI FUSA STL QL NAA+PROBE: NOT DETECTED
C DIFF TOX B STL QL: NEGATIVE
CALCIUM SERPL-MCNC: 9.1 MG/DL (ref 8.5–10.5)
CALCIUM, IONIZED MEASURED: 1.08 MMOL/L (ref 1.11–1.3)
CHLORIDE BLD-SCNC: 106 MMOL/L (ref 98–107)
CO2 SERPL-SCNC: 22 MMOL/L (ref 22–31)
COLOR UR AUTO: YELLOW
CREAT SERPL-MCNC: 0.66 MG/DL (ref 0.6–1.1)
EC STX1 GENE STL QL NAA+PROBE: NOT DETECTED
EC STX2 GENE STL QL NAA+PROBE: NOT DETECTED
EOSINOPHIL # BLD AUTO: 0 10E3/UL (ref 0–0.7)
EOSINOPHIL NFR BLD AUTO: 0 %
ERYTHROCYTE [DISTWIDTH] IN BLOOD BY AUTOMATED COUNT: 12.8 % (ref 10–15)
FLUAV RNA SPEC QL NAA+PROBE: NEGATIVE
FLUBV RNA RESP QL NAA+PROBE: NEGATIVE
GFR SERPL CREATININE-BSD FRML MDRD: 89 ML/MIN/1.73M2
GLUCOSE BLD-MCNC: 158 MG/DL (ref 70–125)
GLUCOSE UR STRIP-MCNC: NEGATIVE MG/DL
HCT VFR BLD AUTO: 41.9 % (ref 35–47)
HGB BLD-MCNC: 13.8 G/DL (ref 11.7–15.7)
HGB UR QL STRIP: ABNORMAL
HOLD SPECIMEN: NORMAL
IMM GRANULOCYTES # BLD: 0.1 10E3/UL
IMM GRANULOCYTES NFR BLD: 0 %
ION CA PH 7.4: 1.07 MMOL/L (ref 1.11–1.3)
KETONES UR STRIP-MCNC: ABNORMAL MG/DL
LACTATE SERPL-SCNC: 2 MMOL/L (ref 0.7–2)
LEUKOCYTE ESTERASE UR QL STRIP: ABNORMAL
LYMPHOCYTES # BLD AUTO: 0.5 10E3/UL (ref 0.8–5.3)
LYMPHOCYTES NFR BLD AUTO: 3 %
MAGNESIUM SERPL-MCNC: 0.9 MG/DL (ref 1.8–2.6)
MAGNESIUM SERPL-MCNC: 1.6 MG/DL (ref 1.8–2.6)
MCH RBC QN AUTO: 29.9 PG (ref 26.5–33)
MCHC RBC AUTO-ENTMCNC: 32.9 G/DL (ref 31.5–36.5)
MCV RBC AUTO: 91 FL (ref 78–100)
MONOCYTES # BLD AUTO: 0.2 10E3/UL (ref 0–1.3)
MONOCYTES NFR BLD AUTO: 1 %
MUCOUS THREADS #/AREA URNS LPF: PRESENT /LPF
NEUTROPHILS # BLD AUTO: 17.3 10E3/UL (ref 1.6–8.3)
NEUTROPHILS NFR BLD AUTO: 96 %
NITRATE UR QL: NEGATIVE
NOROV GI+II ORF1-ORF2 JNC STL QL NAA+PR: NOT DETECTED
NRBC # BLD AUTO: 0 10E3/UL
NRBC BLD AUTO-RTO: 0 /100
PH UR STRIP: 6 [PH] (ref 5–7)
PH: 7.4 (ref 7.35–7.45)
PLATELET # BLD AUTO: 247 10E3/UL (ref 150–450)
POTASSIUM BLD-SCNC: 3.1 MMOL/L (ref 3.5–5)
POTASSIUM BLD-SCNC: 3.6 MMOL/L (ref 3.5–5)
PROCALCITONIN SERPL-MCNC: 0.88 NG/ML (ref 0–0.49)
PROT SERPL-MCNC: 7 G/DL (ref 6–8)
RBC # BLD AUTO: 4.61 10E6/UL (ref 3.8–5.2)
RBC URINE: 45 /HPF
RVA NSP5 STL QL NAA+PROBE: NOT DETECTED
SALMONELLA SP RPOD STL QL NAA+PROBE: NOT DETECTED
SARS-COV-2 RNA RESP QL NAA+PROBE: NEGATIVE
SHIGELLA SP+EIEC IPAH STL QL NAA+PROBE: NOT DETECTED
SODIUM SERPL-SCNC: 142 MMOL/L (ref 136–145)
SP GR UR STRIP: >1.05 (ref 1–1.03)
SQUAMOUS EPITHELIAL: 7 /HPF
TROPONIN I SERPL-MCNC: <0.01 NG/ML (ref 0–0.29)
UROBILINOGEN UR STRIP-MCNC: <2 MG/DL
V CHOL+PARA RFBL+TRKH+TNAA STL QL NAA+PR: NOT DETECTED
WBC # BLD AUTO: 18 10E3/UL (ref 4–11)
WBC URINE: >182 /HPF
Y ENTERO RECN STL QL NAA+PROBE: NOT DETECTED

## 2022-03-03 PROCEDURE — 82330 ASSAY OF CALCIUM: CPT | Performed by: INTERNAL MEDICINE

## 2022-03-03 PROCEDURE — 200N000001 HC R&B ICU

## 2022-03-03 PROCEDURE — 250N000013 HC RX MED GY IP 250 OP 250 PS 637: Performed by: EMERGENCY MEDICINE

## 2022-03-03 PROCEDURE — 83735 ASSAY OF MAGNESIUM: CPT | Performed by: EMERGENCY MEDICINE

## 2022-03-03 PROCEDURE — 99223 1ST HOSP IP/OBS HIGH 75: CPT | Performed by: EMERGENCY MEDICINE

## 2022-03-03 PROCEDURE — 96367 TX/PROPH/DG ADDL SEQ IV INF: CPT

## 2022-03-03 PROCEDURE — 93005 ELECTROCARDIOGRAM TRACING: CPT | Performed by: EMERGENCY MEDICINE

## 2022-03-03 PROCEDURE — 96365 THER/PROPH/DIAG IV INF INIT: CPT | Mod: 59

## 2022-03-03 PROCEDURE — 83880 ASSAY OF NATRIURETIC PEPTIDE: CPT | Performed by: EMERGENCY MEDICINE

## 2022-03-03 PROCEDURE — 84484 ASSAY OF TROPONIN QUANT: CPT | Performed by: EMERGENCY MEDICINE

## 2022-03-03 PROCEDURE — 87077 CULTURE AEROBIC IDENTIFY: CPT | Performed by: EMERGENCY MEDICINE

## 2022-03-03 PROCEDURE — 80053 COMPREHEN METABOLIC PANEL: CPT | Performed by: EMERGENCY MEDICINE

## 2022-03-03 PROCEDURE — 84132 ASSAY OF SERUM POTASSIUM: CPT | Performed by: EMERGENCY MEDICINE

## 2022-03-03 PROCEDURE — 74177 CT ABD & PELVIS W/CONTRAST: CPT

## 2022-03-03 PROCEDURE — 36415 COLL VENOUS BLD VENIPUNCTURE: CPT | Performed by: EMERGENCY MEDICINE

## 2022-03-03 PROCEDURE — 85025 COMPLETE CBC W/AUTO DIFF WBC: CPT | Performed by: EMERGENCY MEDICINE

## 2022-03-03 PROCEDURE — 84145 PROCALCITONIN (PCT): CPT | Performed by: EMERGENCY MEDICINE

## 2022-03-03 PROCEDURE — 87086 URINE CULTURE/COLONY COUNT: CPT | Performed by: EMERGENCY MEDICINE

## 2022-03-03 PROCEDURE — 96361 HYDRATE IV INFUSION ADD-ON: CPT

## 2022-03-03 PROCEDURE — 87149 DNA/RNA DIRECT PROBE: CPT | Performed by: EMERGENCY MEDICINE

## 2022-03-03 PROCEDURE — 87636 SARSCOV2 & INF A&B AMP PRB: CPT | Performed by: EMERGENCY MEDICINE

## 2022-03-03 PROCEDURE — 258N000003 HC RX IP 258 OP 636: Performed by: EMERGENCY MEDICINE

## 2022-03-03 PROCEDURE — 87506 IADNA-DNA/RNA PROBE TQ 6-11: CPT | Performed by: EMERGENCY MEDICINE

## 2022-03-03 PROCEDURE — 83605 ASSAY OF LACTIC ACID: CPT | Performed by: EMERGENCY MEDICINE

## 2022-03-03 PROCEDURE — 3E043XZ INTRODUCTION OF VASOPRESSOR INTO CENTRAL VEIN, PERCUTANEOUS APPROACH: ICD-10-PCS | Performed by: EMERGENCY MEDICINE

## 2022-03-03 PROCEDURE — 99285 EMERGENCY DEPT VISIT HI MDM: CPT | Mod: 25

## 2022-03-03 PROCEDURE — 272N000452 HC KIT SHRLOCK 5FR POWER PICC TRIPLE LUMEN

## 2022-03-03 PROCEDURE — 81003 URINALYSIS AUTO W/O SCOPE: CPT | Performed by: EMERGENCY MEDICINE

## 2022-03-03 PROCEDURE — 87493 C DIFF AMPLIFIED PROBE: CPT | Performed by: EMERGENCY MEDICINE

## 2022-03-03 PROCEDURE — 36569 INSJ PICC 5 YR+ W/O IMAGING: CPT

## 2022-03-03 PROCEDURE — 250N000009 HC RX 250: Performed by: EMERGENCY MEDICINE

## 2022-03-03 PROCEDURE — 36415 COLL VENOUS BLD VENIPUNCTURE: CPT | Performed by: INTERNAL MEDICINE

## 2022-03-03 PROCEDURE — 250N000011 HC RX IP 250 OP 636: Performed by: EMERGENCY MEDICINE

## 2022-03-03 PROCEDURE — 99291 CRITICAL CARE FIRST HOUR: CPT | Performed by: INTERNAL MEDICINE

## 2022-03-03 PROCEDURE — 999N000065 XR CHEST PORT 1 VIEW

## 2022-03-03 PROCEDURE — 250N000009 HC RX 250: Performed by: INTERNAL MEDICINE

## 2022-03-03 PROCEDURE — 71045 X-RAY EXAM CHEST 1 VIEW: CPT

## 2022-03-03 PROCEDURE — 82248 BILIRUBIN DIRECT: CPT | Performed by: EMERGENCY MEDICINE

## 2022-03-03 RX ORDER — ALBUTEROL SULFATE 0.83 MG/ML
2.5 SOLUTION RESPIRATORY (INHALATION) EVERY 4 HOURS PRN
Status: DISCONTINUED | OUTPATIENT
Start: 2022-03-03 | End: 2022-03-07 | Stop reason: HOSPADM

## 2022-03-03 RX ORDER — CALCIUM CHLORIDE 100 MG/ML
1 INJECTION INTRAVENOUS; INTRAVENTRICULAR ONCE
Status: COMPLETED | OUTPATIENT
Start: 2022-03-03 | End: 2022-03-03

## 2022-03-03 RX ORDER — IOPAMIDOL 755 MG/ML
100 INJECTION, SOLUTION INTRAVASCULAR ONCE
Status: COMPLETED | OUTPATIENT
Start: 2022-03-03 | End: 2022-03-03

## 2022-03-03 RX ORDER — ATORVASTATIN CALCIUM 10 MG/1
20 TABLET, FILM COATED ORAL DAILY
Status: DISCONTINUED | OUTPATIENT
Start: 2022-03-03 | End: 2022-03-07 | Stop reason: HOSPADM

## 2022-03-03 RX ORDER — MULTIPLE VITAMINS W/ MINERALS TAB 9MG-400MCG
1 TAB ORAL DAILY
Status: DISCONTINUED | OUTPATIENT
Start: 2022-03-04 | End: 2022-03-07 | Stop reason: HOSPADM

## 2022-03-03 RX ORDER — PIPERACILLIN SODIUM, TAZOBACTAM SODIUM 3; .375 G/15ML; G/15ML
3.38 INJECTION, POWDER, LYOPHILIZED, FOR SOLUTION INTRAVENOUS ONCE
Status: COMPLETED | OUTPATIENT
Start: 2022-03-03 | End: 2022-03-03

## 2022-03-03 RX ORDER — ROPIVACAINE IN 0.9% SOD CHL/PF 0.1 %
.03-.125 PLASTIC BAG, INJECTION (ML) EPIDURAL CONTINUOUS
Status: DISCONTINUED | OUTPATIENT
Start: 2022-03-03 | End: 2022-03-04

## 2022-03-03 RX ORDER — MAGNESIUM SULFATE 4 G/50ML
4 INJECTION INTRAVENOUS EVERY 4 HOURS
Status: DISCONTINUED | OUTPATIENT
Start: 2022-03-03 | End: 2022-03-03

## 2022-03-03 RX ORDER — POTASSIUM CHLORIDE 1500 MG/1
20 TABLET, EXTENDED RELEASE ORAL 3 TIMES DAILY
Status: DISCONTINUED | OUTPATIENT
Start: 2022-03-03 | End: 2022-03-07 | Stop reason: HOSPADM

## 2022-03-03 RX ORDER — LANOLIN ALCOHOL/MO/W.PET/CERES
3 CREAM (GRAM) TOPICAL
Status: DISCONTINUED | OUTPATIENT
Start: 2022-03-03 | End: 2022-03-07 | Stop reason: HOSPADM

## 2022-03-03 RX ORDER — PIPERACILLIN SODIUM, TAZOBACTAM SODIUM 3; .375 G/15ML; G/15ML
3.38 INJECTION, POWDER, LYOPHILIZED, FOR SOLUTION INTRAVENOUS EVERY 8 HOURS
Status: DISCONTINUED | OUTPATIENT
Start: 2022-03-03 | End: 2022-03-06 | Stop reason: ALTCHOICE

## 2022-03-03 RX ORDER — LIDOCAINE 40 MG/G
CREAM TOPICAL
Status: ACTIVE | OUTPATIENT
Start: 2022-03-03 | End: 2022-03-06

## 2022-03-03 RX ORDER — ACETAMINOPHEN 325 MG/1
975 TABLET ORAL ONCE
Status: COMPLETED | OUTPATIENT
Start: 2022-03-03 | End: 2022-03-03

## 2022-03-03 RX ORDER — MAGNESIUM SULFATE HEPTAHYDRATE 40 MG/ML
2 INJECTION, SOLUTION INTRAVENOUS ONCE
Status: COMPLETED | OUTPATIENT
Start: 2022-03-03 | End: 2022-03-03

## 2022-03-03 RX ORDER — DIAZEPAM 5 MG
5 TABLET ORAL DAILY PRN
Status: DISCONTINUED | OUTPATIENT
Start: 2022-03-03 | End: 2022-03-04

## 2022-03-03 RX ORDER — PIPERACILLIN SODIUM, TAZOBACTAM SODIUM 3; .375 G/15ML; G/15ML
3.38 INJECTION, POWDER, LYOPHILIZED, FOR SOLUTION INTRAVENOUS EVERY 6 HOURS
Status: DISCONTINUED | OUTPATIENT
Start: 2022-03-03 | End: 2022-03-03 | Stop reason: DRUGHIGH

## 2022-03-03 RX ORDER — POTASSIUM CHLORIDE 1500 MG/1
40 TABLET, EXTENDED RELEASE ORAL ONCE
Status: COMPLETED | OUTPATIENT
Start: 2022-03-03 | End: 2022-03-03

## 2022-03-03 RX ORDER — MAGNESIUM SULFATE HEPTAHYDRATE 40 MG/ML
2 INJECTION, SOLUTION INTRAVENOUS ONCE
Status: COMPLETED | OUTPATIENT
Start: 2022-03-04 | End: 2022-03-04

## 2022-03-03 RX ORDER — SODIUM CHLORIDE 9 MG/ML
INJECTION, SOLUTION INTRAVENOUS CONTINUOUS
Status: DISCONTINUED | OUTPATIENT
Start: 2022-03-03 | End: 2022-03-04

## 2022-03-03 RX ORDER — GABAPENTIN 300 MG/1
300 CAPSULE ORAL DAILY
Status: DISCONTINUED | OUTPATIENT
Start: 2022-03-04 | End: 2022-03-07 | Stop reason: HOSPADM

## 2022-03-03 RX ORDER — LISINOPRIL AND HYDROCHLOROTHIAZIDE 20; 25 MG/1; MG/1
1 TABLET ORAL DAILY
Status: DISCONTINUED | OUTPATIENT
Start: 2022-03-03 | End: 2022-03-03

## 2022-03-03 RX ORDER — CEFAZOLIN SODIUM 1 G/50ML
1750 SOLUTION INTRAVENOUS ONCE
Status: COMPLETED | OUTPATIENT
Start: 2022-03-03 | End: 2022-03-03

## 2022-03-03 RX ORDER — VANCOMYCIN HYDROCHLORIDE 1 G/200ML
1000 INJECTION, SOLUTION INTRAVENOUS EVERY 12 HOURS
Status: DISCONTINUED | OUTPATIENT
Start: 2022-03-04 | End: 2022-03-04

## 2022-03-03 RX ORDER — POTASSIUM CHLORIDE 7.45 MG/ML
10 INJECTION INTRAVENOUS
Status: DISCONTINUED | OUTPATIENT
Start: 2022-03-03 | End: 2022-03-03

## 2022-03-03 RX ORDER — AMLODIPINE BESYLATE 5 MG/1
10 TABLET ORAL DAILY
Status: DISCONTINUED | OUTPATIENT
Start: 2022-03-03 | End: 2022-03-03

## 2022-03-03 RX ORDER — ATENOLOL 25 MG/1
75 TABLET ORAL DAILY
Status: DISCONTINUED | OUTPATIENT
Start: 2022-03-03 | End: 2022-03-03

## 2022-03-03 RX ADMIN — APIXABAN 5 MG: 5 TABLET, FILM COATED ORAL at 20:17

## 2022-03-03 RX ADMIN — CALCIUM CHLORIDE 1 G: 100 INJECTION, SOLUTION INTRAVENOUS at 21:15

## 2022-03-03 RX ADMIN — LIDOCAINE HYDROCHLORIDE 3 ML: 10 INJECTION, SOLUTION EPIDURAL; INFILTRATION; INTRACAUDAL; PERINEURAL at 13:15

## 2022-03-03 RX ADMIN — Medication 0.03 MCG/KG/MIN: at 17:24

## 2022-03-03 RX ADMIN — ACETAMINOPHEN 975 MG: 325 TABLET ORAL at 11:49

## 2022-03-03 RX ADMIN — PIPERACILLIN AND TAZOBACTAM 3.38 G: 3; .375 INJECTION, POWDER, LYOPHILIZED, FOR SOLUTION INTRAVENOUS at 12:08

## 2022-03-03 RX ADMIN — POTASSIUM CHLORIDE 40 MEQ: 1500 TABLET, EXTENDED RELEASE ORAL at 13:54

## 2022-03-03 RX ADMIN — SODIUM CHLORIDE 700 ML: 9 INJECTION, SOLUTION INTRAVENOUS at 15:58

## 2022-03-03 RX ADMIN — PIPERACILLIN AND TAZOBACTAM 3.38 G: 3; .375 INJECTION, POWDER, LYOPHILIZED, FOR SOLUTION INTRAVENOUS at 17:43

## 2022-03-03 RX ADMIN — ATORVASTATIN CALCIUM 20 MG: 10 TABLET, FILM COATED ORAL at 20:17

## 2022-03-03 RX ADMIN — IOPAMIDOL 100 ML: 755 INJECTION, SOLUTION INTRAVENOUS at 18:38

## 2022-03-03 RX ADMIN — MAGNESIUM SULFATE HEPTAHYDRATE 2 G: 40 INJECTION, SOLUTION INTRAVENOUS at 11:54

## 2022-03-03 RX ADMIN — SODIUM CHLORIDE, POTASSIUM CHLORIDE, SODIUM LACTATE AND CALCIUM CHLORIDE 1000 ML: 600; 310; 30; 20 INJECTION, SOLUTION INTRAVENOUS at 13:51

## 2022-03-03 RX ADMIN — SODIUM CHLORIDE: 9 INJECTION, SOLUTION INTRAVENOUS at 16:43

## 2022-03-03 RX ADMIN — SODIUM CHLORIDE 1000 ML: 9 INJECTION, SOLUTION INTRAVENOUS at 11:50

## 2022-03-03 RX ADMIN — VANCOMYCIN HYDROCHLORIDE 1750 MG: 5 INJECTION, POWDER, LYOPHILIZED, FOR SOLUTION INTRAVENOUS at 13:51

## 2022-03-03 ASSESSMENT — ENCOUNTER SYMPTOMS
LIGHT-HEADEDNESS: 0
HEMATURIA: 0
SHORTNESS OF BREATH: 1
FEVER: 0
SORE THROAT: 0
ABDOMINAL PAIN: 0
CHILLS: 1
DIARRHEA: 1
DYSURIA: 0
CONFUSION: 0
NAUSEA: 1
VOMITING: 1
DIZZINESS: 0
JOINT SWELLING: 0

## 2022-03-03 ASSESSMENT — ACTIVITIES OF DAILY LIVING (ADL)
ADLS_ACUITY_SCORE: 3
DEPENDENT_IADLS:: INDEPENDENT
ADLS_ACUITY_SCORE: 3

## 2022-03-03 NOTE — ED NOTES
Fluid bolus completed, Dr. Snider notified of BPs. He will call ICU and put in order for medication.

## 2022-03-03 NOTE — PHARMACY-VANCOMYCIN DOSING SERVICE
"Pharmacy Vancomycin Initial Note  Date of Service March 3, 2022  Patient's  1940  81 year old, female    Indication: Sepsis    Current estimated CrCl = Estimated Creatinine Clearance: 73.9 mL/min (based on SCr of 0.66 mg/dL).    Creatinine for last 3 days  3/3/2022: 10:16 AM Creatinine 0.66 mg/dL    Recent Vancomycin Level(s) for last 3 days  No results found for requested labs within last 72 hours.      Vancomycin IV Administrations (past 72 hours)                   vancomycin (VANCOCIN) 1,750 mg in sodium chloride 0.9 % 500 mL intermittent infusion (mg) 1,750 mg Given 22 1351                Nephrotoxins and other renal medications (From now, onward)    Start     Dose/Rate Route Frequency Ordered Stop    22 0200  vancomycin (VANCOCIN) 1000 mg in dextrose 5% 200 mL PREMIX         1,000 mg  200 mL/hr over 1 Hours Intravenous EVERY 12 HOURS 22 1620      22 1800  piperacillin-tazobactam (ZOSYN) 3.375 g vial to attach to  mL bag        Note to Pharmacy: For [facility], [facility] and [facility]: For Zosyn-naive patients, use the \"Zosyn initial dose + extended infusion\" order panel.    3.375 g  over 240 Minutes Intravenous EVERY 8 HOURS 22 1600            Contrast Orders - past 72 hours (72h ago, onward)            None          [employer] Prediction of Planned Initial Vancomycin Regimen  Loading dose: 1750 mg at 14:00 2022.  Regimen: 1000 mg IV every 12 hours.  Start time: 02:00 on 2022  Exposure target: AUC24 (range)400-600 mg/L.hr   AUC24,ss: 514 mg/L.hr  Probability of AUC24 > 400: 76 %  Ctrough,ss: 16.8 mg/L  Probability of Ctrough,ss > 20: 34 %  Probability of nephrotoxicity (Lodise ИРИНА ): 12 %        Plan:  1. Start vancomycin  1000 mg IV q12h.   2. Vancomycin monitoring method: AUC  3. Vancomycin therapeutic monitoring goal: 400-600 mg*h/L  4. Pharmacy will check vancomycin levels as appropriate.  5. Serum creatinine levels will be ordered daily for the first week of therapy " and at least twice weekly for subsequent weeks.      Rebecca Gay, [facility]

## 2022-03-03 NOTE — ED PROVIDER NOTES
Emergency Department Encounter     Evaluation Date & Time:   3/3/2022  9:44 AM    CHIEF COMPLAINT:  Shortness of Breath      Triage Note:No notes on file           ED COURSE & MEDICAL DECISION MAKING:     ED Course as of 03/03/22 1337   Thu Mar 03, 2022   1105 Pt spiked temp to 100.1 here and WBC returned at 18.  Nursing reports foul smelling diarrhea. Will get cultures, lactate, cdiff/stool studies.  Will order initial dose of broad spectrum antibx as well.   1109 Magnsium 0.9, will replace initially with IV dose.     1112 Trop negative.   1131 Flu and covid negative.   1220 Updated her and  on results, plan, need for hospitalization.  Pt willing to transfer.   1220 Lactate 2.0.     1242 BP lower on recheck, MAP 69.  Will order additional IVF and PICC.  Pt agreeable to PICC.  Pt is also now able to be admitted to [facility] as we have beds.     Pt here with new onset chills, dyspnea and episodes of vomiting/diarrhea starting this morning around 0700.  Pt denies any known sick contacts or other recent illness.  She is not hypoxic here, but EMS reports initial O2 sats 70s, but never hypoxic since and off O2.  Pt with no cp or abdominal pain.  Will get labs, CXR, covid testing and reassess.      9:56 AM I met with the patient, obtained history, performed an initial exam, and discussed options and plan for diagnostics and treatment here in the ED. PPE worn including N95 mask, surgical gloves, face shield.  12:02 PM I rechecked and updated the patient on results. Discussed plans for admission.  1:13 PM I spoke with the hospitalist Dr. Snider who accepts the patient for admission.  Pt aware of results, plan and agreeable.    At the conclusion of the encounter I discussed the results of all the tests and the disposition. The questions were answered. The patient or family acknowledged understanding and was agreeable with the care plan.      MEDICATIONS GIVEN IN THE EMERGENCY DEPARTMENT:  Medications   vancomycin  (VANCOCIN) 1,750 mg in sodium chloride 0.9 % 500 mL intermittent infusion (has no administration in time range)   potassium chloride ER (KLOR-CON M) CR tablet 40 mEq (has no administration in time range)   lidocaine 1 % 0.1-5 mL (has no administration in time range)   lidocaine (LMX4) cream (has no administration in time range)   sodium chloride (PF) 0.9% PF flush 10-40 mL (has no administration in time range)   lactated ringers BOLUS 1,000 mL (has no administration in time range)   sodium chloride (PF) 0.9% PF flush 10-20 mL (has no administration in time range)   sodium chloride (PF) 0.9% PF flush 10-40 mL (has no administration in time range)   sodium chloride (PF) 0.9% PF flush 10-40 mL (has no administration in time range)   0.9% sodium chloride BOLUS (0 mLs Intravenous Stopped 3/3/22 1305)   magnesium sulfate 2 g in water intermittent infusion (0 g Intravenous Stopped 3/3/22 1254)   acetaminophen (TYLENOL) tablet 975 mg (975 mg Oral Given 3/3/22 1149)   piperacillin-tazobactam (ZOSYN) 3.375 g vial to attach to  mL bag (0 g Intravenous Stopped 3/3/22 1238)       NEW PRESCRIPTIONS STARTED AT TODAY'S ED VISIT:  New Prescriptions    No medications on file       HPI   History obtained from: Patient      Lluvia Marrufo is a 81 year old female with a pertinent history of asthma, persistent atrial fibrillation, hypertension, hyperlipidemia, prediabetes,  who presents to this ED by EMS for evaluation of shortness of breath. Patient reports she developed chills and rigors at 7:00 AM this morning. She then developed shortness of breath, nausea, and had an episode of emesis. She also reports developing diarrhea this morning. She has not taken any medications for her symptoms. Patient is fully vaccinated against COVID-19 with a booster. She was diagnosed with atrial fibrillation 6 months ago and has been on Eliquis as directed.    EMS notes they found the patient's O2 saturations to be in the 70s on arrival and  was placed on O2. On arrival to the ED, patient was taken off O2 and her saturations were in the mid 90s.    Denies chest pain, cough, headache, abdominal pain, fever, leg pain or swelling, lightheadedness, dizziness, black or bloody stools. No known sick contacts. No reported history of MI, CHF, DVT, PE.     REVIEW OF SYSTEMS:  Review of Systems   Constitutional: Positive for chills. Negative for fever.        Positive for rigors.   HENT: Negative for sore throat.    Eyes: Negative for visual disturbance.   Respiratory: Positive for shortness of breath.    Cardiovascular: Negative for chest pain and leg swelling.   Gastrointestinal: Positive for diarrhea, nausea and vomiting (x1). Negative for abdominal pain.   Endocrine: Negative for polyuria.   Genitourinary: Negative for dysuria and hematuria.        - urinary changes     Musculoskeletal: Negative for joint swelling.        Denies leg pain.   Skin: Negative for rash.   Neurological: Negative for dizziness and light-headedness.   Psychiatric/Behavioral: Negative for confusion.   All other systems reviewed and are negative.          Medical History     Past Medical History:   Diagnosis Date     Arthritis      Asthma      Atrial fibrillation (H)      Bronchitis      Earache      Fracture, foot      History of CVA (cerebrovascular accident)      Hyperlipidemia      Hypertension      UTI (urinary tract infection)        Past Surgical History:   Procedure Laterality Date     APPENDECTOMY       EYE SURGERY      Growth on lateral left eyelid removed.     HYSTERECTOMY      age 57     JOINT REPLACEMENT Left     knee     OOPHORECTOMY       OR HALLUX RIGIDUS W/CHEILECTOMY 1ST MP JT W/O IMPLT      Description: Hallux Rigidus Correction W/ Cheilectomy 1st MTP Joint;  Proc Date: 12/03/2010;     TONSILLECTOMY       TOTAL KNEE ARTHROPLASTY      right     TOTAL SHOULDER ARTHROPLASTY      left     ZZC RECONSTR TOTAL SHOULDER IMPLANT Right 10/27/2015    Procedure: RIGHT SHOULDER  TOTAL ARTHROPLASTY;  Surgeon: Eddie Payne MD;  Location: [facility];  Service: Orthopedics     [facility] TOTAL HIP ARTHROPLASTY Left 10/3/2017    Procedure:  LEFT TOTAL HIP ARTHROPLASTY;  Surgeon: Mahin Monsalve MD;  Location: [facility];  Service: Orthopedics       Family History   Problem Relation Age of Onset     Hypertension Mother      Alcoholism Father      Cirrhosis Father      Anesthesia Reaction No family hx of        Social History     Tobacco Use     Smoking status: Never Smoker     Smokeless tobacco: Never Used   Substance Use Topics     Alcohol use: Yes     Comment: Alcoholic Drinks/day: 1 drink per day     Drug use: No       albuterol (PROVENTIL) 2.5 mg /3 mL (0.083 %) nebulizer solution  amLODIPine (NORVASC) 10 MG tablet  apixaban ANTICOAGULANT (ELIQUIS) 5 mg Tab tablet  atenolol (TENORMIN) 50 MG tablet  atorvastatin (LIPITOR) 20 MG tablet  diazepam (VALIUM) 5 MG tablet  diphenhydrAMINE (BENADRYL) 25 mg tablet  gabapentin (NEURONTIN) 300 MG capsule  glucosamine-chondroitin 500-400 mg cap  KLOR-CON 20 MEQ CR tablet  lisinopril-hydrochlorothiazide (ZESTORETIC) 20-25 MG tablet  multivitamin with minerals (THERA-M) 9 mg iron-400 mcg Tab tablet        Physical Exam     Vitals:  BP 95/53   Pulse 100   Temp 100.1  F (37.8  C)   Resp 21   Wt 90.7 kg (200 lb)   LMP  (LMP Unknown)   SpO2 95%   BMI 35.43 kg/m      PHYSICAL EXAM:   Physical Exam  Vitals and nursing note reviewed.   Constitutional:       General: She is not in acute distress.     Appearance: Normal appearance.   HENT:      Head: Normocephalic and atraumatic.      Nose: Nose normal.      Mouth/Throat:      Mouth: Mucous membranes are moist.   Eyes:      Pupils: Pupils are equal, round, and reactive to light.   Neck:      Vascular: No JVD.   Cardiovascular:      Rate and Rhythm: Tachycardia present. Rhythm irregularly irregular.      Pulses: Normal pulses.           Radial pulses are 2+ on the right side and 2+ on the left  side.        Dorsalis pedis pulses are 2+ on the right side and 2+ on the left side.   Pulmonary:      Effort: Pulmonary effort is normal. No respiratory distress.      Breath sounds: Normal breath sounds.   Abdominal:      Palpations: Abdomen is soft.      Tenderness: There is no abdominal tenderness.   Musculoskeletal:      Cervical back: Full passive range of motion without pain and neck supple.      Comments: No calf tenderness or swelling b/l   Skin:     General: Skin is warm.      Findings: No rash.   Neurological:      General: No focal deficit present.      Mental Status: She is alert. Mental status is at baseline.      Comments: Fluent speech, no acute lateralizing deficits   Psychiatric:         Mood and Affect: Mood normal.         Behavior: Behavior normal.         Results     LAB:  All pertinent labs reviewed and interpreted  Labs Ordered and Resulted from Time of ED Arrival to Time of ED Departure   BASIC METABOLIC PANEL - Abnormal       Result Value    Sodium 142      Potassium 3.1 (*)     Chloride 106      Carbon Dioxide (CO2) 22      Anion Gap 14      Urea Nitrogen 15      Creatinine 0.66      Calcium 9.1      Glucose 158 (*)     GFR Estimate 89     B-TYPE NATRIURETIC PEPTIDE ([facility] ONLY) - Abnormal     (*)    MAGNESIUM - Abnormal    Magnesium 0.9 (*)    HEPATIC FUNCTION PANEL - Abnormal    Bilirubin Total 1.1 (*)     Bilirubin Direct 0.4      Protein Total 7.0      Albumin 3.6      Alkaline Phosphatase 79      AST 18      ALT 22     CBC WITH PLATELETS AND DIFFERENTIAL - Abnormal    WBC Count 18.0 (*)     RBC Count 4.61      Hemoglobin 13.8      Hematocrit 41.9      MCV 91      MCH 29.9      MCHC 32.9      RDW 12.8      Platelet Count 247      % Neutrophils 96      % Lymphocytes 3      % Monocytes 1      % Eosinophils 0      % Basophils 0      % Immature Granulocytes 0      NRBCs per 100 WBC 0      Absolute Neutrophils 17.3 (*)     Absolute Lymphocytes 0.5 (*)     Absolute Monocytes 0.2       Absolute Eosinophils 0.0      Absolute Basophils 0.0      Absolute Immature Granulocytes 0.1      Absolute NRBCs 0.0     PROCALCITONIN - Abnormal    Procalcitonin 0.88 (*)    TROPONIN I - Normal    Troponin I <0.01     INFLUENZA A/B & SARS-COV2 PCR MULTIPLEX - Normal    Influenza A PCR Negative      Influenza B PCR Negative      SARS CoV2 PCR Negative     LACTIC ACID WHOLE BLOOD - Normal    Lactic Acid 2.0     ROUTINE UA WITH MICROSCOPIC REFLEX TO CULTURE   BLOOD CULTURE   BLOOD CULTURE   ENTERIC BACTERIA AND VIRUS PANEL BY KEN STOOL   CLOSTRIDIUM DIFFICILE TOXIN B       RADIOLOGY:  XR Chest Port 1 View   Final Result   IMPRESSION: Patchy bibasilar atelectasis. No effusions or pneumothorax. Heart size at the upper limits of normal although accentuated by technique. Bilateral shoulder arthroplasties. No acute osseous findings.      XR Chest Port 1 View    (Results Pending)                ECG:  Afib, rate 117, no acute ischemia      I have independently reviewed and interpreted the EKG(s) documented above     PROCEDURES:  Procedures:  none      FINAL IMPRESSION:    ICD-10-CM    1. Sepsis, due to unspecified organism, unspecified whether acute organ dysfunction present (H)  A41.9    2. Hypomagnesemia  E83.42    3. Hypokalemia  E87.6      CRITICAL CARE  35 minutes of critical care time spent managing sepsis.  Time spent interpreting labs, imaging, discussions with pt and documentation.  Independent of any procedures performed.      I, Jesu Damon, am serving as a scribe to document services personally performed by Dr. Mahin Troncoso, based on my observations and the provider's statements to me. I, Mahin Troncoso, DO attest that Jesu Damon is acting in a scribe capacity, has observed my performance of the services and has documented them in accordance with my direction.      Mahin Troncoso DO  Emergency Medicine  [facility] EMERGENCY DEPARTMENT  3/3/2022  9:55 AM         Mahin Troncoso MD  03/03/22 1419

## 2022-03-03 NOTE — PROCEDURES
"PICC Line Insertion Procedure Note  Pt. Name: Lluvia Marrufo  MRN:        [id number]    Procedure: Insertion of a  triple Lumen  5 fr  Bard SOLO (valved) Power PICC, Lot number [id number]    Indications: access/ sepsis protocol    Contraindications : none    Procedure Details   Patient identified with 2 identifiers and \"Time Out\" conducted.  .     Central line insertion bundle followed: hand hygeine performed prior to procedure, site cleansed with cholraprep, hat, mask, sterile gloves,sterile gown worn, patient draped with maximum barrier head to toe drape, sterile field maintained.    The vein was assessed and found to be compressible and of adequate size. 3 ml 1% Lidocaine administered sq to the insertion site. A 5 Fr PICC was inserted into the BASILIC vein of the right arm with ultrasound guidance. 1 attempt(s) required to access vein.   Catheter threaded without difficulty. Good blood return noted.    Modified Seldinger Technique used for insertion.    The 8 sharps that are included in the PICC insertion kit were accounted for and disposed of in the sharps container prior to breakdown of the sterile field.    Catheter secured with Statlock, biopatch and Tegaderm dressing applied.    Findings:  Total catheter length  43 cm, with 0 cm exposed. Mid upper arm circumference is 41.5 cm. Catheter was flushed with 30 cc NS. Patient  tolerated procedure well.    Tip placement verified by xray. Xray read by Dr. Clements . Tip placement \"in good position at cavoatrial junction\".    CLABSI prevention brochure left at bedside.    Patient's primary RN notified PICC is ready for use.    Comments:        Maryam Carmichael RN  [facility]        "

## 2022-03-03 NOTE — ED NOTES
AIDET performed.  Care assumed.  Met patient for the first time- currently on 2L via NC and pure wick in place.  Patient reports slight discomfort- rates 2/10.  Patient reported she felt very chilled and weak this AM so called EMS.  Lives at home with dog and .  Baseline independent and able to get around on her own.  Updated on plan of care- likely admit and explained medications to be given.  She verbalized understanding of this.  Dr. Troncoso at bedside speaking with patient regarding admission and possible transfer.  Call light within reach, will continue plan of care.

## 2022-03-03 NOTE — ED NOTES
Bed: JNED-06  Expected date: 3/3/22  Expected time:   Means of arrival: Ambulance  Comments:  WBL  81 F  SOB, Afib RVR

## 2022-03-03 NOTE — CONSULTS
Care Management Initial Consult    General Information  Assessment completed with: Patient, Lexii  Type of CM/SW Visit: Initial Assessment    Primary Care Provider verified and updated as needed: Yes   Readmission within the last 30 days: no previous admission in last 30 days      Reason for Consult: discharge planning  Advance Care Planning: Advance Care Planning Reviewed: other (see comments) (has at home)          Communication Assessment  Patient's communication style: spoken language (English or Bilingual)    Hearing Difficulty or Deaf: no   Wear Glasses or Blind: no    Cognitive  Cognitive/Neuro/Behavioral: .WDL except (weakness, dizziness/light-headed this AM)  Level of Consciousness: alert  Arousal Level: opens eyes spontaneously  Orientation: oriented x 4     Best Language: 0 - No aphasia  Speech: clear, spontaneous, logical    Living Environment:   People in home: spouse  Hossein  Current living Arrangements: house      Able to return to prior arrangements: yes       Family/Social Support:  Care provided by: self, spouse/significant other  Provides care for: no one  Marital Status:     Hossein       Description of Support System: Supportive, Involved    Support Assessment: Adequate family and caregiver support, Adequate social supports, Patient communicates needs well met    Current Resources:   Patient receiving home care services: No     Community Resources: None  Equipment currently used at home: grab bar, tub/shower  Supplies currently used at home: None    Employment/Financial:  Employment Status: employed full-time        Financial Concerns:             Lifestyle & Psychosocial Needs:  Social Determinants of Health     Tobacco Use: Low Risk      Smoking Tobacco Use: Never Smoker     Smokeless Tobacco Use: Never Used   Alcohol Use: Not on file   Financial Resource Strain: Not on file   Food Insecurity: Not on file   Transportation Needs: Not on file   Physical Activity: Not on file   Stress: Not  on file   Social Connections: Not on file   Intimate Partner Violence: Not on file   Depression: Not at risk     PHQ-2 Score: 0   Housing Stability: Not on file       Functional Status:  Prior to admission patient needed assistance:   Dependent ADLs:: Independent, Ambulation-no assistive device  Dependent IADLs:: Independent  Assesssment of Functional Status: At functional baseline    Mental Health Status:          Chemical Dependency Status:                Values/Beliefs:  Spiritual, Cultural Beliefs, [employer] Practices, Values that affect care:                 Additional Information:    LIBERTY assessed.  Pt lives in home with  Hossein.  Pt is independent with ADLs, no services.  Pt has HCD at home.  Pt will have transport at time of discharge.      Candace Geiger RN

## 2022-03-03 NOTE — H&P
ADMISSION HISTORY & PHYSICAL      Eddie Diaz, [phone]  ASSESSMENT AND PLAN:  81 year old female presenting with sepsis at this point undetermined source:    1.  Severe sepsis secondary to UTI: Had 1 blood pressure down to 84/54 but improving with IV fluids.  White count elevated at 18.  Lactic acid normal at 2.0.  UA pending.  Chest x-ray shows patchy bibasilar atelectasis but she denies any cough or respiratory symptoms although has some slight hypoxia.  Procalcitonin is 0.8.  Covid negative.  PICC line placed.  Started on Zosyn and vancomycin in the ED.  Also has diarrhea and C. difficile and stool cultures pending.  Consider abdominal CT when blood pressure stabilizes.  Blood culture also pending.    Addendum:           A. Blood pressure still low after 30 cc/kg of IV fluids.  Will start pressors and admit to ICU, discussed with intensivist.  UA is back and appears infected.  We will continue Zosyn and Vanco for now.             B. abdominal CT returned showing masslike wall thickening in the sigmoid.  Once stable would need colonoscopy to evaluate for underlying neoplasm.        2.  Acute hypoxic respiratory failure: O2 sats dipped to 88% and now on 2 L of O2 with good saturations.  Has a history of asthma.  Does have some nonspecific infiltrates on chest x-ray does not have pneumonia symptoms.  BNP is 152 and has required fluid resuscitation, we will continue to monitor, may need diuresis in the future.       3.  Hypertension: We will hold home blood pressure medications given sepsis.  Home blood pressure meds are amlodipine, atenolol and Zestoretic.    4.  Permanent atrial fibrillation: Holding beta-blocker for now to avoid further hypotension, resume when able.  Continue home apixaban.    5.  History of acute CVA    6.  History of asthma: Continue home inhaler    7.  Hypokalemia/hypomagnesemia: Likely secondary to diuretic which is being held, electrolytes are being replaced.  Magnesium was quite  "low at 0.9.  Potassium was 3.1.    8.  Chart history of prediabetes: Blood glucose 158, will check A1c and initiate hyperglycemic protocol.    9.  Diarrhea: C. difficile negative, stool cultures pending.  Will check abdominal CT.    Barriers to discharge: Sepsis      CHIEF COMPLAINT:  Dyspnea, fevers/chills    HISTORY OF PRESENTING ILLNESS:  Lluvia Marrufo is a 81 year old female who felt her usual self until this morning when she felt subjectively febrile alternating with chills, symptoms started around 7 AM this morning.  She states that she has chronic dyspnea from her asthma but felt more short of breath this morning.  She also had some nausea and an episode of emesis.  She reports 2 episodes of watery stool today.  She denies any abdominal pain and currently she states she feels \"much better \"but is still hypotensive in the ED.  No history of similar symptoms.  Her  is present.    PMH/PSH:  Patient Active Problem List   Diagnosis     Obesity     Hyperlipidemia     Prediabetes     Lower Back Pain     Headache     Hypertension goal BP (blood pressure) < 140/90     Dermatitis     Joint Pain, Localized In The Shoulder     Mild intermittent asthma without complication     Spinal stenosis of lumbar region     Primary osteoarthritis of left hip     Hypomagnesemia     Hypokalemia     Expressive aphasia     Acute CVA (cerebrovascular accident) (H)     Obesity (BMI 35.0-39.9) with comorbidity (H)     Dyspnea on exertion     Precordial pain     Persistent atrial fibrillation (H)     Sepsis, due to unspecified organism, unspecified whether acute organ dysfunction present (H)       ALLERGIES:  No Known Allergies    MEDICATIONS:  Reviewed.  Current Facility-Administered Medications   Medication     lidocaine (LMX4) cream     lidocaine 1 % 0.1-5 mL     sodium chloride (PF) 0.9% PF flush 10-20 mL     sodium chloride (PF) 0.9% PF flush 10-40 mL     sodium chloride (PF) 0.9% PF flush 10-40 mL     sodium chloride (PF) " 0.9% PF flush 10-40 mL     vancomycin (VANCOCIN) 1,750 mg in sodium chloride 0.9 % 500 mL intermittent infusion     Current Outpatient Medications   Medication     albuterol (PROVENTIL) 2.5 mg /3 mL (0.083 %) nebulizer solution     amLODIPine (NORVASC) 10 MG tablet     apixaban ANTICOAGULANT (ELIQUIS) 5 mg Tab tablet     atenolol (TENORMIN) 50 MG tablet     atorvastatin (LIPITOR) 20 MG tablet     diazepam (VALIUM) 5 MG tablet     diphenhydrAMINE (BENADRYL) 25 mg tablet     gabapentin (NEURONTIN) 300 MG capsule     glucosamine-chondroitin 500-400 mg cap     KLOR-CON 20 MEQ CR tablet     lisinopril-hydrochlorothiazide (ZESTORETIC) 20-25 MG tablet     multivitamin with minerals (THERA-M) 9 mg iron-400 mcg Tab tablet       SOCIAL HISTORY:  Social History     Socioeconomic History     Marital status:      Spouse name: Not on file     Number of children: Not on file     Years of education: Not on file     Highest education level: Not on file   Occupational History     Not on file   Tobacco Use     Smoking status: Never Smoker     Smokeless tobacco: Never Used   Substance and Sexual Activity     Alcohol use: Yes     Comment: Alcoholic Drinks/day: 1 drink per day     Drug use: No     Sexual activity: Not on file   Other Topics Concern     Not on file   Social History Narrative    [place]  Son-in-law is a .     Social Determinants of Health     Financial Resource Strain: Not on file   Food Insecurity: Not on file   Transportation Needs: Not on file   Physical Activity: Not on file   Stress: Not on file   Social Connections: Not on file   Intimate Partner Violence: Not on file   Housing Stability: Not on file       FAMILY HISTORY:  Family History   Problem Relation Age of Onset     Hypertension Mother      Alcoholism Father      Cirrhosis Father      Anesthesia Reaction No family hx of        ROS:  12 point ROS was performed and found to be negative except for the pertinent  positives mentioned in the HPI.      PHYSICAL EXAM:  BP 91/58   Pulse 95   Temp 100.1  F (37.8  C)   Resp 20   Wt 90.7 kg (200 lb)   LMP  (LMP Unknown)   SpO2 94%   BMI 35.43 kg/m    No intake/output data recorded.  I/O this shift:  In: 1150 [IV Piggyback:1150]  Out: -   CONSTITUTIONAL: No apparent distress  HEENT:       Sclera- anicteric      Mucous membrane-dry  LUNGS: Clear to auscultation bilaterally  CARDIOVASCULAR: S1S2 regular. No murmurs, rubs or gallops,no pedal edema  GI: Soft. Non-tender, non-distended. No organomegaly. No guarding or rigidity. Bowel sounds- active  NEURO: Cranial nerves II-XII grossly intact. No focal neurological deficit. No involuntary movements  INTGM: No pallor,  no cyanosis or clubbing  LYMPH: no adenopathy  MSK: no joint swelling or tenderness  PSYCH: alert and oriented x 3, no agitation      DIAGNOSTIC DATA:  Recent Results (from the past 24 hour(s))   Basic metabolic panel    Collection Time: 03/03/22 10:16 AM   Result Value Ref Range    Sodium 142 136 - 145 mmol/L    Potassium 3.1 (L) 3.5 - 5.0 mmol/L    Chloride 106 98 - 107 mmol/L    Carbon Dioxide (CO2) 22 22 - 31 mmol/L    Anion Gap 14 5 - 18 mmol/L    Urea Nitrogen 15 8 - 28 mg/dL    Creatinine 0.66 0.60 - 1.10 mg/dL    Calcium 9.1 8.5 - 10.5 mg/dL    Glucose 158 (H) 70 - 125 mg/dL    GFR Estimate 89 >60 mL/min/1.73m2   Troponin I    Collection Time: 03/03/22 10:16 AM   Result Value Ref Range    Troponin I <0.01 0.00 - 0.29 ng/mL   B-Type Natriuretic Peptide ([facility] Only)    Collection Time: 03/03/22 10:16 AM   Result Value Ref Range     (H) 0 - 151 pg/mL   Magnesium    Collection Time: 03/03/22 10:16 AM   Result Value Ref Range    Magnesium 0.9 (LL) 1.8 - 2.6 mg/dL   Hepatic function panel    Collection Time: 03/03/22 10:16 AM   Result Value Ref Range    Bilirubin Total 1.1 (H) 0.0 - 1.0 mg/dL    Bilirubin Direct 0.4 <=0.5 mg/dL    Protein Total 7.0 6.0 - 8.0 g/dL    Albumin 3.6 3.5 - 5.0 g/dL    Alkaline  Phosphatase 79 45 - 120 U/L    AST 18 0 - 40 U/L    ALT 22 0 - 45 U/L   Extra Blood Culture Bottle    Collection Time: 03/03/22 10:18 AM   Result Value Ref Range    Hold Specimen JIC    Extra Blue Top Tube    Collection Time: 03/03/22 10:18 AM   Result Value Ref Range    Hold Specimen JIC    CBC with platelets and differential    Collection Time: 03/03/22 10:35 AM   Result Value Ref Range    WBC Count 18.0 (H) 4.0 - 11.0 10e3/uL    RBC Count 4.61 3.80 - 5.20 10e6/uL    Hemoglobin 13.8 11.7 - 15.7 g/dL    Hematocrit 41.9 35.0 - 47.0 %    MCV 91 78 - 100 fL    MCH 29.9 26.5 - 33.0 pg    MCHC 32.9 31.5 - 36.5 g/dL    RDW 12.8 10.0 - 15.0 %    Platelet Count 247 150 - 450 10e3/uL    % Neutrophils 96 %    % Lymphocytes 3 %    % Monocytes 1 %    % Eosinophils 0 %    % Basophils 0 %    % Immature Granulocytes 0 %    NRBCs per 100 WBC 0 <1 /100    Absolute Neutrophils 17.3 (H) 1.6 - 8.3 10e3/uL    Absolute Lymphocytes 0.5 (L) 0.8 - 5.3 10e3/uL    Absolute Monocytes 0.2 0.0 - 1.3 10e3/uL    Absolute Eosinophils 0.0 0.0 - 0.7 10e3/uL    Absolute Basophils 0.0 0.0 - 0.2 10e3/uL    Absolute Immature Granulocytes 0.1 <=0.4 10e3/uL    Absolute NRBCs 0.0 10e3/uL   Symptomatic; Yes; 3/3/2022 Influenza A/B & SARS-CoV2 (COVID-19) Virus PCR Multiplex Nasopharyngeal    Collection Time: 03/03/22 10:37 AM    Specimen: Nasopharyngeal; Swab   Result Value Ref Range    Influenza A PCR Negative Negative    Influenza B PCR Negative Negative    SARS CoV2 PCR Negative Negative   Lactic acid whole blood    Collection Time: 03/03/22 11:24 AM   Result Value Ref Range    Lactic Acid 2.0 0.7 - 2.0 mmol/L   Procalcitonin    Collection Time: 03/03/22 11:48 AM   Result Value Ref Range    Procalcitonin 0.88 (H) 0.00 - 0.49 ng/mL     All lab studies reviewed personally    XR Chest Port 1 View    Result Date: 3/3/2022  EXAM: XR CHEST PORT 1 VIEW LOCATION: [facility] DATE/TIME: 3/3/2022 1:32 PM INDICATION: Check PICC placement.  COMPARISON: 3/3/2022 at 1057 hours.     IMPRESSION: Right-sided PICC tip in good position at cavoatrial junction. Heart size magnified in AP projection with normal vascularity. Lungs are clear with no pneumothorax. Bilateral shoulder arthroplasty redemonstrated.    XR Chest Port 1 View    Result Date: 3/3/2022  EXAM: XR CHEST PORT 1 VIEW LOCATION: [facility] DATE/TIME: 3/3/2022 10:52 AM INDICATION: Shortness of breath COMPARISON: 07/29/2018     IMPRESSION: Patchy bibasilar atelectasis. No effusions or pneumothorax. Heart size at the upper limits of normal although accentuated by technique. Bilateral shoulder arthroplasties. No acute osseous findings.    Radiology report reviewed.        Jay Snider MD  [facility]

## 2022-03-03 NOTE — PHARMACY-VANCOMYCIN DOSING SERVICE
Pharmacy Vancomycin Initial Note  Date of Service March 3, 2022  Patient's  1940  81 year old, female    Indication: Sepsis    Current estimated CrCl = Estimated Creatinine Clearance: 73.9 mL/min (based on SCr of 0.66 mg/dL).    Creatinine for last 3 days  3/3/2022: 10:16 AM Creatinine 0.66 mg/dL    Recent Vancomycin Level(s) for last 3 days  No results found for requested labs within last 72 hours.      Vancomycin IV Administrations (past 72 hours)      No vancomycin orders with administrations in past 72 hours.                Nephrotoxins and other renal medications (From now, onward)    Start     Dose/Rate Route Frequency Ordered Stop    22 1200  vancomycin (VANCOCIN) 1,750 mg in sodium chloride 0.9 % 500 mL intermittent infusion         1,750 mg  over 2 Hours Intravenous ONCE 22 1122      22 1130  piperacillin-tazobactam (ZOSYN) 3.375 g vial to attach to  mL bag         3.375 g  over 30 Minutes Intravenous ONCE 22 1113            Contrast Orders - past 72 hours (72h ago, onward)            None              Plan:  1. Start vancomycin  1750 mg IV x1 dose in ED.  2. If additional vancomycin therapy is desired, please re-order vancomycin upon admission.  Rebecca Gay, [facility]

## 2022-03-03 NOTE — ED TRIAGE NOTES
Pt arrived via ems. Reported for short of breath and chills. Oxygen 70's on room air and placed on oxygen, pt increased oxygen and placed back on room air. Reported hx of afib up to 156 high. N/v given zofran.

## 2022-03-03 NOTE — PHARMACY-ADMISSION MEDICATION HISTORY
Pharmacy Note - Admission Medication History    Pertinent Provider Information: only took 50 mg of atenolol this morning and nothing else.      ______________________________________________________________________    Prior To Admission (PTA) med list completed and updated in EMR.       PTA Med List   Medication Sig Last Dose     amLODIPine (NORVASC) 10 MG tablet Take 1 tablet (10 mg) by mouth daily 3/2/2022 at hs     apixaban ANTICOAGULANT (ELIQUIS) 5 mg Tab tablet [APIXABAN ANTICOAGULANT (ELIQUIS) 5 MG TAB TABLET] Take 1 tablet (5 mg total) by mouth 2 (two) times a day. 3/2/2022 at pm     atenolol (TENORMIN) 50 MG tablet TAKE 1 AND 1/2 TABLETS BY MOUTH DAILY 3/3/2022 at am     atorvastatin (LIPITOR) 20 MG tablet Take 1 tablet (20 mg) by mouth daily 3/2/2022 at hs     diazepam (VALIUM) 5 MG tablet TAKE 1 TABLET BY MOUTH DAILY AS NEEDED. Past Week     diphenhydrAMINE (BENADRYL) 25 mg tablet [DIPHENHYDRAMINE (BENADRYL) 25 MG TABLET] Take 25 mg by mouth at bedtime as needed for sleep. Past Week at hs     gabapentin (NEURONTIN) 300 MG capsule TAKE 1 CAPSULE BY MOUTH THREE TIMES A DAY (Patient taking differently: Take 300 mg by mouth daily 1 tab by mouth daily) 3/2/2022 at am     glucosamine-chondroitin 500-400 mg cap [GLUCOSAMINE-CHONDROITIN 500-400 MG CAP] Take 2 capsules by mouth daily. 3/2/2022 at am     KLOR-CON 20 MEQ CR tablet TAKE 1 TABLET BY MOUTH THREE TIMES A DAY (Patient taking differently: Takes one in the am and two at bedtime) 3/2/2022 at hs     lisinopril-hydrochlorothiazide (ZESTORETIC) 20-25 MG tablet TAKE 2 TABLETS BY MOUTH EVERY DAY 3/2/2022 at am     multivitamin with minerals (THERA-M) 9 mg iron-400 mcg Tab tablet Take 1 tablet by mouth daily Chewable 3/2/2022 at am       Information source(s): Patient, Clinic records and [facility]  Method of interview communication: in-person    Summary of Changes to PTA Med List  New: nothing   Discontinued: nothing   Changed: administration  instructions for potassium     Patient was asked about OTC/herbal products specifically.  PTA med list reflects this.    In the past week, patient estimated taking medication this percent of the time:  greater than 90%.    Allergies were reviewed, assessed, and updated with the patient.      Patient does not use any multi-dose medications prior to admission.    The information provided in this note is only as accurate as the sources available at the time of the update(s).    Thank you for the opportunity to participate in the care of this patient.    Smith Valentino [facility]  3/3/2022 3:12 PM

## 2022-03-03 NOTE — ED NOTES
Attempted to titrate supplemental oxygen down.  Patient maintained sats at 92-93% on 1L NC.  Supplemental oxygen removed and patient de-satted to 88%.  Placed back on O2- required 2L NC and sats back up to 94-95%.

## 2022-03-03 NOTE — ED NOTES
Patient remains with soft BP.  LR continues to infuse.  Dr. Snider aware of patient's BP- per provider DO NOT send patient to floor.  Wants patient to receive full fluid bolus and then be repaged to determine further plan of care (could possibly need ICU admission).  Charge RN updated.

## 2022-03-04 ENCOUNTER — APPOINTMENT (OUTPATIENT)
Dept: RADIOLOGY | Facility: HOSPITAL | Age: 82
DRG: 871 | End: 2022-03-04
Attending: INTERNAL MEDICINE
Payer: COMMERCIAL

## 2022-03-04 LAB
ANION GAP SERPL CALCULATED.3IONS-SCNC: 7 MMOL/L (ref 5–18)
ATRIAL RATE - MUSE: 119 BPM
BUN SERPL-MCNC: 11 MG/DL (ref 8–28)
CALCIUM SERPL-MCNC: 8.1 MG/DL (ref 8.5–10.5)
CHLORIDE BLD-SCNC: 107 MMOL/L (ref 98–107)
CO2 SERPL-SCNC: 25 MMOL/L (ref 22–31)
CREAT SERPL-MCNC: 0.6 MG/DL (ref 0.6–1.1)
DIASTOLIC BLOOD PRESSURE - MUSE: 84 MMHG
ERYTHROCYTE [DISTWIDTH] IN BLOOD BY AUTOMATED COUNT: 13 % (ref 10–15)
GFR SERPL CREATININE-BSD FRML MDRD: >90 ML/MIN/1.73M2
GLUCOSE BLD-MCNC: 121 MG/DL (ref 70–125)
HCT VFR BLD AUTO: 33.1 % (ref 35–47)
HGB BLD-MCNC: 10.6 G/DL (ref 11.7–15.7)
INTERPRETATION ECG - MUSE: NORMAL
MAGNESIUM SERPL-MCNC: 1.8 MG/DL (ref 1.8–2.6)
MCH RBC QN AUTO: 29.9 PG (ref 26.5–33)
MCHC RBC AUTO-ENTMCNC: 32 G/DL (ref 31.5–36.5)
MCV RBC AUTO: 94 FL (ref 78–100)
P AXIS - MUSE: NORMAL DEGREES
PLATELET # BLD AUTO: 204 10E3/UL (ref 150–450)
POTASSIUM BLD-SCNC: 3 MMOL/L (ref 3.5–5)
POTASSIUM BLD-SCNC: 3.4 MMOL/L (ref 3.5–5)
POTASSIUM BLD-SCNC: 3.6 MMOL/L (ref 3.5–5)
PR INTERVAL - MUSE: NORMAL MS
QRS DURATION - MUSE: 80 MS
QT - MUSE: 342 MS
QTC - MUSE: 477 MS
R AXIS - MUSE: 97 DEGREES
RBC # BLD AUTO: 3.54 10E6/UL (ref 3.8–5.2)
SODIUM SERPL-SCNC: 139 MMOL/L (ref 136–145)
SYSTOLIC BLOOD PRESSURE - MUSE: 130 MMHG
T AXIS - MUSE: 124 DEGREES
VENTRICULAR RATE- MUSE: 117 BPM
WBC # BLD AUTO: 21.8 10E3/UL (ref 4–11)

## 2022-03-04 PROCEDURE — 250N000009 HC RX 250: Performed by: EMERGENCY MEDICINE

## 2022-03-04 PROCEDURE — 250N000013 HC RX MED GY IP 250 OP 250 PS 637: Performed by: FAMILY MEDICINE

## 2022-03-04 PROCEDURE — 99233 SBSQ HOSP IP/OBS HIGH 50: CPT | Performed by: INTERNAL MEDICINE

## 2022-03-04 PROCEDURE — 84132 ASSAY OF SERUM POTASSIUM: CPT | Performed by: FAMILY MEDICINE

## 2022-03-04 PROCEDURE — 71045 X-RAY EXAM CHEST 1 VIEW: CPT

## 2022-03-04 PROCEDURE — 99233 SBSQ HOSP IP/OBS HIGH 50: CPT | Performed by: FAMILY MEDICINE

## 2022-03-04 PROCEDURE — 250N000013 HC RX MED GY IP 250 OP 250 PS 637: Performed by: INTERNAL MEDICINE

## 2022-03-04 PROCEDURE — 250N000013 HC RX MED GY IP 250 OP 250 PS 637: Performed by: EMERGENCY MEDICINE

## 2022-03-04 PROCEDURE — 250N000011 HC RX IP 250 OP 636: Performed by: FAMILY MEDICINE

## 2022-03-04 PROCEDURE — 85027 COMPLETE CBC AUTOMATED: CPT | Performed by: INTERNAL MEDICINE

## 2022-03-04 PROCEDURE — 80048 BASIC METABOLIC PNL TOTAL CA: CPT | Performed by: EMERGENCY MEDICINE

## 2022-03-04 PROCEDURE — 250N000011 HC RX IP 250 OP 636: Performed by: INTERNAL MEDICINE

## 2022-03-04 PROCEDURE — 250N000011 HC RX IP 250 OP 636: Performed by: EMERGENCY MEDICINE

## 2022-03-04 PROCEDURE — 999N000157 HC STATISTIC RCP TIME EA 10 MIN

## 2022-03-04 PROCEDURE — 83735 ASSAY OF MAGNESIUM: CPT | Performed by: EMERGENCY MEDICINE

## 2022-03-04 PROCEDURE — 120N000001 HC R&B MED SURG/OB

## 2022-03-04 PROCEDURE — 94640 AIRWAY INHALATION TREATMENT: CPT

## 2022-03-04 RX ORDER — FUROSEMIDE 10 MG/ML
20 INJECTION INTRAMUSCULAR; INTRAVENOUS ONCE
Status: COMPLETED | OUTPATIENT
Start: 2022-03-04 | End: 2022-03-04

## 2022-03-04 RX ORDER — POTASSIUM CHLORIDE 7.45 MG/ML
10 INJECTION INTRAVENOUS
Status: DISCONTINUED | OUTPATIENT
Start: 2022-03-04 | End: 2022-03-04

## 2022-03-04 RX ORDER — ONDANSETRON 4 MG/1
4 TABLET, ORALLY DISINTEGRATING ORAL EVERY 6 HOURS PRN
Status: DISCONTINUED | OUTPATIENT
Start: 2022-03-04 | End: 2022-03-07 | Stop reason: HOSPADM

## 2022-03-04 RX ORDER — POTASSIUM CHLORIDE 29.8 MG/ML
20 INJECTION INTRAVENOUS
Status: COMPLETED | OUTPATIENT
Start: 2022-03-04 | End: 2022-03-04

## 2022-03-04 RX ORDER — ACETAMINOPHEN 325 MG/1
650 TABLET ORAL EVERY 6 HOURS PRN
Status: DISCONTINUED | OUTPATIENT
Start: 2022-03-04 | End: 2022-03-07 | Stop reason: HOSPADM

## 2022-03-04 RX ORDER — ONDANSETRON 2 MG/ML
4 INJECTION INTRAMUSCULAR; INTRAVENOUS EVERY 6 HOURS PRN
Status: DISCONTINUED | OUTPATIENT
Start: 2022-03-04 | End: 2022-03-07 | Stop reason: HOSPADM

## 2022-03-04 RX ORDER — DIAZEPAM 5 MG
5 TABLET ORAL DAILY PRN
Status: DISCONTINUED | OUTPATIENT
Start: 2022-03-04 | End: 2022-03-07 | Stop reason: HOSPADM

## 2022-03-04 RX ORDER — MAGNESIUM SULFATE HEPTAHYDRATE 40 MG/ML
2 INJECTION, SOLUTION INTRAVENOUS ONCE
Status: COMPLETED | OUTPATIENT
Start: 2022-03-04 | End: 2022-03-04

## 2022-03-04 RX ORDER — POTASSIUM CHLORIDE 1500 MG/1
40 TABLET, EXTENDED RELEASE ORAL ONCE
Status: COMPLETED | OUTPATIENT
Start: 2022-03-04 | End: 2022-03-04

## 2022-03-04 RX ADMIN — ACETAMINOPHEN 650 MG: 325 TABLET ORAL at 14:19

## 2022-03-04 RX ADMIN — POTASSIUM CHLORIDE 20 MEQ: 29.8 INJECTION, SOLUTION INTRAVENOUS at 17:31

## 2022-03-04 RX ADMIN — POTASSIUM CHLORIDE 20 MEQ: 29.8 INJECTION, SOLUTION INTRAVENOUS at 16:16

## 2022-03-04 RX ADMIN — VANCOMYCIN HYDROCHLORIDE 1000 MG: 1 INJECTION, SOLUTION INTRAVENOUS at 02:12

## 2022-03-04 RX ADMIN — POTASSIUM CHLORIDE 20 MEQ: 29.8 INJECTION, SOLUTION INTRAVENOUS at 15:00

## 2022-03-04 RX ADMIN — MULTIPLE VITAMINS W/ MINERALS TAB 1 TABLET: TAB at 08:43

## 2022-03-04 RX ADMIN — ACETAMINOPHEN 650 MG: 325 TABLET ORAL at 00:40

## 2022-03-04 RX ADMIN — FUROSEMIDE 20 MG: 10 INJECTION, SOLUTION INTRAVENOUS at 11:18

## 2022-03-04 RX ADMIN — PIPERACILLIN AND TAZOBACTAM 3.38 G: 3; .375 INJECTION, POWDER, LYOPHILIZED, FOR SOLUTION INTRAVENOUS at 10:25

## 2022-03-04 RX ADMIN — ATORVASTATIN CALCIUM 20 MG: 10 TABLET, FILM COATED ORAL at 20:24

## 2022-03-04 RX ADMIN — VANCOMYCIN HYDROCHLORIDE 1000 MG: 1 INJECTION, SOLUTION INTRAVENOUS at 14:20

## 2022-03-04 RX ADMIN — METOPROLOL TARTRATE 12.5 MG: 25 TABLET, FILM COATED ORAL at 20:24

## 2022-03-04 RX ADMIN — APIXABAN 5 MG: 5 TABLET, FILM COATED ORAL at 08:43

## 2022-03-04 RX ADMIN — ALBUTEROL SULFATE 2.5 MG: 2.5 SOLUTION RESPIRATORY (INHALATION) at 06:21

## 2022-03-04 RX ADMIN — MAGNESIUM SULFATE HEPTAHYDRATE 2 G: 40 INJECTION, SOLUTION INTRAVENOUS at 00:29

## 2022-03-04 RX ADMIN — MAGNESIUM SULFATE HEPTAHYDRATE 2 G: 40 INJECTION, SOLUTION INTRAVENOUS at 08:40

## 2022-03-04 RX ADMIN — APIXABAN 5 MG: 5 TABLET, FILM COATED ORAL at 20:24

## 2022-03-04 RX ADMIN — ONDANSETRON 4 MG: 2 INJECTION INTRAMUSCULAR; INTRAVENOUS at 00:48

## 2022-03-04 RX ADMIN — MELATONIN TAB 3 MG 3 MG: 3 TAB at 00:31

## 2022-03-04 RX ADMIN — GABAPENTIN 300 MG: 300 CAPSULE ORAL at 08:43

## 2022-03-04 RX ADMIN — PIPERACILLIN AND TAZOBACTAM 3.38 G: 3; .375 INJECTION, POWDER, LYOPHILIZED, FOR SOLUTION INTRAVENOUS at 19:05

## 2022-03-04 RX ADMIN — POTASSIUM CHLORIDE 40 MEQ: 1500 TABLET, EXTENDED RELEASE ORAL at 08:43

## 2022-03-04 RX ADMIN — ONDANSETRON 4 MG: 4 TABLET, ORALLY DISINTEGRATING ORAL at 20:24

## 2022-03-04 RX ADMIN — PIPERACILLIN AND TAZOBACTAM 3.38 G: 3; .375 INJECTION, POWDER, LYOPHILIZED, FOR SOLUTION INTRAVENOUS at 02:17

## 2022-03-04 ASSESSMENT — ACTIVITIES OF DAILY LIVING (ADL)
ADLS_ACUITY_SCORE: 3
ADLS_ACUITY_SCORE: 5
ADLS_ACUITY_SCORE: 3
ADLS_ACUITY_SCORE: 5
ADLS_ACUITY_SCORE: 3
ADLS_ACUITY_SCORE: 5
ADLS_ACUITY_SCORE: 3
ADLS_ACUITY_SCORE: 5
ADLS_ACUITY_SCORE: 3

## 2022-03-04 NOTE — PROGRESS NOTES
Patient's oxygen need increased and is currently on 5 lpm O2 by nasal cannula. sats low 90s. Mild accessory muscle use noted. BS decreased. Aeration increased post treatment. RT monitoring.    Ady Lynn, RT

## 2022-03-04 NOTE — CONSULTS
Critical Care Medicine Consult  3/3/2022     Name: Lluvia Marrufo  : 1940  MRN: [id number]  PCP: Eddie Diaz         ASSESSMENT/PLAN:  81 year old woman with history of CVA, HTN, atrial fibrillation on anticoagulation, admitted on 3/3/2022 w/ acute onset dyspnea, N/V, and diarrhea, transferred to the ICU on 3/3 w/ septic shock d/t a presumed urinary source.    Neuro/Psych:   #. Prior CVA w/o residual effects.    Pulmonary:   #. Mild intermittent asthma, no clinical e/o acute exacerbation. Monitor.   #. Acute hypoxic respiratory insufficiency, improved since admission, possibly multifactorial. Possible opacity vs fluid collection in the L sulcus, similar in appearance to prior imaging in 2018 -- no concerning correlating findings in lung bases seen on CT abd/pelvis.     Goal SpO2 >/= 90%, wean down supplemental oxygen as able    Albuterol PRN available    Cardiovascular:   #. Shock, distributive, likely secondary to severe sepsis d/t a urinary source. Goal MAP >/= 65.    Vasopressors: NE    Stress-dose steroids not clinically indicated  #. HTN; holding PTA amlodipine, atenolol, & hydrochlorothiazide-lisinopril.  #. Atrial fibrillation, continued on PTA anticoagulation w/ apixaban.    Infectious:  #. UTI w/ septic shock, urinary culture pending, blood cultures pending.    Continue w/ empiric vancomycin & zosyn at this time    Renal: baseline Cr ~0.7.   #. UTI, see above.     GI:   #. Nausea & vomiting, likely secondary to acute infection. Monitor.   #. Diarrhea, likely secondary to an acute infection.   #. Mid-sigmoid wall thickening w/ mass-like margins seen on CT abd/pelvis, will require OP evaluation w/ colonoscopy. HMS & PCP to arrange.     Heme: leukocytosis consistent w/ an acute infection.     Endo: Glucose monitoring PRN.    Access/Lines/Tubes: required and necessary for continued patient cares  PICC line  PIV x2  External mac catheter    ICU prophylaxis:   #. VAP ppx: not indicated  #.  Stress ulcer: not indicated  #. Diet: regular  #. VTE: apixaban   #. Restraints: not required    CODE: FULL    Billing: This patient is critically ill: Yes. Total critical care time today 53 min, excluding procedures.     Macie Diggs MD  Pulmonary and Critical Care          HISTORY OF PRESENTING ILLNESS:  Lluvia Marrufo is a 81 year old woman with history of CVA, HTN, atrial fibrillation on long-term anticoagulation, mild intermittent asthma, lumbar stenosis, admitted on 3/3/2022 with shortness of breath, vomiting, & dairrhea for 1 day, found to be in septic shock due to a presumed urinary source. She was started on vancomycin & zosyn; and despite adequate volume resuscitation she had to be started on NE.     Reports having chills and malaise for about a week, which she attributed to something viral she may have caught from her grandson. Denies any dysuria or other changes in her bowel or bladder habits. Then had an acute worsening w/in 24-hrs of her presentation. She felt that her dyspnea may have been related to her atrial fibrillation, as a similar thing happened over [place]. On advise of one of her relatives in the medical field she took an additional dose of her atenolol to help counteract the sensation of atrial fibrillation w/ RVR.     Overall, she is feeling much better now that she did on her arrival to the hospital. Her breathing is improved, she does not feel that her heart is racing, & she is now able to urinate (has an external catheter in place). She was able to eat dinner w/o any issues, & is generally in better spirits.     REVIEW OF SYSTEMS: 12-point ROS was negative with exceptions as detailed in the HPI.    MEDICAL HISTORY:  has a past medical history of Arthritis, Asthma, Atrial fibrillation (H), Bronchitis, Earache, Fracture, foot, History of CVA (cerebrovascular accident), Hyperlipidemia, Hypertension, and UTI (urinary tract infection).    She has no past medical history of Sleep  apnea.  SURGICAL HISTORY:  has a past surgical history that includes Pr Hallux Rigidus W/Cheilectomy 1St Mp Jt W/O Implt; Tonsillectomy; appendectomy; Eye surgery; Total Shoulder Arthroplasty; Total Knee Arthroplasty; joint replacement (Left); RECONSTR TOTAL SHOULDER IMPLANT (Right, 10/27/2015); Hysterectomy; Oophorectomy; TOTAL HIP ARTHROPLASTY (Left, 10/3/2017); and PICC/Midline Placement (3/3/2022).  SOCIAL HISTORY:  reports that she has never smoked. She has never used smokeless tobacco. She reports current alcohol use. She reports that she does not use drugs.  FAMILY HISTORY: family history includes Alcoholism in her father; Cirrhosis in her father; Hypertension in her mother.  MEDICATIONS: personally reviewed, including EMR/Care Everywhere. Pertinent information noted & updated.     ALLERGIES:  No Known Allergies    PHYSICAL EXAM:  Temp:  [98.2  F (36.8  C)-100.1  F (37.8  C)] 98.2  F (36.8  C)  Pulse:  [] 81  Resp:  [17-38] 28  BP: ()/(49-84) 113/60  SpO2:  [88 %-97 %] 91 %  Resp: 28    Physical Exam:   General: calm woman, lying in bed, NAD  HEENT: EOMI, (+) cataracts, PERRL, MMM  CV: RRR, no M/R/G; extremities well perfused  Pulm: normal clear b/l breath sounds, no wheezing, no rhonchi, no crackles, no cough  Abd: soft, ND, NT, normoactive bowel sounds  Msk: warm to touch, trace BLE edema  Derm: no acute lesions/rashes on limited exam  Neuro: AOx3; moves extremities w/o gross deficit  Psych: calm    LABS: I personally reviewed all the pertinent laboratory studies w/in the EMR.     IMAGING/STUDIES: I personally reviewed all available imaging studies w/in the EMR.     This report was prepared using speech recognition software.  Any typographical errors are unintentional.  Please, contact me directly for any clarifications of my report.    Clinically Significant Risk Factors Present on Admission           # Hypomagnesemia: Mg = 0.9 mg/dL (Ref range: 1.8 - 2.6 mg/dL) on admission, will replace as  "needed    # Coagulation Defect: home medication list includes an anticoagulant medication    # Circulatory Shock: currently requiring pressors for blood pressure support   # Obesity: Estimated body mass index is 38.67 kg/m  as calculated from the following:    Height as of this encounter: 1.6 m (5' 3\").    Weight as of this encounter: 99 kg (218 lb 4.8 oz).                "

## 2022-03-04 NOTE — PROGRESS NOTES
PULMONARY / CRITICAL CARE PROGRESS NOTE    Date / Time of Admission:  3/3/2022  9:44 AM    Assessment:   Active Problems:    Hypomagnesemia    Hypokalemia    Sepsis, due to unspecified organism, unspecified whether acute organ dysfunction present (H)      Code Status:  Prior    81yoF with history of CVA but no deficits, Afib on apixaban who presented with septic shock in setting of UTI now with normal blood pressures and incidentally found colonic abnormality concerning for mass on CT scan.     Plan:   Pulmonary:  1) Acute hypoxia, potentially combination atelectasis and volume overload  -OOB to chair  -IS  -Wean O2 as able  -Consider CTA PE run if not improved in next 24 hours.    C/V:  1) Septic shock--resolved  -holding home atenolol, lisinopril/hctz and norvasc until BP rebounds.  -Already volume resuscitated    ID:  1) UTI causing septic shock  -Awaiting urine and blood cultures  -Vanc/Zosyn, hope to narrow soon    Renal:  1) Hypervolemia (4L up)  -Gentle diuresis in attempt to come off of oxygen    GI:  1) New mass/abnormality seen on CT scan  -Defer inpatient versus outpatient workup to hospitalist. Did inform patient that this is present and that follow up is needed.    Neuro:  Continue home neurontin    Heme:  Mild anemia, likely dilutional    Endo:  No issues      ICU DAILY CHECKLIST                           Can patient transfer out of MICU? yes--Discussed with Dr. Kramer.     CLARKE HUG:    Feeding:  Feeding: Yes.  Patient is receiving ORAL    Lozano:No  Analgesia/Sedation:Not Indicated   Thromboembolic prophylaxis: Yes; Mode:  Apixaban  HOB>30:  yes  Stress Ulcer Protocol Active: No; Mode: Not Indicated  Glycemic Control: Any glucose > 180 no; Mode of Insulin Therapy: Not indicated    Clinically Significant Risk Factors Present on Admission          # Hypocalcemia: Ca = 8.1 mg/dL (Ref range: 8.5 - 10.5 mg/dL) and/or iCa = N/A on admission, will replace as needed     # Coagulation Defect: home medication list  "includes an anticoagulant medication   # Circulatory Shock: currently requiring pressors for blood pressure support   # Obesity: Estimated body mass index is 38.67 kg/m  as calculated from the following:    Height as of this encounter: 1.6 m (5' 3\").    Weight as of this encounter: 99 kg (218 lb 4.8 oz).        Subjective:   HPI:  Lluvia Marrufo is a 81 year old female with history of afib on apixaban, CVA without deficits who presented with nausea/vomiting and found to have UTI, later developed septic shock that has since resolved. Now hypoxic in setting of volume overload. New colonic mass seen on CT scan.     Active Problems:    Hypomagnesemia    Hypokalemia    Sepsis, due to unspecified organism, unspecified whether acute organ dysfunction present (H)      Allergies: Patient has no known allergies.     MEDS:  Scheduled Meds:    apixaban ANTICOAGULANT  5 mg Oral BID     atorvastatin  20 mg Oral Daily     gabapentin  300 mg Oral Daily     multivitamin w/minerals  1 tablet Oral Daily     piperacillin-tazobactam  3.375 g Intravenous Q8H     [Held by provider] potassium chloride ER  20 mEq Oral TID     sodium chloride (PF)  10-40 mL Intracatheter Q7 Days     vancomycin  1,000 mg Intravenous Q12H     Continuous Infusions:    norepinephrine Stopped (03/03/22 2230)     PRN Meds:.acetaminophen, albuterol, [Held by provider] diazepam, lidocaine 4%, lidocaine (buffered or not buffered), melatonin, ondansetron, ondansetron, sodium chloride (PF), sodium chloride (PF), sodium chloride (PF)      Objective:   VITALS:  /57   Pulse 95   Temp 98.1  F (36.7  C) (Oral)   Resp 23   Ht 1.6 m (5' 3\")   Wt 99 kg (218 lb 4.8 oz)   LMP  (LMP Unknown)   SpO2 94%   BMI 38.67 kg/m    EXAM:  GENERAL APPEARANCE: Alert, no acute distress  HENT: Oral mucosa and posterior oropharynx normal, moist mucous membranes  NECK: No adenopathy,masses or thyromegaly  RESP: lungs clear to auscultation bilaterally  CV: regular rate and " rhythm, no murmur, rub or gallop  ABDOMEN: normal bowel sounds, soft, nontender, no hepatosplenomegaly or other masses  LYMPHATICS: No cervical, or supraclavicular adenopathy  SKIN: no suspicious lesions or rashes  NEURO: Alert, oriented x 3, speech and mentation normal    I&O:     Intake/Output Summary (Last 24 hours) at 3/4/2022 1042  Last data filed at 3/4/2022 1000  Gross per 24 hour   Intake 5380.2 ml   Output 1250 ml   Net 4130.2 ml           Data Review:  Chest Xray  Personally reviewed image/s and Personally reviewed impression/s  EXAM: XR CHEST PORT 1 VIEW  LOCATION: [facility]  DATE/TIME: 3/4/2022 8:04 AM     INDICATION: worsening hypoxia  COMPARISON: Chest x-ray 03/03/2022                                                                      IMPRESSION: Stable satisfactory right PICC line position with the tip near the cavoatrial junction. Slightly low lung volumes. New mild left bibasilar pulmonary opacities which may reflect atelectasis or developing infiltrates. No definite pleural   effusion. Stable cardiomegaly. Mild central vascular congestion. Spinal degenerative changes. Bilateral shoulder arthroplasties.    LABS  Glucose Values Latest Ref Rng & Units 3/3/2022 3/4/2022   Bedside Glucose (mg/dl )  - -- --   GLUCOSE 70 - 125 mg/dL 158(H) 121   Some recent data might be hidden       Results for orders placed or performed during the hospital encounter of 03/03/22   Basic metabolic panel   Result Value Ref Range    Sodium 139 136 - 145 mmol/L    Potassium 3.4 (L) 3.5 - 5.0 mmol/L    Chloride 107 98 - 107 mmol/L    Carbon Dioxide (CO2) 25 22 - 31 mmol/L    Anion Gap 7 5 - 18 mmol/L    Urea Nitrogen 11 8 - 28 mg/dL    Creatinine 0.60 0.60 - 1.10 mg/dL    Calcium 8.1 (L) 8.5 - 10.5 mg/dL    Glucose 121 70 - 125 mg/dL    GFR Estimate >90 >60 mL/min/1.73m2     Lab Results   Component Value Date    WBC 21.8 (H) 03/04/2022    HGB 10.6 (L) 03/04/2022    HCT 33.1 (L) 03/04/2022     MCV 94 03/04/2022     03/04/2022       ABG:  Recent Labs   Lab 03/03/22 1959   PH 7.40             By:  Mary Jo Lin MD  Pulmonary/Critical Care  Pager: [phone]

## 2022-03-04 NOTE — PLAN OF CARE
Problem: Adjustment to Illness (Sepsis/Septic Shock)  Goal: Optimal Coping  Outcome: Ongoing, Progressing     Problem: Infection Progression (Sepsis/Septic Shock)  Goal: Absence of Infection Signs and Symptoms  Outcome: Ongoing, Progressing  Intervention: Initiate Sepsis Management  Recent Flowsheet Documentation  Taken 3/4/2022 0400 by Cece Bajwa RN  Infection Prevention:   personal protective equipment utilized   hand hygiene promoted   rest/sleep promoted  Taken 3/4/2022 0000 by Cece Bajwa RN  Infection Prevention:   personal protective equipment utilized   hand hygiene promoted   rest/sleep promoted  Intervention: Promote Recovery  Recent Flowsheet Documentation  Taken 3/4/2022 0400 by Cece Bajwa RN  Activity Management: activity adjusted per tolerance  Taken 3/4/2022 0000 by Cece Bajwa RN  Sleep/Rest Enhancement:   awakenings minimized   consistent schedule promoted   noise level reduced   room darkened  Activity Management: activity adjusted per tolerance     Problem: Nutrition Impaired (Sepsis/Septic Shock)  Goal: Optimal Nutrition Intake  Outcome: Ongoing, Progressing     Problem: Infection Progression (Sepsis/Septic Shock)  Goal: Absence of Infection Signs and Symptoms  Intervention: Initiate Sepsis Management  Recent Flowsheet Documentation  Taken 3/4/2022 0400 by Cece Bajwa RN  Infection Prevention:   personal protective equipment utilized   hand hygiene promoted   rest/sleep promoted  Taken 3/4/2022 0000 by Cece Bajwa RN  Infection Prevention:   personal protective equipment utilized   hand hygiene promoted   rest/sleep promoted     Problem: Infection Progression (Sepsis/Septic Shock)  Goal: Absence of Infection Signs and Symptoms  Intervention: Promote Recovery  Recent Flowsheet Documentation  Taken 3/4/2022 0400 by Cece Bajwa RN  Activity Management: activity adjusted per tolerance  Taken 3/4/2022 0000 by Cece Bajwa RN  Sleep/Rest Enhancement:    awakenings minimized   consistent schedule promoted   noise level reduced   room darkened  Activity Management: activity adjusted per tolerance   Goal Outcome Evaluation:  Blood pressure stable, no vasopressor required overnight. Afebrile.  Zofran effective for mild nausea. Oral fluid intake over 600ml.

## 2022-03-04 NOTE — PROGRESS NOTES
[facility]    Medicine Progress Note - Hospitalist Service    Date of Admission:  3/3/2022    Assessment & Plan        81 year old female presenting with sepsis with circulatory shock.     1.  Severe sepsis   --- Resolved   --- initially Suspect secondary to UTI, however no clear symptoms.  And now urine culture negative.  Unclear if it could be related to the mass in her colon.  ---  Lactic acid normal at 2.0.  U  ---Chest x-ray shows patchy bibasilar atelectasis but she denies any cough or respiratory symptoms although has some slight hypoxia.   --- Procalcitonin is 0.8.  Covid negative.  Influenza negative  --- Started on Zosyn and vancomycin in the ED.   plan to discontinue vancomycin  --- Required pressors briefly last night, now off and continuing IV fluids after full resuscitation on admission.  Stable for stepdown from ICU.               Sigmoid mass  --- Does not appear infectious; diverticulitis seen on CT.  --- CT recommends colonoscopy.  Discussed with patient and can likely do as an outpatient.      2.  Acute hypoxic respiratory failure:   --- Persistent   --- Chest x-ray did show bilateral atelectasis.    --- With urine culture negative if persistent consider chest CT as possible source for infection although no clear pneumonia symptoms   ---Has a history of asthma.    --  BNP is 152 and has required fluid resuscitation, we will continue to monitor, may need diuresis in the future.    Hypokalemia/hypomag  --- Replacing per protocol     3.  Hypertension:   ---continue to hold home amlodipine,  and Zestoretic. Consider resumption in am     4.  Permanent atrial fibrillation:   ---use low dose metoprolol today in place of atenolol   --- Continue home apixaban.     5.  History of acute CVA     6.  History of asthma: Continue home inhaler     7.  Hypokalemia/hypomagnesemia:  -- Likely secondary to diuretic which is being held, ---electrolytes are being replaced.  l.     9.  Diarrhea:  "C. difficile negative, stool cultures pending.  unclear if from mass    # Hypocalcemia: Ca = 8.1 mg/dL (Ref range: 8.5 - 10.5 mg/dL) and/or iCa = N/A on admission, will replace as needed              Diet: Regular Diet Adult    DVT Prophylaxis: DOAC  Lozano Catheter: Not present  Central Lines: PRESENT  PICC Triple Lumen 03/03/22 Right Basilic Vascular Access-Site Assessment: WDL  Cardiac Monitoring: None  Code Status:  full code    Disposition Plan   Expected Discharge: 03/05/2022     Anticipated discharge location: home with family    Delays:     Lab Result Pending (enter specific test & time in comments)            The patient's care was discussed with the Bedside Nurse, Patient and Patient's Family.    Emily Kramer MD  Hospitalist Service  [employer]  Securely message with the Vocera Web Console (learn more here)  Text page via [employer] Paging/Directory         Clinically Significant Risk Factors Present on Admission          # Hypocalcemia: Ca = 8.1 mg/dL (Ref range: 8.5 - 10.5 mg/dL) and/or iCa = N/A on admission, will replace as needed     # Coagulation Defect: home medication list includes an anticoagulant medication   # Circulatory Shock: currently requiring pressors for blood pressure support   # Obesity: Estimated body mass index is 38.67 kg/m  as calculated from the following:    Height as of this encounter: 1.6 m (5' 3\").    Weight as of this encounter: 99 kg (218 lb 4.8 oz).      ______________________________________________________________________    Interval History   --- Patient seen and chart reviewed.  Case is discussed with intensivist.  Patient's been off of pressors since 10:00 last night  --- She still seems somewhat surprised about her hospital stay and wants to go home as soon as possible.  She wants to leave for [place] in 8 days.  --- Denies dysuria increased urgency or hematuria  --- Does state that her urine smelled \"bad\" prior to admission and  corroborates " this  --- No vomiting or diarrhea    Data reviewed today: I reviewed all medications, new labs and imaging results over the last 24 hours.     Physical Exam   Vital Signs: Temp: 98.1  F (36.7  C) Temp src: Oral BP: 126/57 Pulse: 95   Resp: 23 SpO2: 94 % O2 Device: Nasal cannula Oxygen Delivery: 4 LPM  Weight: 218 lbs 4.8 oz  General Appearance: Pleasant, no apparent distress.  Respiratory: Clear to auscultation bilaterally  Cardiovascular: Regular rate and rhythm without murmurs rubs or gallops  GI: Obese soft and nontender without hepatosplenomegaly  Skin: Trace lower extremity edema but no rashes or open areas  Other: Neurological gross intact without focal deficits appreciated    Data   Recent Labs   Lab 03/04/22  1218 03/04/22  0550 03/03/22  1819 03/03/22  1035 03/03/22  1016   WBC  --  21.8*  --  18.0*  --    HGB  --  10.6*  --  13.8  --    MCV  --  94  --  91  --    PLT  --  204  --  247  --    NA  --  139  --   --  142   POTASSIUM 3.0* 3.4* 3.6  --  3.1*   CHLORIDE  --  107  --   --  106   CO2  --  25  --   --  22   BUN  --  11  --   --  15   CR  --  0.60  --   --  0.66   ANIONGAP  --  7  --   --  14   ANNA  --  8.1*  --   --  9.1   GLC  --  121  --   --  158*   ALBUMIN  --   --   --   --  3.6   PROTTOTAL  --   --   --   --  7.0   BILITOTAL  --   --   --   --  1.1*   ALKPHOS  --   --   --   --  79   ALT  --   --   --   --  22   AST  --   --   --   --  18     Recent Results (from the past 24 hour(s))   CT Abdomen Pelvis w Contrast    Narrative    EXAM: CT ABDOMEN PELVIS W CONTRAST  LOCATION: [facility]  DATE/TIME: 3/3/2022 6:22 PM    INDICATION: Abdominal pain and fever. Severe sepsis secondary to urinary tract infection.  COMPARISON: None.  TECHNIQUE: CT scan of the abdomen and pelvis was performed following injection of IV contrast. Multiplanar reformats were obtained. Dose reduction techniques were used.  CONTRAST: Isovue 370, 100 mL.    FINDINGS:   LOWER CHEST: Mild dependent  atelectasis at the lung bases. No pleural effusion. Heart is mildly enlarged.    HEPATOBILIARY: Left hepatic lobe demonstrates a cystic lesion in segment II measuring 4.1 x 3.6 cm. This does not require follow-up. There is a 9 mm cystic lesion on image 63. This also does not require follow-up. Gallbladder has been resected.    PANCREAS: Normal.    SPLEEN: Normal.    ADRENAL GLANDS: Normal.    KIDNEYS/BLADDER: Simple cyst in the right ovary measures 1.8 x 2.7 cm and does not require follow-up. No hydronephrosis. Bladder is unremarkable.    BOWEL: Diverticulosis without evidence of diverticulitis. There is some wall thickening in the mid sigmoid seen on series 3: image 156. This has masslike margins and further evaluation with colonoscopy is recommended when the patient is able in order to   evaluate for neoplasm.    LYMPH NODES: No retroperitoneal adenopathy. No pelvic adenopathy.    VASCULATURE: Unremarkable.    PELVIC ORGANS: Normal.    MUSCULOSKELETAL: Left hip arthroplasty. Degenerative changes in the spine.      Impression    IMPRESSION:   1.  Masslike wall thickening in the sigmoid. Recommend colonoscopy when the patient is able in order to evaluate for underlying neoplasm.    2.  Simple cysts in the kidneys and liver do not require follow-up.   XR Chest Port 1 View    Narrative    EXAM: XR CHEST PORT 1 VIEW  LOCATION: [facility]  DATE/TIME: 3/4/2022 8:04 AM    INDICATION: worsening hypoxia  COMPARISON: Chest x-ray 03/03/2022      Impression    IMPRESSION: Stable satisfactory right PICC line position with the tip near the cavoatrial junction. Slightly low lung volumes. New mild left bibasilar pulmonary opacities which may reflect atelectasis or developing infiltrates. No definite pleural   effusion. Stable cardiomegaly. Mild central vascular congestion. Spinal degenerative changes. Bilateral shoulder arthroplasties.

## 2022-03-05 ENCOUNTER — APPOINTMENT (OUTPATIENT)
Dept: CT IMAGING | Facility: HOSPITAL | Age: 82
DRG: 871 | End: 2022-03-05
Attending: FAMILY MEDICINE
Payer: COMMERCIAL

## 2022-03-05 LAB
ANION GAP SERPL CALCULATED.3IONS-SCNC: 8 MMOL/L (ref 5–18)
BACTERIA UR CULT: NO GROWTH
BNP SERPL-MCNC: 337 PG/ML (ref 0–151)
BUN SERPL-MCNC: 8 MG/DL (ref 8–28)
CALCIUM SERPL-MCNC: 8.2 MG/DL (ref 8.5–10.5)
CHLORIDE BLD-SCNC: 104 MMOL/L (ref 98–107)
CO2 SERPL-SCNC: 26 MMOL/L (ref 22–31)
CREAT SERPL-MCNC: 0.61 MG/DL (ref 0.6–1.1)
ENTEROCOCCUS FAECALIS: NOT DETECTED
ENTEROCOCCUS FAECIUM: NOT DETECTED
ERYTHROCYTE [DISTWIDTH] IN BLOOD BY AUTOMATED COUNT: 12.9 % (ref 10–15)
GFR SERPL CREATININE-BSD FRML MDRD: 90 ML/MIN/1.73M2
GLUCOSE BLD-MCNC: 125 MG/DL (ref 70–125)
HCT VFR BLD AUTO: 34 % (ref 35–47)
HGB BLD-MCNC: 10.7 G/DL (ref 11.7–15.7)
LACTATE SERPL-SCNC: 0.9 MMOL/L (ref 0.7–2)
LISTERIA SPECIES (DETECTED/NOT DETECTED): NOT DETECTED
MAGNESIUM SERPL-MCNC: 1.6 MG/DL (ref 1.8–2.6)
MCH RBC QN AUTO: 29.7 PG (ref 26.5–33)
MCHC RBC AUTO-ENTMCNC: 31.5 G/DL (ref 31.5–36.5)
MCV RBC AUTO: 94 FL (ref 78–100)
PLATELET # BLD AUTO: 209 10E3/UL (ref 150–450)
POTASSIUM BLD-SCNC: 3.6 MMOL/L (ref 3.5–5)
RBC # BLD AUTO: 3.6 10E6/UL (ref 3.8–5.2)
SODIUM SERPL-SCNC: 138 MMOL/L (ref 136–145)
STAPHYLOCOCCUS AUREUS: NOT DETECTED
STAPHYLOCOCCUS EPIDERMIDIS: NOT DETECTED
STAPHYLOCOCCUS LUGDUNENSIS: NOT DETECTED
STAPHYLOCOCCUS SPECIES: NOT DETECTED
STREPTOCOCCUS AGALACTIAE: NOT DETECTED
STREPTOCOCCUS ANGINOSUS GROUP: NOT DETECTED
STREPTOCOCCUS PNEUMONIAE: NOT DETECTED
STREPTOCOCCUS PYOGENES: NOT DETECTED
STREPTOCOCCUS SPECIES: NOT DETECTED
VANCOMYCIN SERPL-MCNC: 7.7 MG/L
WBC # BLD AUTO: 15.2 10E3/UL (ref 4–11)

## 2022-03-05 PROCEDURE — 80202 ASSAY OF VANCOMYCIN: CPT | Performed by: INTERNAL MEDICINE

## 2022-03-05 PROCEDURE — 99233 SBSQ HOSP IP/OBS HIGH 50: CPT | Performed by: FAMILY MEDICINE

## 2022-03-05 PROCEDURE — 36415 COLL VENOUS BLD VENIPUNCTURE: CPT | Performed by: INTERNAL MEDICINE

## 2022-03-05 PROCEDURE — 250N000013 HC RX MED GY IP 250 OP 250 PS 637: Performed by: INTERNAL MEDICINE

## 2022-03-05 PROCEDURE — 94640 AIRWAY INHALATION TREATMENT: CPT

## 2022-03-05 PROCEDURE — 999N000157 HC STATISTIC RCP TIME EA 10 MIN

## 2022-03-05 PROCEDURE — 250N000011 HC RX IP 250 OP 636: Performed by: INTERNAL MEDICINE

## 2022-03-05 PROCEDURE — 85027 COMPLETE CBC AUTOMATED: CPT | Performed by: FAMILY MEDICINE

## 2022-03-05 PROCEDURE — 36415 COLL VENOUS BLD VENIPUNCTURE: CPT | Performed by: FAMILY MEDICINE

## 2022-03-05 PROCEDURE — 120N000001 HC R&B MED SURG/OB

## 2022-03-05 PROCEDURE — 83605 ASSAY OF LACTIC ACID: CPT | Performed by: INTERNAL MEDICINE

## 2022-03-05 PROCEDURE — 83735 ASSAY OF MAGNESIUM: CPT | Performed by: INTERNAL MEDICINE

## 2022-03-05 PROCEDURE — 71275 CT ANGIOGRAPHY CHEST: CPT

## 2022-03-05 PROCEDURE — 94640 AIRWAY INHALATION TREATMENT: CPT | Mod: 76

## 2022-03-05 PROCEDURE — 250N000009 HC RX 250: Performed by: INTERNAL MEDICINE

## 2022-03-05 PROCEDURE — 82310 ASSAY OF CALCIUM: CPT | Performed by: INTERNAL MEDICINE

## 2022-03-05 PROCEDURE — 87899 AGENT NOS ASSAY W/OPTIC: CPT | Performed by: INTERNAL MEDICINE

## 2022-03-05 PROCEDURE — 87040 BLOOD CULTURE FOR BACTERIA: CPT

## 2022-03-05 PROCEDURE — 250N000011 HC RX IP 250 OP 636: Performed by: FAMILY MEDICINE

## 2022-03-05 PROCEDURE — 250N000013 HC RX MED GY IP 250 OP 250 PS 637: Performed by: FAMILY MEDICINE

## 2022-03-05 PROCEDURE — 83880 ASSAY OF NATRIURETIC PEPTIDE: CPT | Performed by: FAMILY MEDICINE

## 2022-03-05 RX ORDER — IOPAMIDOL 755 MG/ML
100 INJECTION, SOLUTION INTRAVASCULAR ONCE
Status: COMPLETED | OUTPATIENT
Start: 2022-03-05 | End: 2022-03-05

## 2022-03-05 RX ORDER — FUROSEMIDE 10 MG/ML
20 INJECTION INTRAMUSCULAR; INTRAVENOUS ONCE
Status: COMPLETED | OUTPATIENT
Start: 2022-03-05 | End: 2022-03-05

## 2022-03-05 RX ORDER — MAGNESIUM OXIDE 400 MG/1
400 TABLET ORAL 2 TIMES DAILY
Status: COMPLETED | OUTPATIENT
Start: 2022-03-05 | End: 2022-03-06

## 2022-03-05 RX ORDER — METHYLPREDNISOLONE SODIUM SUCCINATE 125 MG/2ML
60 INJECTION, POWDER, LYOPHILIZED, FOR SOLUTION INTRAMUSCULAR; INTRAVENOUS EVERY 12 HOURS
Status: COMPLETED | OUTPATIENT
Start: 2022-03-05 | End: 2022-03-07

## 2022-03-05 RX ADMIN — ALBUTEROL SULFATE 2.5 MG: 2.5 SOLUTION RESPIRATORY (INHALATION) at 18:24

## 2022-03-05 RX ADMIN — ALBUTEROL SULFATE 2.5 MG: 2.5 SOLUTION RESPIRATORY (INHALATION) at 08:27

## 2022-03-05 RX ADMIN — APIXABAN 5 MG: 5 TABLET, FILM COATED ORAL at 20:15

## 2022-03-05 RX ADMIN — ATORVASTATIN CALCIUM 20 MG: 10 TABLET, FILM COATED ORAL at 20:15

## 2022-03-05 RX ADMIN — PIPERACILLIN AND TAZOBACTAM 3.38 G: 3; .375 INJECTION, POWDER, LYOPHILIZED, FOR SOLUTION INTRAVENOUS at 10:03

## 2022-03-05 RX ADMIN — ACETAMINOPHEN 650 MG: 325 TABLET ORAL at 03:56

## 2022-03-05 RX ADMIN — GABAPENTIN 300 MG: 300 CAPSULE ORAL at 08:20

## 2022-03-05 RX ADMIN — PIPERACILLIN AND TAZOBACTAM 3.38 G: 3; .375 INJECTION, POWDER, LYOPHILIZED, FOR SOLUTION INTRAVENOUS at 02:28

## 2022-03-05 RX ADMIN — DIAZEPAM 5 MG: 5 TABLET ORAL at 18:14

## 2022-03-05 RX ADMIN — APIXABAN 5 MG: 5 TABLET, FILM COATED ORAL at 08:20

## 2022-03-05 RX ADMIN — MAGNESIUM OXIDE TAB 400 MG (241.3 MG ELEMENTAL MG) 400 MG: 400 (241.3 MG) TAB at 08:20

## 2022-03-05 RX ADMIN — MAGNESIUM OXIDE TAB 400 MG (241.3 MG ELEMENTAL MG) 400 MG: 400 (241.3 MG) TAB at 20:15

## 2022-03-05 RX ADMIN — PIPERACILLIN AND TAZOBACTAM 3.38 G: 3; .375 INJECTION, POWDER, LYOPHILIZED, FOR SOLUTION INTRAVENOUS at 18:14

## 2022-03-05 RX ADMIN — IOPAMIDOL 100 ML: 755 INJECTION, SOLUTION INTRAVENOUS at 14:40

## 2022-03-05 RX ADMIN — METHYLPREDNISOLONE SODIUM SUCCINATE 62.5 MG: 125 INJECTION, POWDER, FOR SOLUTION INTRAMUSCULAR; INTRAVENOUS at 23:32

## 2022-03-05 RX ADMIN — METOPROLOL TARTRATE 12.5 MG: 25 TABLET, FILM COATED ORAL at 08:20

## 2022-03-05 RX ADMIN — METHYLPREDNISOLONE SODIUM SUCCINATE 62.5 MG: 125 INJECTION, POWDER, FOR SOLUTION INTRAMUSCULAR; INTRAVENOUS at 12:59

## 2022-03-05 RX ADMIN — FUROSEMIDE 20 MG: 10 INJECTION, SOLUTION INTRAMUSCULAR; INTRAVENOUS at 12:53

## 2022-03-05 RX ADMIN — ONDANSETRON 4 MG: 4 TABLET, ORALLY DISINTEGRATING ORAL at 02:33

## 2022-03-05 RX ADMIN — MULTIPLE VITAMINS W/ MINERALS TAB 1 TABLET: TAB at 08:20

## 2022-03-05 RX ADMIN — METOPROLOL TARTRATE 12.5 MG: 25 TABLET, FILM COATED ORAL at 20:14

## 2022-03-05 ASSESSMENT — ACTIVITIES OF DAILY LIVING (ADL)
ADLS_ACUITY_SCORE: 9
ADLS_ACUITY_SCORE: 5
ADLS_ACUITY_SCORE: 5
ADLS_ACUITY_SCORE: 9
ADLS_ACUITY_SCORE: 5
ADLS_ACUITY_SCORE: 9
ADLS_ACUITY_SCORE: 9
ADLS_ACUITY_SCORE: 7
ADLS_ACUITY_SCORE: 9
ADLS_ACUITY_SCORE: 9
ADLS_ACUITY_SCORE: 7
ADLS_ACUITY_SCORE: 9
ADLS_ACUITY_SCORE: 5
ADLS_ACUITY_SCORE: 5
ADLS_ACUITY_SCORE: 9
ADLS_ACUITY_SCORE: 5
ADLS_ACUITY_SCORE: 9
ADLS_ACUITY_SCORE: 9
ADLS_ACUITY_SCORE: 5
ADLS_ACUITY_SCORE: 5
ADLS_ACUITY_SCORE: 9
ADLS_ACUITY_SCORE: 5

## 2022-03-05 NOTE — PLAN OF CARE
Goal Outcome Evaluation:  Problem: Adjustment to Illness (Sepsis/Septic Shock)  Goal: Optimal Coping  Outcome: Ongoing, Progressing     Problem: Plan of Care - These are the overarching goals to be used throughout the patient stay.    Goal: Optimal Comfort and Wellbeing  Outcome: Ongoing, Progressing     Patient has been pleasant and cooperative.  Alert and oriented x 4.  Denies pain.  Continues on oxygen 5L and has switched between oxymask and nasal canula for comfort.  LS wheezy and dyspnea on exertion.  Nebs administered x 2.  IV lasix and solumedrol given.  Diuresed 2000 ml clear yellow urine.  Continues on IV antibiotics.  CT PE run completed and awaiting results.

## 2022-03-05 NOTE — PROGRESS NOTES
[facility]    Medicine Progress Note - Hospitalist Service    Date of Admission:  3/3/2022    Assessment & Plan        81 year old female presenting with sepsis with circulatory shock.     1.  Severe sepsis   --- Resolved   --- initially Suspect secondary to UTI, however no clear symptoms, UC negative now suspect CAP, LLL   ---GPC in BC   ---  Lactic acid normal at 2.0.  ---getting CT chest today  --- Procalcitonin is 0.8.  Covid negative.  Influenza negative, C diff negative, stool cx negative  --- Started on Zosyn and vancomycin in the ED.   plan to discontinue vancomycin  --- Required pressors briefly on admit       ---add PT and OT      Bacteremia  ---BC positive from admission  --suspect from CAP  ---appreciate HO note  ---on zosyn and was on vancomycin  ---re[eat BC done  ---await sensitivites  ---question source colon versus CAP   --- chest CT to further delineate given admit cxr abnormal but not clearly diagnostic  .      Acute hypoxic respiratory failure:   --- Persistent, now suspect secondary to asthma exacerbation with pneumonia  --- previous hest x-ray did show bilateral atelectasis.    ---today shows more LLL   ---check CT PE today  -----  BNP is 152; with blood pressure normalized we will give 1 dose IV Lasix  --- Start steroids    Sigmoid mass  --- Does not appear infectious; diverticulitis seen on CT.  --- CT recommends colonoscopy.  Discussed with patient and can likely do as an outpatient    Hypokalemia/hypomag  --- Replacing per protocol     Hypertension:   --- Blood pressure trending up   ---continue to hold home amlodipine,  and Zestoretic.   --- Started metoprolol in place of atenolol  ---Consider resumption in am     Permanent atrial fibrillation:   ---use low dose metoprolol today in place of atenolol   --- Continue home apixaban.     History of acute CVA     History of asthma: See above, suspect hypoxia from acute exacerbation with wheezing.  Getting CT, starting  "continue home inhaler     Diarrhea: C. difficile negative, stool cultures pending.  unclear if from mass    # Hypocalcemia: Ca = 8.1 mg/dL (Ref range: 8.5 - 10.5 mg/dL) and/or iCa = N/A on admission, will replace as needed        Obesity: Estimated body mass index is 38.67 kg/m  as calculated from the following:    Height as of this encounter: 1.6 m (5' 3\").    Weight as of this encounter: 99 kg (218 lb 4.8 oz).       Diet: Regular Diet Adult    DVT Prophylaxis: DOAC  Lozano Catheter: Not present  Central Lines: PRESENT  PICC Triple Lumen 03/03/22 Right Basilic Vascular Access-Site Assessment: WDL  Cardiac Monitoring: None  Code Status: Full Codefull code    Disposition Plan   Expected Discharge: 03/07/2022     Anticipated discharge location: home with family    Delays:     Lab Result Pending (enter specific test & time in comments)            The patient's care was discussed with the Bedside Nurse, Patient and Patient's Family.    Emily Kramer MD  Hospitalist Service  [facility]  Securely message with the Vocera Web Console (learn more here)  Text page via ki work Paging/Directory         Clinically Significant Risk Factors Present on Admission              # Obesity: Estimated body mass index is 38.67 kg/m  as calculated from the following:    Height as of this encounter: 1.6 m (5' 3\").    Weight as of this encounter: 99 kg (218 lb 4.8 oz).      ______________________________________________________________________    Interval History   --- Patient seen and chart reviewed.  ---she is feeling SOB still  ---has chronic cough, adalberto thinks that was worse but she does not  ---no home oxygen  ---previous issues with hypoxia, CAP, and exacerbations    Data reviewed today: I reviewed all medications, new labs and imaging results over the last 24 hours.     Physical Exam   Vital Signs: Temp: 98  F (36.7  C) Temp src: Oral BP: (!) 142/74 Pulse: 96   Resp: 18 SpO2: 91 % O2 Device: Nasal cannula Oxygen " Delivery: 5 LPM  Weight: 218 lbs 4.8 oz  General Appearance: Pleasant, mildly dyspneic  Respiratory: LLL crackles and mild anterior wheeze  Cardiovascular: Regular rate and rhythm without murmurs rubs or gallops  GI: Obese soft and nontender without hepatosplenomegaly  Skin: Trace lower extremity edema but no rashes or open areas  Other: Neurological gross intact without focal deficits appreciated    Data   Recent Labs   Lab 03/05/22  0554 03/04/22 2022 03/04/22  1218 03/04/22  0550 03/03/22  1819 03/03/22  1035 03/03/22  1016   WBC 15.2*  --   --  21.8*  --  18.0*  --    HGB 10.7*  --   --  10.6*  --  13.8  --    MCV 94  --   --  94  --  91  --      --   --  204  --  247  --      --   --  139  --   --  142   POTASSIUM 3.6 3.6 3.0* 3.4*   < >  --  3.1*   CHLORIDE 104  --   --  107  --   --  106   CO2 26  --   --  25  --   --  22   BUN 8  --   --  11  --   --  15   CR 0.61  --   --  0.60  --   --  0.66   ANIONGAP 8  --   --  7  --   --  14   ANNA 8.2*  --   --  8.1*  --   --  9.1     --   --  121  --   --  158*   ALBUMIN  --   --   --   --   --   --  3.6   PROTTOTAL  --   --   --   --   --   --  7.0   BILITOTAL  --   --   --   --   --   --  1.1*   ALKPHOS  --   --   --   --   --   --  79   ALT  --   --   --   --   --   --  22   AST  --   --   --   --   --   --  18    < > = values in this interval not displayed.     Recent Results (from the past 24 hour(s))   CT Chest Pulmonary Embolism w Contrast    Narrative    EXAM: CT CHEST PULMONARY EMBOLISM W CONTRAST  LOCATION: [facility]  DATE/TIME: 3/5/2022 2:33 PM    INDICATION: Severe sepsis, positive blood culture. LLL crackles. Significant hypoxia.  COMPARISON: 03/04/2022 CXR and 03/03/2022 CT AP.  TECHNIQUE: CT chest pulmonary angiogram during arterial phase injection of IV contrast. Multiplanar reformats and MIP reconstructions were performed. Dose reduction techniques were used.   CONTRAST: Isovue 370, 100  mL.    FINDINGS:  ANGIOGRAM CHEST: Pulmonary arteries are normal caliber and negative for pulmonary emboli. Thoracic aorta is negative for dissection. No CT evidence of right heart strain.    LUNGS AND PLEURA: Mild interstitial edema. Small bilateral pleural effusions. Atelectasis and some consolidation and endobronchial secretions involving the posterior half of the basilar segments of both lower lobes and the inferior lingular segment. A 5   mm subpleural right upper lobe nodule has no frankly concerning features (image 45 of series 5).    MEDIASTINUM/AXILLAE: Mild generalized cardiac enlargement. No pericardial effusion. No lymphadenopathy. Right PICC tip low SVC.    CORONARY ARTERY CALCIFICATION: No visible coronary artery calcification.    UPPER ABDOMEN: Benign hepatic cyst left hepatic lobe. Mild diffuse hepatic steatosis.    MUSCULOSKELETAL: Bridging endplate osteophyte formation throughout the thoracic spine. Bones otherwise unremarkable. Bilateral shoulder arthroplasties.      Impression    IMPRESSION:  1.  No pulmonary emboli. Normal thoracic aorta.  2.  Mild cardiac enlargement, mild interstitial edema and small bilateral pleural effusions are not specific for but could indicate CHF.  3.  Atelectasis and some consolidation and endobronchial secretions involving the posterior half of the basilar segments of both lower lobes and the inferior lingular segment. Findings could relate entirely to the pleural effusions with superimposed   pneumonia possible.  4.  A 5 mm subpleural right upper lobe nodule. Please refer to management guidelines below.    REFERENCE:  Guidelines for Management of Incidental Pulmonary Nodules Detected on CT Images: From the Fleischner Society 2017.   Guidelines apply to incidental nodules in patients who are 35 years or older.  Guidelines do not apply to lung cancer screening, patients with immunosuppression, or patients with known primary cancer.    SINGLE NODULE  Nodule size <6  mm  Low-risk patients: No follow-up needed.  High-risk patients: Optional follow-up at 12 months.

## 2022-03-05 NOTE — PLAN OF CARE
Problem: Risk for Delirium  Goal: Improved Sleep  Outcome: Ongoing, Progressing   Pt slept almost all through the night, awake with episode of dyspnea requiring deep breathing that was effective   Problem: Glycemic Control Impaired (Sepsis/Septic Shock)  Goal: Blood Glucose Level Within Desired Range  Outcome: Ongoing, Progressing     Problem: Infection Progression (Sepsis/Septic Shock)  Goal: Absence of Infection Signs and Symptoms  Outcome: Ongoing, Progressing   Sepsis protocol with lactic acid 0.9, pt c/o head ache after tylenol was administered, decline cold was clothe  Problem: Nutrition Impaired (Sepsis/Septic Shock)  Goal: Optimal Nutrition Intake  Outcome: Ongoing, Progressing   C/o nausea x2 and had prn zofran which was effective.    Blood culture pending.      Goal Outcome Evaluation:

## 2022-03-05 NOTE — PROGRESS NOTES
TARA  Paged on the positive blood culture detecting gram positive cocci in pair chains at 0518 per lab result. triggered septic  d/t high pulse of 102 and WBC of 21.8 c/o head ache and feeling warm, tylenol given, afebrile, temp wnl.

## 2022-03-05 NOTE — SIGNIFICANT EVENT
"Significant Event Note    Time of event: 5:35 AM March 5, 2022    Description of event:  Notified 1/2 blood cultures growing gram-positive cocci in pairs and chains.    Briefly, Lexii was admitted to the ICU with severe sepsis, unclear source.  Initially believed to be due to UTI, however UC negative.  No definitive signs of pneumonia on imaging.  Transferred to floor on 3/4/2022.  Currently on Zosyn, vancomycin discontinued on 3/4/2022.    /85 (BP Location: Left arm)   Pulse 102   Temp 97.8  F (36.6  C) (Oral)   Resp 22   Ht 1.6 m (5' 3\")   Wt 99 kg (218 lb 4.8 oz)   LMP  (LMP Unknown)   SpO2 90%   BMI 38.67 kg/m        Assessment plan:  Initial blood culture results suggestive of Streptococcus infection, which should be covered by Zosyn.  Would consider restarting vancomycin if patient clinically declines.  -Continue Zosyn while awaiting speciation  -Repeat blood culture x2      Ramírez Ordaz MD PGY-1  [facility]   House officer    "

## 2022-03-05 NOTE — PROGRESS NOTES
Pt transferred to the unit from the ICU.  Pt denies pain.  IV antibiotics continued.  Alert and oriented x 4.  Continues on 5 L oxygen via nasal canula and sating in low 90's.  Dyspnea on exertion.  Purewick in place.

## 2022-03-05 NOTE — PROVIDER NOTIFICATION
Dr. Kramer paged to clarify need for tele.  Also notified of oxygen needs, plan of care and patient thought she was receiving additional lasix today however nothing is ordered.

## 2022-03-05 NOTE — PROGRESS NOTES
Care Management Follow Up    Length of Stay (days): 2    Expected Discharge Date: 03/07/2022     Concerns to be Addressed: infection, cultures pending      Patient plan of care discussed at interdisciplinary rounds: Yes    Anticipated Discharge Disposition:  TBD     Anticipated Discharge Services:  TBD  Anticipated Discharge DME:  TBD       Additional Information:  Patient admitted for sepsis. Diagnostic work up in process. Currently on IV Zosyn. Currently on O2 6L.    PT/OT orders needed when appropriate.    Social History:  Patient independently living with spouse.     Final discharge plan pending.         Mary Enriquez RN

## 2022-03-06 ENCOUNTER — APPOINTMENT (OUTPATIENT)
Dept: PHYSICAL THERAPY | Facility: HOSPITAL | Age: 82
DRG: 871 | End: 2022-03-06
Attending: FAMILY MEDICINE
Payer: COMMERCIAL

## 2022-03-06 ENCOUNTER — APPOINTMENT (OUTPATIENT)
Dept: OCCUPATIONAL THERAPY | Facility: HOSPITAL | Age: 82
DRG: 871 | End: 2022-03-06
Attending: FAMILY MEDICINE
Payer: COMMERCIAL

## 2022-03-06 PROBLEM — I48.19 PERSISTENT ATRIAL FIBRILLATION (H): Status: ACTIVE | Noted: 2021-08-09

## 2022-03-06 PROBLEM — J96.01 ACUTE RESPIRATORY FAILURE WITH HYPOXIA (H): Status: ACTIVE | Noted: 2022-03-06

## 2022-03-06 PROBLEM — R78.81 GRAM-POSITIVE BACTEREMIA: Status: ACTIVE | Noted: 2022-03-06

## 2022-03-06 LAB
ANION GAP SERPL CALCULATED.3IONS-SCNC: 9 MMOL/L (ref 5–18)
BUN SERPL-MCNC: 8 MG/DL (ref 8–28)
CALCIUM SERPL-MCNC: 9.1 MG/DL (ref 8.5–10.5)
CHLORIDE BLD-SCNC: 101 MMOL/L (ref 98–107)
CO2 SERPL-SCNC: 30 MMOL/L (ref 22–31)
CREAT SERPL-MCNC: 0.6 MG/DL (ref 0.6–1.1)
ERYTHROCYTE [DISTWIDTH] IN BLOOD BY AUTOMATED COUNT: 12.2 % (ref 10–15)
GFR SERPL CREATININE-BSD FRML MDRD: >90 ML/MIN/1.73M2
GLUCOSE BLD-MCNC: 157 MG/DL (ref 70–125)
HCT VFR BLD AUTO: 36.2 % (ref 35–47)
HGB BLD-MCNC: 11.9 G/DL (ref 11.7–15.7)
MCH RBC QN AUTO: 30.1 PG (ref 26.5–33)
MCHC RBC AUTO-ENTMCNC: 32.9 G/DL (ref 31.5–36.5)
MCV RBC AUTO: 92 FL (ref 78–100)
PLATELET # BLD AUTO: 244 10E3/UL (ref 150–450)
POTASSIUM BLD-SCNC: 3.5 MMOL/L (ref 3.5–5)
RBC # BLD AUTO: 3.95 10E6/UL (ref 3.8–5.2)
S PNEUM AG SPEC QL: NEGATIVE
SODIUM SERPL-SCNC: 140 MMOL/L (ref 136–145)
WBC # BLD AUTO: 13.6 10E3/UL (ref 4–11)

## 2022-03-06 PROCEDURE — 250N000013 HC RX MED GY IP 250 OP 250 PS 637: Performed by: INTERNAL MEDICINE

## 2022-03-06 PROCEDURE — 250N000011 HC RX IP 250 OP 636: Performed by: INTERNAL MEDICINE

## 2022-03-06 PROCEDURE — 97165 OT EVAL LOW COMPLEX 30 MIN: CPT | Mod: GO

## 2022-03-06 PROCEDURE — 120N000001 HC R&B MED SURG/OB

## 2022-03-06 PROCEDURE — 97116 GAIT TRAINING THERAPY: CPT | Mod: GP

## 2022-03-06 PROCEDURE — 97161 PT EVAL LOW COMPLEX 20 MIN: CPT | Mod: GP

## 2022-03-06 PROCEDURE — 97110 THERAPEUTIC EXERCISES: CPT | Mod: GP

## 2022-03-06 PROCEDURE — 80048 BASIC METABOLIC PNL TOTAL CA: CPT | Performed by: FAMILY MEDICINE

## 2022-03-06 PROCEDURE — 99233 SBSQ HOSP IP/OBS HIGH 50: CPT | Performed by: INTERNAL MEDICINE

## 2022-03-06 PROCEDURE — 250N000011 HC RX IP 250 OP 636: Performed by: FAMILY MEDICINE

## 2022-03-06 PROCEDURE — 85027 COMPLETE CBC AUTOMATED: CPT | Performed by: FAMILY MEDICINE

## 2022-03-06 PROCEDURE — 97535 SELF CARE MNGMENT TRAINING: CPT | Mod: GO

## 2022-03-06 PROCEDURE — 250N000013 HC RX MED GY IP 250 OP 250 PS 637: Performed by: FAMILY MEDICINE

## 2022-03-06 RX ORDER — FUROSEMIDE 10 MG/ML
20 INJECTION INTRAMUSCULAR; INTRAVENOUS ONCE
Status: COMPLETED | OUTPATIENT
Start: 2022-03-06 | End: 2022-03-06

## 2022-03-06 RX ORDER — AMLODIPINE BESYLATE 5 MG/1
10 TABLET ORAL DAILY
Status: DISCONTINUED | OUTPATIENT
Start: 2022-03-06 | End: 2022-03-07 | Stop reason: HOSPADM

## 2022-03-06 RX ADMIN — PIPERACILLIN AND TAZOBACTAM 3.38 G: 3; .375 INJECTION, POWDER, LYOPHILIZED, FOR SOLUTION INTRAVENOUS at 02:31

## 2022-03-06 RX ADMIN — DIAZEPAM 5 MG: 5 TABLET ORAL at 20:14

## 2022-03-06 RX ADMIN — METHYLPREDNISOLONE SODIUM SUCCINATE 62.5 MG: 125 INJECTION, POWDER, FOR SOLUTION INTRAMUSCULAR; INTRAVENOUS at 12:12

## 2022-03-06 RX ADMIN — GABAPENTIN 300 MG: 300 CAPSULE ORAL at 09:30

## 2022-03-06 RX ADMIN — MAGNESIUM OXIDE TAB 400 MG (241.3 MG ELEMENTAL MG) 400 MG: 400 (241.3 MG) TAB at 09:31

## 2022-03-06 RX ADMIN — AMLODIPINE BESYLATE 10 MG: 5 TABLET ORAL at 09:30

## 2022-03-06 RX ADMIN — ATORVASTATIN CALCIUM 20 MG: 10 TABLET, FILM COATED ORAL at 20:14

## 2022-03-06 RX ADMIN — METOPROLOL TARTRATE 12.5 MG: 25 TABLET, FILM COATED ORAL at 09:30

## 2022-03-06 RX ADMIN — PIPERACILLIN AND TAZOBACTAM 3.38 G: 3; .375 INJECTION, POWDER, LYOPHILIZED, FOR SOLUTION INTRAVENOUS at 09:31

## 2022-03-06 RX ADMIN — FUROSEMIDE 20 MG: 10 INJECTION, SOLUTION INTRAMUSCULAR; INTRAVENOUS at 16:05

## 2022-03-06 RX ADMIN — AMOXICILLIN AND CLAVULANATE POTASSIUM 1 TABLET: 875; 125 TABLET, FILM COATED ORAL at 20:14

## 2022-03-06 RX ADMIN — APIXABAN 5 MG: 5 TABLET, FILM COATED ORAL at 09:31

## 2022-03-06 RX ADMIN — METOPROLOL TARTRATE 12.5 MG: 25 TABLET, FILM COATED ORAL at 20:15

## 2022-03-06 RX ADMIN — MAGNESIUM OXIDE TAB 400 MG (241.3 MG ELEMENTAL MG) 400 MG: 400 (241.3 MG) TAB at 20:14

## 2022-03-06 RX ADMIN — MULTIPLE VITAMINS W/ MINERALS TAB 1 TABLET: TAB at 09:30

## 2022-03-06 RX ADMIN — APIXABAN 5 MG: 5 TABLET, FILM COATED ORAL at 20:15

## 2022-03-06 ASSESSMENT — ACTIVITIES OF DAILY LIVING (ADL)
ADLS_ACUITY_SCORE: 9

## 2022-03-06 NOTE — PROGRESS NOTES
[facility]    PROGRESS NOTE - Hospitalist Service    Assessment and Plan    Active Problems:    Hypertension goal BP (blood pressure) < 140/90    Hypomagnesemia    Hypokalemia    Persistent atrial fibrillation (H)    Sepsis, due to unspecified organism, unspecified whether acute organ dysfunction present (H)    Acute respiratory failure with hypoxia (H)    Gram-positive bacteremia    Lluvia Marrufo is a 81 year old old female with h/o sepsis with circulatory shock.      Severe sepsis initially Suspect secondary to UTI, however no clear symptoms, UC negative now suspect CAP, LLL   - resolved   - GPC in BC only 1/2 possibly contaminant? repeat blood cx NGTD   - Lactic acid normal at 2.0.  - zosyn and vanco on admission, vanco previously stopped, discussed with pulm and change to PO Augmentin for total of 7 days, may need longer. Will curb side ID    --- Procalcitonin is 0.8.  Covid negative.  Influenza negative, C diff negative, stool cx negative  --- Required pressors briefly on admit       --- PT and OT       Bacteremia  - zosyn and vanco on admission, vanco previously stopped, discussed with pulm and change to PO Augmentin for total of 7 days, may need longer. Will curb side ID    - repeat Blood Cx NGTD   - strep constelllatus      Acute hypoxic respiratory failure:   --- Persistent, now suspect secondary to asthma exacerbation with pneumonia  --- previous hest x-ray did show bilateral atelectasis.    - BNP elevated yesterday will give IV lasix 20mg   - CT Mild cardiac enlargement, mild interstitial edema and small bilateral pleural effusions are not specific for but could indicate CHF.  Atelectasis and some consolidation and endobronchial secretions involving the posterior half of the basilar segments of both lower lobes and the inferior lingular segment. Findings could relate entirely to the pleural effusions with superimposed   pneumonia possible.  - IV steroids with h/o asthma   -  "Pulm following discussed with Dr Lin can change abx to PO    - reassess breathing in am      Sigmoid mass  --- Does not appear infectious; diverticulitis seen on CT.  --- CT recommends colonoscopy.  Discussed with patient and can likely do as an outpatient     Hypokalemia/hypomag  --- Replacing per protocol      Hypertension:   --- Blood pressure trending up   - metoprolol in place of atenolol  - resumed amlodipine  - IV lasix  - resume lisinopril-hydrochlorothiazide tmoorrow if still elevated     Permanent atrial fibrillation:   ---use low dose metoprolol  in place of atenolol   --- Continue home apixaban.     History of acute CVA     History of asthma: See above, suspect hypoxia from acute exacerbation with wheezing.  Getting CT, starting continue home inhaler     Diarrhea: C. difficile negative, stool cultures pending.  unclear if from mass     # Hypocalcemia: Ca = 8.1 mg/dL (Ref range: 8.5 - 10.5 mg/dL) and/or iCa = N/A on admission, will replace as needed        Obesity: Estimated body mass index is 38.67 kg/m  as calculated from the following:    Height as of this encounter: 1.6 m (5' 3\").    Weight as of this encounter: 99 kg (218 lb 4.8 oz).     COVID-19 PCR Results    COVID-19 PCR Results 6/19/21 8/9/21 3/3/22   SARS-CoV-2 Virus Specimen Source Nasopharyngeal     SARS-CoV-2 PCR Result NEGATIVE     SARS CoV2 PCR  Negative Negative      Comments are available for some flowsheets but are not being displayed.         COVID-19 Antibody Results, Testing for Immunity    COVID-19 Antibody Results, Testing for Immunity   No data to display.            VTE prophylaxis:  DOAC  DIET: Orders Placed This Encounter      Regular Diet Adult    Drains/Lines: PICC   Weight bearing status: WBAt  Disposition/Barriers to discharge: pending decreased O2 needs   Code Status: Full Code    Subjective:  Ambulating well almost to baseline, but still dyspneic     PHYSICAL EXAM  Temp:  [97.9  F (36.6  C)-98  F (36.7  C)] 97.9  F (36.6 "  C)  Pulse:  [] 97  Resp:  [18-20] 20  BP: (120-161)/(67-87) 150/82  SpO2:  [90 %-95 %] 90 %  Wt Readings from Last 1 Encounters:   03/03/22 99 kg (218 lb 4.8 oz)       Intake/Output Summary (Last 24 hours) at 3/6/2022 0839  Last data filed at 3/6/2022 0500  Gross per 24 hour   Intake 600 ml   Output 4300 ml   Net -3700 ml      Body mass index is 38.67 kg/m .    Physical Exam  Constitutional:       Appearance: She is obese. She is ill-appearing.   HENT:      Head: Normocephalic and atraumatic.   Cardiovascular:      Rate and Rhythm: Tachycardia present. Rhythm irregular.      Pulses: Normal pulses.   Pulmonary:      Comments: Mild tachypnea, exp wheezes breathing through pursed lips. Trace crackles at bases  Abdominal:      General: Bowel sounds are normal.      Palpations: Abdomen is soft.   Musculoskeletal:      Comments: Ankle edema    Skin:     General: Skin is warm and dry.      Capillary Refill: Capillary refill takes less than 2 seconds.   Neurological:      General: No focal deficit present.      Mental Status: She is alert and oriented to person, place, and time.       PERTINENT LABS/IMAGING:  Results for orders placed or performed during the hospital encounter of 03/03/22   XR Chest Port 1 View    Impression    IMPRESSION: Patchy bibasilar atelectasis. No effusions or pneumothorax. Heart size at the upper limits of normal although accentuated by technique. Bilateral shoulder arthroplasties. No acute osseous findings.   XR Chest Port 1 View    Impression    IMPRESSION: Right-sided PICC tip in good position at cavoatrial junction. Heart size magnified in AP projection with normal vascularity. Lungs are clear with no pneumothorax.    Bilateral shoulder arthroplasty redemonstrated.   CT Abdomen Pelvis w Contrast    Impression    IMPRESSION:   1.  Masslike wall thickening in the sigmoid. Recommend colonoscopy when the patient is able in order to evaluate for underlying neoplasm.    2.  Simple cysts in the  kidneys and liver do not require follow-up.   XR Chest Port 1 View    Impression    IMPRESSION: Stable satisfactory right PICC line position with the tip near the cavoatrial junction. Slightly low lung volumes. New mild left bibasilar pulmonary opacities which may reflect atelectasis or developing infiltrates. No definite pleural   effusion. Stable cardiomegaly. Mild central vascular congestion. Spinal degenerative changes. Bilateral shoulder arthroplasties.   CT Chest Pulmonary Embolism w Contrast    Impression    IMPRESSION:  1.  No pulmonary emboli. Normal thoracic aorta.  2.  Mild cardiac enlargement, mild interstitial edema and small bilateral pleural effusions are not specific for but could indicate CHF.  3.  Atelectasis and some consolidation and endobronchial secretions involving the posterior half of the basilar segments of both lower lobes and the inferior lingular segment. Findings could relate entirely to the pleural effusions with superimposed   pneumonia possible.  4.  A 5 mm subpleural right upper lobe nodule. Please refer to management guidelines below.    REFERENCE:  Guidelines for Management of Incidental Pulmonary Nodules Detected on CT Images: From the Fleischner Society 2017.   Guidelines apply to incidental nodules in patients who are 35 years or older.  Guidelines do not apply to lung cancer screening, patients with immunosuppression, or patients with known primary cancer.    SINGLE NODULE  Nodule size <6 mm  Low-risk patients: No follow-up needed.  High-risk patients: Optional follow-up at 12 months.         Recent Labs   Lab 03/06/22  0549 03/06/22  0545 03/05/22  0554 03/04/22  2022 03/04/22  1218 03/04/22  0550 03/03/22  1035 03/03/22  1016   WBC 13.6*  --  15.2*  --   --  21.8*   < >  --    HGB 11.9  --  10.7*  --   --  10.6*   < >  --    MCV 92  --  94  --   --  94   < >  --      --  209  --   --  204   < >  --    NA  --  140 138  --   --  139  --  142   POTASSIUM  --  3.5 3.6 3.6    < > 3.4*   < > 3.1*   CHLORIDE  --  101 104  --   --  107  --  106   CO2  --  30 26  --   --  25  --  22   BUN  --  8 8  --   --  11  --  15   CR  --  0.60 0.61  --   --  0.60  --  0.66   ANIONGAP  --  9 8  --   --  7  --  14   ANNA  --  9.1 8.2*  --   --  8.1*  --  9.1   GLC  --  157* 125  --   --  121  --  158*   ALBUMIN  --   --   --   --   --   --   --  3.6   PROTTOTAL  --   --   --   --   --   --   --  7.0   BILITOTAL  --   --   --   --   --   --   --  1.1*   ALKPHOS  --   --   --   --   --   --   --  79   ALT  --   --   --   --   --   --   --  22   AST  --   --   --   --   --   --   --  18    < > = values in this interval not displayed.     Recent Labs   Lab Test 04/21/21  0738   CHOL 151   HDL 67   LDL 61   TRIG 114     Recent Labs   Lab Test 04/21/21  0738 12/16/19  0756 07/30/18  0622   LDL 61 62 102     Recent Labs   Lab Test 03/06/22  0545      POTASSIUM 3.5   CHLORIDE 101   CO2 30   *   BUN 8   CR 0.60   GFRESTIMATED >90   ANNA 9.1     Recent Labs   Lab Test 10/04/17  0543   A1C 5.4     Recent Labs   Lab Test 03/06/22  0549 03/05/22  0554 03/04/22  0550   HGB 11.9 10.7* 10.6*     Recent Labs   Lab Test 03/03/22  1016   TROPONINI <0.01     Recent Labs   Lab Test 03/05/22  1924 03/03/22  1016   * 152*     No results for input(s): TSH in the last 94444 hours.  Recent Labs   Lab Test 07/29/18 1951   INR 0.98       Sis Alvarado MD  [facility] Medicine Service  [phone]

## 2022-03-06 NOTE — PLAN OF CARE
Occupational Therapy Discharge Summary    Reason for therapy discharge:    All goals and outcomes met, no further needs identified.    Progress towards therapy goal(s). See goals on Care Plan in [facility] electronic health record for goal details.  Goals met    Therapy recommendation(s):    No further therapy is recommended.    Goal Outcome Evaluation:            Mary Reyes, OTR/KELSEY  3/6/2022

## 2022-03-06 NOTE — PLAN OF CARE
"Patient was Alert and oriented. Continues to use oxygen, at 4 L at end of the shift. Does get some shortness of breath with activity, but was able to work with PT and OT today. IV antibiotic given. Kaylie Dent RN     Problem: Plan of Care - These are the overarching goals to be used throughout the patient stay.    Goal: Plan of Care Review/Shift Note  Description: The Plan of Care Review/Shift note should be completed every shift.  The Outcome Evaluation is a brief statement about your assessment that the patient is improving, declining, or no change.  This information will be displayed automatically on your shift note.  Outcome: Ongoing, Progressing  Goal: Patient-Specific Goal (Individualized)  Description: You can add care plan individualizations to a care plan. Examples of Individualization might be:  \"Parent requests to be called daily at 9am for status\", \"I have a hard time hearing out of my right ear\", or \"Do not touch me to wake me up as it startles me\".  Outcome: Ongoing, Progressing  Goal: Absence of Hospital-Acquired Illness or Injury  Outcome: Ongoing, Progressing  Intervention: Identify and Manage Fall Risk  Recent Flowsheet Documentation  Taken 3/6/2022 0930 by Kaylie Dent, RN  Safety Promotion/Fall Prevention:    assistive device/personal items within reach    bed alarm on    chair alarm on    nonskid shoes/slippers when out of bed    patient and family education    room near nurse's station  Intervention: Prevent and Manage VTE (Venous Thromboembolism) Risk  Recent Flowsheet Documentation  Taken 3/6/2022 0930 by Kaylie Dent, RN  Activity Management: up ad andrea  Goal: Optimal Comfort and Wellbeing  Outcome: Ongoing, Progressing     Problem: Risk for Delirium  Goal: Optimal Coping  Outcome: Ongoing, Progressing  Goal: Improved Behavioral Control  Outcome: Ongoing, Progressing  Goal: Improved Attention and Thought Clarity  Outcome: Ongoing, Progressing  Goal: Improved " Sleep  Outcome: Ongoing, Progressing     Problem: Adjustment to Illness (Sepsis/Septic Shock)  Goal: Optimal Coping  Outcome: Ongoing, Progressing     Problem: Bleeding (Sepsis/Septic Shock)  Goal: Absence of Bleeding  Outcome: Ongoing, Progressing     Problem: Glycemic Control Impaired (Sepsis/Septic Shock)  Goal: Blood Glucose Level Within Desired Range  Outcome: Ongoing, Progressing     Problem: Infection Progression (Sepsis/Septic Shock)  Goal: Absence of Infection Signs and Symptoms  Outcome: Ongoing, Progressing  Intervention: Promote Recovery  Recent Flowsheet Documentation  Taken 3/6/2022 0930 by Kaylie Dent RN  Activity Management: up ad andrea     Problem: Nutrition Impaired (Sepsis/Septic Shock)  Goal: Optimal Nutrition Intake  Outcome: Ongoing, Progressing   Goal Outcome Evaluation:

## 2022-03-06 NOTE — PROGRESS NOTES
PULMONARY / CRITICAL CARE PROGRESS NOTE    Date / Time of Admission:  3/3/2022  9:44 AM    Assessment:   Active Problems:    Hypomagnesemia    Hypokalemia    Sepsis, due to unspecified organism, unspecified whether acute organ dysfunction present (H)      Code Status:  Full Code    81yoF with history of CVA but no deficits, Afib on apixaban who presented with septic shock in setting of UTI now with normal blood pressures and incidentally found colonic abnormality concerning for mass on CT scan.     Plan:   Pulmonary:  1) Acute hypoxia, Secondary to bibasilar pneumonia  -OOB to chair  -IS  -Wean O2 as able  -Warned patient that she will likely require O2 at discharge, would recommend down to 3L at least prior to discharge  -Will arrange for follow up visit in 4 weeks with me and repeat imaging  -Discussed using oximeter to wean O2  -Expressed concerns about airplane trip to [place] planned for 3/12. Recommended reconsidering  -OK to transition to Augmentin, plan for 7 days  -Solumedrol to prednisone   -OK to discharge tomorrow if O2 at or less than 3 L. If not pulmonary will continue to round.       Subjective:   HPI:  Lluvia Marrufo is a 81 year old female with history of afib on apixaban, CVA without deficits who presented with nausea/vomiting and found to have UTI, later developed septic shock that has since resolved. Now hypoxic in setting of volume overload and newly discovered bibasilar pneumonia.  New colonic mass seen on CT scan.     Active Problems:    Hypomagnesemia    Hypokalemia    Sepsis, due to unspecified organism, unspecified whether acute organ dysfunction present (H)      Allergies: Patient has no known allergies.     MEDS:  Scheduled Meds:    amLODIPine  10 mg Oral Daily     apixaban ANTICOAGULANT  5 mg Oral BID     atorvastatin  20 mg Oral Daily     gabapentin  300 mg Oral Daily     magnesium oxide  400 mg Oral BID     methylPREDNISolone  62.5 mg Intravenous Q12H     metoprolol tartrate   "12.5 mg Oral BID     multivitamin w/minerals  1 tablet Oral Daily     piperacillin-tazobactam  3.375 g Intravenous Q8H     [Held by provider] potassium chloride ER  20 mEq Oral TID     sodium chloride (PF)  10-40 mL Intracatheter Q7 Days     Continuous Infusions:    PRN Meds:.acetaminophen, albuterol, diazepam, melatonin, ondansetron, ondansetron, sodium chloride (PF), sodium chloride (PF)      Objective:   VITALS:  BP (!) 150/82 (BP Location: Left arm)   Pulse 97   Temp 97.9  F (36.6  C) (Oral)   Resp 20   Ht 1.6 m (5' 3\")   Wt 99 kg (218 lb 4.8 oz)   LMP  (LMP Unknown)   SpO2 90%   BMI 38.67 kg/m    EXAM:  GENERAL APPEARANCE: Alert, no acute distress  HENT: Oral mucosa and posterior oropharynx normal, moist mucous membranes  NECK: No adenopathy,masses or thyromegaly  RESP: lungs clear to auscultation bilaterally  CV: regular rate and rhythm, no murmur, rub or gallop  ABDOMEN: normal bowel sounds, soft, nontender, no hepatosplenomegaly or other masses  LYMPHATICS: No cervical, or supraclavicular adenopathy  SKIN: no suspicious lesions or rashes  NEURO: Alert, oriented x 3, speech and mentation normal    I&O:     Intake/Output Summary (Last 24 hours) at 3/6/2022 1605  Last data filed at 3/6/2022 0500  Gross per 24 hour   Intake 250 ml   Output 1850 ml   Net -1600 ml             Data Review:  Personally reviewed image/s and Personally reviewed impression/s  CT chest  EXAM: CT CHEST PULMONARY EMBOLISM W CONTRAST  LOCATION: [facility]  DATE/TIME: 3/5/2022 2:33 PM     INDICATION: Severe sepsis, positive blood culture. LLL crackles. Significant hypoxia.  COMPARISON: 03/04/2022 CXR and 03/03/2022 CT AP.  TECHNIQUE: CT chest pulmonary angiogram during arterial phase injection of IV contrast. Multiplanar reformats and MIP reconstructions were performed. Dose reduction techniques were used.   CONTRAST: Isovue 370, 100 mL.     FINDINGS:  ANGIOGRAM CHEST: Pulmonary arteries are normal caliber " and negative for pulmonary emboli. Thoracic aorta is negative for dissection. No CT evidence of right heart strain.     LUNGS AND PLEURA: Mild interstitial edema. Small bilateral pleural effusions. Atelectasis and some consolidation and endobronchial secretions involving the posterior half of the basilar segments of both lower lobes and the inferior lingular segment. A 5   mm subpleural right upper lobe nodule has no frankly concerning features (image 45 of series 5).     MEDIASTINUM/AXILLAE: Mild generalized cardiac enlargement. No pericardial effusion. No lymphadenopathy. Right PICC tip low SVC.     CORONARY ARTERY CALCIFICATION: No visible coronary artery calcification.     UPPER ABDOMEN: Benign hepatic cyst left hepatic lobe. Mild diffuse hepatic steatosis.     MUSCULOSKELETAL: Bridging endplate osteophyte formation throughout the thoracic spine. Bones otherwise unremarkable. Bilateral shoulder arthroplasties.                                                                      IMPRESSION:  1.  No pulmonary emboli. Normal thoracic aorta.  2.  Mild cardiac enlargement, mild interstitial edema and small bilateral pleural effusions are not specific for but could indicate CHF.  3.  Atelectasis and some consolidation and endobronchial secretions involving the posterior half of the basilar segments of both lower lobes and the inferior lingular segment. Findings could relate entirely to the pleural effusions with superimposed   pneumonia possible.  4.  A 5 mm subpleural right upper lobe nodule. Please refer to management guidelines below.      LABS  Glucose Values Latest Ref Rng & Units 3/3/2022 3/4/2022 3/5/2022 3/6/2022   Bedside Glucose (mg/dl )  - -- -- -- --   GLUCOSE 70 - 125 mg/dL 158(H) 121 125 157(H)   Some recent data might be hidden       Results for orders placed or performed during the hospital encounter of 03/03/22   Basic metabolic panel   Result Value Ref Range    Sodium 140 136 - 145 mmol/L     Potassium 3.5 3.5 - 5.0 mmol/L    Chloride 101 98 - 107 mmol/L    Carbon Dioxide (CO2) 30 22 - 31 mmol/L    Anion Gap 9 5 - 18 mmol/L    Urea Nitrogen 8 8 - 28 mg/dL    Creatinine 0.60 0.60 - 1.10 mg/dL    Calcium 9.1 8.5 - 10.5 mg/dL    Glucose 157 (H) 70 - 125 mg/dL    GFR Estimate >90 >60 mL/min/1.73m2     Lab Results   Component Value Date    WBC 13.6 (H) 03/06/2022    HGB 11.9 03/06/2022    HCT 36.2 03/06/2022    MCV 92 03/06/2022     03/06/2022       ABG:  Recent Labs   Lab 03/03/22 1959   PH 7.40             By:  Mary Jo Lin MD  Pulmonary/Critical Care  Pager: [phone]

## 2022-03-06 NOTE — PLAN OF CARE
Problem: Risk for Delirium  Goal: Improved Sleep  Outcome: Ongoing, Progressing  Pt slept most of the night, awake for cares and assessment.   Problem: Infection Progression (Sepsis/Septic Shock)  Goal: Absence of Infection Signs and Symptoms  Outcome: Ongoing, Progressing   Pt afebrile, no s/s of infection noted, VSS.   Problem: Nutrition Impaired (Sepsis/Septic Shock)  Goal: Optimal Nutrition Intake  Outcome: Ongoing, Progressing   Requested and had bedtime snack.   A/o x4, occasional dyspnea with exertion, on 4L NC sating at 89 to 95%, urine sample send to lab, pending result.    Goal Outcome Evaluation:

## 2022-03-06 NOTE — PROGRESS NOTES
03/06/22 0845   Quick Adds   Type of Visit Initial Occupational Therapy Evaluation   Living Environment   People in Home spouse   Current Living Arrangements house   Home Accessibility stairs to enter home   Number of Stairs, Main Entrance 4   Stair Railings, Main Entrance railings safe and in good condition   [place] house (airbnb)   Self-Care   Current Activity Tolerance moderate   Equipment Currently Used at Home grab bar, tub/shower  (no AD)   Instrumental Activities of Daily Living (IADL)   IADL Comments indep. w/ ADLs, retired    General Information   Onset of Illness/Injury or Date of Surgery 03/03/22   Patient/Family Therapy Goal Statement (OT) home   Existing Precautions/Restrictions oxygen therapy device and L/min   Cognitive Status Examination   Affect/Mental Status (Cognitive) WFL   Visual Perception   Visual Impairment/Limitations corrective lenses full-time   Range of Motion Comprehensive   General Range of Motion no range of motion deficits identified   Strength Comprehensive (MMT)   General Manual Muscle Testing (MMT) Assessment no strength deficits identified   Coordination   Upper Extremity Coordination No deficits were identified   Bed Mobility   Bed Mobility supine-sit   Supine-Sit [place] (Bed Mobility) independent   Transfers   Transfers toilet transfer;bed-chair transfer   Transfer Skill: Bed to Chair/Chair to Bed   Bed-Chair [place] (Transfers) independent   Assistive Device (Bed-Chair Transfers)   (none)   Toilet Transfer   Type (Toilet Transfer) sit-stand;stand-sit   [place] Level (Toilet Transfer) independent   Assistive Device (Toilet Transfer)   (none)   Toilet Transfer Comments 5L o2, o2 sats ~90%, PLB technique used   Balance   Balance Assessment standing balance: static   Balance Comments WFL   Lower Body Dressing Assessment/Training   [place] Level (Lower Body Dressing) maximum assist (25%  patient effort)   Comment, (Lower Body Dressing) socks (donning)   Grooming Assessment/Training   [place] Level (Grooming) independent   Position (Grooming) supported standing   Comment, (Grooming) washed hands, face, brushed teeth    Toileting   [place] Level (Toileting) independent   Comment, (Toileting) all toileting   Clinical Impression   Criteria for Skilled Therapeutic Interventions Met (OT) Yes, treatment indicated   OT Diagnosis decreased ADLs   OT Problem List-Impairments impacting ADL activity tolerance impaired   Identified Performance Deficits activity tolerance as related to completing ADLs   Planned Therapy Interventions (OT) home program guidelines   Clinical Decision Making Complexity (OT) low complexity   Anticipated Equipment Needs Upon Discharge (OT)   (pt [place]   Risk & Benefits of therapy have been explained patient;evaluation/treatment results reviewed;care plan/treatment goals reviewed   OT Discharge Planning   OT Discharge Recommendation (DC Rec) home   OT Rationale for DC Rec d/c home w/ family,    Total Evaluation Time (Minutes)   Total Evaluation Time (Minutes) 5   OT Goals   Therapy Frequency (OT) Daily   OT Predicated Duration/Target Date for Goal Attainment 03/06/22   OT Goals Hygiene/Grooming;Transfers   OT: Hygiene/Grooming independent;while standing   OT: Transfer Independent

## 2022-03-06 NOTE — PLAN OF CARE
Physical Therapy Discharge Summary    Reason for therapy discharge:    All goals and outcomes met, no further needs identified.    Progress towards therapy goal(s). See goals on Care Plan in [facility] electronic health record for goal details.  Goals met    Therapy recommendation(s):    Continue home exercise program.

## 2022-03-06 NOTE — PROGRESS NOTES
03/06/22 1000   Quick Adds   Type of Visit Initial PT Evaluation   Living Environment   People in Home spouse   Current Living Arrangements house   Home Accessibility stairs to enter home   Number of Stairs, Main Entrance 4   Stair Railings, Main Entrance railings safe and in good condition   Self-Care   Equipment Currently Used at Home   (does not use her FWW, SEC)   General Information   Onset of Illness/Injury or Date of Surgery 03/03/22   Patient/Family Therapy Goals Statement (PT) go home   Existing Precautions/Restrictions oxygen therapy device and L/min   Strength Comprehensive (MMT)   General Manual Muscle Testing (MMT) Assessment no strength deficits identified   Transfers   Transfers no deficits identified   Gait/Stairs (Locomotion)   [place] Level (Gait) independent   Assistive Device (Gait)   (none)   Distance in Feet (Required for LE Total Joints) 200   Deviations/Abnormal Patterns (Gait) [place] decreased;gait speed decreased;stride length decreased   Negotiation (Stairs) stairs independence;handrail location;number of steps;ascending technique;descending technique   [place] Level (Stairs) modified independence   Handrail Location (Stairs) left side (ascending)   Number of Steps (Stairs) 4   Ascending Technique (Stairs) step-to-step   Descending Technique (Stairs) step-to-step   Clinical Impression   Criteria for Skilled Therapeutic Intervention Yes, treatment indicated   PT Diagnosis (PT) impaired funtional mobility   Influenced by the following impairments gait, stairs   Functional limitations due to impairments dec activity tolerance/endurance   Clinical Presentation (PT Evaluation Complexity) Evolving/Changing   Clinical Presentation Rationale pt presents as medically diagnosed   Clinical Decision Making (Complexity) low complexity   Planned Therapy Interventions (PT) gait training;home exercise program;stair training   Risk & Benefits of therapy have been explained  evaluation/treatment results reviewed;care plan/treatment goals reviewed;patient   PT Discharge Planning   PT Discharge Recommendation (DC Rec) home  (nursing to facilitate ambulation during hospitalization)   PT Rationale for DC Rec pt demonstrates good understanding of conditioning exercise and ambulation   Total Evaluation Time   Total Evaluation Time (Minutes) 10   Physical Therapy Goals   PT Frequency One time eval and treatment only   PT Predicated Duration/Target Date for Goal Attainment 03/06/22   PT Goals Gait;Stairs   PT: Gait Modified independent;150 feet;Goal Met   PT: Stairs Modified independent;4 stairs;Rail on left;Goal Met

## 2022-03-07 VITALS
HEART RATE: 90 BPM | DIASTOLIC BLOOD PRESSURE: 73 MMHG | BODY MASS INDEX: 38.68 KG/M2 | WEIGHT: 218.3 LBS | SYSTOLIC BLOOD PRESSURE: 122 MMHG | RESPIRATION RATE: 18 BRPM | HEIGHT: 63 IN | OXYGEN SATURATION: 95 % | TEMPERATURE: 98.1 F

## 2022-03-07 DIAGNOSIS — J18.9 PNEUMONIA DUE TO INFECTIOUS ORGANISM, UNSPECIFIED LATERALITY, UNSPECIFIED PART OF LUNG: Primary | ICD-10-CM

## 2022-03-07 PROBLEM — K63.89 MASS OF COLON: Status: ACTIVE | Noted: 2022-03-07

## 2022-03-07 PROBLEM — J45.21 MILD INTERMITTENT ASTHMA WITH EXACERBATION: Status: ACTIVE | Noted: 2022-03-07

## 2022-03-07 LAB
ANION GAP SERPL CALCULATED.3IONS-SCNC: 10 MMOL/L (ref 5–18)
BUN SERPL-MCNC: 11 MG/DL (ref 8–28)
CALCIUM SERPL-MCNC: 8.7 MG/DL (ref 8.5–10.5)
CHLORIDE BLD-SCNC: 100 MMOL/L (ref 98–107)
CO2 SERPL-SCNC: 32 MMOL/L (ref 22–31)
CREAT SERPL-MCNC: 0.59 MG/DL (ref 0.6–1.1)
GFR SERPL CREATININE-BSD FRML MDRD: >90 ML/MIN/1.73M2
GLUCOSE BLD-MCNC: 159 MG/DL (ref 70–125)
MAGNESIUM SERPL-MCNC: 1.8 MG/DL (ref 1.8–2.6)
POTASSIUM BLD-SCNC: 3.4 MMOL/L (ref 3.5–5)
SODIUM SERPL-SCNC: 142 MMOL/L (ref 136–145)

## 2022-03-07 PROCEDURE — 80048 BASIC METABOLIC PNL TOTAL CA: CPT | Performed by: INTERNAL MEDICINE

## 2022-03-07 PROCEDURE — 250N000011 HC RX IP 250 OP 636: Performed by: FAMILY MEDICINE

## 2022-03-07 PROCEDURE — 83735 ASSAY OF MAGNESIUM: CPT | Performed by: INTERNAL MEDICINE

## 2022-03-07 PROCEDURE — 99239 HOSP IP/OBS DSCHRG MGMT >30: CPT | Performed by: INTERNAL MEDICINE

## 2022-03-07 PROCEDURE — 250N000013 HC RX MED GY IP 250 OP 250 PS 637: Performed by: INTERNAL MEDICINE

## 2022-03-07 PROCEDURE — 250N000012 HC RX MED GY IP 250 OP 636 PS 637: Performed by: INTERNAL MEDICINE

## 2022-03-07 PROCEDURE — 250N000013 HC RX MED GY IP 250 OP 250 PS 637: Performed by: FAMILY MEDICINE

## 2022-03-07 RX ORDER — PREDNISONE 20 MG/1
40 TABLET ORAL DAILY
Status: DISCONTINUED | OUTPATIENT
Start: 2022-03-07 | End: 2022-03-07 | Stop reason: HOSPADM

## 2022-03-07 RX ORDER — METOPROLOL TARTRATE 25 MG/1
12.5 TABLET, FILM COATED ORAL 2 TIMES DAILY
Qty: 30 TABLET | Refills: 0 | Status: SHIPPED | OUTPATIENT
Start: 2022-03-07 | End: 2022-03-31

## 2022-03-07 RX ORDER — PREDNISONE 20 MG/1
TABLET ORAL
Qty: 13 TABLET | Refills: 0 | Status: ON HOLD | OUTPATIENT
Start: 2022-03-08 | End: 2022-03-24

## 2022-03-07 RX ADMIN — APIXABAN 5 MG: 5 TABLET, FILM COATED ORAL at 09:24

## 2022-03-07 RX ADMIN — METHYLPREDNISOLONE SODIUM SUCCINATE 62.5 MG: 125 INJECTION, POWDER, FOR SOLUTION INTRAMUSCULAR; INTRAVENOUS at 00:13

## 2022-03-07 RX ADMIN — AMLODIPINE BESYLATE 10 MG: 5 TABLET ORAL at 09:25

## 2022-03-07 RX ADMIN — METOPROLOL TARTRATE 12.5 MG: 25 TABLET, FILM COATED ORAL at 09:25

## 2022-03-07 RX ADMIN — PREDNISONE 40 MG: 20 TABLET ORAL at 10:50

## 2022-03-07 RX ADMIN — GABAPENTIN 300 MG: 300 CAPSULE ORAL at 09:25

## 2022-03-07 RX ADMIN — AMOXICILLIN AND CLAVULANATE POTASSIUM 1 TABLET: 875; 125 TABLET, FILM COATED ORAL at 09:24

## 2022-03-07 RX ADMIN — MULTIPLE VITAMINS W/ MINERALS TAB 1 TABLET: TAB at 09:24

## 2022-03-07 ASSESSMENT — ACTIVITIES OF DAILY LIVING (ADL)
ADLS_ACUITY_SCORE: 9

## 2022-03-07 NOTE — PLAN OF CARE
Problem: Plan of Care - These are the overarching goals to be used throughout the patient stay.    Goal: Plan of Care Review/Shift Note  Description: The Plan of Care Review/Shift note should be completed every shift.  The Outcome Evaluation is a brief statement about your assessment that the patient is improving, declining, or no change.  This information will be displayed automatically on your shift note.  Outcome: Adequate for Care Transition  Flowsheets (Taken 3/7/2022 1113)  Plan of Care Reviewed With: patient     Problem: Plan of Care - These are the overarching goals to be used throughout the patient stay.    Goal: Readiness for Transition of Care  Outcome: Adequate for Care Transition  Flowsheets (Taken 3/7/2022 1113)  Concerns to be Addressed: all concerns addressed in this encounter  Intervention: Mutually Develop Transition Plan  Recent Flowsheet Documentation  Taken 3/7/2022 1113 by Mulu Leahy RN  Concerns to be Addressed: all concerns addressed in this encounter   Goal Outcome Evaluation:    Plan of Care Reviewed With: patient patient ready to go home with oxygen

## 2022-03-07 NOTE — PROGRESS NOTES
[facility]    Primary Care Physician:  Eddie Diaz  Discharge Provider: Ssi Alvarado MD   Admission Date: 3/3/2022.  Admission Diagnoses:  Hypokalemia [E87.6]  Hypomagnesemia [E83.42]  Sepsis, due to unspecified organism, unspecified whether acute organ dysfunction present (H) [A41.9]   Discharge Diagnoses:   Active Problems:    Hypertension goal BP (blood pressure) < 140/90    Hypomagnesemia    Hypokalemia    Persistent atrial fibrillation (H)    Sepsis, due to unspecified organism, unspecified whether acute organ dysfunction present (H)    Acute respiratory failure with hypoxia (H)    Gram-positive bacteremia    Patient: Lluvia Marrufo is a 81 year old old female     I certify that this patient, Lluvia Marrufo has been under my care and that I, or a nurse practitioner or physician's assistant working with me, had a face-to-face encounter that meets face-to-face encounter requirements with this patient on 3/7/2022.      Was the patient admitted for an acute respiratory illness? Yes. Has the acute respiratory illness been resolved? Yes    Lluvia Marrufo is now in a chronic stable state and continues to require supplemental oxygen due to continued oxygen desaturation.  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  If portability is ordered, is the patient mobile within the home? Yes    **Patients who qualify for home O2 coverage under the CMS guidelines require ABG tests or O2 sat readings obtained closest to, but no earlier than 2 days prior to the discharge, as evidence of the need for home oxygen therapy. Testing must be performed while patient is in the chronic stable state. See notes for O2 sats.**   Pulse oximetry (SpO2) and Oxygen flow readings:     SpO2 = 88% on room air at rest while awake.     SpO2 improved to 95% on  3 liters/minute at rest.     SpO2 = 84% on room air during activity/with exercise.     *SpO2 improved to 4% on 4 liters/minute during activity/with exercise.     Signed Sis Alvarado MD.   Date 03/07/22

## 2022-03-07 NOTE — PLAN OF CARE
Problem: Plan of Care - These are the overarching goals to be used throughout the patient stay.    Goal: Readiness for Transition of Care  Outcome: Ongoing, Progressing   Goal Outcome Evaluation:           Patient alert and oriented. Vitals stable. On 3 litter of oxygen sating low 90's gets SOB with activity. Denied pain through out this shift. Kirk wicke in place. IV lasix given with good urine out put.will continue to monitor.

## 2022-03-07 NOTE — PLAN OF CARE
Problem: Plan of Care - These are the overarching goals to be used throughout the patient stay.    Goal: Absence of Hospital-Acquired Illness or Injury  Intervention: Identify and Manage Fall Risk  Recent Flowsheet Documentation  Taken 3/7/2022 0014 by Bette Luna RN  Safety Promotion/Fall Prevention: activity supervised  Intervention: Prevent Skin Injury  Recent Flowsheet Documentation  Taken 3/7/2022 0014 by Bette Luna RN  Body Position: position changed independently  Intervention: Prevent and Manage VTE (Venous Thromboembolism) Risk  Recent Flowsheet Documentation  Taken 3/7/2022 0014 by Bette Luna RN  Activity Management: activity adjusted per tolerance  Intervention: Prevent Infection  Recent Flowsheet Documentation  Taken 3/7/2022 0014 by Bette Luna RN  Infection Prevention:   rest/sleep promoted   single patient room provided     Problem: Infection Progression (Sepsis/Septic Shock)  Goal: Absence of Infection Signs and Symptoms  Intervention: Initiate Sepsis Management  Recent Flowsheet Documentation  Taken 3/7/2022 0014 by Bette Luna RN  Infection Prevention:   rest/sleep promoted   single patient room provided  Intervention: Promote Recovery  Recent Flowsheet Documentation  Taken 3/7/2022 0014 by Bette Luna RN  Activity Management: activity adjusted per tolerance   Goal Outcome Evaluation:           Pt a/o with shortness of breath on exertion. Pt states baseline shortness of breath d/t her asthma. Pt on 3L nc with sats dipping to 84% while up to the bathroom. Mg and K+ protocols to be rechecked this am. Will monitor.

## 2022-03-07 NOTE — PROGRESS NOTES
Patient has been assessed for Home Oxygen needs.     Pulse oximetry (SpO2) and Oxygen flow readings:    SpO2 = 88% on room air at rest while awake.    SpO2 improved to 95% on 3 liters/minute at rest.    SpO2 = 84% on room air during activity/with exercise.    *SpO2 improved to 4% on 4 liters/minute during activity/with exercise.

## 2022-03-07 NOTE — DISCHARGE SUMMARY
[facility]  Hospitalist Discharge Summary      Date of Admission:  3/3/2022  Date of Discharge:  3/7/2022  Discharging Provider: Sis Alvarado MD  Discharge Service: Hospitalist Service    Discharge Diagnoses   Active Problems:    Hypertension goal BP (blood pressure) < 140/90    Hypomagnesemia    Hypokalemia    Persistent atrial fibrillation (H)    Sepsis, due to unspecified organism, unspecified whether acute organ dysfunction present (H)    Acute respiratory failure with hypoxia (H)    Gram-positive bacteremia    Mild intermittent asthma with exacerbation    Mass of colon        Follow-ups Needed After Discharge   Follow-up Appointments     Follow-up and recommended labs and tests       Follow up with primary care provider, Eddie Diaz, within 3-5days,   to evaluate medication change and for hospital follow- up. The following   labs/tests are recommended: BMP. PCP to arrange colonoscopy to follow-up   questionable sigmoid mass   Pulmonology arranging follow-up .             Unresulted Labs Ordered in the Past 30 Days of this Admission     Date and Time Order Name Status Description    3/5/2022  5:38 AM Blood Culture Peripheral Blood Preliminary     3/3/2022 11:02 AM Blood Culture Peripheral Blood Preliminary     3/3/2022 11:02 AM Blood Culture Peripheral Blood Preliminary           Discharge Disposition   Discharged to home  Condition at discharge: Stable      Hospital Course   Lluvia Marrufo is a 81 year old old female with h/o sepsis with circulatory shock.      Severe sepsis initially Suspect secondary to UTI, however no clear symptoms, UC negative now suspect CAP, LLL   - resolved vitals stable   - GPC in BC only 1/2 possibly contaminant, and repeat negative so 1/4 positive. Discussed with Dr. Lin and agrees contaminant, Also discussed with ID as curb side an agrees 1/4 likely contaminant   - Lactic acid normal at 2.0.  - zosyn and vanco on admission, vanco  previously stopped, discussed with pulm and change to PO Augmentin for total of 7 day  --- Procalcitonin is 0.8.  Covid negative.  Influenza negative, C diff negative, stool cx negative  --- Required pressors briefly on admit       --- PT and OT       Bacteremia  - zosyn and vanco on admission, vanco previously stopped, discussed with pulm and change to PO Augmentin for total of 7 days, may need longer. Will curb side ID    - repeat Blood Cx NGTD   - strep constelllatus      Acute hypoxic respiratory failure:   --- Persistent, now suspect secondary to asthma exacerbation with pneumonia  --- previous hest x-ray did show bilateral atelectasis.    - BNP elevated yesterday will give IV lasix 20mg   - CT Mild cardiac enlargement, mild interstitial edema and small bilateral pleural effusions are not specific for but could indicate CHF.  Atelectasis and some consolidation and endobronchial secretions involving the posterior half of the basilar segments of both lower lobes and the inferior lingular segment. Findings could relate entirely to the pleural effusions with superimposed   pneumonia possible.  - IV steroids with h/o asthma   - Pulm following discussed with Dr Lin can change abx to PO    - reassess breathing in am      Sigmoid mass  --- Does not appear infectious; diverticulitis seen on CT.  --- CT recommends colonoscopy.  Discussed with patient and can likely do as an outpatient     Hypokalemia/hypomag  --- Replacing per protocol      Hypertension:   --- Blood pressure trending up   - metoprolol in place of atenolol  - resumed amlodipine  - IV lasix  - resume lisinopril-hydrochlorothiazide tmoorrow if still elevated     Permanent atrial fibrillation:   ---use low dose metoprolol  in place of atenolol   --- Continue home apixaban.     History of acute CVA     History of asthma: See above, suspect hypoxia from acute exacerbation with wheezing.  Getting CT, starting continue home inhaler     Diarrhea: C. difficile  "negative, stool cultures pending.  unclear if from mass     # Hypocalcemia: Ca = 8.1 mg/dL (Ref range: 8.5 - 10.5 mg/dL) and/or iCa = N/A on admission, will replace as needed        Obesity: Estimated body mass index is 38.67 kg/m  as calculated from the following:    Height as of this encounter: 1.6 m (5' 3\").    Weight as of this encounter: 99 kg (218 lb 4.8 oz).         Consultations This Hospital Stay   PHARMACY TO DOSE VANCO  VASCULAR ACCESS ADULT IP CONSULT  PHARMACY TO DOSE VANCO  SOCIAL WORK IP CONSULT  INTENSIVIST IP CONSULT  PHYSICAL THERAPY ADULT IP CONSULT  OCCUPATIONAL THERAPY ADULT IP CONSULT    Code Status   Full Code    Time Spent on this Encounter   I, Sis Alvarado MD, personally saw the patient today and spent greater than 30 minutes discharging this patient.       Sis Alvarado MD  [address] [address]  Phone: [phone]  Fax: [phone]  ______________________________________________________________________    Physical Exam   Vital Signs: Temp: 98.1  F (36.7  C) Temp src: Oral BP: 122/73 Pulse: 90   Resp: 18 SpO2: 95 % O2 Device: None (Room air) Oxygen Delivery: 3 LPM  Weight: 218 lbs 4.8 oz  Lluvia Marrufo is a 81 year old old female with h/o sepsis with circulatory shock.      Severe sepsis initially Suspect secondary to UTI, however no clear symptoms, UC negative now suspect CAP, LLL   - resolved   - GPC in BC only 1/2 possibly contaminant? repeat blood cx NGTD   - Lactic acid normal at 2.0.  - zosyn and vanco on admission, vanco previously stopped, discussed with pulm and change to PO Augmentin for total of 7 days  --- Procalcitonin is 0.8.  Covid negative.  Influenza negative, C diff negative, stool cx negative  --- Required pressors briefly on admit, none since       Bacteremia  - zosyn and vanco on admission, vanco previously stopped, discussed with pulm and change to PO Augmentin for total of 7 days as above " 1/4 positive ID and Pulm agree contaminant   - strep constelllatus      Acute hypoxic respiratory failure:   --- Persistent, now suspect secondary to asthma exacerbation with pneumonia  --- previous hest x-ray did show bilateral atelectasis.    - BNP elevated given IV lasix 20mg breathing slightly improved   - CT Mild cardiac enlargement, mild interstitial edema and small bilateral pleural effusions are not specific for but could indicate CHF.  Atelectasis and some consolidation and endobronchial secretions involving the posterior half of the basilar segments of both lower lobes and the inferior lingular segment. Findings could relate entirely to the pleural effusions with superimposed   pneumonia possible.  - IV steroids with prednisone taper decrease by 10mg every 3 days with h/o asthma    - Pulm following discussed with Dr Lin she is arranging clinic f/u and reviewed taper and abx as above   - home O2 eval and requires 3 liters at rest and 4liters with activity.  - patient has plans to travel in a week to [place]. Dr. Lin and writer did recommend delaying trip, but patient to determine.      Sigmoid mass  --- Does not appear infectious; diverticulitis seen on CT.  --- CT recommends colonoscopy.  Discussed with patient and can likely do as an outpatient     Hypokalemia/hypomag  --- Replacing     Hypertension:   --- Blood pressure trending up   - metoprolol in place of atenolol  - resumed amlodipine  - IV lasix  - resume lisinopril-hydrochlorothiazide at discharge      Permanent atrial fibrillation:   ---use low dose metoprolol  in place of atenolol   --- Continue home apixaban.     History of acute CVA asx       Diarrhea: C. difficile negative, stool cultures pending.  unclear if from mass, sigmoid mass  - PCP to arrange colonoscopy at follow-up      # Hypocalcemia: stable       Obesity: Estimated body mass index is 38.67 kg/m  as calculated from the following:    Height as of this encounter: 1.6 m (5'  "3\").    Weight as of this encounter: 99 kg (218 lb 4.8 oz).       Primary Care Physician   Eddie Diaz    Discharge Orders      Reason for your hospital stay    Active Problems:    Hypertension goal BP (blood pressure) < 140/90    Hypomagnesemia    Hypokalemia    Persistent atrial fibrillation (H)    Sepsis, due to unspecified organism, unspecified whether acute organ dysfunction present (H)    Acute respiratory failure with hypoxia (H)    Gram-positive bacteremia    Mild intermittent asthma with exacerbation     Activity    Your activity upon discharge: activity as tolerated     Follow-up and recommended labs and tests     Follow up with primary care provider, Eddie Diaz, within 3-5days, to evaluate medication change and for hospital follow- up. The following labs/tests are recommended: BMP. PCP to arrange colonoscopy to follow-up questionable sigmoid mass   Pulmonology arranging follow-up .     Oxygen Order    Oxygen Documentation:   I certify that this patient, Lluvia Marrufo has been under my care (or a nurse practitioner or physican's assistant working with me). This is the face-to-face encounter for oxygen medical necessity.      Lluvia Marrufo is now in a chronic stable state and continues to require supplemental oxygen. Patient has continued oxygen desaturation due to Mild Persistent Asthma J45.30.    Alternative treatment(s) tried or considered and deemed clinically infective for treatment of Mild Persistent Asthma J45.30 include nebulizers and steroids.  If portability is ordered, is the patient mobile within the home? yes    **Patients who qualify for home O2 coverage under the CMS guidelines require ABG tests or O2 sat readings obtained closest to, but no earlier than 2 days prior to the discharge, as evidence of the need for home oxygen therapy. Testing must be performed while patient is in the chronic stable state. See notes for O2 sats.**     Diet    Follow this diet upon " discharge: Orders Placed This Encounter      Regular Diet Adult       Significant Results and Procedures   Most Recent 3 CBC's:  Recent Labs   Lab Test 03/06/22  0549 03/05/22  0554 03/04/22  0550   WBC 13.6* 15.2* 21.8*   HGB 11.9 10.7* 10.6*   MCV 92 94 94    209 204     Most Recent 3 BMP's:  Recent Labs   Lab Test 03/07/22  0625 03/06/22  0545 03/05/22  0554    140 138   POTASSIUM 3.4* 3.5 3.6   CHLORIDE 100 101 104   CO2 32* 30 26   BUN 11 8 8   CR 0.59* 0.60 0.61   ANIONGAP 10 9 8   ANNA 8.7 9.1 8.2*   * 157* 125     Most Recent 2 LFT's:  Recent Labs   Lab Test 03/03/22  1016 04/21/21  0738   AST 18 19   ALT 22 22   ALKPHOS 79 76   BILITOTAL 1.1* 1.1*   ,   Results for orders placed or performed during the hospital encounter of 03/03/22   XR Chest Port 1 View    Narrative    EXAM: XR CHEST PORT 1 VIEW  LOCATION: [facility]  DATE/TIME: 3/3/2022 10:52 AM    INDICATION: Shortness of breath  COMPARISON: 07/29/2018      Impression    IMPRESSION: Patchy bibasilar atelectasis. No effusions or pneumothorax. Heart size at the upper limits of normal although accentuated by technique. Bilateral shoulder arthroplasties. No acute osseous findings.   XR Chest Port 1 View    Narrative    EXAM: XR CHEST PORT 1 VIEW  LOCATION: [facility]  DATE/TIME: 3/3/2022 1:32 PM    INDICATION: Check PICC placement.  COMPARISON: 3/3/2022 at 1057 hours.      Impression    IMPRESSION: Right-sided PICC tip in good position at cavoatrial junction. Heart size magnified in AP projection with normal vascularity. Lungs are clear with no pneumothorax.    Bilateral shoulder arthroplasty redemonstrated.   CT Abdomen Pelvis w Contrast    Narrative    EXAM: CT ABDOMEN PELVIS W CONTRAST  LOCATION: [facility]  DATE/TIME: 3/3/2022 6:22 PM    INDICATION: Abdominal pain and fever. Severe sepsis secondary to urinary tract infection.  COMPARISON: None.  TECHNIQUE: CT  scan of the abdomen and pelvis was performed following injection of IV contrast. Multiplanar reformats were obtained. Dose reduction techniques were used.  CONTRAST: Isovue 370, 100 mL.    FINDINGS:   LOWER CHEST: Mild dependent atelectasis at the lung bases. No pleural effusion. Heart is mildly enlarged.    HEPATOBILIARY: Left hepatic lobe demonstrates a cystic lesion in segment II measuring 4.1 x 3.6 cm. This does not require follow-up. There is a 9 mm cystic lesion on image 63. This also does not require follow-up. Gallbladder has been resected.    PANCREAS: Normal.    SPLEEN: Normal.    ADRENAL GLANDS: Normal.    KIDNEYS/BLADDER: Simple cyst in the right ovary measures 1.8 x 2.7 cm and does not require follow-up. No hydronephrosis. Bladder is unremarkable.    BOWEL: Diverticulosis without evidence of diverticulitis. There is some wall thickening in the mid sigmoid seen on series 3: image 156. This has masslike margins and further evaluation with colonoscopy is recommended when the patient is able in order to   evaluate for neoplasm.    LYMPH NODES: No retroperitoneal adenopathy. No pelvic adenopathy.    VASCULATURE: Unremarkable.    PELVIC ORGANS: Normal.    MUSCULOSKELETAL: Left hip arthroplasty. Degenerative changes in the spine.      Impression    IMPRESSION:   1.  Masslike wall thickening in the sigmoid. Recommend colonoscopy when the patient is able in order to evaluate for underlying neoplasm.    2.  Simple cysts in the kidneys and liver do not require follow-up.   XR Chest Port 1 View    Narrative    EXAM: XR CHEST PORT 1 VIEW  LOCATION: [facility]  DATE/TIME: 3/4/2022 8:04 AM    INDICATION: worsening hypoxia  COMPARISON: Chest x-ray 03/03/2022      Impression    IMPRESSION: Stable satisfactory right PICC line position with the tip near the cavoatrial junction. Slightly low lung volumes. New mild left bibasilar pulmonary opacities which may reflect atelectasis or developing  infiltrates. No definite pleural   effusion. Stable cardiomegaly. Mild central vascular congestion. Spinal degenerative changes. Bilateral shoulder arthroplasties.   CT Chest Pulmonary Embolism w Contrast    Narrative    EXAM: CT CHEST PULMONARY EMBOLISM W CONTRAST  LOCATION: [facility]  DATE/TIME: 3/5/2022 2:33 PM    INDICATION: Severe sepsis, positive blood culture. LLL crackles. Significant hypoxia.  COMPARISON: 03/04/2022 CXR and 03/03/2022 CT AP.  TECHNIQUE: CT chest pulmonary angiogram during arterial phase injection of IV contrast. Multiplanar reformats and MIP reconstructions were performed. Dose reduction techniques were used.   CONTRAST: Isovue 370, 100 mL.    FINDINGS:  ANGIOGRAM CHEST: Pulmonary arteries are normal caliber and negative for pulmonary emboli. Thoracic aorta is negative for dissection. No CT evidence of right heart strain.    LUNGS AND PLEURA: Mild interstitial edema. Small bilateral pleural effusions. Atelectasis and some consolidation and endobronchial secretions involving the posterior half of the basilar segments of both lower lobes and the inferior lingular segment. A 5   mm subpleural right upper lobe nodule has no frankly concerning features (image 45 of series 5).    MEDIASTINUM/AXILLAE: Mild generalized cardiac enlargement. No pericardial effusion. No lymphadenopathy. Right PICC tip low SVC.    CORONARY ARTERY CALCIFICATION: No visible coronary artery calcification.    UPPER ABDOMEN: Benign hepatic cyst left hepatic lobe. Mild diffuse hepatic steatosis.    MUSCULOSKELETAL: Bridging endplate osteophyte formation throughout the thoracic spine. Bones otherwise unremarkable. Bilateral shoulder arthroplasties.      Impression    IMPRESSION:  1.  No pulmonary emboli. Normal thoracic aorta.  2.  Mild cardiac enlargement, mild interstitial edema and small bilateral pleural effusions are not specific for but could indicate CHF.  3.  Atelectasis and some  consolidation and endobronchial secretions involving the posterior half of the basilar segments of both lower lobes and the inferior lingular segment. Findings could relate entirely to the pleural effusions with superimposed   pneumonia possible.  4.  A 5 mm subpleural right upper lobe nodule. Please refer to management guidelines below.    REFERENCE:  Guidelines for Management of Incidental Pulmonary Nodules Detected on CT Images: From the Fleischner Society 2017.   Guidelines apply to incidental nodules in patients who are 35 years or older.  Guidelines do not apply to lung cancer screening, patients with immunosuppression, or patients with known primary cancer.    SINGLE NODULE  Nodule size <6 mm  Low-risk patients: No follow-up needed.  High-risk patients: Optional follow-up at 12 months.           Discharge Medications   Current Discharge Medication List      START taking these medications    Details   amoxicillin-clavulanate (AUGMENTIN) 875-125 MG tablet Take 1 tablet by mouth every 12 hours for 3 days  Qty: 6 tablet, Refills: 0    Associated Diagnoses: Sepsis, due to unspecified organism, unspecified whether acute organ dysfunction present (H); Mild intermittent asthma with exacerbation      metoprolol tartrate (LOPRESSOR) 25 MG tablet Take 0.5 tablets (12.5 mg) by mouth 2 times daily  Qty: 30 tablet, Refills: 0    Associated Diagnoses: Persistent atrial fibrillation (H)      predniSONE (DELTASONE) 20 MG tablet 40mg x 2days; then 30mg x3days; then [id number]; then 10mg x3days then stop  Qty: 13 tablet, Refills: 0    Comments: Pharmacy please fill amount for taper  Associated Diagnoses: Mild intermittent asthma without complication         CONTINUE these medications which have NOT CHANGED    Details   amLODIPine (NORVASC) 10 MG tablet Take 1 tablet (10 mg) by mouth daily  Qty: 90 tablet, Refills: 1    Associated Diagnoses: Essential hypertension      apixaban ANTICOAGULANT (ELIQUIS) 5 mg Tab tablet [APIXABAN  ANTICOAGULANT (ELIQUIS) 5 MG TAB TABLET] Take 1 tablet (5 mg total) by mouth 2 (two) times a day.  Qty: 60 tablet, Refills: 12    Associated Diagnoses: PAF (paroxysmal atrial fibrillation) (H)      atorvastatin (LIPITOR) 20 MG tablet Take 1 tablet (20 mg) by mouth daily  Qty: 90 tablet, Refills: 0    Associated Diagnoses: Acute CVA (cerebrovascular accident) (H)      diazepam (VALIUM) 5 MG tablet TAKE 1 TABLET BY MOUTH DAILY AS NEEDED.  Qty: 15 tablet, Refills: 0    Comments: Not to exceed 5 additional fills before 04/25/2022  Associated Diagnoses: Anxiety      diphenhydrAMINE (BENADRYL) 25 mg tablet [DIPHENHYDRAMINE (BENADRYL) 25 MG TABLET] Take 25 mg by mouth at bedtime as needed for sleep.      gabapentin (NEURONTIN) 300 MG capsule TAKE 1 CAPSULE BY MOUTH THREE TIMES A DAY  Qty: 90 capsule, Refills: 3    Associated Diagnoses: Chronic bilateral low back pain with right-sided sciatica; Right lumbar radiculitis      glucosamine-chondroitin 500-400 mg cap [GLUCOSAMINE-CHONDROITIN 500-400 MG CAP] Take 2 capsules by mouth daily.      KLOR-CON 20 MEQ CR tablet TAKE 1 TABLET BY MOUTH THREE TIMES A DAY  Qty: 270 tablet, Refills: 0    Associated Diagnoses: Essential hypertension      lisinopril-hydrochlorothiazide (ZESTORETIC) 20-25 MG tablet TAKE 2 TABLETS BY MOUTH EVERY DAY  Qty: 180 tablet, Refills: 0    Associated Diagnoses: Essential hypertension      multivitamin with minerals (THERA-M) 9 mg iron-400 mcg Tab tablet Take 1 tablet by mouth daily Chewable      albuterol (PROVENTIL) 2.5 mg /3 mL (0.083 %) nebulizer solution [ALBUTEROL (PROVENTIL) 2.5 MG /3 ML (0.083 %) NEBULIZER SOLUTION] Take 3 mL (2.5 mg total) by nebulization every 4 (four) hours as needed for wheezing or shortness of breath.  Qty: 75 mL, Refills: 1    Associated Diagnoses: Mild intermittent asthma with exacerbation         STOP taking these medications       atenolol (TENORMIN) 50 MG tablet Comments:   Reason for Stopping:             Allergies   No  Known Allergies

## 2022-03-07 NOTE — DISCHARGE INSTRUCTIONS
You will receive a call from the pulmonary clinic to schedule follow up CT scan of chest and visit with us for 6 weeks from now. If you do not hear from them, call [phone] to get scheduled with a provider.

## 2022-03-07 NOTE — PROGRESS NOTES
Discharge paperwork gone over with patient . Knows to stop atenolol and to start metopolol.  Prescriptions filled at pharmacy of choice.

## 2022-03-08 ENCOUNTER — DOCUMENTATION ONLY (OUTPATIENT)
Dept: PULMONOLOGY | Facility: OTHER | Age: 82
End: 2022-03-08
Payer: COMMERCIAL

## 2022-03-08 ENCOUNTER — PATIENT OUTREACH (OUTPATIENT)
Dept: CARE COORDINATION | Facility: CLINIC | Age: 82
End: 2022-03-08
Payer: COMMERCIAL

## 2022-03-08 DIAGNOSIS — Z71.89 OTHER SPECIFIED COUNSELING: ICD-10-CM

## 2022-03-08 LAB — BACTERIA BLD CULT: NO GROWTH

## 2022-03-08 NOTE — PROGRESS NOTES
Patient requesting letter for the airlines so that she can get credit for the flight they had to cancel this weekend due to her recent hospital stay.     Letter composed by Dr. Lovett in clinic today.  Letter sent to patient by email at: [email or web address].  Hard copy of letter also mailed to patient at the address listed on her chart.

## 2022-03-08 NOTE — PROGRESS NOTES
"Clinic Care Coordination Contact  [facility]: Post-Discharge Note  SITUATION                                                      Admission:    Admission Date: 03/03/22   Reason for Admission: Hypertension goal BP (blood pressure) < 140/90  Discharge:   Discharge Date: 03/07/22  Discharge Diagnosis: Hypertension goal BP (blood pressure) < 140/90    BACKGROUND                                                      Per hospital discharge summary and inpatient provider notes:    Lluvia Marrufo is a 81 year old female who felt her usual self until this morning when she felt subjectively febrile alternating with chills, symptoms started around 7 AM this morning.  She states that she has chronic dyspnea from her asthma but felt more short of breath this morning.  She also had some nausea and an episode of emesis.  She reports 2 episodes of watery stool today.  She denies any abdominal pain and currently she states she feels \"much better \"but is still hypotensive in the ED.  No history of similar symptoms.  Her  is present.    ASSESSMENT      Enrollment  Primary Care Care Coordination Status: Declined    Discharge Assessment  How are you doing now that you are home?: \"Really well\"  How are your symptoms? (Red Flag symptoms escalate to triage hotline per guidelines): Improved  Do you feel your condition is stable enough to be safe at home until your provider visit?: Yes  Does the patient have their discharge instructions? : Yes  Does the patient have questions regarding their discharge instructions? : No  Were you started on any new medications or were there changes to any of your previous medications? : Yes  Does the patient have all of their medications?: Yes  Do you have questions regarding any of your medications? : No  Do you have all of your needed medical supplies or equipment (DME)?  (i.e. oxygen tank, CPAP, cane, etc.): Yes  Discharge follow-up appointment scheduled within 14 calendar days? : " Yes  Discharge Follow Up Appointment Date: 03/09/22  Discharge Follow Up Appointment Scheduled with?: Primary Care Provider    Post-op ([facility] Only)  If the patient had a surgery or procedure, do they have any questions for a nurse?: No      PLAN                                                      Outpatient Plan:    Follow-up and recommended labs and tests      Follow up with primary care provider, Eddie Diaz, within 3-5days,   to evaluate medication change and for hospital follow- up. The following   labs/tests are recommended: BMP. PCP to arrange colonoscopy to follow-up   questionable sigmoid mass   Pulmonology arranging follow-up .      Future Appointments   Date Time Provider Department Center   3/9/2022  1:30 PM [facility]   4/4/2022  1:00 PM [facility] CT 2 [facility]         For any urgent concerns, please contact our 24 hour nurse triage line: [phone] ([address] [place] [address])         SILVER Huggins  [phone]  Connected Care Resource [facility]

## 2022-03-09 ENCOUNTER — OFFICE VISIT (OUTPATIENT)
Dept: FAMILY MEDICINE | Facility: CLINIC | Age: 82
End: 2022-03-09
Payer: COMMERCIAL

## 2022-03-09 VITALS
WEIGHT: 219 LBS | DIASTOLIC BLOOD PRESSURE: 94 MMHG | RESPIRATION RATE: 24 BRPM | SYSTOLIC BLOOD PRESSURE: 145 MMHG | HEART RATE: 109 BPM | BODY MASS INDEX: 38.79 KG/M2 | OXYGEN SATURATION: 94 %

## 2022-03-09 DIAGNOSIS — I48.19 PERSISTENT ATRIAL FIBRILLATION (H): ICD-10-CM

## 2022-03-09 DIAGNOSIS — I10 ESSENTIAL HYPERTENSION: ICD-10-CM

## 2022-03-09 DIAGNOSIS — R73.09 ELEVATED GLUCOSE: ICD-10-CM

## 2022-03-09 DIAGNOSIS — F41.9 ANXIETY: ICD-10-CM

## 2022-03-09 DIAGNOSIS — K63.9 SIGMOID THICKENING: ICD-10-CM

## 2022-03-09 DIAGNOSIS — G47.00 INSOMNIA, UNSPECIFIED TYPE: ICD-10-CM

## 2022-03-09 DIAGNOSIS — E66.01 MORBID OBESITY (H): ICD-10-CM

## 2022-03-09 DIAGNOSIS — R73.09 ELEVATED GLUCOSE: Primary | ICD-10-CM

## 2022-03-09 DIAGNOSIS — J96.01 ACUTE RESPIRATORY FAILURE WITH HYPOXIA (H): ICD-10-CM

## 2022-03-09 DIAGNOSIS — Z09 HOSPITAL DISCHARGE FOLLOW-UP: Primary | ICD-10-CM

## 2022-03-09 LAB
ALBUMIN SERPL-MCNC: 3.6 G/DL (ref 3.5–5)
ALP SERPL-CCNC: 75 U/L (ref 45–120)
ALT SERPL W P-5'-P-CCNC: 41 U/L (ref 0–45)
ANION GAP SERPL CALCULATED.3IONS-SCNC: 15 MMOL/L (ref 5–18)
AST SERPL W P-5'-P-CCNC: 29 U/L (ref 0–40)
BASOPHILS # BLD AUTO: 0 10E3/UL (ref 0–0.2)
BASOPHILS NFR BLD AUTO: 0 %
BILIRUB SERPL-MCNC: 0.7 MG/DL (ref 0–1)
BNP SERPL-MCNC: 69 PG/ML (ref 0–151)
BUN SERPL-MCNC: 22 MG/DL (ref 8–28)
CALCIUM SERPL-MCNC: 10.1 MG/DL (ref 8.5–10.5)
CHLORIDE BLD-SCNC: 97 MMOL/L (ref 98–107)
CO2 SERPL-SCNC: 27 MMOL/L (ref 22–31)
CREAT SERPL-MCNC: 0.82 MG/DL (ref 0.6–1.1)
EOSINOPHIL # BLD AUTO: 0 10E3/UL (ref 0–0.7)
EOSINOPHIL NFR BLD AUTO: 0 %
ERYTHROCYTE [DISTWIDTH] IN BLOOD BY AUTOMATED COUNT: 12.6 % (ref 10–15)
GFR SERPL CREATININE-BSD FRML MDRD: 72 ML/MIN/1.73M2
GLUCOSE BLD-MCNC: 166 MG/DL (ref 70–125)
HCT VFR BLD AUTO: 44.3 % (ref 35–47)
HGB BLD-MCNC: 14.7 G/DL (ref 11.7–15.7)
IMM GRANULOCYTES # BLD: 0.2 10E3/UL
IMM GRANULOCYTES NFR BLD: 1 %
LYMPHOCYTES # BLD AUTO: 0.9 10E3/UL (ref 0.8–5.3)
LYMPHOCYTES NFR BLD AUTO: 7 %
MCH RBC QN AUTO: 29.7 PG (ref 26.5–33)
MCHC RBC AUTO-ENTMCNC: 33.2 G/DL (ref 31.5–36.5)
MCV RBC AUTO: 90 FL (ref 78–100)
MONOCYTES # BLD AUTO: 0.5 10E3/UL (ref 0–1.3)
MONOCYTES NFR BLD AUTO: 3 %
NEUTROPHILS # BLD AUTO: 12.3 10E3/UL (ref 1.6–8.3)
NEUTROPHILS NFR BLD AUTO: 89 %
PLATELET # BLD AUTO: 376 10E3/UL (ref 150–450)
POTASSIUM BLD-SCNC: 3.6 MMOL/L (ref 3.5–5)
PROT SERPL-MCNC: 6.9 G/DL (ref 6–8)
RBC # BLD AUTO: 4.95 10E6/UL (ref 3.8–5.2)
SODIUM SERPL-SCNC: 139 MMOL/L (ref 136–145)
WBC # BLD AUTO: 13.8 10E3/UL (ref 4–11)

## 2022-03-09 PROCEDURE — 36415 COLL VENOUS BLD VENIPUNCTURE: CPT | Performed by: FAMILY MEDICINE

## 2022-03-09 PROCEDURE — 80053 COMPREHEN METABOLIC PANEL: CPT | Performed by: FAMILY MEDICINE

## 2022-03-09 PROCEDURE — 83036 HEMOGLOBIN GLYCOSYLATED A1C: CPT | Performed by: FAMILY MEDICINE

## 2022-03-09 PROCEDURE — 83880 ASSAY OF NATRIURETIC PEPTIDE: CPT | Mod: GZ | Performed by: FAMILY MEDICINE

## 2022-03-09 PROCEDURE — 85025 COMPLETE CBC W/AUTO DIFF WBC: CPT | Performed by: FAMILY MEDICINE

## 2022-03-09 PROCEDURE — 99496 TRANSJ CARE MGMT HIGH F2F 7D: CPT | Performed by: FAMILY MEDICINE

## 2022-03-09 RX ORDER — DIAZEPAM 5 MG
TABLET ORAL
Qty: 15 TABLET | Refills: 0 | Status: CANCELLED | OUTPATIENT
Start: 2022-03-09

## 2022-03-09 RX ORDER — TRAZODONE HYDROCHLORIDE 50 MG/1
50 TABLET, FILM COATED ORAL AT BEDTIME
Qty: 30 TABLET | Refills: 3 | Status: SHIPPED | OUTPATIENT
Start: 2022-03-09 | End: 2022-07-11

## 2022-03-09 ASSESSMENT — ASTHMA QUESTIONNAIRES
QUESTION_3 LAST FOUR WEEKS HOW OFTEN DID YOUR ASTHMA SYMPTOMS (WHEEZING, COUGHING, SHORTNESS OF BREATH, CHEST TIGHTNESS OR PAIN) WAKE YOU UP AT NIGHT OR EARLIER THAN USUAL IN THE MORNING: ONCE OR TWICE
QUESTION_1 LAST FOUR WEEKS HOW MUCH OF THE TIME DID YOUR ASTHMA KEEP YOU FROM GETTING AS MUCH DONE AT WORK, SCHOOL OR AT HOME: A LITTLE OF THE TIME
ACT_TOTALSCORE: 20
QUESTION_2 LAST FOUR WEEKS HOW OFTEN HAVE YOU HAD SHORTNESS OF BREATH: THREE TO SIX TIMES A WEEK
QUESTION_5 LAST FOUR WEEKS HOW WOULD YOU RATE YOUR ASTHMA CONTROL: WELL CONTROLLED
ACT_TOTALSCORE: 20
QUESTION_4 LAST FOUR WEEKS HOW OFTEN HAVE YOU USED YOUR RESCUE INHALER OR NEBULIZER MEDICATION (SUCH AS ALBUTEROL): NOT AT ALL

## 2022-03-09 NOTE — PROGRESS NOTES
"  Assessment & Plan     ICD-10-CM    1. Hospital discharge follow-up  Z09 B-Type Natriuretic Peptide ( [facility] Only)   2. Anxiety  F41.9    3. Acute respiratory failure with hypoxia (H)  J96.01 CBC with platelets and differential     Comprehensive metabolic panel (BMP + Alb, Alk Phos, ALT, AST, Total. Bili, TP)   4. Persistent atrial fibrillation (H)  I48.19    5. Morbid obesity (H)  E66.01    6. Essential hypertension  I10    7. Insomnia, unspecified type  G47.00 traZODone (DESYREL) 50 MG tablet   8. Sigmoid thickening  K63.9 Adult Gastro Ref - Procedure Only     Medical decision making: Patient was seen in hospital for sepsis with circulatory shock.  Initially suspected secondary to UTI and the urine culture was negative.  Subsequently it was thought to be due to community-acquired pneumonia in the left lower lobe.  She received Zosyn and vancomycin on admission and it was changed to p.o. Augmentin.  She was treated with steroid for possible asthma exacerbation and is now finishing a Pred pack.  She continues to feel improved.  Due to prednisone, she is having some insomnia.  This causes her to have anxiety as she cannot sleep at night.  In the past she has been on diazepam.  She does have morbid obesity complicated with hypertension.  Her blood sugars random were elevated during visit however, she is on prednisone.  She does have atrial fibrillation and is on a blood thinner.    At this time, we will repeat lab check as above to ensure stability.  Discussed that diazepam's are not a good option with respiratory underlying issues.  We will do trial of trazodone.  During the course of her CT scan, there was noted to be a sigmoid thickening and colonoscopy has been advised.  This has been ordered for patient                 BMI:   Estimated body mass index is 38.79 kg/m  as calculated from the following:    Height as of 3/3/22: 1.6 m (5' 3\").    Weight as of this encounter: 99.3 kg (219 lb).   Weight management plan: " "Discussed healthy diet and exercise guidelines    MEDICATIONS:   Orders Placed This Encounter   Medications     traZODone (DESYREL) 50 MG tablet     Sig: Take 1 tablet (50 mg) by mouth At Bedtime Try half tablet for first few days     Dispense:  30 tablet     Refill:  3          - Continue other medications without change  See Patient Instructions    Return in about 3 months (around 6/9/2022) for Routine preventive.    Jefry Vick MD  [facility]    Subjective    Chief Complaint   Patient presents with     Hospital F/U     Follow up from [facility] 3/3-3/7        Lexii is a 81 year old who presents for the following health issues  accompanied by her spouse.    HPI     Post Discharge Outreach 3/8/2022   Admission Date 3/3/2022   Reason for Admission Hypertension goal BP (blood pressure) < 140/90   Discharge Date 3/7/2022   Discharge Diagnosis Hypertension goal BP (blood pressure) < 140/90   How are you doing now that you are home? \"Really well\"   How are your symptoms? (Red Flag symptoms escalate to triage hotline per guidelines) Improved   Do you feel your condition is stable enough to be safe at home until your provider visit? Yes   Does the patient have their discharge instructions?  Yes   Does the patient have questions regarding their discharge instructions?  No   Were you started on any new medications or were there changes to any of your previous medications?  Yes   Does the patient have all of their medications? Yes   Do you have questions regarding any of your medications?  No   Do you have all of your needed medical supplies or equipment (DME)?  (i.e. oxygen tank, CPAP, cane, etc.) Yes   Discharge follow-up appointment scheduled within 14 calendar days?  Yes   Discharge Follow Up Appointment Date 3/9/2022   Discharge Follow Up Appointment Scheduled with? Primary Care Provider     Hospital Follow-up Visit:    Hospital/Nursing Home/IP Rehab Facility: [facility] " [facility]  Date of Admission: 3/3/2022  Date of Discharge: 3/7/2022  Reason(s) for Admission: Sepsis, acute respiratory failure with hypoxia, atrial fibrillation, hypokalemia, mild intermittent asthma with exacerbation, mass of the colon      Was your hospitalization related to COVID-19? No   Problems taking medications regularly:  None  Medication changes since discharge: None  Problems adhering to non-medication therapy:  None    Summary of hospitalization:  [facility] discharge summary reviewed  Diagnostic Tests/Treatments reviewed.  Follow up needed: Pulmonary specialist, gastroenterology  Other Healthcare Providers Involved in Patient s Care:         Specialist appointment - Pulmonology,   Update since discharge: improved.       Post Discharge Medication Reconciliation: discharge medications reconciled, continue medications without change.  Plan of care communicated with patient                Review of Systems   Constitutional, HEENT, cardiovascular, pulmonary, gi and gu systems are negative, except as otherwise noted.      Objective    BP (!) 145/94 (BP Location: Left arm, Patient Position: Sitting, Cuff Size: Adult Large)   Pulse 109   Resp 24   Wt 99.3 kg (219 lb)   LMP  (LMP Unknown)   SpO2 94%   BMI 38.79 kg/m    Body mass index is 38.79 kg/m .  Physical Exam   GENERAL: healthy, alert and no distress  NECK: no adenopathy, no asymmetry, masses, or scars and thyroid normal to palpation  RESP: lungs clear to auscultation - no rales, rhonchi or wheezes  CV: regular rate and rhythm, normal S1 S2, no S3 or S4, no murmur, click or rub, no peripheral edema and peripheral pulses strong  ABDOMEN: soft, nontender, no hepatosplenomegaly, no masses and bowel sounds normal  MS: no gross musculoskeletal defects noted, no edema    Results for orders placed or performed in visit on 03/09/22   Comprehensive metabolic panel (BMP + Alb, Alk Phos, ALT, AST, Total. Bili, TP)     Status: Abnormal    Result Value Ref Range    Sodium 139 136 - 145 mmol/L    Potassium 3.6 3.5 - 5.0 mmol/L    Chloride 97 (L) 98 - 107 mmol/L    Carbon Dioxide (CO2) 27 22 - 31 mmol/L    Anion Gap 15 5 - 18 mmol/L    Urea Nitrogen 22 8 - 28 mg/dL    Creatinine 0.82 0.60 - 1.10 mg/dL    Calcium 10.1 8.5 - 10.5 mg/dL    Glucose 166 (H) 70 - 125 mg/dL    Alkaline Phosphatase 75 45 - 120 U/L    AST 29 0 - 40 U/L    ALT 41 0 - 45 U/L    Protein Total 6.9 6.0 - 8.0 g/dL    Albumin 3.6 3.5 - 5.0 g/dL    Bilirubin Total 0.7 0.0 - 1.0 mg/dL    GFR Estimate 72 >60 mL/min/1.73m2   B-Type Natriuretic Peptide ([facility] Only)     Status: Normal   Result Value Ref Range    BNP 69 0 - 151 pg/mL   CBC with platelets and differential     Status: Abnormal   Result Value Ref Range    WBC Count 13.8 (H) 4.0 - 11.0 10e3/uL    RBC Count 4.95 3.80 - 5.20 10e6/uL    Hemoglobin 14.7 11.7 - 15.7 g/dL    Hematocrit 44.3 35.0 - 47.0 %    MCV 90 78 - 100 fL    MCH 29.7 26.5 - 33.0 pg    MCHC 33.2 31.5 - 36.5 g/dL    RDW 12.6 10.0 - 15.0 %    Platelet Count 376 150 - 450 10e3/uL    % Neutrophils 89 %    % Lymphocytes 7 %    % Monocytes 3 %    % Eosinophils 0 %    % Basophils 0 %    % Immature Granulocytes 1 %    Absolute Neutrophils 12.3 (H) 1.6 - 8.3 10e3/uL    Absolute Lymphocytes 0.9 0.8 - 5.3 10e3/uL    Absolute Monocytes 0.5 0.0 - 1.3 10e3/uL    Absolute Eosinophils 0.0 0.0 - 0.7 10e3/uL    Absolute Basophils 0.0 0.0 - 0.2 10e3/uL    Absolute Immature Granulocytes 0.2 <=0.4 10e3/uL   Hemoglobin A1c     Status: Abnormal   Result Value Ref Range    Hemoglobin A1C 5.9 (H) <=5.6 %   CBC with platelets and differential     Status: Abnormal    Narrative    The following orders were created for panel order CBC with platelets and differential.  Procedure                               Abnormality         Status                     ---------                               -----------         ------                     CBC with platelets and d...[[id number]]  Abnormal             Final result                 Please view results for these tests on the individual orders.

## 2022-03-09 NOTE — PATIENT INSTRUCTIONS
Patient Education     Discharge Instructions for Asthma  You have been diagnosed with an asthma attack. With the help of your healthcare provider, you can keep your asthma under control and have less emergency department visits and stays in the hospital.    Managing asthma    Take your asthma medicines exactly as your provider tells you. Do this even if you feel that your athma is under control.    Learn how to monitor your asthma. Some people watch for early changes of symptoms getting worse. Some use a peak flow meter. Your healthcare provider may decide to give you an asthma action plan.    Be sure to always have a quick-relief inhaler with you. If you were given a prescription, make sure you go to a pharmacy to get it filled as soon as possible.  Controlling asthma triggers  Triggers are those things that make your asthma symptoms worse or cause asthma attacks. Many people with asthma have allergies that can be triggers. Your healthcare provider may have you get allergy testing to find out what you are allergic to. This can help you stay away from triggers.  Dust or dust mites are a common asthma trigger. To avoid a dust mites, do the following:    Use dust-proof covers on your mattress and pillows. Wash the sheets and blankets on your bed once a week in very hot water.    Don t sleep or lie on cloth-covered cushions or furniture.    Ask someone else to vacuum and dust your house.    If you do vacuum and dust yourself, wear a dust mask. You can buy them from the [employer] store.    Use a vacuum with a double-layered bag or HEPA (high-efficiency particulate air) filter.  Pets with fur or feathers are triggers for some people. If you must have pets, take these precautions:    Keep pets out of your bedroom and off your bed. Keep the bedroom door closed.    Cover the air vents in your bedroom with heavy material to filter the air.    Don't use carpets or cloth-covered furniture in your home. If this is not possible,  keep pets out of rooms with these items.    Have someone bathe your pets every week. And brush them often.  If you smoke, do your best to quit.    Enroll in a stop-smoking program to increase your chance of success.    Ask your healthcare provider about medicines or other methods to help you quit.    Ask family members to quit smoking as well.    Don t allow anyone to smoke in your home, in your car, or around you.  Other steps to take    Make sure you know what to do if exercise is a trigger for you. Many people use quick-relief inhalers before exercise or physical activity.    Get a flu shot every year and get pneumonia shots as advised by your healthcare provider.    Try to keep your windows closed during pollen seasons and when mold counts are high.    On cold or windy days, cover your nose and mouth with a scarf.    Try to stay away from people who are sick with colds or the flu. Wash your hands often or use a hand . If respiratory infections like colds or flu trigger your asthma, use your quick-relief medicines as soon as you begin to notice respiratory symptoms. They may include a runny or stuffy nose, sore throat, or a cough.  Follow-up care  Make a follow-up appointment as directed. Follow your asthma action plan if you were given one.  Call 911  Call 911 right away if you have:    Severe wheezing    Shortness of breath that is not relieved by your quick-relief medicine    Trouble walking or talking because of shortness of breath    Blue lips or fingernails    If you monitor symptoms with a peak flow meter, readings less than 50% of your personal best   [email or web address] last reviewed this educational content on 2/3/2017    1907-1128 [employer]. All rights reserved. This information is not intended as a substitute for professional medical care. Always follow your healthcare professional's instructions.

## 2022-03-10 ENCOUNTER — TELEPHONE (OUTPATIENT)
Dept: LAB | Facility: CLINIC | Age: 82
End: 2022-03-10
Payer: COMMERCIAL

## 2022-03-10 DIAGNOSIS — D72.828 OTHER ELEVATED WHITE BLOOD CELL (WBC) COUNT: Primary | ICD-10-CM

## 2022-03-10 LAB
BACTERIA BLD CULT: NO GROWTH
HBA1C MFR BLD: 5.9 %

## 2022-03-10 NOTE — PROGRESS NOTES
Patient has appt 3/11/2022 for recheck of CBC. I see her WBC was slightly elevated and you already added an A1C. Does CBC need to be repeated, if so, please place orders, thanks!

## 2022-03-11 ENCOUNTER — LAB (OUTPATIENT)
Dept: LAB | Facility: CLINIC | Age: 82
End: 2022-03-11
Payer: COMMERCIAL

## 2022-03-11 DIAGNOSIS — D72.828 OTHER ELEVATED WHITE BLOOD CELL (WBC) COUNT: ICD-10-CM

## 2022-03-11 LAB
BASOPHILS # BLD AUTO: 0 10E3/UL (ref 0–0.2)
BASOPHILS NFR BLD AUTO: 0 %
EOSINOPHIL # BLD AUTO: 0.2 10E3/UL (ref 0–0.7)
EOSINOPHIL NFR BLD AUTO: 1 %
ERYTHROCYTE [DISTWIDTH] IN BLOOD BY AUTOMATED COUNT: 12.8 % (ref 10–15)
HCT VFR BLD AUTO: 45.4 % (ref 35–47)
HGB BLD-MCNC: 14.6 G/DL (ref 11.7–15.7)
IMM GRANULOCYTES # BLD: 0.1 10E3/UL
IMM GRANULOCYTES NFR BLD: 1 %
LYMPHOCYTES # BLD AUTO: 1.6 10E3/UL (ref 0.8–5.3)
LYMPHOCYTES NFR BLD AUTO: 10 %
MCH RBC QN AUTO: 29.2 PG (ref 26.5–33)
MCHC RBC AUTO-ENTMCNC: 32.2 G/DL (ref 31.5–36.5)
MCV RBC AUTO: 91 FL (ref 78–100)
MONOCYTES # BLD AUTO: 0.8 10E3/UL (ref 0–1.3)
MONOCYTES NFR BLD AUTO: 5 %
NEUTROPHILS # BLD AUTO: 13.9 10E3/UL (ref 1.6–8.3)
NEUTROPHILS NFR BLD AUTO: 84 %
PLATELET # BLD AUTO: 380 10E3/UL (ref 150–450)
RBC # BLD AUTO: 5 10E6/UL (ref 3.8–5.2)
WBC # BLD AUTO: 16.6 10E3/UL (ref 4–11)

## 2022-03-11 PROCEDURE — 85025 COMPLETE CBC W/AUTO DIFF WBC: CPT

## 2022-03-11 PROCEDURE — 36415 COLL VENOUS BLD VENIPUNCTURE: CPT

## 2022-03-12 LAB
BACTERIA BLD CULT: ABNORMAL

## 2022-03-15 ENCOUNTER — MYC MEDICAL ADVICE (OUTPATIENT)
Dept: PULMONOLOGY | Facility: OTHER | Age: 82
End: 2022-03-15
Payer: COMMERCIAL

## 2022-03-16 ENCOUNTER — HOSPITAL ENCOUNTER (OUTPATIENT)
Facility: AMBULATORY SURGERY CENTER | Age: 82
End: 2022-03-16
Attending: SURGERY
Payer: COMMERCIAL

## 2022-03-16 DIAGNOSIS — Z11.59 ENCOUNTER FOR SCREENING FOR OTHER VIRAL DISEASES: Primary | ICD-10-CM

## 2022-03-18 ENCOUNTER — OFFICE VISIT (OUTPATIENT)
Dept: FAMILY MEDICINE | Facility: CLINIC | Age: 82
End: 2022-03-18
Payer: COMMERCIAL

## 2022-03-18 ENCOUNTER — TELEPHONE (OUTPATIENT)
Dept: FAMILY MEDICINE | Facility: CLINIC | Age: 82
End: 2022-03-18

## 2022-03-18 VITALS
RESPIRATION RATE: 20 BRPM | DIASTOLIC BLOOD PRESSURE: 80 MMHG | TEMPERATURE: 98.3 F | SYSTOLIC BLOOD PRESSURE: 116 MMHG | OXYGEN SATURATION: 96 % | HEART RATE: 90 BPM

## 2022-03-18 DIAGNOSIS — N30.00 ACUTE CYSTITIS WITHOUT HEMATURIA: Primary | ICD-10-CM

## 2022-03-18 DIAGNOSIS — R30.0 BURNING WITH URINATION: ICD-10-CM

## 2022-03-18 DIAGNOSIS — K63.89 COLONIC MASS: ICD-10-CM

## 2022-03-18 LAB
ALBUMIN UR-MCNC: NEGATIVE MG/DL
APPEARANCE UR: ABNORMAL
BACTERIA #/AREA URNS HPF: ABNORMAL /HPF
BILIRUB UR QL STRIP: NEGATIVE
COLOR UR AUTO: YELLOW
GLUCOSE UR STRIP-MCNC: NEGATIVE MG/DL
HGB UR QL STRIP: ABNORMAL
KETONES UR STRIP-MCNC: NEGATIVE MG/DL
LEUKOCYTE ESTERASE UR QL STRIP: ABNORMAL
NITRATE UR QL: NEGATIVE
PH UR STRIP: 6.5 [PH] (ref 5–8)
RBC #/AREA URNS AUTO: ABNORMAL /HPF
SP GR UR STRIP: 1.02 (ref 1–1.03)
SQUAMOUS #/AREA URNS AUTO: ABNORMAL /LPF
UROBILINOGEN UR STRIP-ACNC: 0.2 E.U./DL
WBC #/AREA URNS AUTO: >100 /HPF
WBC CLUMPS #/AREA URNS HPF: PRESENT /HPF

## 2022-03-18 PROCEDURE — 87086 URINE CULTURE/COLONY COUNT: CPT | Performed by: EMERGENCY MEDICINE

## 2022-03-18 PROCEDURE — 99213 OFFICE O/P EST LOW 20 MIN: CPT | Performed by: EMERGENCY MEDICINE

## 2022-03-18 PROCEDURE — 81001 URINALYSIS AUTO W/SCOPE: CPT | Performed by: EMERGENCY MEDICINE

## 2022-03-18 PROCEDURE — 87186 SC STD MICRODIL/AGAR DIL: CPT | Performed by: EMERGENCY MEDICINE

## 2022-03-18 RX ORDER — AMOXICILLIN AND CLAVULANATE POTASSIUM 500; 125 MG/1; MG/1
1 TABLET, FILM COATED ORAL 3 TIMES DAILY
Qty: 21 TABLET | Refills: 0 | Status: SHIPPED | OUTPATIENT
Start: 2022-03-18 | End: 2022-03-25

## 2022-03-18 NOTE — PATIENT INSTRUCTIONS
Patient Education     Understanding Urinary Tract Infections (UTIs)   Most UTIs are caused by bacteria, but they may also be caused by viruses or fungi. Bacteria from the bowel are the most common source of infection. The infection may start because of any of the following:     Sexual activity.  During sex, bacteria can travel from the penis, vagina, or rectum into the urethra.     Bacteria outside the rectum getting into the urethra.  Bacteria on the skin outside the rectum may travel into the urethra. This is more common in women since the rectum and urethra are closer to each other than in men. Wiping from front to back after using the toilet and keeping the area clean can help prevent germs from getting to the urethra.    Blocked urine flow through the urinary tract. If urine sits too long, germs may start to grow out of control.  Parts of the urinary tract  The infection can occur in any part of the urinary tract.       The kidneys. These organs collect and store urine.    The ureters. These tubes carry urine from the kidneys to the bladder.    The bladder. This holds urine until you are ready to let it out.    The urethra. This tube carries urine from the bladder out of the body. It is shorter in women, so bacteria can move through it more easily. The urethra is longer in men, so a UTI is less likely to reach the bladder or kidneys in men.  [employer] last reviewed this educational content on 1/1/2020 2000-2021 [employer]. All rights reserved. This information is not intended as a substitute for professional medical care. Always follow your healthcare professional's instructions.           Patient Education     Bladder Infection, Female (Adult)     Urine normally doesn't have any germs (bacteria) in it. But bacteria can get into the urinary tract from the skin around the rectum. Or they can travel in the blood from other parts of the body. Once they are in your urinary tract, they can cause  infection in these areas:    The urethra (urethritis)    The bladder (cystitis)    The kidneys (pyelonephritis)  The most common place for an infection is in the bladder. This is called a bladder infection. This is one of the most common infections in women. Most bladder infections are easily treated. They are not serious unless the infection spreads to the kidney.  The terms bladder infection, UTI, and cystitis are often used to describe the same thing. But they are not always the same. Cystitis is an inflammation of the bladder. The most common cause of cystitis is an infection.  Symptoms  The infection causes inflammation in the urethra and bladder. This causes many of the symptoms. The most common symptoms of a bladder infection are:    Pain or burning when urinating    Having to urinate more often than normal    Urgent need to urinate    Only a small amount of urine comes out    Blood in urine    Belly (abdominal) discomfort. This is often in the lower belly above the pubic bone.    Cloudy urine    Strong- or bad-smelling urine    Unable to urinate (urinary retention)    Unable to hold urine in (urinary incontinence)    Fever    Loss of appetite    Confusion (in older adults)  Causes  Bladder infections are not contagious. You can't get one from someone else, from a toilet seat, or from sharing a bath.  The most common cause of bladder infections is bacteria from the bowels. The bacteria get onto the skin around the opening of the urethra. From there, they can get into the urine. Then they travel up to the bladder, causing inflammation and infection. This often happens because of:    Wiping incorrectly after urinating. Always wipe from front to back.    Bowel incontinence    Pregnancy    Procedures such as having a catheter put in    Older age    Not emptying your bladder. This can give bacteria a chance to grow in your urine.    Fluid loss (dehydration)    Constipation    Having sex    Using a diaphragm for  birth control   Treatment  Bladder infections are diagnosed by a urine test and urine culture. They are treated with antibiotics. They often clear up quickly without problems. Treatment helps prevent a more serious kidney infection.  Medicines  Medicines can help in the treatment of a bladder infection:    Take antibiotics until they are used up, even if you feel better. It's important to finish them to make sure the infection has cleared.    You can use acetaminophen or ibuprofen for pain, fever, or discomfort, unless another medicine was prescribed. If you have long-term (chronic) liver or kidney disease, talk with your healthcare provider before using these medicines. Also talk with your provider if you've ever had a stomach ulcer or GI (gastrointestinal) bleeding, or are taking blood-thinner medicines.    If you are given phenazopydridine to reduce burning with urination, it will make your urine a bright orange color. This can stain clothing.  Care and prevention  These self-care steps can help prevent future infections:    Drink plenty of fluids. This helps to prevent dehydration and flush out your bladder. Do this unless you must restrict fluids for other health reasons, or your healthcare provider told you not to.    Clean yourself correctly after going to the bathroom. Wipe from front to back after using the toilet. This helps prevent the spread of bacteria.    Urinate more often. Don't try to hold urine in for a long time.    Wear loose-fitting clothes and cotton underwear. Don't wear tight-fitting pants.    Improve your diet and prevent constipation. Eat more fresh fruits and vegetables, and fiber. Eat less junk foods and fatty foods.    Don't have sex until your symptoms are gone.    Don't have caffeine, alcohol, and spicy foods. These can irritate your bladder.    Urinate right after you have sex to flush out your bladder.    If you use birth control pills and have frequent bladder infections, discuss it  with your healthcare provider.  Follow-up care  Call your healthcare provider if all symptoms are not gone after 3 days of treatment. This is especially important if you have repeat infections.  If a culture was done, you will be told if your treatment needs to be changed. If directed, you can call to find out the results.  If X-rays were done, you will be told if the results will affect your treatment.  Call 911  Call 911 if any of the following occur:    Trouble breathing    Hard to wake up or confusion    Fainting (loss of consciousness)    Fast heart rate  When to get medical advice  Call your healthcare provider right away if any of these occur:    Fever of 100.4 F (38.0 C) or higher, or as directed by your healthcare provider    Symptoms are not better after 3 days of treatment    Back or belly pain that gets worse    Repeated vomiting, or unable to keep medicine down    Weakness or dizziness    Vaginal discharge    Pain, redness, or swelling in the outer vaginal area (labia)  [employer] last reviewed this educational content on 11/1/2019 2000-2021 [employer]. All rights reserved. This information is not intended as a substitute for professional medical care. Always follow your healthcare professional's instructions.

## 2022-03-18 NOTE — TELEPHONE ENCOUNTER
Reason for Call:  Other URGENT MED. ORDER HOLD    Detailed comments: Per Tamar at [facility], Lluvia needs a 3 day hold on Eliquis, starting Monday, March 21 for a colonoscopy that is scheduled for March 24.     [facility] said these orders needed to be confirmed today & they would like a call back to confirm.     Phone Number [facility] can be reached at: Other phone number: [phone]    Best Time: any time    Can we leave a detailed message on this number? YES    Call taken on 3/18/2022 at 1:24 PM by Jovita Cronin

## 2022-03-18 NOTE — PROGRESS NOTES
Impression:  Urinary tract infection occurring shortly after hospitalization for sepsis.  Abnormal: On CT scan needs follow-up colonoscopy    Plan:  We will restart Augmentin for 7 days, schedule colonoscopy soon as possible, follow-up with primary care      Chief Complaint:  Patient presents with:  Urinary Frequency: BURNING AND FREQUENCY, STARTED YESTERDAY. PT WAS AT [facility] 3/3, SEE NOTES.          HPI:   Lluvia Marrufo is a 81 year old female who presents to this clinic for the evaluation of dysuria and frequency.  Patient developed urinary tract infection symptoms yesterday.  She just got out of the hospital for an episode of sepsis about a week ago.  Her urine culture was negative but her blood culture was positive for 2 different bacteria, 1 was Streptococcus constellatus and one was Fusobacterium nucleatum.  Both of them were susceptible to ampicillin or Augmentin.  She was discharged on Augmentin for a few days but stopped it a few days ago and developed the urinary symptoms yesterday.  No fever.  No nausea or vomiting.  No back pain or abdominal pain.  She did have thickening on her colon on a CT scan of her abdomen and was advised to follow-up with a colonoscopy but she is not been able to schedule that yet      PMH:   Past Medical History:   Diagnosis Date     Arthritis      Asthma      Atrial fibrillation (H)      Bronchitis      Earache      Fracture, foot      History of CVA (cerebrovascular accident)      Hyperlipidemia      Hypertension      UTI (urinary tract infection)      Past Surgical History:   Procedure Laterality Date     APPENDECTOMY       EYE SURGERY      Growth on lateral left eyelid removed.     HYSTERECTOMY      age 57     JOINT REPLACEMENT Left     knee     OOPHORECTOMY       PICC TRIPLE LUMEN PLACEMENT  3/3/2022          OR HALLUX RIGIDUS W/CHEILECTOMY 1ST MP JT W/O IMPLT      Description: Hallux Rigidus Correction W/ Cheilectomy 1st MTP Joint;  Proc Date: 12/03/2010;      TONSILLECTOMY       TOTAL KNEE ARTHROPLASTY      right     TOTAL SHOULDER ARTHROPLASTY      left     [facility] RECONSTR TOTAL SHOULDER IMPLANT Right 10/27/2015    Procedure: RIGHT SHOULDER TOTAL ARTHROPLASTY;  Surgeon: Eddie Payne MD;  Location: [facility];  Service: Orthopedics     [facility] TOTAL HIP ARTHROPLASTY Left 10/3/2017    Procedure:  LEFT TOTAL HIP ARTHROPLASTY;  Surgeon: Mahin Monsalve MD;  Location: [facility];  Service: Orthopedics         ROS:  All other systems negative    Meds:    Current Outpatient Medications:      albuterol (PROVENTIL) 2.5 mg /3 mL (0.083 %) nebulizer solution, [ALBUTEROL (PROVENTIL) 2.5 MG /3 ML (0.083 %) NEBULIZER SOLUTION] Take 3 mL (2.5 mg total) by nebulization every 4 (four) hours as needed for wheezing or shortness of breath., Disp: 75 mL, Rfl: 1     amLODIPine (NORVASC) 10 MG tablet, Take 1 tablet (10 mg) by mouth daily, Disp: 90 tablet, Rfl: 1     apixaban ANTICOAGULANT (ELIQUIS) 5 mg Tab tablet, [APIXABAN ANTICOAGULANT (ELIQUIS) 5 MG TAB TABLET] Take 1 tablet (5 mg total) by mouth 2 (two) times a day., Disp: 60 tablet, Rfl: 12     atorvastatin (LIPITOR) 20 MG tablet, Take 1 tablet (20 mg) by mouth daily, Disp: 90 tablet, Rfl: 0     diphenhydrAMINE (BENADRYL) 25 mg tablet, [DIPHENHYDRAMINE (BENADRYL) 25 MG TABLET] Take 25 mg by mouth at bedtime as needed for sleep., Disp: , Rfl:      gabapentin (NEURONTIN) 300 MG capsule, TAKE 1 CAPSULE BY MOUTH THREE TIMES A DAY (Patient taking differently: Take 300 mg by mouth daily 1 tab by mouth daily), Disp: 90 capsule, Rfl: 3     glucosamine-chondroitin 500-400 mg cap, [GLUCOSAMINE-CHONDROITIN 500-400 MG CAP] Take 2 capsules by mouth daily., Disp: , Rfl:      KLOR-CON 20 MEQ CR tablet, TAKE 1 TABLET BY MOUTH THREE TIMES A DAY (Patient taking differently: Takes one in the am and two at bedtime), Disp: 270 tablet, Rfl: 0     lisinopril-hydrochlorothiazide (ZESTORETIC) 20-25 MG tablet, TAKE 2 TABLETS BY MOUTH EVERY DAY, Disp:  180 tablet, Rfl: 0     metoprolol tartrate (LOPRESSOR) 25 MG tablet, Take 0.5 tablets (12.5 mg) by mouth 2 times daily, Disp: 30 tablet, Rfl: 0     multivitamin with minerals (THERA-M) 9 mg iron-400 mcg Tab tablet, Take 1 tablet by mouth daily Chewable, Disp: , Rfl:      predniSONE (DELTASONE) 20 MG tablet, 40mg x 2days; then 30mg x3days; then [id number]; then 10mg x3days then stop, Disp: 13 tablet, Rfl: 0     traZODone (DESYREL) 50 MG tablet, Take 1 tablet (50 mg) by mouth At Bedtime Try half tablet for first few days, Disp: 30 tablet, Rfl: 3        Social:  Social History     Socioeconomic History     Marital status:      Spouse name: Not on file     Number of children: Not on file     Years of education: Not on file     Highest education level: Not on file   Occupational History     Not on file   Tobacco Use     Smoking status: Never Smoker     Smokeless tobacco: Never Used   Substance and Sexual Activity     Alcohol use: Yes     Comment: Alcoholic Drinks/day: 1 drink per day     Drug use: No     Sexual activity: Not on file   Other Topics Concern     Not on file   Social History Narrative    [place]  Son-in-law is a .     Social Determinants of Health     Financial Resource Strain: Not on file   Food Insecurity: Not on file   Transportation Needs: Not on file   Physical Activity: Not on file   Stress: Not on file   Social Connections: Not on file   Intimate Partner Violence: Not on file   Housing Stability: Not on file         Physical Exam:  Vitals:    03/18/22 1020   BP: 116/80   BP Location: Right arm   Patient Position: Sitting   Cuff Size: Adult Large   Pulse: 90   Resp: 20   Temp: 98.3  F (36.8  C)   SpO2: 96%      Patient is awake, alert, no distress  Pharynx clear      Results:    Results for orders placed or performed in visit on 03/18/22 (from the past 24 hour(s))   UA with Microscopic reflex to Culture - Clinic Collect    Specimen: Urine, Clean Catch    Result Value Ref Range    Color Urine Yellow Colorless, Straw, Light Yellow, Yellow    Appearance Urine Slightly Cloudy (A) Clear    Glucose Urine Negative Negative mg/dL    Bilirubin Urine Negative Negative    Ketones Urine Negative Negative mg/dL    Specific Gravity Urine 1.020 1.005 - 1.030    Blood Urine Trace (A) Negative    pH Urine 6.5 5.0 - 8.0    Protein Albumin Urine Negative Negative mg/dL    Urobilinogen Urine 0.2 0.2, 1.0 E.U./dL    Nitrite Urine Negative Negative    Leukocyte Esterase Urine Small (A) Negative              Robby Chavez MD

## 2022-03-19 LAB
BACTERIA UR CULT: ABNORMAL
BACTERIA UR CULT: ABNORMAL

## 2022-03-24 ENCOUNTER — LAB (OUTPATIENT)
Dept: FAMILY MEDICINE | Facility: CLINIC | Age: 82
End: 2022-03-24
Payer: COMMERCIAL

## 2022-03-24 ENCOUNTER — HOSPITAL ENCOUNTER (OUTPATIENT)
Facility: HOSPITAL | Age: 82
Discharge: HOME | End: 2022-03-24
Attending: INTERNAL MEDICINE | Admitting: INTERNAL MEDICINE
Payer: COMMERCIAL

## 2022-03-24 ENCOUNTER — ANESTHESIA (OUTPATIENT)
Dept: SURGERY | Facility: HOSPITAL | Age: 82
End: 2022-03-24
Payer: COMMERCIAL

## 2022-03-24 ENCOUNTER — ANESTHESIA EVENT (OUTPATIENT)
Dept: SURGERY | Facility: HOSPITAL | Age: 82
End: 2022-03-24
Payer: COMMERCIAL

## 2022-03-24 VITALS
TEMPERATURE: 97.7 F | WEIGHT: 216 LBS | HEART RATE: 102 BPM | RESPIRATION RATE: 18 BRPM | OXYGEN SATURATION: 98 % | SYSTOLIC BLOOD PRESSURE: 112 MMHG | DIASTOLIC BLOOD PRESSURE: 70 MMHG | BODY MASS INDEX: 38.26 KG/M2

## 2022-03-24 DIAGNOSIS — Z11.59 ENCOUNTER FOR SCREENING FOR OTHER VIRAL DISEASES: ICD-10-CM

## 2022-03-24 LAB
COLONOSCOPY: NORMAL
SARS-COV-2 RNA RESP QL NAA+PROBE: NEGATIVE
SARS-COV-2 RNA RESP QL NAA+PROBE: NEGATIVE

## 2022-03-24 PROCEDURE — 258N000003 HC RX IP 258 OP 636: Performed by: STUDENT IN AN ORGANIZED HEALTH CARE EDUCATION/TRAINING PROGRAM

## 2022-03-24 PROCEDURE — 999N000141 HC STATISTIC PRE-PROCEDURE NURSING ASSESSMENT: Performed by: INTERNAL MEDICINE

## 2022-03-24 PROCEDURE — 272N000001 HC OR GENERAL SUPPLY STERILE: Performed by: INTERNAL MEDICINE

## 2022-03-24 PROCEDURE — 250N000009 HC RX 250: Performed by: INTERNAL MEDICINE

## 2022-03-24 PROCEDURE — 999N000067 HC STATISTIC FAILED PERIPHERAL IV START

## 2022-03-24 PROCEDURE — 710N000012 HC RECOVERY PHASE 2, PER MINUTE: Performed by: INTERNAL MEDICINE

## 2022-03-24 PROCEDURE — 87635 SARS-COV-2 COVID-19 AMP PRB: CPT | Performed by: ANESTHESIOLOGY

## 2022-03-24 PROCEDURE — 250N000009 HC RX 250: Performed by: STUDENT IN AN ORGANIZED HEALTH CARE EDUCATION/TRAINING PROGRAM

## 2022-03-24 PROCEDURE — 999N000128 HC STATISTIC PERIPHERAL IV START W/O US GUIDANCE

## 2022-03-24 PROCEDURE — 250N000011 HC RX IP 250 OP 636: Performed by: STUDENT IN AN ORGANIZED HEALTH CARE EDUCATION/TRAINING PROGRAM

## 2022-03-24 PROCEDURE — 370N000017 HC ANESTHESIA TECHNICAL FEE, PER MIN: Performed by: INTERNAL MEDICINE

## 2022-03-24 PROCEDURE — U0005 INFEC AGEN DETEC AMPLI PROBE: HCPCS

## 2022-03-24 PROCEDURE — 258N000003 HC RX IP 258 OP 636: Performed by: ANESTHESIOLOGY

## 2022-03-24 PROCEDURE — 88305 TISSUE EXAM BY PATHOLOGIST: CPT | Mod: TC | Performed by: INTERNAL MEDICINE

## 2022-03-24 PROCEDURE — U0003 INFECTIOUS AGENT DETECTION BY NUCLEIC ACID (DNA OR RNA); SEVERE ACUTE RESPIRATORY SYNDROME CORONAVIRUS 2 (SARS-COV-2) (CORONAVIRUS DISEASE [COVID-19]), AMPLIFIED PROBE TECHNIQUE, MAKING USE OF HIGH THROUGHPUT TECHNOLOGIES AS DESCRIBED BY CMS-2020-01-R: HCPCS

## 2022-03-24 PROCEDURE — 360N000075 HC SURGERY LEVEL 2, PER MIN: Performed by: INTERNAL MEDICINE

## 2022-03-24 RX ORDER — ONDANSETRON 2 MG/ML
INJECTION INTRAMUSCULAR; INTRAVENOUS PRN
Status: DISCONTINUED | OUTPATIENT
Start: 2022-03-24 | End: 2022-03-24

## 2022-03-24 RX ORDER — PROPOFOL 10 MG/ML
INJECTION, EMULSION INTRAVENOUS CONTINUOUS PRN
Status: DISCONTINUED | OUTPATIENT
Start: 2022-03-24 | End: 2022-03-24

## 2022-03-24 RX ORDER — ONDANSETRON 4 MG/1
4 TABLET, ORALLY DISINTEGRATING ORAL EVERY 6 HOURS PRN
Status: DISCONTINUED | OUTPATIENT
Start: 2022-03-24 | End: 2022-03-24 | Stop reason: HOSPADM

## 2022-03-24 RX ORDER — ONDANSETRON 4 MG/1
4 TABLET, ORALLY DISINTEGRATING ORAL EVERY 30 MIN PRN
Status: DISCONTINUED | OUTPATIENT
Start: 2022-03-24 | End: 2022-03-24 | Stop reason: HOSPADM

## 2022-03-24 RX ORDER — SODIUM CHLORIDE, SODIUM LACTATE, POTASSIUM CHLORIDE, CALCIUM CHLORIDE 600; 310; 30; 20 MG/100ML; MG/100ML; MG/100ML; MG/100ML
INJECTION, SOLUTION INTRAVENOUS CONTINUOUS
Status: DISCONTINUED | OUTPATIENT
Start: 2022-03-24 | End: 2022-03-24 | Stop reason: HOSPADM

## 2022-03-24 RX ORDER — ONDANSETRON 2 MG/ML
4 INJECTION INTRAMUSCULAR; INTRAVENOUS EVERY 6 HOURS PRN
Status: DISCONTINUED | OUTPATIENT
Start: 2022-03-24 | End: 2022-03-24 | Stop reason: HOSPADM

## 2022-03-24 RX ORDER — FENTANYL CITRATE 50 UG/ML
25 INJECTION, SOLUTION INTRAMUSCULAR; INTRAVENOUS EVERY 5 MIN PRN
Status: DISCONTINUED | OUTPATIENT
Start: 2022-03-24 | End: 2022-03-24 | Stop reason: HOSPADM

## 2022-03-24 RX ORDER — ONDANSETRON 2 MG/ML
4 INJECTION INTRAMUSCULAR; INTRAVENOUS EVERY 30 MIN PRN
Status: DISCONTINUED | OUTPATIENT
Start: 2022-03-24 | End: 2022-03-24 | Stop reason: HOSPADM

## 2022-03-24 RX ORDER — FLUMAZENIL 0.1 MG/ML
0.2 INJECTION, SOLUTION INTRAVENOUS
Status: DISCONTINUED | OUTPATIENT
Start: 2022-03-24 | End: 2022-03-24 | Stop reason: HOSPADM

## 2022-03-24 RX ORDER — PROPOFOL 10 MG/ML
INJECTION, EMULSION INTRAVENOUS PRN
Status: DISCONTINUED | OUTPATIENT
Start: 2022-03-24 | End: 2022-03-24

## 2022-03-24 RX ORDER — HYDROMORPHONE HYDROCHLORIDE 1 MG/ML
0.2 INJECTION, SOLUTION INTRAMUSCULAR; INTRAVENOUS; SUBCUTANEOUS EVERY 5 MIN PRN
Status: DISCONTINUED | OUTPATIENT
Start: 2022-03-24 | End: 2022-03-24 | Stop reason: HOSPADM

## 2022-03-24 RX ORDER — MEPERIDINE HYDROCHLORIDE 25 MG/ML
12.5 INJECTION INTRAMUSCULAR; INTRAVENOUS; SUBCUTANEOUS
Status: DISCONTINUED | OUTPATIENT
Start: 2022-03-24 | End: 2022-03-24 | Stop reason: HOSPADM

## 2022-03-24 RX ORDER — PROCHLORPERAZINE MALEATE 5 MG
5 TABLET ORAL EVERY 6 HOURS PRN
Status: DISCONTINUED | OUTPATIENT
Start: 2022-03-24 | End: 2022-03-24 | Stop reason: HOSPADM

## 2022-03-24 RX ORDER — LIDOCAINE HYDROCHLORIDE 20 MG/ML
INJECTION, SOLUTION INFILTRATION; PERINEURAL PRN
Status: DISCONTINUED | OUTPATIENT
Start: 2022-03-24 | End: 2022-03-24

## 2022-03-24 RX ORDER — LIDOCAINE 40 MG/G
CREAM TOPICAL
Status: DISCONTINUED | OUTPATIENT
Start: 2022-03-24 | End: 2022-03-24 | Stop reason: HOSPADM

## 2022-03-24 RX ORDER — ACETAMINOPHEN 325 MG/1
975 TABLET ORAL ONCE
Status: CANCELLED | OUTPATIENT
Start: 2022-03-24 | End: 2022-03-24

## 2022-03-24 RX ORDER — OXYCODONE HYDROCHLORIDE 5 MG/1
5 TABLET ORAL EVERY 4 HOURS PRN
Status: DISCONTINUED | OUTPATIENT
Start: 2022-03-24 | End: 2022-03-24 | Stop reason: HOSPADM

## 2022-03-24 RX ORDER — NALOXONE HYDROCHLORIDE 1 MG/ML
0.2 INJECTION INTRAMUSCULAR; INTRAVENOUS; SUBCUTANEOUS
Status: DISCONTINUED | OUTPATIENT
Start: 2022-03-24 | End: 2022-03-24 | Stop reason: HOSPADM

## 2022-03-24 RX ORDER — NALOXONE HYDROCHLORIDE 1 MG/ML
0.4 INJECTION INTRAMUSCULAR; INTRAVENOUS; SUBCUTANEOUS
Status: DISCONTINUED | OUTPATIENT
Start: 2022-03-24 | End: 2022-03-24 | Stop reason: HOSPADM

## 2022-03-24 RX ORDER — FENTANYL CITRATE 50 UG/ML
25 INJECTION, SOLUTION INTRAMUSCULAR; INTRAVENOUS
Status: DISCONTINUED | OUTPATIENT
Start: 2022-03-24 | End: 2022-03-24 | Stop reason: HOSPADM

## 2022-03-24 RX ADMIN — PROPOFOL 100 MCG/KG/MIN: 10 INJECTION, EMULSION INTRAVENOUS at 15:16

## 2022-03-24 RX ADMIN — SODIUM CHLORIDE, POTASSIUM CHLORIDE, SODIUM LACTATE AND CALCIUM CHLORIDE: 600; 310; 30; 20 INJECTION, SOLUTION INTRAVENOUS at 15:12

## 2022-03-24 RX ADMIN — PROPOFOL 50 MG: 10 INJECTION, EMULSION INTRAVENOUS at 15:23

## 2022-03-24 RX ADMIN — PROPOFOL 40 MG: 10 INJECTION, EMULSION INTRAVENOUS at 15:39

## 2022-03-24 RX ADMIN — ONDANSETRON 4 MG: 2 INJECTION INTRAMUSCULAR; INTRAVENOUS at 15:26

## 2022-03-24 RX ADMIN — LIDOCAINE HYDROCHLORIDE 40 MG: 20 INJECTION, SOLUTION INFILTRATION; PERINEURAL at 15:16

## 2022-03-24 RX ADMIN — PROPOFOL 30 MG: 10 INJECTION, EMULSION INTRAVENOUS at 15:19

## 2022-03-24 RX ADMIN — PHENYLEPHRINE HYDROCHLORIDE 100 MCG: 10 INJECTION INTRAVENOUS at 15:33

## 2022-03-24 ASSESSMENT — LIFESTYLE VARIABLES: TOBACCO_USE: 0

## 2022-03-24 ASSESSMENT — ENCOUNTER SYMPTOMS: DYSRHYTHMIAS: 1

## 2022-03-24 NOTE — DISCHARGE INSTRUCTIONS
Colonoscopy  Colonoscopy is a test to view the inside of your lower digestive tract (colon and rectum). Sometimes it can show the last part of the small intestine (ileum). During the test, small pieces of tissue may be removed for testing. This is called a biopsy. Small growths, such as polyps, may also be removed.       A camera attached to a flexible tube with a viewing lens is used to take video pictures.   Why is colonoscopy done?  The test is done to help look for colon cancer. And it can help find the source of abdominal pain, bleeding, and changes in bowel habits. It may be needed once a year to every 10 years, depending on factors such as your:     Age    Health history    Family health history    Symptoms    Results from any prior colonoscopy  Risks and possible complications  These include:    Bleeding                 A puncture or tear in the colon     Risks of anesthesia    A cancer lesion not being seen or fully removed  Getting ready   To prepare for the test:    Talk with your healthcare provider about the risks of the test (see below). Also ask your healthcare provider about alternatives to the test.    Tell your healthcare provider about any medicines and supplements you take. Also tell him or her about any health conditions you may have.    Make sure your rectum and colon are empty for the test. Follow the diet and bowel prep instructions exactly. If you don t, the test may need to be rescheduled.    Plan for a friend or family member to drive you home after the test.    You may discuss the results with your doctor right away or at a future visit.   During the test   The test is usually done in the hospital on an outpatient basis or at an outpatient clinic. This means you go home the same day. The procedure takes about 30 minutes. During that time:     You are given relaxing (sedating) medicine through an IV line. You may be drowsy, or fully asleep.    The healthcare provider will first give you  a physical exam to check for anal and rectal problems.    Then the anus is lubricated and the scope inserted.    If you are awake, you may have a feeling similar to needing to have a bowel movement. You may also feel pressure as air is pumped into the colon. It s OK to pass gas during the procedure.    Biopsy, polyp removal, or other treatments may be done during the test.     Colonoscopy provides an inside view of the entire colon.   After the test   You may have gas right after the test. It can help to try to pass it to help prevent later bloating. Your healthcare provider may discuss the results with you right away. Or you may need to schedule a follow-up visit to talk about the results. After the test, you can go back to your normal eating and other activities. You may be tired from the sedation and need to rest for a few hours. Discuss your medicines with your provider to understand if they can be restarted right away.   When to call your healthcare provider  Call your healthcare provider if you have any of the following after the procedure:     Pain in your belly    Fever of 100.4 F (38 C) or higher, or as directed by your provider    Rectal bleeding    Nausea or vomiting  [employer] last reviewed this educational content on 6/1/2019 2000-2021 [employer]. All rights reserved. This information is not intended as a substitute for professional medical care. Always follow your healthcare professional's instructions.

## 2022-03-24 NOTE — ANESTHESIA PREPROCEDURE EVALUATION
Anesthesia Pre-Procedure Evaluation    Patient: Lluvia Marrufo   MRN: [id number] : 1940        Preoperative Diagnosis: * No pre-op diagnosis entered *   Procedure : * No procedures listed *     Past Medical History:   Diagnosis Date     Arthritis      Asthma      Atrial fibrillation (H)      Bronchitis      Earache      Fracture, foot      History of CVA (cerebrovascular accident)      Hyperlipidemia      Hypertension      UTI (urinary tract infection)       Past Surgical History:   Procedure Laterality Date     APPENDECTOMY       EYE SURGERY      Growth on lateral left eyelid removed.     HYSTERECTOMY      age 57     JOINT REPLACEMENT Left     knee     OOPHORECTOMY       PICC TRIPLE LUMEN PLACEMENT  3/3/2022          MO HALLUX RIGIDUS W/CHEILECTOMY 1ST MP JT W/O IMPLT      Description: Hallux Rigidus Correction W/ Cheilectomy 1st MTP Joint;  Proc Date: 2010;     TONSILLECTOMY       TOTAL KNEE ARTHROPLASTY      right     TOTAL SHOULDER ARTHROPLASTY      left     [facility] RECONSTR TOTAL SHOULDER IMPLANT Right 10/27/2015    Procedure: RIGHT SHOULDER TOTAL ARTHROPLASTY;  Surgeon: Eddie Payne MD;  Location: [facility];  Service: Orthopedics     [facility] TOTAL HIP ARTHROPLASTY Left 10/3/2017    Procedure:  LEFT TOTAL HIP ARTHROPLASTY;  Surgeon: Mahin Monsalve MD;  Location: [facility];  Service: Orthopedics      No Known Allergies   Social History     Tobacco Use     Smoking status: Never Smoker     Smokeless tobacco: Never Used   Substance Use Topics     Alcohol use: Yes     Comment: Alcoholic Drinks/day: 1 drink per day      Wt Readings from Last 1 Encounters:   22 98 kg (216 lb)        Anesthesia Evaluation   Pt has had prior anesthetic. Type: General and Regional.    No history of anesthetic complications       ROS/MED HX  ENT/Pulmonary:     (+) MARTÍN risk factors, hypertension, obese, Intermittent, asthma  (-) tobacco use   Neurologic: Comment: Expressive aphasia    (+) CVA, TIA,      Cardiovascular: Comment: 7/27/21 Echo  Interpretation Summary 1.Left ventricular size, wall motion and function are normal. The ejection fraction is 60-65%. 2.TAPSE is normal, which is consistent with normal right ventricular systolic function. 3.IVC diameter and respiratory changes fall into an intermediate range suggesting an RA pressure of 8 mmHg. 4.No hemodynamically significant valvular abnormalities on 2D or color flow imaging. 5.Compared to the prior study dated 7/13/2018, there have been no changes other then A Fib now present      (+) Dyslipidemia hypertension-----NAIK. dysrhythmias, a-fib,     METS/Exercise Tolerance:     Hematologic:  - neg hematologic  ROS     Musculoskeletal:  - neg musculoskeletal ROS     GI/Hepatic:    (-) GERD   Renal/Genitourinary: Comment: UTI      Endo: Comment: Pre DM    (+) Obesity (BMI 38),     Psychiatric/Substance Use:       Infectious Disease: Comment: UTI      Malignancy:  - neg malignancy ROS     Other:  - neg other ROS          Physical Exam    Airway  airway exam normal      Mallampati: II   TM distance: > 3 FB   Neck ROM: full   Mouth opening: > 3 cm    Respiratory Devices and Support         Dental  no notable dental history         Cardiovascular   cardiovascular exam normal       Rhythm and rate: regular and normal     Pulmonary   pulmonary exam normal        breath sounds clear to auscultation           OUTSIDE LABS:  CBC:   Lab Results   Component Value Date    WBC 16.6 (H) 03/11/2022    WBC 13.8 (H) 03/09/2022    HGB 14.6 03/11/2022    HGB 14.7 03/09/2022    HCT 45.4 03/11/2022    HCT 44.3 03/09/2022     03/11/2022     03/09/2022     BMP:   Lab Results   Component Value Date     03/09/2022     03/07/2022    POTASSIUM 3.6 03/09/2022    POTASSIUM 3.4 (L) 03/07/2022    CHLORIDE 97 (L) 03/09/2022    CHLORIDE 100 03/07/2022    CO2 27 03/09/2022    CO2 32 (H) 03/07/2022    BUN 22 03/09/2022    BUN 11 03/07/2022    CR 0.82 03/09/2022    CR 0.59  (L) 03/07/2022     (H) 03/09/2022     (H) 03/07/2022     COAGS:   Lab Results   Component Value Date    PTT 29 07/29/2018    INR 0.98 07/29/2018     POC: No results found for: BGM, HCG, HCGS  HEPATIC:   Lab Results   Component Value Date    ALBUMIN 3.6 03/09/2022    PROTTOTAL 6.9 03/09/2022    ALT 41 03/09/2022    AST 29 03/09/2022    ALKPHOS 75 03/09/2022    BILITOTAL 0.7 03/09/2022     OTHER:   Lab Results   Component Value Date    PH 7.40 03/03/2022    LACT 0.9 03/05/2022    A1C 5.9 (H) 03/09/2022    ANNA 10.1 03/09/2022    MAG 1.8 03/07/2022       Anesthesia Plan    ASA Status:  3      Anesthesia Type: MAC.              Consents    Anesthesia Plan(s) and associated risks, benefits, and realistic alternatives discussed. Questions answered and patient/representative(s) expressed understanding.    - Discussed:     - Discussed with:  Patient      - Extended Intubation/Ventilatory Support Discussed: No.      - Patient is DNR/DNI Status: No    Use of blood products discussed: No .     Postoperative Care            Comments:                    Crystal Galicia MD

## 2022-03-24 NOTE — H&P
Pre-procedure Note    Reason for procedure: Abnormal CT imaging    History and Physical Reviewed: Reviewed, no changes.    82 yo F with hx of HTN, CVA, Afib, recent hospitalization for UTI, sepsis and acute resp failure. Noted to have abnormal appearance of the sigmoid colon incidentally on imaging. Last colonoscopy >10 years ago.    Pre-sedation assessment:    General: alert, appears stated age, and cooperative  Airway: normal  Heart: regular rate  Lungs: clear to auscultation bilaterally anteriorly, no wheezing    Sedation Plan based on assessment: MAC    Mallampati score: Class II (visualization of the soft palate, fauces, and uvula)          ASA Classification: ASA 3 - Patient with moderate systemic disease with functional limitations    Impression: Patient deemed adequate candidate for MAC sedation    Risks, benefits and alternatives were discussed with the patient and informed consent was obtained.    Plan: colonoscopy      Khang Torres MD 3/24/2022 3:14 PM                                               Khang Torres MD  Thank you for the opportunity to participate in the care of this patient.   Please feel free to call me with any questions or concerns.  Phone number [phone].

## 2022-03-24 NOTE — ANESTHESIA CARE TRANSFER NOTE
Patient: Lluvia Marrufo    Procedure: Procedure(s):  COLONOSCOPY       Diagnosis: Colonic mass [K63.89]  Diagnosis Additional Information: No value filed.    Anesthesia Type:   MAC     Note:    Oropharynx: oropharynx clear of all foreign objects and spontaneously breathing  Level of Consciousness: awake  Oxygen Supplementation: face mask  Level of Supplemental Oxygen (L/min / FiO2): 5  Independent Airway: airway patency satisfactory and stable  Dentition: dentition unchanged  Vital Signs Stable: post-procedure vital signs reviewed and stable  Report to RN Given: handoff report given  Patient transferred to: Phase II    Handoff Report: Identifed the Patient, Identified the Reponsible Provider, Reviewed the pertinent medical history, Discussed the surgical course, Reviewed Intra-OP anesthesia mangement and issues during anesthesia, Set expectations for post-procedure period and Allowed opportunity for questions and acknowledgement of understanding      Vitals:  Vitals Value Taken Time   /73 03/24/22 1601   Temp 36.5  C (97.7  F) 03/24/22 1601   Pulse 97 03/24/22 1601   Resp 18 03/24/22 1601   SpO2 100 % 03/24/22 1601       Electronically Signed By: ARIELLA Murguia CRNA  March 24, 2022  4:02 PM

## 2022-03-24 NOTE — INTERVAL H&P NOTE
"I have reviewed the surgical (or preoperative) H&P that is linked to this encounter, and examined the patient. There are no significant changes  H&P documented 3/3/22    Clinical Conditions Present on Arrival:  SECTIONPRESENTONADMISSIONBEGIN@             # Anion Gap Metabolic Acidosis: AG = N/A on admission, will monitor and treat as appropriate    # Coagulation Defect: home medication list includes an anticoagulant medication    # Obesity: Estimated body mass index is 38.26 kg/m  as calculated from the following:    Height as of 3/3/22: 1.6 m (5' 3\").    Weight as of this encounter: 98 kg (216 lb).       "

## 2022-03-25 NOTE — ANESTHESIA POSTPROCEDURE EVALUATION
Patient: Lluvia Marrufo    Procedure: Procedure(s):  COLONOSCOPY       Anesthesia Type:  MAC    Note:  Disposition: Outpatient   Postop Pain Control: Uneventful            Sign Out: Well controlled pain   PONV: No   Neuro/Psych: Uneventful            Sign Out: Acceptable/Baseline neuro status   Airway/Respiratory: Uneventful            Sign Out: Acceptable/Baseline resp. status   CV/Hemodynamics: Uneventful            Sign Out: Acceptable CV status; No obvious hypovolemia; No obvious fluid overload   Other NRE: NONE   DID A NON-ROUTINE EVENT OCCUR? No           Last vitals:  Vitals Value Taken Time   /70 03/24/22 1644   Temp 36.5  C (97.7  F) 03/24/22 1601   Pulse 111 03/24/22 1652   Resp 18 03/24/22 1644   SpO2 93 % 03/24/22 1652   Vitals shown include unvalidated device data.    Electronically Signed By: Crystal Galicia MD  March 24, 2022  7:29 PM

## 2022-03-28 LAB
PATH REPORT.COMMENTS IMP SPEC: NORMAL
PATH REPORT.FINAL DX SPEC: NORMAL
PATH REPORT.GROSS SPEC: NORMAL
PATH REPORT.MICROSCOPIC SPEC OTHER STN: NORMAL
PATH REPORT.RELEVANT HX SPEC: NORMAL
PHOTO IMAGE: NORMAL

## 2022-03-28 PROCEDURE — 88305 TISSUE EXAM BY PATHOLOGIST: CPT | Mod: 26 | Performed by: PATHOLOGY

## 2022-03-29 DIAGNOSIS — I48.19 PERSISTENT ATRIAL FIBRILLATION (H): ICD-10-CM

## 2022-03-29 DIAGNOSIS — I63.9 ACUTE CVA (CEREBROVASCULAR ACCIDENT) (H): ICD-10-CM

## 2022-03-31 RX ORDER — ATORVASTATIN CALCIUM 20 MG/1
TABLET, FILM COATED ORAL
Qty: 90 TABLET | Refills: 0 | Status: SHIPPED | OUTPATIENT
Start: 2022-03-31 | End: 2022-07-25

## 2022-03-31 RX ORDER — METOPROLOL TARTRATE 25 MG/1
12.5 TABLET, FILM COATED ORAL 2 TIMES DAILY
Qty: 90 TABLET | Refills: 0 | Status: SHIPPED | OUTPATIENT
Start: 2022-03-31 | End: 2022-06-06

## 2022-03-31 NOTE — TELEPHONE ENCOUNTER
"Routing refill request to provider for review/approval because:  Early fill requested  Acute dosing with an end date on Metoprolol script- provider please review for continued use    atorvastatin (LIPITOR) 20 MG tablet  Last Written Prescription Date:  2/2/22  Last Fill Quantity: 90,  # refills: 0   metoprolol tartrate (LOPRESSOR) 25 MG tablet  Last Written Prescription Date:  3/7/22  Last Fill Quantity: 30,  # refills: 0   Last office visit provider:  3/9/22     Requested Prescriptions   Pending Prescriptions Disp Refills     atorvastatin (LIPITOR) 20 MG tablet [Pharmacy Med Name: ATORVASTATIN 20 MG TABLET] 90 tablet 0     Sig: TAKE 1 TABLET BY MOUTH EVERY DAY       Statins Protocol Passed - 3/29/2022 12:54 PM        Passed - LDL on file in past 12 months     Recent Labs   Lab Test 04/21/21  0738   LDL 61             Passed - No abnormal creatine kinase in past 12 months     No lab results found.             Passed - Recent (12 mo) or future (30 days) visit within the authorizing provider's specialty     Patient has had an office visit with the authorizing provider or a provider within the authorizing providers department within the previous 12 mos or has a future within next 30 days. See \"Patient Info\" tab in inbasket, or \"Choose Columns\" in Meds & Orders section of the refill encounter.              Passed - Medication is active on med list        Passed - Patient is age 18 or older        Passed - No active pregnancy on record        Passed - No positive pregnancy test in past 12 months           metoprolol tartrate (LOPRESSOR) 25 MG tablet 30 tablet 0     Sig: Take 0.5 tablets (12.5 mg) by mouth 2 times daily       Beta-Blockers Protocol Passed - 3/29/2022 12:54 PM        Passed - Blood pressure under 140/90 in past 12 months     BP Readings from Last 3 Encounters:   03/24/22 112/70   03/18/22 116/80   03/09/22 (!) 145/94                 Passed - Patient is age 6 or older        Passed - Recent (12 mo) or " "future (30 days) visit within the authorizing provider's specialty     Patient has had an office visit with the authorizing provider or a provider within the authorizing providers department within the previous 12 mos or has a future within next 30 days. See \"Patient Info\" tab in inbasket, or \"Choose Columns\" in Meds & Orders section of the refill encounter.              Passed - Medication is active on med list             Joyce Gates RN 03/31/22 9:21 AM  "

## 2022-04-04 ENCOUNTER — HOSPITAL ENCOUNTER (OUTPATIENT)
Dept: CT IMAGING | Facility: HOSPITAL | Age: 82
Discharge: HOME | End: 2022-04-04
Attending: INTERNAL MEDICINE | Admitting: INTERNAL MEDICINE
Payer: COMMERCIAL

## 2022-04-04 DIAGNOSIS — J18.9 PNEUMONIA DUE TO INFECTIOUS ORGANISM, UNSPECIFIED LATERALITY, UNSPECIFIED PART OF LUNG: ICD-10-CM

## 2022-04-04 PROCEDURE — 71250 CT THORAX DX C-: CPT

## 2022-04-18 ENCOUNTER — OFFICE VISIT (OUTPATIENT)
Dept: PULMONOLOGY | Facility: OTHER | Age: 82
End: 2022-04-18
Payer: COMMERCIAL

## 2022-04-18 VITALS — DIASTOLIC BLOOD PRESSURE: 82 MMHG | OXYGEN SATURATION: 97 % | HEART RATE: 90 BPM | SYSTOLIC BLOOD PRESSURE: 144 MMHG

## 2022-04-18 DIAGNOSIS — R06.09 DYSPNEA ON EXERTION: Primary | ICD-10-CM

## 2022-04-18 DIAGNOSIS — J45.20 MILD INTERMITTENT ASTHMA WITHOUT COMPLICATION: ICD-10-CM

## 2022-04-18 PROCEDURE — 99214 OFFICE O/P EST MOD 30 MIN: CPT | Performed by: INTERNAL MEDICINE

## 2022-04-18 RX ORDER — PREDNISONE 20 MG/1
TABLET ORAL
Qty: 10 TABLET | Refills: 0 | Status: SHIPPED | OUTPATIENT
Start: 2022-04-18 | End: 2022-06-06

## 2022-04-18 ASSESSMENT — ASTHMA QUESTIONNAIRES
ACT_TOTALSCORE: 17
ACT_TOTALSCORE: 17
QUESTION_5 LAST FOUR WEEKS HOW WOULD YOU RATE YOUR ASTHMA CONTROL: WELL CONTROLLED
QUESTION_3 LAST FOUR WEEKS HOW OFTEN DID YOUR ASTHMA SYMPTOMS (WHEEZING, COUGHING, SHORTNESS OF BREATH, CHEST TIGHTNESS OR PAIN) WAKE YOU UP AT NIGHT OR EARLIER THAN USUAL IN THE MORNING: ONCE OR TWICE
QUESTION_2 LAST FOUR WEEKS HOW OFTEN HAVE YOU HAD SHORTNESS OF BREATH: ONCE A DAY
QUESTION_4 LAST FOUR WEEKS HOW OFTEN HAVE YOU USED YOUR RESCUE INHALER OR NEBULIZER MEDICATION (SUCH AS ALBUTEROL): NOT AT ALL
QUESTION_1 LAST FOUR WEEKS HOW MUCH OF THE TIME DID YOUR ASTHMA KEEP YOU FROM GETTING AS MUCH DONE AT WORK, SCHOOL OR AT HOME: MOST OF THE TIME
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
## 2022-04-26 ENCOUNTER — OFFICE VISIT (OUTPATIENT)
Dept: FAMILY MEDICINE | Facility: CLINIC | Age: 82
End: 2022-04-26
Payer: COMMERCIAL

## 2022-04-26 VITALS
SYSTOLIC BLOOD PRESSURE: 117 MMHG | HEIGHT: 63 IN | DIASTOLIC BLOOD PRESSURE: 77 MMHG | OXYGEN SATURATION: 95 % | HEART RATE: 106 BPM | WEIGHT: 216 LBS | BODY MASS INDEX: 38.27 KG/M2

## 2022-04-26 DIAGNOSIS — K52.9 COLITIS: ICD-10-CM

## 2022-04-26 DIAGNOSIS — J45.20 MILD INTERMITTENT ASTHMA WITHOUT COMPLICATION: ICD-10-CM

## 2022-04-26 DIAGNOSIS — Z78.0 POSTMENOPAUSAL STATUS: ICD-10-CM

## 2022-04-26 DIAGNOSIS — R30.0 BURNING WITH URINATION: Primary | ICD-10-CM

## 2022-04-26 DIAGNOSIS — R05.8 COUGH WITH EXPECTORATION: ICD-10-CM

## 2022-04-26 PROBLEM — K57.30 DIVERTICULAR DISEASE OF LARGE INTESTINE: Status: ACTIVE | Noted: 2022-03-31

## 2022-04-26 PROBLEM — R93.3 IMAGING OF GASTROINTESTINAL TRACT ABNORMAL: Status: ACTIVE | Noted: 2022-04-26

## 2022-04-26 PROBLEM — K50.10 CROHN'S DISEASE OF LARGE BOWEL (H): Status: ACTIVE | Noted: 2022-03-31

## 2022-04-26 PROCEDURE — 99214 OFFICE O/P EST MOD 30 MIN: CPT | Performed by: FAMILY MEDICINE

## 2022-04-26 RX ORDER — FLUTICASONE PROPIONATE 220 UG/1
1 AEROSOL, METERED RESPIRATORY (INHALATION) 2 TIMES DAILY
Qty: 12 G | Refills: 1 | Status: SHIPPED | OUTPATIENT
Start: 2022-04-26 | End: 2022-07-01

## 2022-04-26 NOTE — PROGRESS NOTES
Assessment & Plan     ICD-10-CM    1. Burning with urination  R30.0 UA Macro with Reflex to Micro and Culture - lab collect   2. Postmenopausal status  Z78.0 DEXA HIP/PELVIS/SPINE - Future   3. Mild intermittent asthma without complication  J45.20 fluticasone (FLOVENT HFA) 220 MCG/ACT inhaler   4. Colitis  K52.9 Adult Gastro Ref - Consult Only   5. Cough with expectoration  R05.8      Medical decision making: Patient is here today for follow-up colonoscopy, dysuria and cough.  She does have history of mild intermittent asthma.  Her cough is productive with clear sputum.  She does not have fever or chills.  Lung exam is fairly normal at this time.  She has been prescribed prednisone Dosepak by the pulmonologist.  However in the past she had extreme sleeping problems with this  At this time, I have asked patient to start her albuterol.  In addition, I will send prescription for Flovent as she does have seasonal exacerbation of asthma and she can use it in a step up and step down pattern.  Start allergy medication Claritin.  If she only has symptoms of cough we can also try Tessalon pearls and she will get back to me.  Reviewed the colonoscopy results which shows an area of colitis.  Patient currently has symptoms of stool x2 a day.  Referral to GI for further evaluation and management.  I have also asked the patient to pursue a DEXA scan.  Patient has some symptoms of dysuria.  No abdominal pain, no fever or chills.  She was unable to leave a specimen.         MEDICATIONS:   Orders Placed This Encounter   Medications     fluticasone (FLOVENT HFA) 220 MCG/ACT inhaler     Sig: Inhale 1 puff into the lungs 2 times daily     Dispense:  12 g     Refill:  1     There are no discontinued medications.       - Continue other medications without change  See Patient Instructions    Return in about 3 months (around 7/26/2022) for Routine preventive.    Jefry Vick MD  [facility]     Chief Complaint   Patient presents with     RECHECK     Follow up from CT scan and colonoscopy      Cough     Developed a hard loose cough, congestion.      UTI     Burning when going, started this morning.        Lexii is a 82 year old who presents for the following health issues  accompanied by her spouse.    History of Present Illness       Reason for visit:  Severe cough.  Burning on urination  Symptom onset:  3-7 days ago  Symptoms include:  Cough burning on urination  Symptom intensity:  Moderate  Symptom progression:  Staying the same  Had these symptoms before:  Yes  Has tried/received treatment for these symptoms:  Yes  Previous treatment was successful:  No  What makes it worse:  No  What makes it better:  No    She eats 2-3 servings of fruits and vegetables daily.She consumes 0 sweetened beverage(s) daily.She exercises with enough effort to increase her heart rate 9 or less minutes per day.  She exercises with enough effort to increase her heart rate 5 days per week.   She is taking medications regularly.     Patient Active Problem List   Diagnosis     Obesity     Hyperlipidemia     Prediabetes     Lower Back Pain     Hypertension goal BP (blood pressure) < 140/90     Joint Pain, Localized In The Shoulder     Mild intermittent asthma without complication     Spinal stenosis of lumbar region     Primary osteoarthritis of left hip     Hypomagnesemia     Hypokalemia     Expressive aphasia     Acute CVA (cerebrovascular accident) (H)     Obesity (BMI 35.0-39.9) with comorbidity (H)     Dyspnea on exertion     Precordial pain     Persistent atrial fibrillation (H)     Sepsis, due to unspecified organism, unspecified whether acute organ dysfunction present (H)     Acute respiratory failure with hypoxia (H)     Gram-positive bacteremia     Mild intermittent asthma with exacerbation     Mass of colon     Crohn's disease of large bowel (H)     Diverticular disease of large intestine     Imaging of  "gastrointestinal tract abnormal     Colitis       Current Outpatient Medications   Medication     albuterol (PROVENTIL) 2.5 mg /3 mL (0.083 %) nebulizer solution     amLODIPine (NORVASC) 10 MG tablet     apixaban ANTICOAGULANT (ELIQUIS) 5 mg Tab tablet     atorvastatin (LIPITOR) 20 MG tablet     diphenhydrAMINE (BENADRYL) 25 mg tablet     fluticasone (FLOVENT HFA) 220 MCG/ACT inhaler     gabapentin (NEURONTIN) 300 MG capsule     glucosamine-chondroitin 500-400 mg cap     KLOR-CON 20 MEQ CR tablet     lisinopril-hydrochlorothiazide (ZESTORETIC) 20-25 MG tablet     metoprolol tartrate (LOPRESSOR) 25 MG tablet     multivitamin with minerals (THERA-M) 9 mg iron-400 mcg Tab tablet     predniSONE (DELTASONE) 20 MG tablet     traZODone (DESYREL) 50 MG tablet     No current facility-administered medications for this visit.         Review of Systems   Constitutional, HEENT, cardiovascular, pulmonary, gi and gu systems are negative, except as otherwise noted.      Objective    /77   Pulse 106   Ht 1.6 m (5' 3\")   Wt 98 kg (216 lb)   LMP  (LMP Unknown)   SpO2 95%   BMI 38.26 kg/m    Body mass index is 38.26 kg/m .  Physical Exam   GENERAL: healthy, alert and no distress  NECK: no adenopathy, no asymmetry, masses, or scars and thyroid normal to palpation  RESP: lungs clear to auscultation - no rales, rhonchi or wheezes  CV: regular rate and rhythm, normal S1 S2, no S3 or S4, no murmur, click or rub, no peripheral edema and peripheral pulses strong  ABDOMEN: soft, nontender, no hepatosplenomegaly, no masses and bowel sounds normal    Admission on 03/24/2022, Discharged on 03/24/2022   Component Date Value Ref Range Status     SARS CoV2 PCR 03/24/2022 Negative  Negative Final    NEGATIVE: SARS-CoV-2 (COVID-19) RNA not detected, presumed negative.     Case Report 03/24/2022    Final                    Value:Surgical Pathology Report                         Case: [id number]                                  Authorizing " Provider:  Khang Torres MD     Collected:           03/24/2022 03:31 PM          Ordering Location:     [facility]      Received:            03/24/2022 04:00 PM                                 [facility]                                                               Pathologist:           Vince Schreiber MD                                                      Specimens:   A) - Large Intestine, Colon, Ascending, ASCENDING COLON POLYP                                       B) - Large Intestine, Colon, Sigmoid, SIGMOID COLON POLYP X 2                                       C) - Large Intestine, Colon, Sigmoid, SIGMOID BIOPSY OF ABNORMAL MUCOSA                     Final Diagnosis 03/24/2022    Final                    Value:This result contains rich text formatting which cannot be displayed here.     Comment 03/24/2022    Final                    Value:This result contains rich text formatting which cannot be displayed here.     Clinical Information 03/24/2022    Final                    Value:This result contains rich text formatting which cannot be displayed here.     Gross Description 03/24/2022    Final                    Value:This result contains rich text formatting which cannot be displayed here.     Microscopic Description 03/24/2022    Final                    Value:This result contains rich text formatting which cannot be displayed here.     Performing Labs 03/24/2022    Final                    Value:This result contains rich text formatting which cannot be displayed here.     COLONOSCOPY 03/24/2022    Final                    Value:[facility]  [address] [place] [address]  _______________________________________________________________________________  Patient Name: Lluvia Marrufo        Procedure Date: 3/24/2022 2:47 PM  MRN: [id number]                       Account Number: [id number]  YOB: 1940               Admit Type:  Outpatient  Age: 81                               Room: [place] [address]  Note Status: Finalized                Attending MD: GEM CARUSO MD  Instrument Name: Pediatric Colonoscope 617   _______________________________________________________________________________     Procedure:             Colonoscopy  Indications:           Abnormal CT of the GI tract  Providers:             GEM CARUSO MD  Referring MD:            Medicines:             Monitored Anesthesia Care  Complications:         No immediate complications. Estimated blood loss: None.  _______________________________________________________________________________  Procedure:                                       Pre-Anesthesia Assessment:                         - Prior to the procedure, a History and Physical was                          performed, and patient medications, allergies and                          sensitivities were reviewed. The patient's tolerance                          of previous anesthesia was reviewed.                         - The risks and benefits of the procedure and the                          sedation options and risks were discussed with the                          patient. All questions were answered and informed                          consent was obtained.                         - Patient identification and proposed procedure were                          verified prior to the procedure by the physician, the                          nurse and the anesthesiologist. The procedure was                          verified in the pre-procedure area.                         After obtaining informed consent, the colonoscope was                          passed                           under direct vision. Throughout the procedure,                          the patient's blood pressure, pulse, and oxygen                          saturations were monitored continuously. The pediatric                          colonoscope  was introduced through the anus and                          advanced to the terminal ileum, with identification of                          the appendiceal orifice and IC valve. The colonoscopy                          was performed without difficulty. The patient                          tolerated the procedure well. The quality of the bowel                          preparation was good.                                                                                   Findings:       The terminal ileum appeared normal.       A 5 mm polyp was found in the ascending colon. The polyp was sessile.        The polyp was removed with a cold snare. Resection and retrieval were        complete.       Multiple small and large-mouthed diverticula were found in                           the sigmoid        colon and descending colon. There was narrowing of the colon in        association with the diverticular opening. There was evidence of        diverticular spasm. Erythema was seen in association with the        diverticular opening. Petechia were visualized in association with the        diverticular opening. No purulence.       Two sessile polyps were found in the sigmoid colon. The polyps were 5 to        6 mm in size. These polyps were removed with a cold snare. Resection and        retrieval were complete.       A segmental area of moderate-severely congested, erythematous, granular        and inflamed mucosa was found in the sigmoid colon associated with the        significantly narrowed segment of diverticular disease. Multiple        biopsies were taken with a cold forceps for histology.       Non-bleeding internal hemorrhoids were found during retroflexion. The        hemorrhoids were small.                                                                                                             Moderate Sedation:       Moderate (conscious) sedation was personally administered by an        anesthesia professional.  The following parameters were monitored: oxygen        saturation, heart rate, blood pressure, respiratory rate, EKG, adequacy        of pulmonary ventilation, and response to care.  Impression:            - The examined portion of the ileum was normal.                         - One 5 mm polyp in the ascending colon, removed with                          a cold snare. Resected and retrieved.                         - Severe diverticulosis in the sigmoid colon and in                          the descending colon. There was narrowing of the colon                          in association with the diverticular opening. There                          was evidence of diverticular spasm. Erythema was seen                          in association with the diverticular opening. Petechia                          were visualized in association with the diverticular                                                    opening. No purulence noted.                         - Two 5 to 6 mm polyps in the sigmoid colon, removed                          with a cold snare. Resected and retrieved. Polyps                          possibly represent inflammatory polyps associated with                          diverticular disease.                         - Congested, erythematous, granular and inflamed                          mucosa in the sigmoid colon. Biopsied.                         - Hemorrhoids.  Recommendation:        - Await pathology results.                         - Consider starting Metamucil 2-4 tsp mixed in 8 oz                          liquid daily.                         - Restart anticoagulation tomorrow.                                                                                     Khang Caruso MD  _____________________  KHANG CARUSO MD  3/24/2022 4:15:19 PM  I was physically present for the entire viewing portion of the exam.  ________________________                          __  Signature of teaching  physician  [id number] MD ZULY  Number of Addenda: 0    Note Initiated On: 3/24/2022 2:47 PM  Scope In: 3:25:12 PM  Scope Out: 3:52:55 PM

## 2022-04-26 NOTE — PATIENT INSTRUCTIONS
I have seen you today for cough.  This may be an exacerbation of asthma.  As you do report wheezing.    And examined clinic was normal.  I recommend that you take albuterol as needed to see if that help with cough.  In addition the pulmonologist has prescribed your prednisone Dosepak and you can start same.  Since you have sleeping issues with prednisone, inhaled corticosteroid ICS Flovent has been sent to the pharmacy.  You can start and use it in a stepup and stepdown pattern with start of your seasonal allergy asthma.  In addition start on an antihistamine like Claritin for seasonal allergy.    Colonoscopy shows colitis and I will make a referral to gastroenterology.  Currently you do not have severe symptoms.    For burning of urine, a UA will be checked today.

## 2022-04-27 ENCOUNTER — OFFICE VISIT (OUTPATIENT)
Dept: FAMILY MEDICINE | Facility: CLINIC | Age: 82
End: 2022-04-27
Payer: COMMERCIAL

## 2022-04-27 VITALS
RESPIRATION RATE: 24 BRPM | OXYGEN SATURATION: 93 % | SYSTOLIC BLOOD PRESSURE: 132 MMHG | DIASTOLIC BLOOD PRESSURE: 84 MMHG | HEART RATE: 89 BPM | TEMPERATURE: 97.8 F

## 2022-04-27 DIAGNOSIS — R30.0 DYSURIA: ICD-10-CM

## 2022-04-27 DIAGNOSIS — R73.09 OTHER ABNORMAL GLUCOSE: ICD-10-CM

## 2022-04-27 DIAGNOSIS — N39.0 URINARY TRACT INFECTION WITHOUT HEMATURIA, SITE UNSPECIFIED: Primary | ICD-10-CM

## 2022-04-27 LAB
ALBUMIN UR-MCNC: >=300 MG/DL
APPEARANCE UR: ABNORMAL
BILIRUB UR QL STRIP: ABNORMAL
COLOR UR AUTO: YELLOW
GLUCOSE UR STRIP-MCNC: 100 MG/DL
HGB UR QL STRIP: ABNORMAL
KETONES UR STRIP-MCNC: 15 MG/DL
LEUKOCYTE ESTERASE UR QL STRIP: ABNORMAL
NITRATE UR QL: NEGATIVE
PH UR STRIP: 8.5 [PH] (ref 5–8)
SP GR UR STRIP: 1.01 (ref 1–1.03)
UROBILINOGEN UR STRIP-ACNC: 4 E.U./DL

## 2022-04-27 PROCEDURE — 99214 OFFICE O/P EST MOD 30 MIN: CPT | Performed by: PHYSICIAN ASSISTANT

## 2022-04-27 PROCEDURE — 81003 URINALYSIS AUTO W/O SCOPE: CPT | Performed by: PHYSICIAN ASSISTANT

## 2022-04-27 PROCEDURE — 87086 URINE CULTURE/COLONY COUNT: CPT | Performed by: PHYSICIAN ASSISTANT

## 2022-04-27 RX ORDER — CEPHALEXIN 500 MG/1
500 CAPSULE ORAL 2 TIMES DAILY
Qty: 20 CAPSULE | Refills: 0 | Status: SHIPPED | OUTPATIENT
Start: 2022-04-27 | End: 2022-05-07

## 2022-04-27 NOTE — PATIENT INSTRUCTIONS
Take the antibiotic as written.  Urine is sent for culture.  Follow-up with your primary care provider soon as possible to have evaluation for possible diabetes.  You had quite a bit of glucose in the urine which may indicate either prediabetes or diabetes.

## 2022-04-27 NOTE — PROGRESS NOTES
Patient presents with:  Vaginal Problem: X Monday 4/25. No itchiness but burning when urinating, frequency and urgency, no foul smell or discharge, no abdominal pain, history of bladder infection. No fever or headache.        Clinical Decision Making:  Multiple etiologies and diagnoses were considered to include but not limited to urinary tract infection, dysuria, hyperglycemia.  Patient is treated for a long course of treatment based on her age and other inclusion criteria.  Patient is treated with antibiotic and creatinine clearance and allergies were reviewed and expected course of resolution and indication for return was gone over.        ICD-10-CM    1. Urinary tract infection without hematuria, site unspecified  N39.0 cephALEXin (KEFLEX) 500 MG capsule   2. Dysuria  R30.0 UA macro with reflex to Microscopic and Culture - Clinc Collect     Urine Culture     cephALEXin (KEFLEX) 500 MG capsule     CANCELED: Urine Microscopic Exam   3. Prediabetes  R73.09 cephALEXin (KEFLEX) 500 MG capsule       Patient Instructions   Take the antibiotic as written.  Urine is sent for culture.  Follow-up with your primary care provider soon as possible to have evaluation for possible diabetes.  You had quite a bit of glucose in the urine which may indicate either prediabetes or diabetes.          HPI:  Lluvia Marrufo is a 82 year old female who presents today for a three day history of irritative voiding symptoms to include urinary hesitancy urgency and a feeling of pressure and dysuria.  Patient denies gross hematuria.  Denies fever chills night sweats fatigue or other red flag symptoms to include back or flank pain and no vaginal discharge.  She has had a recent urinary tract infections does feel similar to how her other symptoms have been.    History obtained from chart review and the patient.    Problem List:  2022-04: Imaging of gastrointestinal tract abnormal  2022-04: Colitis  2022-03: Crohn's disease of large bowel  (H)  2022-03: Diverticular disease of large intestine  2022-03: Mild intermittent asthma with exacerbation  2022-03: Mass of colon  2022-03: Acute respiratory failure with hypoxia (H)  2022-03: Gram-positive bacteremia  2022-03: Sepsis, due to unspecified organism, unspecified whether   acute organ dysfunction present (H)  2021-08: Persistent atrial fibrillation (H)  2021-06: Dyspnea on exertion  2021-06: PAF (paroxysmal atrial fibrillation) (H)  2021-06: Precordial pain  2019-12: Obesity (BMI 35.0-39.9) with comorbidity (H)  2018-07: Acute CVA (cerebrovascular accident) (H)  2018-07: Expressive aphasia  2017-10: Primary osteoarthritis of left hip  2016-09: Spinal stenosis of lumbar region  2015-10: Mild intermittent asthma without complication  Obesity  Hyperlipidemia  Prediabetes  Lower Back Pain  Headache  Hypertension goal BP (blood pressure) < 140/90  Dermatitis  Joint Pain, Localized In The Shoulder  Hypomagnesemia  Hypokalemia      Past Medical History:   Diagnosis Date     Arthritis      Asthma      Atrial fibrillation (H)      Bronchitis      Earache      Fracture, foot      History of CVA (cerebrovascular accident)      Hyperlipidemia      Hypertension      UTI (urinary tract infection)        Social History     Tobacco Use     Smoking status: Never Smoker     Smokeless tobacco: Never Used   Substance Use Topics     Alcohol use: Yes     Comment: Alcoholic Drinks/day: 1 drink per day       Review of Systems  As above in HPI otherwise negative.    Vitals:    04/27/22 1131   BP: 132/84   BP Location: Right arm   Patient Position: Sitting   Cuff Size: Adult Large   Pulse: 89   Resp: 24   Temp: 97.8  F (36.6  C)   TempSrc: Oral   SpO2: 93%       General: Patient is resting comfortably no acute distress is afebrile  HEENT: Head is normocephalic atraumatic   eyes are PERRL EOMI sclera anicteric   No cervical lymphadenopathy present  LUNGS: Clear to auscultation bilaterally  HEART: Regular rate and rhythm  Skin:  Without rash non-diaphoretic  Abdomen: Nontender nondistended no rebound or guarding no masses no CVA tenderness to percussion.  No suprapubic induration or tenderness to palpation.    Physical Exam      Labs:  Results for orders placed or performed in visit on 04/27/22   UA macro with reflex to Microscopic and Culture - Clinc Collect     Status: Abnormal    Specimen: Urine, Clean Catch   Result Value Ref Range    Color Urine Yellow Colorless, Straw, Light Yellow, Yellow    Appearance Urine Cloudy (A) Clear    Glucose Urine 100  (A) Negative mg/dL    Bilirubin Urine Small (A) Negative    Ketones Urine 15  (A) Negative mg/dL    Specific Gravity Urine 1.015 1.005 - 1.030    Blood Urine Small (A) Negative    pH Urine 8.5 (H) 5.0 - 8.0    Protein Albumin Urine >=300 (A) Negative mg/dL    Urobilinogen Urine 4.0 (A) 0.2, 1.0 E.U./dL    Nitrite Urine Negative Negative    Leukocyte Esterase Urine Large (A) Negative    Narrative    QNS FOR MICRO      Urine is sent for culture.    At the end of the encounter, I discussed results, diagnosis, medications. Discussed red flags for immediate return to clinic/ER, as well as indications for follow up if no improvement. Patient understood and agreed to plan. Patient was stable for discharge.

## 2022-04-29 LAB — BACTERIA UR CULT: NORMAL

## 2022-05-01 DIAGNOSIS — I10 ESSENTIAL HYPERTENSION: ICD-10-CM

## 2022-05-04 NOTE — TELEPHONE ENCOUNTER
"Routing refill request to provider for review/approval because:  Provider input to determine length of treatment.     Last Written Prescription Date:  2/2/22  Last Fill Quantity: 270,  # refills: 0   Last office visit provider:  4/26/22     Requested Prescriptions   Pending Prescriptions Disp Refills     KLOR-CON 20 MEQ CR tablet [Pharmacy Med Name: KLOR-CON M20 TABLET] 270 tablet 0     Sig: TAKE 1 TABLET BY MOUTH THREE TIMES A DAY       Potassium Supplements Protocol Passed - 5/1/2022  8:21 AM        Passed - Recent (12 mo) or future (30 days) visit within the authorizing provider's department     Patient has had an office visit with the authorizing provider or a provider within the authorizing providers department within the previous 12 mos or has a future within next 30 days. See \"Patient Info\" tab in inbasket, or \"Choose Columns\" in Meds & Orders section of the refill encounter.              Passed - Medication is active on med list        Passed - Patient is age 18 or older        Passed - Normal serum potassium in past 12 months     Recent Labs   Lab Test 03/09/22  1428   POTASSIUM 3.6                         Love Sanchez RN 05/04/22 10:06 AM    "

## 2022-05-09 RX ORDER — POTASSIUM CHLORIDE 1500 MG/1
TABLET, EXTENDED RELEASE ORAL
Qty: 270 TABLET | Refills: 0 | Status: SHIPPED | OUTPATIENT
Start: 2022-05-09 | End: 2022-07-23

## 2022-05-22 DIAGNOSIS — I10 ESSENTIAL HYPERTENSION: ICD-10-CM

## 2022-05-23 RX ORDER — LISINOPRIL AND HYDROCHLOROTHIAZIDE 20; 25 MG/1; MG/1
TABLET ORAL
Qty: 180 TABLET | Refills: 2 | Status: SHIPPED | OUTPATIENT
Start: 2022-05-23 | End: 2023-02-28

## 2022-05-23 NOTE — TELEPHONE ENCOUNTER
"Last Written Prescription Date:  2/2/22  Last Fill Quantity: 180,  # refills: 0   Last office visit provider:  4/27/22     Requested Prescriptions   Pending Prescriptions Disp Refills     lisinopril-hydrochlorothiazide (ZESTORETIC) 20-25 MG tablet [Pharmacy Med Name: LISINOPRIL-HCTZ 20-25 MG TAB] 180 tablet 0     Sig: TAKE 2 TABLETS BY MOUTH EVERY DAY       Diuretics (Including Combos) Protocol Passed - 5/22/2022  7:25 AM        Passed - Blood pressure under 140/90 in past 12 months     BP Readings from Last 3 Encounters:   04/27/22 132/84   04/26/22 117/77   04/18/22 (!) 144/82                 Passed - Recent (12 mo) or future (30 days) visit within the authorizing provider's specialty     Patient has had an office visit with the authorizing provider or a provider within the authorizing providers department within the previous 12 mos or has a future within next 30 days. See \"Patient Info\" tab in inbasket, or \"Choose Columns\" in Meds & Orders section of the refill encounter.              Passed - Medication is active on med list        Passed - Patient is age 18 or older        Passed - No active pregancy on record        Passed - Normal serum creatinine on file in past 12 months     Recent Labs   Lab Test 03/09/22  1428   CR 0.82              Passed - Normal serum potassium on file in past 12 months     Recent Labs   Lab Test 03/09/22  1428   POTASSIUM 3.6                    Passed - Normal serum sodium on file in past 12 months     Recent Labs   Lab Test 03/09/22  1428                 Passed - No positive pregnancy test in past 12 months       ACE Inhibitors (Including Combos) Protocol Passed - 5/22/2022  7:25 AM        Passed - Blood pressure under 140/90 in past 12 months     BP Readings from Last 3 Encounters:   04/27/22 132/84   04/26/22 117/77   04/18/22 (!) 144/82                 Passed - Recent (12 mo) or future (30 days) visit within the authorizing provider's specialty     Patient has had an office " "visit with the authorizing provider or a provider within the authorizing providers department within the previous 12 mos or has a future within next 30 days. See \"Patient Info\" tab in inbasket, or \"Choose Columns\" in Meds & Orders section of the refill encounter.              Passed - Medication is active on med list        Passed - Patient is age 18 or older        Passed - No active pregnancy on record        Passed - Normal serum creatinine on file in past 12 months     Recent Labs   Lab Test 03/09/22  1428   CR 0.82       Ok to refill medication if creatinine is low          Passed - Normal serum potassium on file in past 12 months     Recent Labs   Lab Test 03/09/22  1428   POTASSIUM 3.6             Passed - No positive pregnancy test within past 12 months             Polly Ryan RN 05/23/22 12:00 PM  "

## 2022-05-29 ENCOUNTER — HEALTH MAINTENANCE LETTER (OUTPATIENT)
Age: 82
End: 2022-05-29

## 2022-06-06 ENCOUNTER — OFFICE VISIT (OUTPATIENT)
Dept: FAMILY MEDICINE | Facility: CLINIC | Age: 82
End: 2022-06-06
Payer: COMMERCIAL

## 2022-06-06 VITALS
BODY MASS INDEX: 38.35 KG/M2 | DIASTOLIC BLOOD PRESSURE: 81 MMHG | OXYGEN SATURATION: 96 % | TEMPERATURE: 98.1 F | HEART RATE: 70 BPM | SYSTOLIC BLOOD PRESSURE: 133 MMHG | WEIGHT: 216.5 LBS

## 2022-06-06 DIAGNOSIS — M48.061 SPINAL STENOSIS OF LUMBAR REGION WITHOUT NEUROGENIC CLAUDICATION: ICD-10-CM

## 2022-06-06 DIAGNOSIS — R05.9 COUGH: ICD-10-CM

## 2022-06-06 DIAGNOSIS — J45.20 MILD INTERMITTENT ASTHMA WITHOUT COMPLICATION: Primary | Chronic | ICD-10-CM

## 2022-06-06 DIAGNOSIS — N39.0 RECURRENT UTI: ICD-10-CM

## 2022-06-06 DIAGNOSIS — R31.29 MICROSCOPIC HEMATURIA: ICD-10-CM

## 2022-06-06 DIAGNOSIS — I48.19 PERSISTENT ATRIAL FIBRILLATION (H): ICD-10-CM

## 2022-06-06 LAB
ALBUMIN UR-MCNC: NEGATIVE MG/DL
APPEARANCE UR: CLEAR
BACTERIA #/AREA URNS HPF: ABNORMAL /HPF
BILIRUB UR QL STRIP: NEGATIVE
COLOR UR AUTO: YELLOW
GLUCOSE UR STRIP-MCNC: NEGATIVE MG/DL
HGB UR QL STRIP: NEGATIVE
KETONES UR STRIP-MCNC: NEGATIVE MG/DL
LEUKOCYTE ESTERASE UR QL STRIP: ABNORMAL
NITRATE UR QL: NEGATIVE
PH UR STRIP: 6.5 [PH] (ref 5–8)
RBC #/AREA URNS AUTO: ABNORMAL /HPF
SP GR UR STRIP: 1.01 (ref 1–1.03)
SQUAMOUS #/AREA URNS AUTO: ABNORMAL /LPF
UROBILINOGEN UR STRIP-ACNC: 0.2 E.U./DL
WBC #/AREA URNS AUTO: ABNORMAL /HPF

## 2022-06-06 PROCEDURE — 99215 OFFICE O/P EST HI 40 MIN: CPT | Performed by: FAMILY MEDICINE

## 2022-06-06 PROCEDURE — 87086 URINE CULTURE/COLONY COUNT: CPT | Performed by: FAMILY MEDICINE

## 2022-06-06 PROCEDURE — 81001 URINALYSIS AUTO W/SCOPE: CPT | Performed by: FAMILY MEDICINE

## 2022-06-06 RX ORDER — METOPROLOL TARTRATE 25 MG/1
12.5 TABLET, FILM COATED ORAL 2 TIMES DAILY
Qty: 90 TABLET | Refills: 0 | Status: SHIPPED | OUTPATIENT
Start: 2022-06-06 | End: 2022-07-25

## 2022-06-06 RX ORDER — BENZONATATE 200 MG/1
200 CAPSULE ORAL 3 TIMES DAILY PRN
Qty: 40 CAPSULE | Refills: 1 | Status: SHIPPED | OUTPATIENT
Start: 2022-06-06 | End: 2022-09-19

## 2022-06-06 ASSESSMENT — ASTHMA QUESTIONNAIRES
QUESTION_1 LAST FOUR WEEKS HOW MUCH OF THE TIME DID YOUR ASTHMA KEEP YOU FROM GETTING AS MUCH DONE AT WORK, SCHOOL OR AT HOME: A LITTLE OF THE TIME
QUESTION_5 LAST FOUR WEEKS HOW WOULD YOU RATE YOUR ASTHMA CONTROL: WELL CONTROLLED
QUESTION_3 LAST FOUR WEEKS HOW OFTEN DID YOUR ASTHMA SYMPTOMS (WHEEZING, COUGHING, SHORTNESS OF BREATH, CHEST TIGHTNESS OR PAIN) WAKE YOU UP AT NIGHT OR EARLIER THAN USUAL IN THE MORNING: NOT AT ALL
ACT_TOTALSCORE: 22
QUESTION_4 LAST FOUR WEEKS HOW OFTEN HAVE YOU USED YOUR RESCUE INHALER OR NEBULIZER MEDICATION (SUCH AS ALBUTEROL): NOT AT ALL
ACT_TOTALSCORE: 22
QUESTION_2 LAST FOUR WEEKS HOW OFTEN HAVE YOU HAD SHORTNESS OF BREATH: ONCE OR TWICE A WEEK

## 2022-06-06 ASSESSMENT — ENCOUNTER SYMPTOMS
DYSURIA: 1
COUGH: 1

## 2022-06-06 NOTE — LETTER
"My Asthma Action Plan    Name: Lluvia Marrufo   YOB: 1940  Date: 6/6/2022   My doctor: Chio Galindo MD   My clinic: [facility]        My Rescue Medicine:   Albuterol inhaler (Proair/Ventolin/Proventil HFA)  2-4 puffs EVERY 4 HOURS as needed. Use a spacer if recommended by your provider.   My Asthma Severity:   { :831812::\"Intermittent / Exercise Induced\"}  Know your asthma triggers: { :019861}             GREEN ZONE   Good Control    I feel good    No cough or wheeze    Can work, sleep and play without asthma symptoms       Take your asthma control medicine every day.     1. If exercise triggers your asthma, take your rescue medication    15 minutes before exercise or sports, and    During exercise if you have asthma symptoms  2. Spacer to use with inhaler: If you have a spacer, make sure to use it with your inhaler             YELLOW ZONE Getting Worse  I have ANY of these:    I do not feel good    Cough or wheeze    Chest feels tight    Wake up at night   1. Keep taking your Green Zone medications  2. Start taking your rescue medicine:    every 20 minutes for up to 1 hour. Then every 4 hours for 24-48 hours.  3. If you stay in the Yellow Zone for more than 12-24 hours, contact your doctor.  4. If you do not return to the Green Zone in 12-24 hours or you get worse, start taking your oral steroid medicine if prescribed by your provider.           RED ZONE Medical Alert - Get Help  I have ANY of these:    I feel awful    Medicine is not helping    Breathing getting harder    Trouble walking or talking    Nose opens wide to breathe       1. Take your rescue medicine NOW  2. If your provider has prescribed an oral steroid medicine, start taking it NOW  3. Call your doctor NOW  4. If you are still in the Red Zone after 20 minutes and you have not reached your doctor:    Take your rescue medicine again and    Call 911 or go to the emergency room right away    See " your regular doctor within 2 weeks of an Emergency Room or Urgent Care visit for follow-up treatment.          Annual Reminders:  Meet with Asthma Educator,  Flu Shot in the Fall, consider Pneumonia Vaccination for patients with asthma (aged 19 and older).    Pharmacy: [facility]ACY #[id number] - [place] [id number]    Electronically signed by Chio Galindo MD   Date: 06/06/22                    Asthma Triggers  How To Control Things That Make Your Asthma Worse    Triggers are things that make your asthma worse.  Look at the list below to help you find your triggers and   what you can do about them. You can help prevent asthma flare-ups by staying away from your triggers.      Trigger                                                          What you can do   Cigarette Smoke  Tobacco smoke can make asthma worse. Do not allow smoking in your home, car or around you.  Be sure no one smokes at a child s day care or school.  If you smoke, ask your health care provider for ways to help you quit.  Ask family members to quit too.  Ask your health care provider for a referral to Quit Plan to help you quit smoking, or call 6-678-784-PLAN.     Colds, Flu, Bronchitis  These are common triggers of asthma. Wash your hands often.  Don t touch your eyes, nose or mouth.  Get a flu shot every year.     Dust Mites  These are tiny bugs that live in cloth or carpet. They are too small to see. Wash sheets and blankets in hot water every week.   Encase pillows and mattress in dust mite proof covers.  Avoid having carpet if you can. If you have carpet, vacuum weekly.   Use a dust mask and HEPA vacuum.   Pollen and Outdoor Mold  Some people are allergic to trees, grass, or weed pollen, or molds. Try to keep your windows closed.  Limit time out doors when pollen count is high.   Ask you health care provider about taking medicine during allergy season.     Animal Dander  Some people are allergic to skin flakes, urine or  saliva from pets with fur or feathers. Keep pets with fur or feathers out of your home.    If you can t keep the pet outdoors, then keep the pet out of your bedroom.  Keep the bedroom door closed.  Keep pets off cloth furniture and away from stuffed toys.     Mice, Rats, and Cockroaches  Some people are allergic to the waste from these pests.   Cover food and garbage.  Clean up spills and food crumbs.  Store grease in the refrigerator.   Keep food out of the bedroom.   Indoor Mold  This can be a trigger if your home has high moisture. Fix leaking faucets, pipes, or other sources of water.   Clean moldy surfaces.  Dehumidify basement if it is damp and smelly.   Smoke, Strong Odors, and Sprays  These can reduce air quality. Stay away from strong odors and sprays, such as perfume, powder, hair spray, paints, smoke incense, paint, cleaning products, candles and new carpet.   Exercise or Sports  Some people with asthma have this trigger. Be active!  Ask your doctor about taking medicine before sports or exercise to prevent symptoms.    Warm up for 5-10 minutes before and after sports or exercise.     Other Triggers of Asthma  Cold air:  Cover your nose and mouth with a scarf.  Sometimes laughing or crying can be a trigger.  Some medicines and food can trigger asthma.

## 2022-06-06 NOTE — LETTER
"My Asthma Action Plan    Name: Lluvia Marrufo   YOB: 1940  Date: 6/6/2022   My doctor: Chio Galindo MD   My clinic: [facility]        My Control Medicine: { :940549}  My Rescue Medicine: { :756477::\"Albuterol (Proair/Ventolin/Proventil HFA) 2-4 puffs EVERY 4 HOURS as needed. Use a spacer if recommended by your provider.\"}  {AAP include Oral Steroid (Optional) :269658} My Asthma Severity:   { :075189}  Know your asthma triggers: { :419601}               GREEN ZONE   Good Control    I feel good    No cough or wheeze    Can work, sleep and play without asthma symptoms       Take your asthma control medicine every day.     1. If exercise triggers your asthma, take your rescue medication    15 minutes before exercise or sports, and    During exercise if you have asthma symptoms  2. Spacer to use with inhaler: If you have a spacer, make sure to use it with your inhaler             YELLOW ZONE Getting Worse  I have ANY of these:    I do not feel good    Cough or wheeze    Chest feels tight    Wake up at night   1. Keep taking your Green Zone medications  2. Start taking your rescue medicine:    every 20 minutes for up to 1 hour. Then every 4 hours for 24-48 hours.  3. If you stay in the Yellow Zone for more than 12-24 hours, contact your doctor.  4. If you do not return to the Green Zone in 12-24 hours or you get worse, start taking your oral steroid medicine if prescribed by your provider.           RED ZONE Medical Alert - Get Help  I have ANY of these:    I feel awful    Medicine is not helping    Breathing getting harder    Trouble walking or talking    Nose opens wide to breathe       1. Take your rescue medicine NOW  2. If your provider has prescribed an oral steroid medicine, start taking it NOW  3. Call your doctor NOW  4. If you are still in the Red Zone after 20 minutes and you have not reached your doctor:    Take your rescue medicine again and    Call 911 " or go to the emergency room right away    See your regular doctor within 2 weeks of an Emergency Room or Urgent Care visit for follow-up treatment.          Annual Reminders:  Meet with Asthma Educator,  Flu Shot in the Fall, consider Pneumonia Vaccination for patients with asthma (aged 19 and older).    Pharmacy: [facility]ACY #[id number] - [place] [phone]    Electronically signed by Chio Galindo MD   Date: 06/06/22                      Asthma Triggers  How To Control Things That Make Your Asthma Worse    Triggers are things that make your asthma worse.  Look at the list below to help you find your triggers and what you can do about them.  You can help prevent asthma flare-ups by staying away from your triggers.      Trigger                                                          What you can do   Cigarette Smoke  Tobacco smoke can make asthma worse. Do not allow smoking in your home, car or around you.  Be sure no one smokes at a child s day care or school.  If you smoke, ask your health care provider for ways to help you quit.  Ask family members to quit too.  Ask your health care provider for a referral to Quit Plan to help you quit smoking, or call 8-334-764-PLAN.     Colds, Flu, Bronchitis  These are common triggers of asthma. Wash your hands often.  Don t touch your eyes, nose or mouth.  Get a flu shot every year.     Dust Mites  These are tiny bugs that live in cloth or carpet. They are too small to see. Wash sheets and blankets in hot water every week.   Encase pillows and mattress in dust mite proof covers.  Avoid having carpet if you can. If you have carpet, vacuum weekly.   Use a dust mask and HEPA vacuum.   Pollen and Outdoor Mold  Some people are allergic to trees, grass, or weed pollen, or molds. Try to keep your windows closed.  Limit time out doors when pollen count is high.   Ask you health care provider about taking medicine during allergy season.     Animal Dander  Some  people are allergic to skin flakes, urine or saliva from pets with fur or feathers. Keep pets with fur or feathers out of your home.    If you can t keep the pet outdoors, then keep the pet out of your bedroom.  Keep the bedroom door closed.  Keep pets off cloth furniture and away from stuffed toys.     Mice, Rats, and Cockroaches   Some people are allergic to the waste from these pests.   Cover food and garbage.  Clean up spills and food crumbs.  Store grease in the refrigerator.   Keep food out of the bedroom.   Indoor Mold  This can be a trigger if your home has high moisture. Fix leaking faucets, pipes, or other sources of water.   Clean moldy surfaces.  Dehumidify basement if it is damp and smelly.   Smoke, Strong Odors, and Sprays  These can reduce air quality. Stay away from strong odors and sprays, such as perfume, powder, hair spray, paints, smoke incense, paint, cleaning products, candles and new carpet.   Exercise or Sports  Some people with asthma have this trigger. Be active!  Ask your doctor about taking medicine before sports or exercise to prevent symptoms.    Warm up for 5-10 minutes before and after sports or exercise.     Other Triggers of Asthma  Cold air:  Cover your nose and mouth with a scarf.  Sometimes laughing or crying can be a trigger.  Some medicines and food can trigger asthma.

## 2022-06-06 NOTE — PATIENT INSTRUCTIONS
UA today.  Urine culture today.    We will put you on an antibiotic if positive.    Urology consult given for microscopic hematuria (Blood in urine) and recurrent UTI.    PFT's planned July 1.    CT 4-4-22:  Mild pulmonary trunk enlargement; correlate for pulmonary hypertension.  Please talk to your pulmonologist about this at the follow-up.    Can use Albuterol inhaher as needed for cough.  If using before bedtime, may need 1-3 mg of Melatonin to help you sleep.    Tessalon Perles 200 mg up to 3 times a day as needed for cough.    Follow-up with you primary provider for physical /  annual wellness.    Refilled Metoprolol.     Referral given to spine clinic.    Chest x-ray today, we will contact with results.

## 2022-06-08 ENCOUNTER — TELEPHONE (OUTPATIENT)
Dept: FAMILY MEDICINE | Facility: CLINIC | Age: 82
End: 2022-06-08
Payer: COMMERCIAL

## 2022-06-08 LAB — BACTERIA UR CULT: NORMAL

## 2022-06-08 NOTE — TELEPHONE ENCOUNTER
----- Message from Chio Galindo MD sent at 6/8/2022 10:20 AM CDT -----  Please call patient.  Urine culture came back with a low amount of bacteria not a true infection.  Please ask if she is having any burning urgency or frequency of urination and if so I can send an antibiotic otherwise would not treat at this time.

## 2022-06-13 ENCOUNTER — OFFICE VISIT (OUTPATIENT)
Dept: PHYSICAL MEDICINE AND REHAB | Facility: CLINIC | Age: 82
End: 2022-06-13
Payer: COMMERCIAL

## 2022-06-13 VITALS — OXYGEN SATURATION: 96 % | SYSTOLIC BLOOD PRESSURE: 152 MMHG | HEART RATE: 110 BPM | DIASTOLIC BLOOD PRESSURE: 88 MMHG

## 2022-06-13 DIAGNOSIS — M51.369 DDD (DEGENERATIVE DISC DISEASE), LUMBAR: ICD-10-CM

## 2022-06-13 DIAGNOSIS — M54.16 LUMBAR RADICULOPATHY: Primary | ICD-10-CM

## 2022-06-13 DIAGNOSIS — M43.16 SPONDYLOLISTHESIS OF LUMBAR REGION: ICD-10-CM

## 2022-06-13 DIAGNOSIS — M54.41 CHRONIC BILATERAL LOW BACK PAIN WITH BILATERAL SCIATICA: ICD-10-CM

## 2022-06-13 DIAGNOSIS — G89.29 CHRONIC BILATERAL LOW BACK PAIN WITH BILATERAL SCIATICA: ICD-10-CM

## 2022-06-13 DIAGNOSIS — M48.061 SPINAL STENOSIS OF LUMBAR REGION WITHOUT NEUROGENIC CLAUDICATION: ICD-10-CM

## 2022-06-13 DIAGNOSIS — M54.42 CHRONIC BILATERAL LOW BACK PAIN WITH BILATERAL SCIATICA: ICD-10-CM

## 2022-06-13 PROCEDURE — 99214 OFFICE O/P EST MOD 30 MIN: CPT | Performed by: NURSE PRACTITIONER

## 2022-06-13 ASSESSMENT — PAIN SCALES - GENERAL: PAINLEVEL: MODERATE PAIN (5)

## 2022-06-13 NOTE — LETTER
6/13/2022         RE: Lluvia Marrufo  [address] [facility] [address]        Dear Colleague,    Thank you for referring your patient, Lluvia Marrufo, to the [facility]. Please see a copy of my visit note below.      Assessment:     Diagnoses and all orders for this visit:  Lumbar radiculopathy  -     Physical Therapy Referral; Future  -     PAIN Transforaminal WILBUR Inj Lumbosacral Juan C; Future  Chronic bilateral low back pain with bilateral sciatica  -     Physical Therapy Referral; Future  -     PAIN Transforaminal WILBUR Inj Lumbosacral Juan C; Future  Spinal stenosis of lumbar region without neurogenic claudication  -     Spine Referral  -     Physical Therapy Referral; Future  -     PAIN Transforaminal WILBUR Inj Lumbosacral Juan C; Future  DDD (degenerative disc disease), lumbar  -     Physical Therapy Referral; Future  Spondylolisthesis of lumbar region  -     Physical Therapy Referral; Future  -     PAIN Transforaminal WILBUR Inj Lumbosacral Juan C; Future     Lluvia Marrufo is a 82 year old y.o. female with past medical history significant for hyperlipidemia, hypertension, mild intermittent asthma, prediabetes, hypomagnesia, hypokalemia, acute CVA 2018 with expressive aphasia, obesity, headache, history of right total knee replacement, left hip replacement, history of bilateral shoulder replacement who presents today for follow-up regarding:    -Chronic bilateral low back pain with lumbar radiculopathy with minimal benefit with previous physical therapy.  MRI with L3-4 spondylolisthesis with severe central stenosis.     Plan:     A shared decision making plan was used. The patient's values and choices were respected. Prior medical records were reviewed today. The following represents what was discussed and decided upon by the provider and the patient.        -DIAGNOSTIC TESTS: Images were personally reviewed and interpreted.   -- Lumbar spine MRI 6/14/2021 with  multilevel degenerative change, progression since 2016.  L3-4 spondylolisthesis with severe spinal stenosis with mild right and moderate left foraminal stenosis.  Moderate spinal stenosis at L4-5 and mild at L2-3.  --Lumbar spine MRI 2016 with multilevel spondylolisthesis L2-3, L3-4, L4-5.  Advanced disc height loss at T12-L1.  Moderate to advanced disc height loss at L5-S1, however no nerve compression noted at this level.  L3-4 moderate spinal stenosis with left disc bulge along with facet arthropathy.  L4-5 mild spinal stenosis with mild left foraminal stenosis.    -INTERVENTIONS: Ordered bilateral L3-4 transforaminal epidural steroid injection.  If minimal lasting benefit with this injection we could consider a bilateral L2, L3, L4 MBB targeting the L3-4 and L4-5 joints for her back pain versus spine surgery.    -MEDICATIONS: No changes in medications at this time  Discussed side effects of medications and proper use. Patient verbalized understanding.    -PHYSICAL THERAPY: New physical therapy referral placed to reestablish home exercises, unfortunately she got minimal benefit with her previous PT program however.  Discussed the importance of core strengthening, ROM, stretching exercises with the patient and how each of these entities is important in decreasing pain.  Explained to the patient that the purpose of physical therapy is to teach the patient a home exercise program.  These exercises need to be performed every day in order to decrease pain and prevent future occurrences of pain.        -PATIENT EDUCATION:  Total time of 32 minutes, on the day of service, spent with the patient, reviewing the chart, placing orders, and documenting.   -Today we also discussed the issues related to the current COVID-19 pandemic, the pros and cons of the current treatment plan, the CDC guidelines such as social distancing, washing the hands, and covering the cough.    -FOLLOW UP: Follow-up for injection with Dr. Srivastava  then 2 weeks postinjection  Advised to contact clinic if symptoms worsen or change.    Subjective:     Lluvia Marrufo is a 82 year old female who presents today for follow-up regarding generalized lower lumbar spine which does radiate into the lower extremities with lower extremity weakness with standing and walking however her pain is also constant as well.  Currently her pain is constant at a 5/10, and 8 at its worst, 3 at its best, does improve overall with sitting however.  Patient did do physical therapy last year with minimal benefit and is hoping for an injection at this time.    Denies any recent trips or falls or balance changes, denies bowel or bladder loss control.    -Treatment to Date: No prior spinal surgery.  Physical therapy 2017.  Physical therapy 2021x5 sessions LBP with radiculopathy.     Left L5-S1 TFESI 12/15/2016.     -Medications:  Advil at bedtime       Patient Active Problem List   Diagnosis     Obesity     Hyperlipidemia     Prediabetes     Lower Back Pain     Hypertension goal BP (blood pressure) < 140/90     Joint Pain, Localized In The Shoulder     Mild intermittent asthma without complication     Spinal stenosis of lumbar region     Primary osteoarthritis of left hip     Hypomagnesemia     Hypokalemia     Expressive aphasia     Acute CVA (cerebrovascular accident) (H)     Obesity (BMI 35.0-39.9) with comorbidity (H)     Dyspnea on exertion     Precordial pain     Persistent atrial fibrillation (H)     Sepsis, due to unspecified organism, unspecified whether acute organ dysfunction present (H)     Acute respiratory failure with hypoxia (H)     Gram-positive bacteremia     Mild intermittent asthma with exacerbation     Mass of colon     Crohn's disease of large bowel (H)     Diverticular disease of large intestine     Imaging of gastrointestinal tract abnormal     Colitis       Current Outpatient Medications   Medication     apixaban ANTICOAGULANT (ELIQUIS) 5 mg Tab tablet      gabapentin (NEURONTIN) 300 MG capsule     albuterol (PROVENTIL) 2.5 mg /3 mL (0.083 %) nebulizer solution     amLODIPine (NORVASC) 10 MG tablet     atorvastatin (LIPITOR) 20 MG tablet     benzonatate (TESSALON) 200 MG capsule     diphenhydrAMINE (BENADRYL) 25 mg tablet     fluticasone (FLOVENT HFA) 220 MCG/ACT inhaler     glucosamine-chondroitin 500-400 mg cap     KLOR-CON 20 MEQ CR tablet     lisinopril-hydrochlorothiazide (ZESTORETIC) 20-25 MG tablet     metoprolol tartrate (LOPRESSOR) 25 MG tablet     multivitamin with minerals (THERA-M) 9 mg iron-400 mcg Tab tablet     traZODone (DESYREL) 50 MG tablet     No current facility-administered medications for this visit.       Allergies   Allergen Reactions     Azithromycin Nausea     Dizziness, dehydrated.        Past Medical History:   Diagnosis Date     Arthritis      Asthma      Atrial fibrillation (H)      Bronchitis      Earache      Fracture, foot      History of CVA (cerebrovascular accident)      Hyperlipidemia      Hypertension      UTI (urinary tract infection)         Review of Systems  ROS:  Specifically negative for bowel/bladder dysfunction, balance changes, headache, dizziness, foot drop, fevers, chills, appetite changes, nausea/vomiting, unexplained weight loss. Otherwise 13 systems reviewed are negative. Please see the patient's intake questionnaire from today for details.    Reviewed Social, Family, Past Medical and Past Surgical history with patient, no significant changes noted since prior visit.     Objective:     BP (!) 152/88 (BP Location: Right arm, Patient Position: Sitting)   Pulse 110   LMP  (LMP Unknown)   SpO2 96%     PHYSICAL EXAMINATION:    --CONSTITUTIONAL: Well developed, well nourished, healthy appearing individual.  --PSYCHIATRIC: Appropriate mood and affect. No difficulty interacting due to temper, social withdrawal, or memory issues.  --SKIN: Lumbar region is dry and intact.   --RESPIRATORY: Normal rhythm and effort. No  abnormal accessory muscle breathing patterns noted.   --MUSCULOSKELETAL:  Normal lumbar lordosis noted, no lateral shift.  --GROSS MOTOR: Easily arises from a seated position. Gait is non-antalgic  --LUMBAR SPINE:  Inspection reveals no evidence of deformity.  --LOWER EXTREMITY MOTOR TESTING:  Plantar flexion left 5/5, right 5/5   Dorsiflexion left 5/5, right 5/5   Great toe MTP extension left 5/5, right 5/5  Knee flexion left 5/5, right 5/5  Knee extension left 5/5, right 5/5   Hip flexion left 5/5, right 5/5  Hip abduction left 5/5, right 5/5  Hip adduction left 5/5, right 5/5   --HIPS: Full range of motion bilaterally.   --NEUROLOGIC: Bilateral patellar and achilles reflexes are physiologic and symmetric. Sensation to light touch is intact in the bilateral L4, L5, and S1 dermatomes.    RESULTS:   Imaging: Lumbar spine imaging was reviewed today. The images were shown to the patient and the findings were explained using a spine model.      Lumbar spine MRI reviewed from 2021.                    Again, thank you for allowing me to participate in the care of your patient.        Sincerely,        Reyna Weldon, CNP

## 2022-06-13 NOTE — PROGRESS NOTES
Assessment:     Diagnoses and all orders for this visit:  Lumbar radiculopathy  -     Physical Therapy Referral; Future  -     PAIN Transforaminal WILBUR Inj Lumbosacral Juan C; Future  Chronic bilateral low back pain with bilateral sciatica  -     Physical Therapy Referral; Future  -     PAIN Transforaminal WILBUR Inj Lumbosacral Juan C; Future  Spinal stenosis of lumbar region without neurogenic claudication  -     Spine Referral  -     Physical Therapy Referral; Future  -     PAIN Transforaminal WILBUR Inj Lumbosacral Juan C; Future  DDD (degenerative disc disease), lumbar  -     Physical Therapy Referral; Future  Spondylolisthesis of lumbar region  -     Physical Therapy Referral; Future  -     PAIN Transforaminal WILBUR Inj Lumbosacral Juan C; Future     Lluvia Marrufo is a 82 year old y.o. female with past medical history significant for hyperlipidemia, hypertension, mild intermittent asthma, prediabetes, hypomagnesia, hypokalemia, acute CVA 2018 with expressive aphasia, obesity, headache, history of right total knee replacement, left hip replacement, history of bilateral shoulder replacement who presents today for follow-up regarding:    -Chronic bilateral low back pain with lumbar radiculopathy with minimal benefit with previous physical therapy.  MRI with L3-4 spondylolisthesis with severe central stenosis.     Plan:     A shared decision making plan was used. The patient's values and choices were respected. Prior medical records were reviewed today. The following represents what was discussed and decided upon by the provider and the patient.        -DIAGNOSTIC TESTS: Images were personally reviewed and interpreted.   -- Lumbar spine MRI 6/14/2021 with multilevel degenerative change, progression since 2016.  L3-4 spondylolisthesis with severe spinal stenosis with mild right and moderate left foraminal stenosis.  Moderate spinal stenosis at L4-5 and mild at L2-3.  --Lumbar spine MRI 2016 with multilevel spondylolisthesis  L2-3, L3-4, L4-5.  Advanced disc height loss at T12-L1.  Moderate to advanced disc height loss at L5-S1, however no nerve compression noted at this level.  L3-4 moderate spinal stenosis with left disc bulge along with facet arthropathy.  L4-5 mild spinal stenosis with mild left foraminal stenosis.    -INTERVENTIONS: Ordered bilateral L3-4 transforaminal epidural steroid injection.  If minimal lasting benefit with this injection we could consider a bilateral L2, L3, L4 MBB targeting the L3-4 and L4-5 joints for her back pain versus spine surgery.    -MEDICATIONS: No changes in medications at this time  Discussed side effects of medications and proper use. Patient verbalized understanding.    -PHYSICAL THERAPY: New physical therapy referral placed to reestablish home exercises, unfortunately she got minimal benefit with her previous PT program however.  Discussed the importance of core strengthening, ROM, stretching exercises with the patient and how each of these entities is important in decreasing pain.  Explained to the patient that the purpose of physical therapy is to teach the patient a home exercise program.  These exercises need to be performed every day in order to decrease pain and prevent future occurrences of pain.        -PATIENT EDUCATION:  Total time of 32 minutes, on the day of service, spent with the patient, reviewing the chart, placing orders, and documenting.   -Today we also discussed the issues related to the current COVID-19 pandemic, the pros and cons of the current treatment plan, the CDC guidelines such as social distancing, washing the hands, and covering the cough.    -FOLLOW UP: Follow-up for injection with Dr. Srivastava then 2 weeks postinjection  Advised to contact clinic if symptoms worsen or change.    Subjective:     Lluvia Marrufo is a 82 year old female who presents today for follow-up regarding generalized lower lumbar spine which does radiate into the lower extremities with  lower extremity weakness with standing and walking however her pain is also constant as well.  Currently her pain is constant at a 5/10, and 8 at its worst, 3 at its best, does improve overall with sitting however.  Patient did do physical therapy last year with minimal benefit and is hoping for an injection at this time.    Denies any recent trips or falls or balance changes, denies bowel or bladder loss control.    -Treatment to Date: No prior spinal surgery.  Physical therapy 2017.  Physical therapy 2021x5 sessions LBP with radiculopathy.     Left L5-S1 TFESI 12/15/2016.     -Medications:  Advil at bedtime       Patient Active Problem List   Diagnosis     Obesity     Hyperlipidemia     Prediabetes     Lower Back Pain     Hypertension goal BP (blood pressure) < 140/90     Joint Pain, Localized In The Shoulder     Mild intermittent asthma without complication     Spinal stenosis of lumbar region     Primary osteoarthritis of left hip     Hypomagnesemia     Hypokalemia     Expressive aphasia     Acute CVA (cerebrovascular accident) (H)     Obesity (BMI 35.0-39.9) with comorbidity (H)     Dyspnea on exertion     Precordial pain     Persistent atrial fibrillation (H)     Sepsis, due to unspecified organism, unspecified whether acute organ dysfunction present (H)     Acute respiratory failure with hypoxia (H)     Gram-positive bacteremia     Mild intermittent asthma with exacerbation     Mass of colon     Crohn's disease of large bowel (H)     Diverticular disease of large intestine     Imaging of gastrointestinal tract abnormal     Colitis       Current Outpatient Medications   Medication     apixaban ANTICOAGULANT (ELIQUIS) 5 mg Tab tablet     gabapentin (NEURONTIN) 300 MG capsule     albuterol (PROVENTIL) 2.5 mg /3 mL (0.083 %) nebulizer solution     amLODIPine (NORVASC) 10 MG tablet     atorvastatin (LIPITOR) 20 MG tablet     benzonatate (TESSALON) 200 MG capsule     diphenhydrAMINE (BENADRYL) 25 mg tablet      fluticasone (FLOVENT HFA) 220 MCG/ACT inhaler     glucosamine-chondroitin 500-400 mg cap     KLOR-CON 20 MEQ CR tablet     lisinopril-hydrochlorothiazide (ZESTORETIC) 20-25 MG tablet     metoprolol tartrate (LOPRESSOR) 25 MG tablet     multivitamin with minerals (THERA-M) 9 mg iron-400 mcg Tab tablet     traZODone (DESYREL) 50 MG tablet     No current facility-administered medications for this visit.       Allergies   Allergen Reactions     Azithromycin Nausea     Dizziness, dehydrated.        Past Medical History:   Diagnosis Date     Arthritis      Asthma      Atrial fibrillation (H)      Bronchitis      Earache      Fracture, foot      History of CVA (cerebrovascular accident)      Hyperlipidemia      Hypertension      UTI (urinary tract infection)         Review of Systems  ROS:  Specifically negative for bowel/bladder dysfunction, balance changes, headache, dizziness, foot drop, fevers, chills, appetite changes, nausea/vomiting, unexplained weight loss. Otherwise 13 systems reviewed are negative. Please see the patient's intake questionnaire from today for details.    Reviewed Social, Family, Past Medical and Past Surgical history with patient, no significant changes noted since prior visit.     Objective:     BP (!) 152/88 (BP Location: Right arm, Patient Position: Sitting)   Pulse 110   LMP  (LMP Unknown)   SpO2 96%     PHYSICAL EXAMINATION:    --CONSTITUTIONAL: Well developed, well nourished, healthy appearing individual.  --PSYCHIATRIC: Appropriate mood and affect. No difficulty interacting due to temper, social withdrawal, or memory issues.  --SKIN: Lumbar region is dry and intact.   --RESPIRATORY: Normal rhythm and effort. No abnormal accessory muscle breathing patterns noted.   --MUSCULOSKELETAL:  Normal lumbar lordosis noted, no lateral shift.  --GROSS MOTOR: Easily arises from a seated position. Gait is non-antalgic  --LUMBAR SPINE:  Inspection reveals no evidence of deformity.  --LOWER EXTREMITY  MOTOR TESTING:  Plantar flexion left 5/5, right 5/5   Dorsiflexion left 5/5, right 5/5   Great toe MTP extension left 5/5, right 5/5  Knee flexion left 5/5, right 5/5  Knee extension left 5/5, right 5/5   Hip flexion left 5/5, right 5/5  Hip abduction left 5/5, right 5/5  Hip adduction left 5/5, right 5/5   --HIPS: Full range of motion bilaterally.   --NEUROLOGIC: Bilateral patellar and achilles reflexes are physiologic and symmetric. Sensation to light touch is intact in the bilateral L4, L5, and S1 dermatomes.    RESULTS:   Imaging: Lumbar spine imaging was reviewed today. The images were shown to the patient and the findings were explained using a spine model.      Lumbar spine MRI reviewed from 2021.

## 2022-06-13 NOTE — PATIENT INSTRUCTIONS
~[facility] scheduling #[phone].  ~Please call our [facility] Nurse Navigation line [phone] with any questions or concerns about your treatment plan, if symptoms worsen and you would like to be seen urgently, or if you have problems controlling bladder and bowel function.       ~You have been referred for Physical Therapy to [facility]. They will call you to schedule an appointment.      Scheduling phone number is [phone] for [facility], or [place] location.  If you have not heard from the scheduling office within 2 business days, please call [phone] for ALL other locations.    Discussed the importance of core strengthening, ROM, stretching exercises and how each of these entities is important in decreasing pain and improving long term spine health.  The purpose of physical therapy is to teach you an individualized home exercise program.  These exercises need to be performed every day in order to decrease pain and prevent future occurrences of pain.           An injection has been ordered today to potentially help with your pain symptoms. These injections do not fix what is going on in your back, therefore they typically do not take away the pain completely, however they can many times help improve symptoms. Injections should always be completed along with other modalities such as physical therapy for the best long term outcomes. If injections alone are done, then pain will likely return.     [facility] Injection Requirements    A  is required for all fluoroscopically-guided injections.  Injection appointments may be cancelled if there are signs/symptoms of an active infection or if the patient is being actively treated with antibiotics for a diagnosed infection.  Patients may have their steroid injection cancelled if they have had another steroid injection within 2  weeks.  Diabetic patients will have their blood glucose levels checked the day of their injection and the appointment will be rescheduled if the blood glucose level is 300 or higher.  Patients with allergies to cortisone, local anesthetics, iodine, or contrast dye should contact the Spine Center to further discuss these considerations.  Patients scheduled for medial branch block diagnostic injections should refrain from taking pain medication the day of the procedure.  The medial branch block injection appointment will be rescheduled if the patient's pain rating is not 5/10 or greater at the time of the procedure.  Patients taking warfarin/Coumadin will have their INR checked the day of the procedure and the procedure may be rescheduled if the INR is greater than 3.0.  Please contact the Spine Center (#[phone]) if you are taking any prescription blood-thinning medications (warfarin, Plavix, Lovenox, Eliquis, Brilinta, Effient, etc.) as special dosing adjustments may need to be made depending on the type of injection you are scheduled to receive.  It is recommended that you delay having your steroid injection if you have received a flu shot or shingles vaccine within 2 weeks.

## 2022-06-29 ENCOUNTER — RADIOLOGY INJECTION OFFICE VISIT (OUTPATIENT)
Dept: PHYSICAL MEDICINE AND REHAB | Facility: CLINIC | Age: 82
End: 2022-06-29
Attending: NURSE PRACTITIONER
Payer: COMMERCIAL

## 2022-06-29 VITALS
OXYGEN SATURATION: 98 % | DIASTOLIC BLOOD PRESSURE: 72 MMHG | SYSTOLIC BLOOD PRESSURE: 128 MMHG | TEMPERATURE: 97.9 F | RESPIRATION RATE: 16 BRPM | HEART RATE: 80 BPM

## 2022-06-29 DIAGNOSIS — G89.29 CHRONIC BILATERAL LOW BACK PAIN WITH BILATERAL SCIATICA: ICD-10-CM

## 2022-06-29 DIAGNOSIS — M48.061 SPINAL STENOSIS OF LUMBAR REGION WITHOUT NEUROGENIC CLAUDICATION: ICD-10-CM

## 2022-06-29 DIAGNOSIS — M54.16 LUMBAR RADICULOPATHY: ICD-10-CM

## 2022-06-29 DIAGNOSIS — M54.42 CHRONIC BILATERAL LOW BACK PAIN WITH BILATERAL SCIATICA: ICD-10-CM

## 2022-06-29 DIAGNOSIS — M43.16 SPONDYLOLISTHESIS OF LUMBAR REGION: ICD-10-CM

## 2022-06-29 DIAGNOSIS — M54.41 CHRONIC BILATERAL LOW BACK PAIN WITH BILATERAL SCIATICA: ICD-10-CM

## 2022-06-29 PROCEDURE — 64483 NJX AA&/STRD TFRM EPI L/S 1: CPT | Mod: 50 | Performed by: PHYSICAL MEDICINE & REHABILITATION

## 2022-06-29 RX ORDER — LIDOCAINE HYDROCHLORIDE 10 MG/ML
INJECTION, SOLUTION EPIDURAL; INFILTRATION; INTRACAUDAL; PERINEURAL
Status: COMPLETED | OUTPATIENT
Start: 2022-06-29 | End: 2022-06-29

## 2022-06-29 RX ORDER — METHYLPREDNISOLONE ACETATE 80 MG/ML
INJECTION, SUSPENSION INTRA-ARTICULAR; INTRALESIONAL; INTRAMUSCULAR; SOFT TISSUE
Status: COMPLETED | OUTPATIENT
Start: 2022-06-29 | End: 2022-06-29

## 2022-06-29 RX ADMIN — METHYLPREDNISOLONE ACETATE 80 MG: 80 INJECTION, SUSPENSION INTRA-ARTICULAR; INTRALESIONAL; INTRAMUSCULAR; SOFT TISSUE at 14:20

## 2022-06-29 RX ADMIN — LIDOCAINE HYDROCHLORIDE 5 ML: 10 INJECTION, SOLUTION EPIDURAL; INFILTRATION; INTRACAUDAL; PERINEURAL at 14:20

## 2022-06-29 ASSESSMENT — PAIN SCALES - GENERAL
PAINLEVEL: MILD PAIN (3)
PAINLEVEL: MODERATE PAIN (4)

## 2022-06-29 NOTE — PATIENT INSTRUCTIONS
Follow-up visit in 2 weeks with Reyna Weldon CNP to discuss injection outcome and determine care plan going forward.     DISCHARGE INSTRUCTIONS    During office hours (8:00 a.m.- 4:00 p.m.) questions or concerns may be answered  by calling Spine Center Navigation Nurses at  [phone].  Messages received after hours will be returned the following business day.      In the case of an emergency, please dial 911 or seek assistance at the nearest Emergency Room/Urgent Care facility.     All Patients:    You may experience an increase in your symptoms for the first 2 days (It may take anywhere between 2 days- 2 weeks for the steroid to have maximum effect).    You may use ice on the injection site, as frequently as 20 minutes each hour if needed.    You may take your pain medicine.    You may continue taking your regular medication after your injection. If you have had a Medial Branch Block you may resume pain medication once your pain diary is completed.    You may shower. No swimming, tub bath or hot tub for 48 hours.  You may remove your bandaid/bandage as soon as you are home.    You may resume light activities, as tolerated.    Resume your usual diet as tolerated.    It is strongly advised that you do not drive for 1-3 hours post injection.    If you have had oral sedation:  Do not drive for 8 hours post injection.      If you have had IV sedation:  Do not drive for 24 hours post injection.  Do not operate hazardous machinery or make important personal/business decisions for 24 hours.      POSSIBLE STEROID SIDE EFFECTS (If steroid/cortisone was used for your procedure)    -If you experience these symptoms, it should only last for a short period    Swelling of the legs              Skin redness (flushing)     Mouth (oral) irritation   Blood sugar (glucose) levels            Sweats                    Mood changes  Headache  Sleeplessness  Weakened immune system for up to 14 days, which could increase the risk  of jesus alberto the COVID-19 virus and/or experiencing more severe symptoms of the disease, if exposed.  Decreased effectiveness of the flu vaccine if given within 2 weeks of the steroid.         POSSIBLE PROCEDURE SIDE EFFECTS  -Call the Spine Center if you are concerned  Increased Pain           Increased numbness/tingling      Nausea/Vomiting          Bruising/bleeding at site      Redness or swelling                                              Difficulty walking      Weakness           Fever greater than 100.5    *In the event of a severe headache after an epidural steroid injection that is relieved by lying down, please call the [facility] to speak with a clinical staff member*

## 2022-07-01 ENCOUNTER — OFFICE VISIT (OUTPATIENT)
Dept: PULMONOLOGY | Facility: OTHER | Age: 82
End: 2022-07-01

## 2022-07-01 ENCOUNTER — ALLIED HEALTH/NURSE VISIT (OUTPATIENT)
Dept: PULMONOLOGY | Facility: OTHER | Age: 82
End: 2022-07-01
Payer: COMMERCIAL

## 2022-07-01 VITALS
DIASTOLIC BLOOD PRESSURE: 74 MMHG | WEIGHT: 208.7 LBS | SYSTOLIC BLOOD PRESSURE: 130 MMHG | BODY MASS INDEX: 36.97 KG/M2 | OXYGEN SATURATION: 94 % | HEART RATE: 86 BPM

## 2022-07-01 DIAGNOSIS — J45.20 MILD INTERMITTENT ASTHMA WITHOUT COMPLICATION: Primary | Chronic | ICD-10-CM

## 2022-07-01 DIAGNOSIS — R06.09 DYSPNEA ON EXERTION: ICD-10-CM

## 2022-07-01 LAB — HGB BLD-MCNC: 14.2 G/DL

## 2022-07-01 PROCEDURE — 85018 HEMOGLOBIN: CPT

## 2022-07-01 PROCEDURE — 94060 EVALUATION OF WHEEZING: CPT | Performed by: INTERNAL MEDICINE

## 2022-07-01 PROCEDURE — 99213 OFFICE O/P EST LOW 20 MIN: CPT | Mod: 25 | Performed by: INTERNAL MEDICINE

## 2022-07-01 PROCEDURE — 94726 PLETHYSMOGRAPHY LUNG VOLUMES: CPT | Performed by: INTERNAL MEDICINE

## 2022-07-01 PROCEDURE — 94729 DIFFUSING CAPACITY: CPT | Performed by: INTERNAL MEDICINE

## 2022-07-01 ASSESSMENT — ASTHMA QUESTIONNAIRES
ACT_TOTALSCORE: 17
QUESTION_3 LAST FOUR WEEKS HOW OFTEN DID YOUR ASTHMA SYMPTOMS (WHEEZING, COUGHING, SHORTNESS OF BREATH, CHEST TIGHTNESS OR PAIN) WAKE YOU UP AT NIGHT OR EARLIER THAN USUAL IN THE MORNING: ONCE OR TWICE
QUESTION_1 LAST FOUR WEEKS HOW MUCH OF THE TIME DID YOUR ASTHMA KEEP YOU FROM GETTING AS MUCH DONE AT WORK, SCHOOL OR AT HOME: SOME OF THE TIME
QUESTION_2 LAST FOUR WEEKS HOW OFTEN HAVE YOU HAD SHORTNESS OF BREATH: ONCE A DAY
QUESTION_4 LAST FOUR WEEKS HOW OFTEN HAVE YOU USED YOUR RESCUE INHALER OR NEBULIZER MEDICATION (SUCH AS ALBUTEROL): ONCE A WEEK OR LESS
QUESTION_5 LAST FOUR WEEKS HOW WOULD YOU RATE YOUR ASTHMA CONTROL: WELL CONTROLLED
ACT_TOTALSCORE: 17

## 2022-07-01 NOTE — PROGRESS NOTES
Pulmonary Clinic Follow-up Visit    Assessment:  82yoF with history of morbid obesity and reported history of asthma presents for follow up after severe pneumonia requiring hospitalization. Now resolved on CT scan but with persistent shortness of breath.     Cough developed in April but she didn't try either Claritin or Prednisone. Likely post-infectious cough that is finally subsiding.   Should it return I would start with BID flovent and if after 2 weeks that is not working would consider prednisone 40mg for 5 days then stop but she has been reluctant in using medications prescribed to her.    Enlarged pulmonary artery on imaging.  Patient had recent echo that found no evidence of TR and no evidence of RV strain. I will not pursue right heart catheterization at this time.  Could consider PSG given her body habitus but not sure patient would be interested in pursuing this.    Shortness of breath; multifactorial including obesity, deconditioning, and potentially afib. I am not seeing significant underlying lung disease based on imaging and PFTs to explain the dyspnea.  -Recommended   Pulmonary rehab but she wanted to wait until PT completes for her back.     Follow up 6 months        Mary Jo Lin MD  Pulmonary/Critical Care    ----------------------------    CCx: abnormal CT    HPI: 82yoF with history of CVA but no defects presents for follow up after hospitalization for septic shock from pneumonia.    She was weaned off O2 shortly after her hospitalization.     She remains very dyspneic trouble walking from one room to another. Family notices this too.     Cough since April, tessalon perles that she only takes at night.  Wondering if this might be related to allergies, has itchy, running eyes as well.       Current Regimen: Albuterol  ACT: 2+2+4+5+4=17    ROS:  12-point review performed and notable for cough, shortness of breath. Frequent urination at night.  The remainder reviewed and negative.       Current  Meds:  Current Outpatient Medications   Medication Sig Dispense Refill     amLODIPine (NORVASC) 10 MG tablet Take 1 tablet (10 mg) by mouth daily 90 tablet 1     apixaban ANTICOAGULANT (ELIQUIS) 5 mg Tab tablet [APIXABAN ANTICOAGULANT (ELIQUIS) 5 MG TAB TABLET] Take 1 tablet (5 mg total) by mouth 2 (two) times a day. 60 tablet 12     atorvastatin (LIPITOR) 20 MG tablet TAKE 1 TABLET BY MOUTH EVERY DAY 90 tablet 0     benzonatate (TESSALON) 200 MG capsule Take 1 capsule (200 mg) by mouth 3 times daily as needed for cough 40 capsule 1     gabapentin (NEURONTIN) 300 MG capsule TAKE 1 CAPSULE BY MOUTH THREE TIMES A DAY (Patient taking differently: Take 300 mg by mouth daily 1 tab by mouth daily) 90 capsule 3     glucosamine-chondroitin 500-400 mg cap [GLUCOSAMINE-CHONDROITIN 500-400 MG CAP] Take 2 capsules by mouth daily.       KLOR-CON 20 MEQ CR tablet TAKE 1 TABLET BY MOUTH THREE TIMES A  tablet 0     lisinopril-hydrochlorothiazide (ZESTORETIC) 20-25 MG tablet TAKE 2 TABLETS BY MOUTH EVERY  tablet 2     metoprolol tartrate (LOPRESSOR) 25 MG tablet Take 0.5 tablets (12.5 mg) by mouth 2 times daily 90 tablet 0     multivitamin with minerals (THERA-M) 9 mg iron-400 mcg Tab tablet Take 1 tablet by mouth daily Chewable       traZODone (DESYREL) 50 MG tablet Take 1 tablet (50 mg) by mouth At Bedtime Try half tablet for first few days 30 tablet 3     albuterol (PROVENTIL) 2.5 mg /3 mL (0.083 %) nebulizer solution [ALBUTEROL (PROVENTIL) 2.5 MG /3 ML (0.083 %) NEBULIZER SOLUTION] Take 3 mL (2.5 mg total) by nebulization every 4 (four) hours as needed for wheezing or shortness of breath. (Patient not taking: No sig reported) 75 mL 1       Physical Exam:  /74 (BP Location: Right arm, Patient Position: Chair, Cuff Size: Adult Large)   Pulse 86   Wt 94.7 kg (208 lb 11.2 oz)   LMP  (LMP Unknown)   SpO2 94%   BMI 36.97 kg/m    Gen: alert, oriented, no distress  HEENT: no lymphadenopathy  Neck: Trachea  midline, No Accessory muscle use  CV: irregularly irregular, no M/G/R  Resp: CTAB, no focal crackles or wheezes  Abd: soft, nontender, no palpable organomegaly  Skin: no apparent rashes  Ext: no cyanosis, clubbing or edema  Neuro: alert, nonfocal    Labs:    CBC RESULTS:   Recent Labs   Lab Test 03/11/22  1130   WBC 16.6*   RBC 5.00   HGB 14.6   HCT 45.4   MCV 91   MCH 29.2   MCHC 32.2   RDW 12.8        Sodium   Date Value Ref Range Status   03/09/2022 139 136 - 145 mmol/L Final     Potassium   Date Value Ref Range Status   03/09/2022 3.6 3.5 - 5.0 mmol/L Final     Chloride   Date Value Ref Range Status   03/09/2022 97 (L) 98 - 107 mmol/L Final     Carbon Dioxide (CO2)   Date Value Ref Range Status   03/09/2022 27 22 - 31 mmol/L Final     Anion Gap   Date Value Ref Range Status   03/09/2022 15 5 - 18 mmol/L Final     Glucose   Date Value Ref Range Status   03/09/2022 166 (H) 70 - 125 mg/dL Final     Urea Nitrogen   Date Value Ref Range Status   03/09/2022 22 8 - 28 mg/dL Final     Creatinine   Date Value Ref Range Status   03/09/2022 0.82 0.60 - 1.10 mg/dL Final     GFR Estimate   Date Value Ref Range Status   03/09/2022 72 >60 mL/min/1.73m2 Final     Comment:     Effective December 21, 2021 eGFRcr in adults is calculated using the 2021 CKD-EPI creatinine equation which includes age and gender (Lc et al., NEJ, DOI: 10.1056/YZPWvg5015463)   04/21/2021 >60 >60 mL/min/1.73m2 Final     GFR, ESTIMATED POCT   Date Value Ref Range Status   07/22/2021 >60 >60 mL/min/1.73m2 Final     Calcium   Date Value Ref Range Status   03/09/2022 10.1 8.5 - 10.5 mg/dL Final         Imaging studies:  Personally reviewed image/s and Personally reviewed impression/s  EXAM: CT CHEST W/O CONTRAST  LOCATION: [facility]  DATE/TIME: 4/4/2022 1:17 PM     INDICATION: Follow-up opacities. Pneumonia.  COMPARISON: 03/05/2022 CT.  TECHNIQUE: CT chest without IV contrast. Multiplanar reformats were obtained. Dose  reduction techniques were used.  CONTRAST: None.     FINDINGS:   LUNGS AND PLEURA: Clearing of prior opacities and pleural fluid. Stable anterior right upper lobe 5-6 mm nodule (series 4, image 70).     MEDIASTINUM/AXILLAE: No adenopathy. Nonaneurysmal aorta. Mild pulmonary trunk enlargement at 3.3 cm. Stable right thyroid nodule or cyst.     CORONARY ARTERY CALCIFICATION: Compromised from motion.     UPPER ABDOMEN: Incidental hepatic and renal cysts. Cholecystectomy.     MUSCULOSKELETAL: Hypertrophic degenerative changes. Right shoulder arthroplasty.                                                                      IMPRESSION:      1.  Resolution of prior opacities, edema and pleural fluid.     2.  Stable right upper lobe nodule.     3.  Mild pulmonary trunk enlargement; correlate for pulmonary hypertension.      PFT's  Personally reviewed and interpreted.   FEV1 1.57L, 83% predicted  FVC 1.91, 77% predicted  FEV1/FVC 82  Normal flow volume loop  No change post-bronchodilator  ELC 3.99L, 83% predicted  RV 1.86, 85% predicted  DLCO 82% predicted  Impression: normal PFTs

## 2022-07-02 LAB
DLCOCOR-%PRED-PRE: 82 %
DLCOCOR-PRE: 15.03 ML/MIN/MMHG
DLCOUNC-%PRED-PRE: 84 %
DLCOUNC-PRE: 15.39 ML/MIN/MMHG
DLCOUNC-PRED: 18.16 ML/MIN/MMHG
ERV-%PRED-PRE: 291 %
ERV-PRE: 0.24 L
ERV-PRED: 0.08 L
EXPTIME-PRE: 5.73 SEC
FEF2575-%PRED-POST: 165 %
FEF2575-%PRED-PRE: 100 %
FEF2575-POST: 2.51 L/SEC
FEF2575-PRE: 1.53 L/SEC
FEF2575-PRED: 1.52 L/SEC
FEFMAX-%PRED-PRE: 86 %
FEFMAX-PRE: 4.01 L/SEC
FEFMAX-PRED: 4.64 L/SEC
FEV1-%PRED-PRE: 83 %
FEV1-PRE: 1.57 L
FEV1FEV6-PRE: 81 %
FEV1FEV6-PRED: 78 %
FEV1FVC-PRE: 82 %
FEV1FVC-PRED: 77 %
FEV1SVC-PRE: 74 %
FEV1SVC-PRED: 69 %
FIFMAX-PRE: 2.94 L/SEC
FRCPLETH-%PRED-PRE: 79 %
FRCPLETH-PRE: 2.11 L
FRCPLETH-PRED: 2.66 L
FVC-%PRED-PRE: 77 %
FVC-PRE: 1.91 L
FVC-PRED: 2.45 L
IC-%PRED-PRE: 71 %
IC-PRE: 1.89 L
IC-PRED: 2.64 L
RVPLETH-%PRED-PRE: 85 %
RVPLETH-PRE: 1.86 L
RVPLETH-PRED: 2.18 L
TLCPLETH-%PRED-PRE: 83 %
TLCPLETH-PRE: 3.99 L
TLCPLETH-PRED: 4.77 L
VA-%PRED-PRE: 73 %
VA-PRE: 3.26 L
VC-%PRED-PRE: 78 %
VC-PRE: 2.13 L
VC-PRED: 2.72 L

## 2022-07-05 ENCOUNTER — THERAPY VISIT (OUTPATIENT)
Dept: PHYSICAL THERAPY | Facility: CLINIC | Age: 82
End: 2022-07-05
Attending: NURSE PRACTITIONER
Payer: COMMERCIAL

## 2022-07-05 DIAGNOSIS — M43.16 SPONDYLOLISTHESIS OF LUMBAR REGION: ICD-10-CM

## 2022-07-05 DIAGNOSIS — M54.41 CHRONIC BILATERAL LOW BACK PAIN WITH BILATERAL SCIATICA: ICD-10-CM

## 2022-07-05 DIAGNOSIS — M48.061 SPINAL STENOSIS OF LUMBAR REGION WITHOUT NEUROGENIC CLAUDICATION: ICD-10-CM

## 2022-07-05 DIAGNOSIS — M54.16 LUMBAR RADICULOPATHY: ICD-10-CM

## 2022-07-05 DIAGNOSIS — G89.29 CHRONIC BILATERAL LOW BACK PAIN WITH BILATERAL SCIATICA: ICD-10-CM

## 2022-07-05 DIAGNOSIS — M54.42 CHRONIC BILATERAL LOW BACK PAIN WITH BILATERAL SCIATICA: ICD-10-CM

## 2022-07-05 DIAGNOSIS — M51.369 DDD (DEGENERATIVE DISC DISEASE), LUMBAR: ICD-10-CM

## 2022-07-05 PROCEDURE — 97110 THERAPEUTIC EXERCISES: CPT | Mod: GP | Performed by: PHYSICAL THERAPIST

## 2022-07-05 PROCEDURE — 97161 PT EVAL LOW COMPLEX 20 MIN: CPT | Mod: GP | Performed by: PHYSICAL THERAPIST

## 2022-07-05 NOTE — PROGRESS NOTES
[facility]    OUTPATIENT Physical Therapy ORTHOPEDIC EVALUATION  PLAN OF TREATMENT FOR OUTPATIENT REHABILITATION  (COMPLETE FOR INITIAL CLAIMS ONLY)  Patient's Last Name, First Name, M.I.  YOB: 1940  NiruLluvia  L    Provider s Name:  [facility]   Medical Record No.  [id number]   Start of Care Date:  07/05/22   Onset Date:   04/05/22   Type:     _X__PT   ___OT Medical Diagnosis:    Encounter Diagnoses   Name Primary?    Chronic bilateral low back pain with bilateral sciatica     DDD (degenerative disc disease), lumbar     Spondylolisthesis of lumbar region     Spinal stenosis of lumbar region without neurogenic claudication     Lumbar radiculopathy         Treatment Diagnosis:  LBP        Goals:     07/05/22 0500   Body Part   Goals listed below are for LBP   Goal #1   Goal #1 ambulation   Previous Functional Level No restrictions   Current Functional Level Miles patient can walk   Performance Level < 1/4 mile   STG Target Performance Miles patient will be able to  walk   Performance Level 1/4 without increased symptoms   Rationale for safe household ambulation;for safe outdoor household ambulation;for safe community ambulation;to maintain proper body mechanics/posture while ambulating to avoid additional compensatory injury due to improper gait mechanics;to promote a healthy and active lifestyle   Due Date 07/26/22    LTG Target Performance Miles patient will be able to  walk   Performance Level 1/2 mile, without increased symptoms   Rationale for safe outdoor household ambulation;for safe household ambulation;for safe community ambulation;to promote a healthy and active lifestyle;to maintain proper body mechanics/posture while ambulating to avoid additional compensatory injury due to improper gait mechanics   Due Date 08/16/22       Therapy Frequency:   1x/week  Predicted Duration of Therapy Intervention:  6 weeks    Kavya Sutton, PT                 I CERTIFY THE NEED FOR THESE SERVICES FURNISHED UNDER        THIS PLAN OF TREATMENT AND WHILE UNDER MY CARE     (Physician co-signature of this document indicates review and certification of the therapy plan).                     Certification Date From:  07/05/22   Certification Date To:  08/16/22    Referring Provider:  Reyna Weldon    Initial Assessment        See Epic Evaluation SOC Date: 07/05/22

## 2022-07-05 NOTE — PROGRESS NOTES
Physical Therapy Initial Evaluation  Subjective:  The history is provided by the patient and medical records. No  was used.   Patient Health History  Lluvia Marrufo being seen for LBP.     Problem began: 4/5/2022.   Problem occurred: over time   Pain is reported as 2/10 on pain scale.  General health as reported by patient is good.  Pertinent medical history includes: asthma, high blood pressure, heart problems and overweight.   Red flags:  None as reported by patient.  Medical allergies: none.   Surgeries include:  Orthopedic surgery. Other surgery history details: R TKA, L NARCISO.        Current occupation is Retired.                     Therapist Generated HPI Evaluation  Problem details: Long history of LBP, flared up about 3 months ago. Notes worse by the end of the day and notes would be walking differently.   Hospitalized for a few days in March and pain once again increased.   Had injection done 6/29, noting good relief of symptoms. Notes prior episodes of PT have been somewhat helpful, and feels like has to do exercises everyday to help.   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 .         Type of problem:  Lumbar.    This is a chronic condition.  Condition occurred with:  Degenerative joint disease and insidious onset.  Where condition occurred: for unknown reasons.  Patient reports pain:  Central lumbar spine.  and is constant.  Pain is worse in the P.M..  Since onset symptoms are gradually improving.  Associated symptoms:  Loss of motion/stiffness and loss of strength. Symptoms are exacerbated by lifting, bending, certain positions and walking  and relieved by rest.  Imaging testing: see chart MRI 2021.  Previous treatment includes physical therapy. There was mild improvement following previous treatment.                          Objective:  System         Lumbar/SI Evaluation  ROM:    AROM Lumbar:   Flexion:          To ankle  Ext:                    Moderate  limitation, stiff   Side Bend:        Left:  Moderate limitation    Right:  Mod-max limitation  Rotation:           Left:     Right:   Side Glide:        Left:     Right:         Strength: fair TA activation. Sit <>stand standard chair w/o UE assist with mild difficulty  Lumbar Myotomes:  Lumbar myotomes: normal, except R knee extension 4/5.                Lumbar Dermtomes:  normal                Neural Tension/Mobility:  Lumbar:  Normal (increased hamstring tightness R> L . - SLR, slump)        Lumbar Palpation:    Tenderness present at Left:    Quadratus Lumborum  Tenderness present at Right: Quadratus Lumborum    Lumbar Provocation:      Left negative with:  PROM hip (restrictions mild (prior NARCISO))    Right negative with:  PROM hip                                                 General     ROS    Assessment/Plan:    Patient is a 82 year old female with lumbar complaints.    Patient has the following significant findings with corresponding treatment plan.                Diagnosis 1:  LBP  Pain -  hot/cold therapy, US and electric stimulation  Decreased ROM/flexibility - manual therapy and therapeutic exercise  Decreased joint mobility - manual therapy and therapeutic exercise  Decreased strength - therapeutic exercise and therapeutic activities  Impaired muscle performance - neuro re-education    Therapy Evaluation Codes:   1) History comprised of:   Personal factors that impact the plan of care:      Time since onset of symptoms.    Comorbidity factors that impact the plan of care are:      Asthma, Heart problems, High blood pressure and Overweight.     Medications impacting care: None.  2) Examination of Body Systems comprised of:   Body structures and functions that impact the plan of care:      Lumbar spine.   Activity limitations that impact the plan of care are:      Lifting, Sitting, Squatting/kneeling, Standing and Walking.  3) Clinical presentation characteristics  are:   Stable/Uncomplicated.  4) Decision-Making    Low complexity using standardized patient assessment instrument and/or measureable assessment of functional outcome.  Cumulative Therapy Evaluation is: Low complexity.    Previous and current functional limitations:  (See Goal Flow Sheet for this information)    Short term and Long term goals: (See Goal Flow Sheet for this information)     Communication ability:  Patient appears to be able to clearly communicate and understand verbal and written communication and follow directions correctly.  Treatment Explanation - The following has been discussed with the patient:   RX ordered/plan of care  Anticipated outcomes  Possible risks and side effects  This patient would benefit from PT intervention to resume normal activities.   Rehab potential is excellent.    Frequency:  1 X week, once daily  Duration:  for 6 weeks  Discharge Plan:  Achieve all LTG.  Independent in home treatment program.    Please refer to the daily flowsheet for treatment today, total treatment time and time spent performing 1:1 timed codes.

## 2022-07-08 DIAGNOSIS — G47.00 INSOMNIA, UNSPECIFIED TYPE: ICD-10-CM

## 2022-07-11 RX ORDER — TRAZODONE HYDROCHLORIDE 50 MG/1
50 TABLET, FILM COATED ORAL AT BEDTIME
Qty: 30 TABLET | Refills: 3 | Status: SHIPPED | OUTPATIENT
Start: 2022-07-11 | End: 2022-07-23

## 2022-07-13 ENCOUNTER — OFFICE VISIT (OUTPATIENT)
Dept: PHYSICAL MEDICINE AND REHAB | Facility: CLINIC | Age: 82
End: 2022-07-13
Payer: COMMERCIAL

## 2022-07-13 VITALS — DIASTOLIC BLOOD PRESSURE: 82 MMHG | SYSTOLIC BLOOD PRESSURE: 144 MMHG

## 2022-07-13 DIAGNOSIS — M43.16 SPONDYLOLISTHESIS OF LUMBAR REGION: ICD-10-CM

## 2022-07-13 DIAGNOSIS — M54.16 LUMBAR RADICULOPATHY: ICD-10-CM

## 2022-07-13 DIAGNOSIS — M54.42 CHRONIC BILATERAL LOW BACK PAIN WITH BILATERAL SCIATICA: Primary | ICD-10-CM

## 2022-07-13 DIAGNOSIS — M54.41 CHRONIC BILATERAL LOW BACK PAIN WITH BILATERAL SCIATICA: Primary | ICD-10-CM

## 2022-07-13 DIAGNOSIS — M51.369 DDD (DEGENERATIVE DISC DISEASE), LUMBAR: ICD-10-CM

## 2022-07-13 DIAGNOSIS — M48.061 SPINAL STENOSIS OF LUMBAR REGION WITHOUT NEUROGENIC CLAUDICATION: ICD-10-CM

## 2022-07-13 DIAGNOSIS — G89.29 CHRONIC BILATERAL LOW BACK PAIN WITH BILATERAL SCIATICA: Primary | ICD-10-CM

## 2022-07-13 PROCEDURE — 99213 OFFICE O/P EST LOW 20 MIN: CPT | Performed by: NURSE PRACTITIONER

## 2022-07-13 ASSESSMENT — PAIN SCALES - GENERAL: PAINLEVEL: NO PAIN (0)

## 2022-07-13 NOTE — LETTER
7/13/2022         RE: Lluvia Marrufo  [address] [facility] [address]        Dear Colleague,    Thank you for referring your patient, Lluvia Marrufo, to the [facility]. Please see a copy of my visit note below.      Assessment:     Diagnoses and all orders for this visit:  Chronic bilateral low back pain with bilateral sciatica  Lumbar radiculopathy  Spinal stenosis of lumbar region without neurogenic claudication  Spondylolisthesis of lumbar region  DDD (degenerative disc disease), lumbar     Lluvia Marrufo is a 82 year old y.o. female with past medical history significant for hyperlipidemia, hypertension, mild intermittent asthma, prediabetes, hypomagnesia, hypokalemia, acute CVA 2018 with expressive aphasia, obesity, headache, history of right total knee replacement, left hip replacement, history of bilateral shoulder replacement who presents today for follow-up regarding:    -Chronic bilateral low back pain with bilateral lumbar radiculopathy with significant relief with recent bilateral L3-4 TFESI.  Patient does have severe spinal stenosis at this level with spondylolisthesis.     Plan:     A shared decision making plan was used. The patient's values and choices were respected. Prior medical records were reviewed today. The following represents what was discussed and decided upon by the provider and the patient.        -DIAGNOSTIC TESTS: Images were personally reviewed and interpreted.   --Lumbar spine MRI 6/14/2021 with multilevel degenerative change, progression since 2016.  L3-4 spondylolisthesis with severe spinal stenosis with mild right and moderate left foraminal stenosis.  Moderate spinal stenosis at L4-5 and mild at L2-3.  --Lumbar spine MRI 2016 with multilevel spondylolisthesis L2-3, L3-4, L4-5.  Advanced disc height loss at T12-L1.  Moderate to advanced disc height loss at L5-S1, however no nerve compression noted at this level.  L3-4  moderate spinal stenosis with left disc bulge along with facet arthropathy.  L4-5 mild spinal stenosis with mild left foraminal stenosis.    -INTERVENTIONS: Discussed that we can repeat the injection if symptoms worsen in 3 months or longer.  If symptoms worsen in less than 3 months then it may be worth getting a surgical opinion at that time.  Patient is doing well however at this time and wants to avoid spine surgery, she denies lower extremity weakness.    -MEDICATIONS: No medications changes today  Discussed side effects of medications and proper use. Patient verbalized understanding.    -PHYSICAL THERAPY: Advised patient to continue with home exercises from prior physical therapy sessions as tolerated  Discussed the importance of core strengthening, ROM, stretching exercises with the patient and how each of these entities is important in decreasing pain.  Explained to the patient that the purpose of physical therapy is to teach the patient a home exercise program.  These exercises need to be performed every day in order to decrease pain and prevent future occurrences of pain.        -PATIENT EDUCATION:  Total time of 24 minutes, on the day of service, spent with the patient, reviewing the chart, placing orders, and documenting.   -Today we also discussed the issues related to the current COVID-19 pandemic, the pros and cons of the current treatment plan, the CDC guidelines such as social distancing, washing the hands, and covering the cough.    -FOLLOW UP: Follow-up as needed  Advised to contact clinic if symptoms worsen or change.    Subjective:     Lluvia Marrufo is a 82 year old female who presents today for follow-up regarding 2 weeks post bilateral L3-4 TFESI in which she received 100% relief with her back pain in general as well as lower extremity pain and lower extremity weakness that was more severe previously with standing and walking.  Currently her pain today is a 0/10.  Patient is very happy  with her symptoms and overall doing well.    -Treatment to Date: No prior spinal surgery.  Physical therapy 2017.  Physical therapy 2021x5 sessions LBP with radiculopathy.     Left L5-S1 TFESI 12/15/2016.  Bilateral L3-4 TFESI 6/29/2022 with 100% relief LBP and radiculopathy leg symptoms.  Preprocedure pain 4/10, post 3/10.     -Medications:  Advil at bedtime    Patient Active Problem List   Diagnosis     Obesity     Hyperlipidemia     Prediabetes     Lower Back Pain     Hypertension goal BP (blood pressure) < 140/90     Joint Pain, Localized In The Shoulder     Mild intermittent asthma without complication     Spinal stenosis of lumbar region     Primary osteoarthritis of left hip     Hypomagnesemia     Hypokalemia     Expressive aphasia     Acute CVA (cerebrovascular accident) (H)     Obesity (BMI 35.0-39.9) with comorbidity (H)     Dyspnea on exertion     Precordial pain     Persistent atrial fibrillation (H)     Sepsis, due to unspecified organism, unspecified whether acute organ dysfunction present (H)     Gram-positive bacteremia     Mass of colon     Crohn's disease of large bowel (H)     Diverticular disease of large intestine     Imaging of gastrointestinal tract abnormal     Colitis       Current Outpatient Medications   Medication     gabapentin (NEURONTIN) 300 MG capsule     albuterol (PROVENTIL) 2.5 mg /3 mL (0.083 %) nebulizer solution     amLODIPine (NORVASC) 10 MG tablet     apixaban ANTICOAGULANT (ELIQUIS) 5 mg Tab tablet     atorvastatin (LIPITOR) 20 MG tablet     benzonatate (TESSALON) 200 MG capsule     glucosamine-chondroitin 500-400 mg cap     KLOR-CON 20 MEQ CR tablet     lisinopril-hydrochlorothiazide (ZESTORETIC) 20-25 MG tablet     metoprolol tartrate (LOPRESSOR) 25 MG tablet     multivitamin with minerals (THERA-M) 9 mg iron-400 mcg Tab tablet     traZODone (DESYREL) 50 MG tablet     No current facility-administered medications for this visit.       Allergies   Allergen Reactions      Azithromycin Nausea     Dizziness, dehydrated.        Past Medical History:   Diagnosis Date     Arthritis      Asthma      Atrial fibrillation (H)      Bronchitis      Earache      Fracture, foot      History of CVA (cerebrovascular accident)      Hyperlipidemia      Hypertension      UTI (urinary tract infection)         Review of Systems  ROS:  Specifically negative for bowel/bladder dysfunction, balance changes, headache, dizziness, foot drop, fevers, chills, appetite changes, nausea/vomiting, unexplained weight loss. Otherwise 13 systems reviewed are negative. Please see the patient's intake questionnaire from today for details.    Reviewed Social, Family, Past Medical and Past Surgical history with patient, no significant changes noted since prior visit.     Objective:     BP (!) 144/82 (BP Location: Right arm, Patient Position: Sitting)   LMP  (LMP Unknown)     PHYSICAL EXAMINATION:    --CONSTITUTIONAL: Well developed, well nourished, healthy appearing individual.  --PSYCHIATRIC: Appropriate mood and affect. No difficulty interacting due to temper, social withdrawal, or memory issues.  --SKIN: Lumbar region is dry and intact.   --RESPIRATORY: Normal rhythm and effort. No abnormal accessory muscle breathing patterns noted.   --MUSCULOSKELETAL:  Normal lumbar lordosis noted, no lateral shift.  --GROSS MOTOR: Easily arises from a seated position. Gait is non-antalgic  --LUMBAR SPINE:  Inspection reveals no evidence of deformity.     RESULTS:   Imaging: Lumbar spine imaging was reviewed today. The images were shown to the patient and the findings were explained using a spine model.      Lumbar spine MRI reviewed from 2021.                      Again, thank you for allowing me to participate in the care of your patient.        Sincerely,        Reyna Weldon, CNP

## 2022-07-13 NOTE — PROGRESS NOTES
Assessment:     Diagnoses and all orders for this visit:  Chronic bilateral low back pain with bilateral sciatica  Lumbar radiculopathy  Spinal stenosis of lumbar region without neurogenic claudication  Spondylolisthesis of lumbar region  DDD (degenerative disc disease), lumbar     Lluvia Marrufo is a 82 year old y.o. female with past medical history significant for hyperlipidemia, hypertension, mild intermittent asthma, prediabetes, hypomagnesia, hypokalemia, acute CVA 2018 with expressive aphasia, obesity, headache, history of right total knee replacement, left hip replacement, history of bilateral shoulder replacement who presents today for follow-up regarding:    -Chronic bilateral low back pain with bilateral lumbar radiculopathy with significant relief with recent bilateral L3-4 TFESI.  Patient does have severe spinal stenosis at this level with spondylolisthesis.     Plan:     A shared decision making plan was used. The patient's values and choices were respected. Prior medical records were reviewed today. The following represents what was discussed and decided upon by the provider and the patient.        -DIAGNOSTIC TESTS: Images were personally reviewed and interpreted.   --Lumbar spine MRI 6/14/2021 with multilevel degenerative change, progression since 2016.  L3-4 spondylolisthesis with severe spinal stenosis with mild right and moderate left foraminal stenosis.  Moderate spinal stenosis at L4-5 and mild at L2-3.  --Lumbar spine MRI 2016 with multilevel spondylolisthesis L2-3, L3-4, L4-5.  Advanced disc height loss at T12-L1.  Moderate to advanced disc height loss at L5-S1, however no nerve compression noted at this level.  L3-4 moderate spinal stenosis with left disc bulge along with facet arthropathy.  L4-5 mild spinal stenosis with mild left foraminal stenosis.    -INTERVENTIONS: Discussed that we can repeat the injection if symptoms worsen in 3 months or longer.  If symptoms worsen in less than  3 months then it may be worth getting a surgical opinion at that time.  Patient is doing well however at this time and wants to avoid spine surgery, she denies lower extremity weakness.    -MEDICATIONS: No medications changes today  Discussed side effects of medications and proper use. Patient verbalized understanding.    -PHYSICAL THERAPY: Advised patient to continue with home exercises from prior physical therapy sessions as tolerated  Discussed the importance of core strengthening, ROM, stretching exercises with the patient and how each of these entities is important in decreasing pain.  Explained to the patient that the purpose of physical therapy is to teach the patient a home exercise program.  These exercises need to be performed every day in order to decrease pain and prevent future occurrences of pain.        -PATIENT EDUCATION:  Total time of 24 minutes, on the day of service, spent with the patient, reviewing the chart, placing orders, and documenting.   -Today we also discussed the issues related to the current COVID-19 pandemic, the pros and cons of the current treatment plan, the CDC guidelines such as social distancing, washing the hands, and covering the cough.    -FOLLOW UP: Follow-up as needed  Advised to contact clinic if symptoms worsen or change.    Subjective:     Lluvia Marrufo is a 82 year old female who presents today for follow-up regarding 2 weeks post bilateral L3-4 TFESI in which she received 100% relief with her back pain in general as well as lower extremity pain and lower extremity weakness that was more severe previously with standing and walking.  Currently her pain today is a 0/10.  Patient is very happy with her symptoms and overall doing well.    -Treatment to Date: No prior spinal surgery.  Physical therapy 2017.  Physical therapy 2021x5 sessions LBP with radiculopathy.     Left L5-S1 TFESI 12/15/2016.  Bilateral L3-4 TFESI 6/29/2022 with 100% relief LBP and radiculopathy  leg symptoms.  Preprocedure pain 4/10, post 3/10.     -Medications:  Advil at bedtime    Patient Active Problem List   Diagnosis     Obesity     Hyperlipidemia     Prediabetes     Lower Back Pain     Hypertension goal BP (blood pressure) < 140/90     Joint Pain, Localized In The Shoulder     Mild intermittent asthma without complication     Spinal stenosis of lumbar region     Primary osteoarthritis of left hip     Hypomagnesemia     Hypokalemia     Expressive aphasia     Acute CVA (cerebrovascular accident) (H)     Obesity (BMI 35.0-39.9) with comorbidity (H)     Dyspnea on exertion     Precordial pain     Persistent atrial fibrillation (H)     Sepsis, due to unspecified organism, unspecified whether acute organ dysfunction present (H)     Gram-positive bacteremia     Mass of colon     Crohn's disease of large bowel (H)     Diverticular disease of large intestine     Imaging of gastrointestinal tract abnormal     Colitis       Current Outpatient Medications   Medication     gabapentin (NEURONTIN) 300 MG capsule     albuterol (PROVENTIL) 2.5 mg /3 mL (0.083 %) nebulizer solution     amLODIPine (NORVASC) 10 MG tablet     apixaban ANTICOAGULANT (ELIQUIS) 5 mg Tab tablet     atorvastatin (LIPITOR) 20 MG tablet     benzonatate (TESSALON) 200 MG capsule     glucosamine-chondroitin 500-400 mg cap     KLOR-CON 20 MEQ CR tablet     lisinopril-hydrochlorothiazide (ZESTORETIC) 20-25 MG tablet     metoprolol tartrate (LOPRESSOR) 25 MG tablet     multivitamin with minerals (THERA-M) 9 mg iron-400 mcg Tab tablet     traZODone (DESYREL) 50 MG tablet     No current facility-administered medications for this visit.       Allergies   Allergen Reactions     Azithromycin Nausea     Dizziness, dehydrated.        Past Medical History:   Diagnosis Date     Arthritis      Asthma      Atrial fibrillation (H)      Bronchitis      Earache      Fracture, foot      History of CVA (cerebrovascular accident)      Hyperlipidemia      Hypertension       UTI (urinary tract infection)         Review of Systems  ROS:  Specifically negative for bowel/bladder dysfunction, balance changes, headache, dizziness, foot drop, fevers, chills, appetite changes, nausea/vomiting, unexplained weight loss. Otherwise 13 systems reviewed are negative. Please see the patient's intake questionnaire from today for details.    Reviewed Social, Family, Past Medical and Past Surgical history with patient, no significant changes noted since prior visit.     Objective:     BP (!) 144/82 (BP Location: Right arm, Patient Position: Sitting)   LMP  (LMP Unknown)     PHYSICAL EXAMINATION:    --CONSTITUTIONAL: Well developed, well nourished, healthy appearing individual.  --PSYCHIATRIC: Appropriate mood and affect. No difficulty interacting due to temper, social withdrawal, or memory issues.  --SKIN: Lumbar region is dry and intact.   --RESPIRATORY: Normal rhythm and effort. No abnormal accessory muscle breathing patterns noted.   --MUSCULOSKELETAL:  Normal lumbar lordosis noted, no lateral shift.  --GROSS MOTOR: Easily arises from a seated position. Gait is non-antalgic  --LUMBAR SPINE:  Inspection reveals no evidence of deformity.     RESULTS:   Imaging: Lumbar spine imaging was reviewed today. The images were shown to the patient and the findings were explained using a spine model.      Lumbar spine MRI reviewed from 2021.

## 2022-07-15 ENCOUNTER — THERAPY VISIT (OUTPATIENT)
Dept: PHYSICAL THERAPY | Facility: CLINIC | Age: 82
End: 2022-07-15
Payer: COMMERCIAL

## 2022-07-15 DIAGNOSIS — M54.41 CHRONIC BILATERAL LOW BACK PAIN WITH BILATERAL SCIATICA: Primary | ICD-10-CM

## 2022-07-15 DIAGNOSIS — G89.29 CHRONIC BILATERAL LOW BACK PAIN WITH BILATERAL SCIATICA: Primary | ICD-10-CM

## 2022-07-15 DIAGNOSIS — M54.42 CHRONIC BILATERAL LOW BACK PAIN WITH BILATERAL SCIATICA: Primary | ICD-10-CM

## 2022-07-15 PROCEDURE — 97110 THERAPEUTIC EXERCISES: CPT | Mod: GP | Performed by: PHYSICAL THERAPIST

## 2022-07-18 DIAGNOSIS — I48.0 PAF (PAROXYSMAL ATRIAL FIBRILLATION) (H): ICD-10-CM

## 2022-07-19 ENCOUNTER — THERAPY VISIT (OUTPATIENT)
Dept: PHYSICAL THERAPY | Facility: CLINIC | Age: 82
End: 2022-07-19
Payer: COMMERCIAL

## 2022-07-19 DIAGNOSIS — G89.29 CHRONIC BILATERAL LOW BACK PAIN WITH BILATERAL SCIATICA: Primary | ICD-10-CM

## 2022-07-19 DIAGNOSIS — M54.42 CHRONIC BILATERAL LOW BACK PAIN WITH BILATERAL SCIATICA: Primary | ICD-10-CM

## 2022-07-19 DIAGNOSIS — M54.41 CHRONIC BILATERAL LOW BACK PAIN WITH BILATERAL SCIATICA: Primary | ICD-10-CM

## 2022-07-19 PROCEDURE — 97110 THERAPEUTIC EXERCISES: CPT | Mod: GP | Performed by: PHYSICAL THERAPIST

## 2022-07-19 PROCEDURE — 97140 MANUAL THERAPY 1/> REGIONS: CPT | Mod: GP | Performed by: PHYSICAL THERAPIST

## 2022-07-20 RX ORDER — APIXABAN 5 MG/1
TABLET, FILM COATED ORAL
Qty: 60 TABLET | Refills: 12 | Status: SHIPPED | OUTPATIENT
Start: 2022-07-20 | End: 2023-07-31

## 2022-07-22 ENCOUNTER — TELEPHONE (OUTPATIENT)
Dept: CARDIOLOGY | Facility: CLINIC | Age: 82
End: 2022-07-22

## 2022-07-22 DIAGNOSIS — G47.00 INSOMNIA, UNSPECIFIED TYPE: ICD-10-CM

## 2022-07-22 DIAGNOSIS — I63.9 ACUTE CVA (CEREBROVASCULAR ACCIDENT) (H): ICD-10-CM

## 2022-07-22 DIAGNOSIS — I48.0 PAF (PAROXYSMAL ATRIAL FIBRILLATION) (H): Primary | ICD-10-CM

## 2022-07-22 DIAGNOSIS — I48.19 PERSISTENT ATRIAL FIBRILLATION (H): ICD-10-CM

## 2022-07-22 DIAGNOSIS — I10 ESSENTIAL HYPERTENSION: ICD-10-CM

## 2022-07-23 RX ORDER — POTASSIUM CHLORIDE 1500 MG/1
TABLET, EXTENDED RELEASE ORAL
Qty: 270 TABLET | Refills: 1 | Status: SHIPPED | OUTPATIENT
Start: 2022-07-23 | End: 2023-01-20

## 2022-07-23 RX ORDER — TRAZODONE HYDROCHLORIDE 50 MG/1
50 TABLET, FILM COATED ORAL AT BEDTIME
Qty: 90 TABLET | Refills: 1 | Status: SHIPPED | OUTPATIENT
Start: 2022-07-23 | End: 2022-11-01

## 2022-07-23 NOTE — TELEPHONE ENCOUNTER
"Routing refill request to provider for review/approval because:  bp out of range    Last Written Prescription Date:  6/6/22  Last Fill Quantity: 90,  # refills: 0   Last office visit provider:  6/6/22     Requested Prescriptions   Pending Prescriptions Disp Refills     metoprolol tartrate (LOPRESSOR) 25 MG tablet [Pharmacy Med Name: METOPROLOL TARTRATE 25 MG TAB] 90 tablet 0     Sig: TAKE 0.5 TABLETS BY MOUTH 2 TIMES DAILY.       Beta-Blockers Protocol Failed - 7/22/2022  5:28 PM        Failed - Blood pressure under 140/90 in past 12 months     BP Readings from Last 3 Encounters:   07/13/22 (!) 144/82   07/01/22 130/74   06/29/22 128/72                 Passed - Patient is age 6 or older        Passed - Recent (12 mo) or future (30 days) visit within the authorizing provider's specialty     Patient has had an office visit with the authorizing provider or a provider within the authorizing providers department within the previous 12 mos or has a future within next 30 days. See \"Patient Info\" tab in inbasket, or \"Choose Columns\" in Meds & Orders section of the refill encounter.              Passed - Medication is active on med list             Polly Ryan RN 07/23/22 2:10 PM  "

## 2022-07-23 NOTE — TELEPHONE ENCOUNTER
"Routing refill request to provider for review/approval because:  Labs not current:  Ldl,  bp out of range    Last Written Prescription Date:  3/31/22  Last Fill Quantity: 90,  # refills: 0   Last office visit provider:  6/6/22     Requested Prescriptions   Pending Prescriptions Disp Refills     atorvastatin (LIPITOR) 20 MG tablet [Pharmacy Med Name: ATORVASTATIN 20 MG TABLET] 90 tablet 0     Sig: TAKE 1 TABLET BY MOUTH EVERY DAY       Statins Protocol Failed - 7/22/2022  5:28 PM        Failed - LDL on file in past 12 months     Recent Labs   Lab Test 04/21/21  0738   LDL 61             Passed - No abnormal creatine kinase in past 12 months     No lab results found.             Passed - Recent (12 mo) or future (30 days) visit within the authorizing provider's specialty     Patient has had an office visit with the authorizing provider or a provider within the authorizing providers department within the previous 12 mos or has a future within next 30 days. See \"Patient Info\" tab in inbasket, or \"Choose Columns\" in Meds & Orders section of the refill encounter.              Passed - Medication is active on med list        Passed - Patient is age 18 or older        Passed - No active pregnancy on record        Passed - No positive pregnancy test in past 12 months           traZODone (DESYREL) 50 MG tablet [Pharmacy Med Name: TRAZODONE 50 MG TABLET] 90 tablet 1     Sig: TAKE 1 TABLET (50 MG) BY MOUTH AT BEDTIME TRY HALF TABLET FOR FIRST FEW DAYS       Serotonin Modulators Passed - 7/22/2022  5:28 PM        Passed - Recent (12 mo) or future (30 days) visit within the authorizing provider's specialty     Patient has had an office visit with the authorizing provider or a provider within the authorizing providers department within the previous 12 mos or has a future within next 30 days. See \"Patient Info\" tab in inbasket, or \"Choose Columns\" in Meds & Orders section of the refill encounter.              Passed - Medication is " "active on med list        Passed - Patient is age 18 or older        Passed - No active pregnancy on record        Passed - No positive pregnancy test in past 12 months           amLODIPine (NORVASC) 10 MG tablet [Pharmacy Med Name: AMLODIPINE BESYLATE 10 MG TAB] 90 tablet 1     Sig: TAKE 1 TABLET (10 MG) BY MOUTH DAILY.       Calcium Channel Blockers Protocol  Failed - 7/22/2022  5:28 PM        Failed - Blood pressure under 140/90 in past 12 months     BP Readings from Last 3 Encounters:   07/13/22 (!) 144/82   07/01/22 130/74   06/29/22 128/72                 Passed - Recent (12 mo) or future (30 days) visit within the authorizing provider's specialty     Patient has had an office visit with the authorizing provider or a provider within the authorizing providers department within the previous 12 mos or has a future within next 30 days. See \"Patient Info\" tab in inbasket, or \"Choose Columns\" in Meds & Orders section of the refill encounter.              Passed - Medication is active on med list        Passed - Patient is age 18 or older        Passed - No active pregnancy on record        Passed - Normal serum creatinine on file in past 12 months     Recent Labs   Lab Test 03/09/22  1428   CR 0.82       Ok to refill medication if creatinine is low          Passed - No positive pregnancy test in past 12 months           KLOR-CON 20 MEQ CR tablet [Pharmacy Med Name: KLOR-CON M20 TABLET] 270 tablet 0     Sig: TAKE 1 TABLET BY MOUTH THREE TIMES A DAY       Potassium Supplements Protocol Passed - 7/22/2022  5:28 PM        Passed - Recent (12 mo) or future (30 days) visit within the authorizing provider's department     Patient has had an office visit with the authorizing provider or a provider within the authorizing providers department within the previous 12 mos or has a future within next 30 days. See \"Patient Info\" tab in inbasket, or \"Choose Columns\" in Meds & Orders section of the refill encounter.              " Passed - Medication is active on med list        Passed - Patient is age 18 or older        Passed - Normal serum potassium in past 12 months     Recent Labs   Lab Test 03/09/22  1428   POTASSIUM 3.6                         Polly Ryan, RN 07/23/22 8:38 AM

## 2022-07-25 ENCOUNTER — THERAPY VISIT (OUTPATIENT)
Dept: PHYSICAL THERAPY | Facility: CLINIC | Age: 82
End: 2022-07-25
Payer: COMMERCIAL

## 2022-07-25 DIAGNOSIS — G89.29 CHRONIC BILATERAL LOW BACK PAIN WITH BILATERAL SCIATICA: Primary | ICD-10-CM

## 2022-07-25 DIAGNOSIS — M54.42 CHRONIC BILATERAL LOW BACK PAIN WITH BILATERAL SCIATICA: Primary | ICD-10-CM

## 2022-07-25 DIAGNOSIS — M54.41 CHRONIC BILATERAL LOW BACK PAIN WITH BILATERAL SCIATICA: Primary | ICD-10-CM

## 2022-07-25 DIAGNOSIS — M51.369 DDD (DEGENERATIVE DISC DISEASE), LUMBAR: ICD-10-CM

## 2022-07-25 PROCEDURE — 97140 MANUAL THERAPY 1/> REGIONS: CPT | Mod: GP | Performed by: PHYSICAL THERAPIST

## 2022-07-25 PROCEDURE — 97110 THERAPEUTIC EXERCISES: CPT | Mod: GP | Performed by: PHYSICAL THERAPIST

## 2022-07-25 RX ORDER — ATORVASTATIN CALCIUM 20 MG/1
TABLET, FILM COATED ORAL
Qty: 90 TABLET | Refills: 0 | Status: SHIPPED | OUTPATIENT
Start: 2022-07-25 | End: 2022-10-03

## 2022-07-25 RX ORDER — AMLODIPINE BESYLATE 10 MG/1
10 TABLET ORAL DAILY
Qty: 90 TABLET | Refills: 1 | Status: SHIPPED | OUTPATIENT
Start: 2022-07-25 | End: 2023-01-25

## 2022-07-25 RX ORDER — METOPROLOL TARTRATE 25 MG/1
TABLET, FILM COATED ORAL
Qty: 90 TABLET | Refills: 0 | Status: SHIPPED | OUTPATIENT
Start: 2022-07-25 | End: 2022-10-03

## 2022-08-10 ENCOUNTER — THERAPY VISIT (OUTPATIENT)
Dept: PHYSICAL THERAPY | Facility: CLINIC | Age: 82
End: 2022-08-10
Payer: COMMERCIAL

## 2022-08-10 DIAGNOSIS — M54.41 CHRONIC BILATERAL LOW BACK PAIN WITH BILATERAL SCIATICA: Primary | ICD-10-CM

## 2022-08-10 DIAGNOSIS — M54.42 CHRONIC BILATERAL LOW BACK PAIN WITH BILATERAL SCIATICA: Primary | ICD-10-CM

## 2022-08-10 DIAGNOSIS — G89.29 CHRONIC BILATERAL LOW BACK PAIN WITH BILATERAL SCIATICA: Primary | ICD-10-CM

## 2022-08-10 PROCEDURE — 97112 NEUROMUSCULAR REEDUCATION: CPT | Mod: GP | Performed by: PHYSICAL THERAPIST

## 2022-08-10 PROCEDURE — 97110 THERAPEUTIC EXERCISES: CPT | Mod: GP | Performed by: PHYSICAL THERAPIST

## 2022-08-10 NOTE — PROGRESS NOTES
DISCHARGE REPORT    Progress reporting period is from 7/5/22 to 8/10/22.       SUBJECTIVE   Still some soreness by end of day but more concious of walking. 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 Legs not hurting as much as they used to.  Balance seems to be improving.     Changes in function:  Yes (See Goal flowsheet attached for changes in current functional level)  Adverse reaction to treatment or activity: None    OBJECTIVE  Changes noted in objective findings:  Yes,   Objective: Functional lumbar ROM. Good tolerance to exercises. SL stance x 4-5 seconds B.     ASSESSMENT/PLAN  Updated problem list and treatment plan: Diagnosis 1:  LBP  Decreased strength - home program  Impaired muscle performance - home program  STG/LTGs have been met or progress has been made towards goals:  Yes (See Goal flow sheet completed today.)  Assessment of Progress: The patient's condition is improving.  Patient is meeting short term goals and is progressing towards long term goals.  Self Management Plans:  Patient is independent in a home treatment program.  Patient is independent in self management of symptoms.  I have re-evaluated this patient and find that the nature, scope, duration and intensity of the therapy is appropriate for the medical condition of the patient.  Lluvia continues to require the following intervention to meet STG and LTG's: HEP.  PT intervention is no longer required to meet STG/LTG.    Recommendations:  This patient is ready to be discharged from therapy and continue their home treatment program.    Please refer to the daily flowsheet for treatment today, total treatment time and time spent performing 1:1 timed codes.

## 2022-08-12 DIAGNOSIS — F41.9 ANXIETY: ICD-10-CM

## 2022-08-12 NOTE — TELEPHONE ENCOUNTER
Routing refill request to provider for review/approval because:  Drug not on the [facility] [place] [place] refill protocol   Drug not active on patient's medication list    diazepam (VALIUM) 5 MG tablet (Discontinued) 15 tablet 0 1/7/2022 3/9/2022 No   Sig: TAKE 1 TABLET BY MOUTH DAILY AS NEEDED.   Sent to pharmacy as: diazePAM 5 MG Oral Tablet (VALIUM)   Class: E-Prescribe     Last Written Prescription Date:    Last Fill Quantity: ,  # refills:    Last office visit provider:  6/6/22     Requested Prescriptions   Pending Prescriptions Disp Refills     diazepam (VALIUM) 5 MG tablet [Pharmacy Med Name: DIAZEPAM 5 MG TABLET] 15 tablet      Sig: TAKE 1 TABLET BY MOUTH EVERY DAY AS NEEDED       There is no refill protocol information for this order          Polly Ryan RN 08/12/22 4:35 PM

## 2022-08-15 RX ORDER — DIAZEPAM 5 MG
TABLET ORAL
Qty: 15 TABLET | Refills: 0 | Status: SHIPPED | OUTPATIENT
Start: 2022-08-15 | End: 2022-11-01

## 2022-08-15 NOTE — TELEPHONE ENCOUNTER
Please inform patient that she needs an appointment at clinic- can be for yearly exam.  Medication request refilled but for future reference will need to be seen piror to further refills.  We can review clinic policy at that time.

## 2022-08-23 NOTE — TELEPHONE ENCOUNTER
I spoke with patient, she did not want to do an Annual Wellness Visit so she scheduled a med check appt for 10.05.2022 at  10AM with Dr Rodrigues.   [facility]    Hospitalist Progress Note    Date of Service (when I saw the patient): 04/17/2018    Assessment & Plan   Aure Lanier is a 68 year old  woman who was admitted on 4/17/2018 for elective left total knee arthoplasty. Operative procedure unremarkable. Her history is significant for chronic pain related to multiple chronic joint pain including neck and knee, hypothyroidism, gastroesophageal reflux, dysthymia marked by anxiety and depression, insomnia for which she frequently uses Ambien, as well as a remote history of antrectomy possibly because of a perforating peptic ulcer was also resulted in peritonitis.  She recently underwent esophagogastroduodenoscopy for food bolus.       1.  Elective left total knee arthroplasty  For severe degenerative arthritis.  Operative course itself was unremarkable.  Preoperatively she had some difficulties with pain resulting in.  In terms of anticoagulation she has some concerns with the use of aspirin because of her antrectomy although in fact the risk of this following her partial gastrectomy likely to be less than previously.  As noted pain perioperatively is been something of a issue but would anticipate this will continue to improve.  Other issues per orthopedic team directed by Dr. Hernandez.  2.  Chronic pain  Issues include chronic neck pain for which she intermittently has used prednisone as well as somewhat quiesced and history of migraines.  Needed single spinal injection in pack year prior to transfer to medical wards.  May require further titration of analgesia.  Control as dictated by perioperative course.  3.  Gastroesophageal reflux  Also has multiple other GI issues included as noted history of an antrectomy and recent food bolus.  Continue cyto-protection.  4.  Hypothyroidism  Recently increase in thyroxine 250 mcg daily.  Continue this during predicted short hospital course.  Active Problems:    * No active hospital problems. *      # Pain  Assessment:  Current Pain Score 4/17/2018   Patient currently in pain? yes   Pain score (0-10) 1   Pain location Knee   - Aure is experiencing pain due to operative procedure. Pain management was discussed and the plan was created in a collaborative fashion.  Aure's response to the current recommendations: engaged  - Please see the plan for pain management as documented above         DVT Prophylaxis: Full dose twice daily aspirin  Code Status: No Order    Disposition: Expected discharge in 2-3 days depending on her course    Jignesh Jansen    Interval History   Awake alert and interactive.  No pain at present (recently recent spinal injection)    -Data reviewed today: I reviewed all new labs and imaging results over the last 24 hours.    Peripheral IV 04/17/18 Left Hand (Active)   Site Assessment WDL 4/17/2018  3:00 PM   Line Status Infusing 4/17/2018  3:00 PM   Phlebitis Scale 0-->no symptoms 4/17/2018  3:00 PM   Infiltration Scale 0 4/17/2018  3:00 PM   Infiltration Site Treatment Method  None 4/17/2018  3:00 PM   Extravasation? No 4/17/2018  3:00 PM   Dressing Intervention New dressing  4/17/2018 10:40 AM   Number of days:0       Incision/Surgical Site 04/17/18 Left Knee (Active)   Incision Assessment UTV 4/17/2018  3:32 PM   Closure Approximated;Staples 4/17/2018  2:36 PM   Incision Drainage Amount None 4/17/2018  3:32 PM   Dressing Intervention Clean, dry, intact 4/17/2018  3:32 PM   Number of days:0     Line/device assessment(s) completed for medical necessity April 17    Physical Exam   Temp: 97.6  F (36.4  C) Temp src: Oral BP: 120/60 Pulse: 97 Heart Rate: 87 Resp: 21 SpO2: 98 % O2 Device: None (Room air) Oxygen Delivery: 2 LPM  Vitals:    04/17/18 1035   Weight: 88.9 kg (196 lb)     Vital Signs with Ranges  Temp:  [97.6  F (36.4  C)-98.4  F (36.9  C)] 97.6  F (36.4  C)  Pulse:  [97] 97  Heart Rate:  [87-97] 87  Resp:  [13-26] 21  BP: (107-153)/() 120/60  SpO2:  [93 %-98 %] 98 %  I/O last 3  completed shifts:  In: 1400 [I.V.:1400]  Out: 50 [Blood:50]    Awake, alert, slightly uncomfortable woman lying in bed on medical wards.  Speech is clear.  Oriented ×3.  HEENT: Pupils equal, conjugate. No icterus or nystagmus. Oral mucosa moist. No facial asymmetry.   Neck: Supple, jugular veins not elevated. Trachea midline   Chest: No chest wall movement asymmetry. Aeration preserved to bases. Accessory muscles not in use. Expiratory time not increased. No tidal wheezes. No rhonchi. No discrete crackles.   Cardiac: PMI not displaced. S1, S2 unremarkable. No S3, S4. P2 not accentuated. No murmurs.  Abdomen: Soft. No palpation or percussion tenderness. No distention. Normoactive bowel sounds. Liver and spleen not increased in size. No bruits, masses, or pulsations.   Extremities: Left leg with compression dressing.  Ice to me directly.  Extremities warm.  Brisk capillary refill.  Easily palpable peripheral pulses.  No lower extremity edema. No eccymoses, clubbing.   Neurologic: Mental state above. Motor 5/5 and bilaterally equal. Tone preserved. No fasiculations or tremors. Sensation intact to light touch.     Medications     lactated ringers               Data   No lab results found in last 7 days.         No results found for this or any previous visit (from the past 24 hour(s)).

## 2022-09-18 ENCOUNTER — NURSE TRIAGE (OUTPATIENT)
Dept: NURSING | Facility: CLINIC | Age: 82
End: 2022-09-18

## 2022-09-18 DIAGNOSIS — R05.9 COUGH: ICD-10-CM

## 2022-09-18 NOTE — TELEPHONE ENCOUNTER
S-(situation): COVID    B-(background):   Returned from a cruise trip to [place].  Was at [facility] yesterday and arrived to [place] today.    Cruise ship docked in [place],  tested positive on Thursday and is now on quarantine at [facility] in [place].    Symptoms started on 9/16  Tested positive today 9/18    A-(assessment):   Fever on Friday, has now resolved.  Coughing a lot  Fatigue  Mild SOB with exertion, slightly worse than baseline.    O2 sat: 95% on room air  Uses inhaler PRN  No chest pain      R-(recommendations):   COVID treatment recommended  Transferred to .  Pt states she is aware of isolation guidelines, declined information when FNA offered.    Zaina Ibarra RN/[place] Nurse Advisor            Reason for Disposition    HIGH RISK for severe COVID complications (e.g., weak immune system, age > 64 years, obesity with BMI > 25, pregnant, chronic lung disease or other chronic medical condition)  (Exception: Already seen by PCP and no new or worsening symptoms.)    Additional Information    Negative: SEVERE difficulty breathing (e.g., struggling for each breath, speaks in single words)    Negative: Difficult to awaken or acting confused (e.g., disoriented, slurred speech)    Negative: Bluish (or gray) lips or face now    Negative: Shock suspected (e.g., cold/pale/clammy skin, too weak to stand, low BP, rapid pulse)    Negative: Sounds like a life-threatening emergency to the triager    Negative: SEVERE or constant chest pain or pressure  (Exception: Mild central chest pain, present only when coughing.)    Negative: MODERATE difficulty breathing (e.g., speaks in phrases, SOB even at rest, pulse 100-120)    Negative: [1] Headache AND [2] stiff neck (can't touch chin to chest)    Negative: Oxygen level (e.g., pulse oximetry) 90 percent or lower    Negative: Chest pain or pressure    Negative: Patient sounds very sick or weak to the triager    Negative: MILD difficulty breathing (e.g.,  minimal/no SOB at rest, SOB with walking, pulse <100)    Negative: Fever > 103 F (39.4 C)    Negative: [1] Fever > 101 F (38.3 C) AND [2] age > 60 years    Negative: [1] Fever > 100.0 F (37.8 C) AND [2] bedridden (e.g., nursing home patient, CVA, chronic illness, recovering from surgery)    Protocols used: CORONAVIRUS (COVID-19) DIAGNOSED OR [id number] 1.18.2022

## 2022-09-19 ENCOUNTER — VIRTUAL VISIT (OUTPATIENT)
Dept: URGENT CARE | Facility: CLINIC | Age: 82
End: 2022-09-19
Payer: COMMERCIAL

## 2022-09-19 DIAGNOSIS — R05.9 COUGH: ICD-10-CM

## 2022-09-19 DIAGNOSIS — U07.1 CLINICAL DIAGNOSIS OF COVID-19: Primary | ICD-10-CM

## 2022-09-19 PROCEDURE — 99213 OFFICE O/P EST LOW 20 MIN: CPT | Mod: CS

## 2022-09-19 RX ORDER — BENZONATATE 200 MG/1
200 CAPSULE ORAL 3 TIMES DAILY PRN
Qty: 30 CAPSULE | Refills: 0 | Status: SHIPPED | OUTPATIENT
Start: 2022-09-19 | End: 2022-10-05

## 2022-09-19 NOTE — PROGRESS NOTES
"Lexii is a 82 year old who is being evaluated via a billable telephone visit.      On cruise ship-  tested + on Thursday.  Patient tested + yesterday.  COVID vaccinated and boosted.  Cough.  Hx of asthma.    What phone number would you like to be contacted at? cell  How would you like to obtain your AVS? MyChart    Assessment & Plan     Clinical diagnosis of COVID-19    - nirmatrelvir and ritonavir (PAXLOVID) therapy pack; Take 3 tablets by mouth 2 times daily for 5 days (Take 2 Nirmatrelvir tablets and 1 Ritonavir tablet twice daily for 5 days)    Cough    - benzonatate (TESSALON) 200 MG capsule; Take 1 capsule (200 mg) by mouth 3 times daily as needed for cough    COVID-19 positive patient.  Encounter for consideration of medication intervention. Patient does qualify for a prescription. Full discussion with patient including medication options, risks and benefits. Potential drug interactions reviewed with patient.     Treatment Planned Paxlovid sent to [facility]    Temporary change to home medications:   Reduce Eliquis to 1/2 tab twice daily while taking Paxlovid x 5 days.  Reduce amlodipine to 1/2 tab once daily while taking Paxlovid x 5 days.  Hold atorvastatin while on Paxlovid and resume one week after last dose of Paxlovid (hold for total of 12 days)  Not taking Valium or trazodone at this time    Estimated body mass index is 36.97 kg/m  as calculated from the following:    Height as of 4/26/22: 1.6 m (5' 3\").    Weight as of 7/1/22: 94.7 kg (208 lb 11.2 oz).  GFR Estimate   Date Value Ref Range Status   03/09/2022 72 >60 mL/min/1.73m2 Final     Comment:     Effective December 21, 2021 eGFRcr in adults is calculated using the 2021 CKD-EPI creatinine equation which includes age and gender (Lc araya al., NEJM, DOI: 10.1056/UTUZtv5431088)   04/21/2021 >60 >60 mL/min/1.73m2 Final     GFR, ESTIMATED POCT   Date Value Ref Range Status   07/22/2021 >60 >60 mL/min/1.73m2 Final     Whitney Shukla, " MD  Virtual Urgent Care  [facility] VIRTUAL URGENT CARE    Erick Shultz is a 82 year old, presenting for the following health issues:  No chief complaint on file.      HPI       On cruise ship-  tested + on Thursday.  Patient tested + yesterday.  COVID vaccinated and boosted.  Cough.  Hx of asthma.      Review of Systems   Constitutional, HEENT, cardiovascular, pulmonary, GI, , musculoskeletal, neuro, skin, endocrine and psych systems are negative, except as otherwise noted.      Objective           Vitals:  No vitals were obtained today due to virtual visit.    Physical Exam   healthy, alert and no distress  PSYCH: Alert and oriented times 3; coherent speech, normal   rate and volume, able to articulate logical thoughts, able   to abstract reason, no tangential thoughts, no hallucinations   or delusions  Her affect is normal and pleasant  RESP: No cough, no audible wheezing, able to talk in full sentences  Remainder of exam unable to be completed due to telephone visits        Phone call duration: 10 minutes

## 2022-09-20 RX ORDER — BENZONATATE 200 MG/1
200 CAPSULE ORAL 3 TIMES DAILY PRN
Qty: 40 CAPSULE | Refills: 1 | OUTPATIENT
Start: 2022-09-20

## 2022-10-01 ENCOUNTER — HEALTH MAINTENANCE LETTER (OUTPATIENT)
Age: 82
End: 2022-10-01

## 2022-10-01 NOTE — TELEPHONE ENCOUNTER
REFERRAL INFORMATION:    Referring Provider:  Dr. Jefry Vick     Referring Clinic:  [facility]     Reason for Visit/Diagnosis: Colitis      FUTURE VISIT INFORMATION:    Appointment Date: 1/17/2023    Appointment Time: 1 PM      NOTES STATUS DETAILS   OFFICE NOTE from Referring Provider Internal 4/26/2022 Office visit with Dr. Vick    OFFICE NOTE from Other Specialist N/A    HOSPITAL DISCHARGE SUMMARY/  ED VISITS N/A    OPERATIVE REPORT N/A    MEDICATION LIST Internal         ENDOSCOPY  N/A    COLONOSCOPY Internal 3/24/2022   ERCP N/A    EUS N/A    STOOL TESTING N/A    PERTINENT LABS Internal    PATHOLOGY REPORTS (RELATED) Internal 3/24/2022   IMAGING (CT, MRI, EGD, MRCP, Small Bowel Follow Through/SBT, MR/CT Enterography) Internal CT Abdomen Pelvis: 3/3/2022

## 2022-10-02 DIAGNOSIS — I48.19 PERSISTENT ATRIAL FIBRILLATION (H): ICD-10-CM

## 2022-10-02 DIAGNOSIS — I63.9 ACUTE CVA (CEREBROVASCULAR ACCIDENT) (H): ICD-10-CM

## 2022-10-03 RX ORDER — ATORVASTATIN CALCIUM 20 MG/1
20 TABLET, FILM COATED ORAL DAILY
Qty: 90 TABLET | Refills: 0 | Status: SHIPPED | OUTPATIENT
Start: 2022-10-03 | End: 2022-12-31

## 2022-10-03 RX ORDER — METOPROLOL TARTRATE 25 MG/1
TABLET, FILM COATED ORAL
Qty: 90 TABLET | Refills: 3 | Status: SHIPPED | OUTPATIENT
Start: 2022-10-03 | End: 2023-10-10

## 2022-10-03 NOTE — TELEPHONE ENCOUNTER
"Routing refill request to provider for review/approval because:  Labs not current:  LDL  BP not in range.    Last Written Prescription Date:  7/25/22  Last Fill Quantity: 90,  # refills: 0   Last office visit provider:  6/6/22     Requested Prescriptions   Pending Prescriptions Disp Refills     atorvastatin (LIPITOR) 20 MG tablet [Pharmacy Med Name: ATORVASTATIN 20 MG TABLET] 90 tablet 0     Sig: TAKE 1 TABLET BY MOUTH EVERY DAY       Statins Protocol Failed - 10/3/2022  9:21 AM        Failed - LDL on file in past 12 months     Recent Labs   Lab Test 04/21/21  0738   LDL 61             Passed - No abnormal creatine kinase in past 12 months     No lab results found.             Passed - Recent (12 mo) or future (30 days) visit within the authorizing provider's specialty     Patient has had an office visit with the authorizing provider or a provider within the authorizing providers department within the previous 12 mos or has a future within next 30 days. See \"Patient Info\" tab in inbasket, or \"Choose Columns\" in Meds & Orders section of the refill encounter.              Passed - Medication is active on med list        Passed - Patient is age 18 or older        Passed - No active pregnancy on record        Passed - No positive pregnancy test in past 12 months           metoprolol tartrate (LOPRESSOR) 25 MG tablet [Pharmacy Med Name: METOPROLOL TARTRATE 25 MG TAB] 90 tablet 0     Sig: TAKE 1/2 TABLET BY MOUTH TWICE A DAY       Beta-Blockers Protocol Failed - 10/3/2022  9:21 AM        Failed - Blood pressure under 140/90 in past 12 months     BP Readings from Last 3 Encounters:   07/13/22 (!) 144/82   07/01/22 130/74   06/29/22 128/72                 Passed - Patient is age 6 or older        Passed - Recent (12 mo) or future (30 days) visit within the authorizing provider's specialty     Patient has had an office visit with the authorizing provider or a provider within the authorizing providers department within the " "previous 12 mos or has a future within next 30 days. See \"Patient Info\" tab in inbasket, or \"Choose Columns\" in Meds & Orders section of the refill encounter.              Passed - Medication is active on med list             Jamie Qiu RN 10/03/22 9:21 AM  "

## 2022-10-05 ENCOUNTER — OFFICE VISIT (OUTPATIENT)
Dept: FAMILY MEDICINE | Facility: CLINIC | Age: 82
End: 2022-10-05
Payer: COMMERCIAL

## 2022-10-05 VITALS
BODY MASS INDEX: 37.65 KG/M2 | SYSTOLIC BLOOD PRESSURE: 130 MMHG | WEIGHT: 212.5 LBS | DIASTOLIC BLOOD PRESSURE: 84 MMHG | HEART RATE: 93 BPM | HEIGHT: 63 IN

## 2022-10-05 DIAGNOSIS — Z23 IMMUNIZATION DUE: ICD-10-CM

## 2022-10-05 DIAGNOSIS — R05.1 ACUTE COUGH: Primary | ICD-10-CM

## 2022-10-05 PROCEDURE — 90662 IIV NO PRSV INCREASED AG IM: CPT | Performed by: FAMILY MEDICINE

## 2022-10-05 PROCEDURE — 99213 OFFICE O/P EST LOW 20 MIN: CPT | Mod: 25 | Performed by: FAMILY MEDICINE

## 2022-10-05 PROCEDURE — G0008 ADMIN INFLUENZA VIRUS VAC: HCPCS | Performed by: FAMILY MEDICINE

## 2022-10-05 RX ORDER — BENZONATATE 200 MG/1
200 CAPSULE ORAL 3 TIMES DAILY PRN
Qty: 90 CAPSULE | Refills: 0 | Status: SHIPPED | OUTPATIENT
Start: 2022-10-05 | End: 2022-12-13

## 2022-10-05 ASSESSMENT — ASTHMA QUESTIONNAIRES
QUESTION_1 LAST FOUR WEEKS HOW MUCH OF THE TIME DID YOUR ASTHMA KEEP YOU FROM GETTING AS MUCH DONE AT WORK, SCHOOL OR AT HOME: A LITTLE OF THE TIME
QUESTION_3 LAST FOUR WEEKS HOW OFTEN DID YOUR ASTHMA SYMPTOMS (WHEEZING, COUGHING, SHORTNESS OF BREATH, CHEST TIGHTNESS OR PAIN) WAKE YOU UP AT NIGHT OR EARLIER THAN USUAL IN THE MORNING: NOT AT ALL
ACT_TOTALSCORE: 19
ACT_TOTALSCORE: 19
QUESTION_5 LAST FOUR WEEKS HOW WOULD YOU RATE YOUR ASTHMA CONTROL: SOMEWHAT CONTROLLED
QUESTION_4 LAST FOUR WEEKS HOW OFTEN HAVE YOU USED YOUR RESCUE INHALER OR NEBULIZER MEDICATION (SUCH AS ALBUTEROL): TWO OR THREE TIMES PER WEEK
QUESTION_2 LAST FOUR WEEKS HOW OFTEN HAVE YOU HAD SHORTNESS OF BREATH: ONCE OR TWICE A WEEK

## 2022-10-05 ASSESSMENT — PATIENT HEALTH QUESTIONNAIRE - PHQ9
SUM OF ALL RESPONSES TO PHQ QUESTIONS 1-9: 9
SUM OF ALL RESPONSES TO PHQ QUESTIONS 1-9: 9
10. IF YOU CHECKED OFF ANY PROBLEMS, HOW DIFFICULT HAVE THESE PROBLEMS MADE IT FOR YOU TO DO YOUR WORK, TAKE CARE OF THINGS AT HOME, OR GET ALONG WITH OTHER PEOPLE: NOT DIFFICULT AT ALL

## 2022-10-05 ASSESSMENT — ENCOUNTER SYMPTOMS: COUGH: 1

## 2022-10-05 NOTE — PROGRESS NOTES
Assessment & Plan     1. Acute cough  - benzonatate (TESSALON) 200 MG capsule; Take 1 capsule (200 mg) by mouth 3 times daily as needed for cough  Dispense: 90 capsule; Refill: 0    Covid illness after cruise.  Treated with Paxlovid.  Symptoms have largely resolved but lingering cough.  Recommend trial of dextromethorphan, guaifenesin, and will continue Tessalon Perles.  Provided anticipatory guidance that it may just take some time for symptoms to resolve.  Recommend also targeting any postnasal drainage that may be contributing with Flonase and/or antihistamines.    2. Immunization due  - INFLUENZA, QUAD, HIGH DOSE, PF, 65YR + (FLUZONE HD)     Return in about 1 month (around 11/5/2022) for medicare wellness exam.    Addie Rodrigues, [facility]    Erick Shultz is a 82 year old presenting for the following health issues:  Recheck Medication and Cough      Cough    History of Present Illness     Asthma:  She presents for follow up of asthma.  She has some cough, some wheezing, and some shortness of breath. She is using a relief medication a few times a month. She does not have a controller medication. Patient is aware of the following triggers: cold air, dust mites, humidity, mold, smoke, strong odors and fumes and upper respiratory infections. The patient has not had a visit to the Emergency Room, Urgent Care or Hospital due to asthma since the last clinic visit.     Hypertension: She presents for follow up of hypertension.  She does check blood pressure  regularly outside of the clinic. Outside blood pressures have been over 140/90. She follows a low salt diet.      Today's PHQ-9         PHQ-9 Total Score: 9    PHQ-9 Q9 Thoughts of better off dead/self-harm past 2 weeks :   Not at all    How difficult have these problems made it for you to do your work, take care of things at home, or get along with other people: Not difficult at all    [place]ent [place], caught covid,  "tested positive 9/18/22. Treated with paxlovid. Tested negative pretty quickly. Still have lingering cough.     Cough is somewhat improved. Coughing up phlegm, gagging, feels like vomiting.   Wheezing some, no chest tightness. Cough still productive. More clear. No lower extremity edema. No recurrent fevers.   No facial pressure, no sore throat. Fatigued.     ROS:   See HPI above.         Objective    /84 (BP Location: Left arm, Patient Position: Sitting, Cuff Size: Adult Large)   Pulse 93   Ht 1.6 m (5' 3\")   Wt 96.4 kg (212 lb 8 oz)   LMP  (LMP Unknown)   BMI 37.64 kg/m    Body mass index is 37.64 kg/m .  Physical Exam   GENERAL: healthy, alert and no distress  NECK: no adenopathy, no asymmetry, masses, or scars and thyroid normal to palpation  RESP: lungs clear to auscultation - no rales, rhonchi or wheezes  CV: regular rate and rhythm, normal S1 S2, no S3 or S4, no murmur, click or rub, no peripheral edema and peripheral pulses strong  MS: no gross musculoskeletal defects noted, no edema            "

## 2022-10-13 ENCOUNTER — OFFICE VISIT (OUTPATIENT)
Dept: FAMILY MEDICINE | Facility: CLINIC | Age: 82
End: 2022-10-13
Payer: COMMERCIAL

## 2022-10-13 VITALS
BODY MASS INDEX: 38.05 KG/M2 | TEMPERATURE: 98.4 F | SYSTOLIC BLOOD PRESSURE: 142 MMHG | OXYGEN SATURATION: 95 % | RESPIRATION RATE: 16 BRPM | WEIGHT: 214.8 LBS | HEART RATE: 85 BPM | DIASTOLIC BLOOD PRESSURE: 83 MMHG

## 2022-10-13 DIAGNOSIS — J30.2 SEASONAL ALLERGIC RHINITIS, UNSPECIFIED TRIGGER: Primary | ICD-10-CM

## 2022-10-13 DIAGNOSIS — Z86.16 HISTORY OF COVID-19: ICD-10-CM

## 2022-10-13 DIAGNOSIS — I48.19 PERSISTENT ATRIAL FIBRILLATION (H): ICD-10-CM

## 2022-10-13 PROCEDURE — 99214 OFFICE O/P EST MOD 30 MIN: CPT | Performed by: NURSE PRACTITIONER

## 2022-10-13 RX ORDER — CETIRIZINE HYDROCHLORIDE 10 MG/1
10 TABLET ORAL DAILY
Qty: 30 TABLET | Refills: 3 | Status: SHIPPED | OUTPATIENT
Start: 2022-10-13 | End: 2023-02-07

## 2022-10-13 ASSESSMENT — ENCOUNTER SYMPTOMS
CHILLS: 0
EYE ITCHING: 1
EYE REDNESS: 1
PALPITATIONS: 1
FEVER: 0

## 2022-10-13 NOTE — PATIENT INSTRUCTIONS
Try cetirizine daily for allergies. Also can try zatidor drops (OTC) if eyes are still super itchy.     Can try Coricidin HBP over the counter     For atrial fib: Watch for racing heart, weakness/dizziness/shortness of breath.

## 2022-10-13 NOTE — PROGRESS NOTES
Assessment & Plan     Seasonal allergic rhinitis, unspecified trigger    - cetirizine (ZYRTEC) 10 MG tablet  Dispense: 30 tablet; Refill: 3   -Zaditor OTC eyedrops if needed for eye itching and Zyrtec alone is not effective.      History of COVID-19    Atrial fibrillation     Focused exam and history done due to COVID-19 pandemic in a walk-in setting.      Patient has lifelong history of seasonal allergies.  Is not taking anything at this time.  Symptoms include red, itchy eyes with some minimal white thin drainage, sneezing, congestion and cough with mostly clear drainage.  COVID little less than 1 month ago.     No red flags on exam such as fever, shortness of breath or abnormal lung sounds.    We will try treating this is allergies.  Can follow-up if no change in symptoms seem to be worsening.    Recheck if shortness of breath or new fevers develop.  Rest.     Was noted also to have A. fib with RVR initially.  Did recheck her pulse and it was 80 after 1 full minute of manual counting by me and irregular.  Patient is already on blood thinners and metoprolol.  Given precautions for worsening including dizziness, shortness of breath, weakness or noticeable rapid heart rate.    OTCs recommended: None [   ].  Cetitizine 10 mg daily.  Coricidin HBP.                Return in about 1 week (around 10/20/2022) for If no better.    No name on file  [facility] MYLES Shultz is a 82 year old female who presents to clinic today for the following health issues:  Chief Complaint   Patient presents with     Cough     X Yesterday. 9/15 COVID pos then neg x 2. Coughing phlegm. Pt state coughing to point of throwing up.     HPI    Patient went on a cruise and tested positive for COVID on September 18.  Treated with paxlovid.    Saw PCP on October 5 and given Tessalon for lingering cough.  Recommended to use dextromethorphan/guaifenesin combo.    Cough worsened about over the last week.  2 negative  COVID test so far.     is sleepy after covid, but no new exposures to new upper respiratory type illnesses.    Has been wheezing at night if coughing at night x 1 week.  Will get long coughing fits.  Coughed so hard she vomiting.  Phlegm = clear.      Gets fall allergies, not on allergy medicine.      Has htn.      Not taking cough medicine except tessalon perle.  Maybe helping a bit.      Off and on afib.  Taking eliquis.      Coughs all the time.        Review of Systems   Constitutional: Negative for chills and fever.   HENT: Positive for congestion and sneezing (a little ). Negative for ear pain (full but no pain.  ).    Eyes: Positive for redness and itching.   Cardiovascular: Positive for palpitations (afib ).   Allergic/Immunologic: Positive for environmental allergies.           Objective    BP (!) 142/83 (BP Location: Right arm, Patient Position: Sitting, Cuff Size: Adult Large)   Pulse 85   Temp 98.4  F (36.9  C) (Oral)   Resp 16   Wt 97.4 kg (214 lb 12.8 oz)   LMP  (LMP Unknown)   SpO2 95%   BMI 38.05 kg/m    Physical Exam  Constitutional:       General: She is not in acute distress.     Appearance: She is well-developed and well-nourished.   HENT:      Nose: Congestion present.   Eyes:      General:         Right eye: No discharge.         Left eye: No discharge.      Conjunctiva/sclera:      Right eye: Right conjunctiva is injected.      Left eye: Left conjunctiva is injected.   Cardiovascular:      Rate and Rhythm: Rhythm irregular.      Pulses: Normal pulses and intact distal pulses.      Heart sounds: Normal heart sounds.   Pulmonary:      Effort: Pulmonary effort is normal.      Breath sounds: Normal breath sounds. No wheezing.   Musculoskeletal:         General: Normal range of motion.   Skin:     General: Skin is warm and dry.      Capillary Refill: Capillary refill takes less than 2 seconds.   Neurological:      Mental Status: She is alert and oriented to person, place, and time.    Psychiatric:         Mood and Affect: Mood and affect and mood normal.         Behavior: Behavior normal.         Thought Content: Thought content normal.         Judgment: Judgment normal.

## 2022-10-29 DIAGNOSIS — I10 ESSENTIAL HYPERTENSION: ICD-10-CM

## 2022-10-31 RX ORDER — POTASSIUM CHLORIDE 1500 MG/1
TABLET, EXTENDED RELEASE ORAL
Qty: 270 TABLET | Refills: 1 | OUTPATIENT
Start: 2022-10-31

## 2022-10-31 RX ORDER — AMLODIPINE BESYLATE 10 MG/1
10 TABLET ORAL DAILY
Qty: 90 TABLET | Refills: 1 | OUTPATIENT
Start: 2022-10-31

## 2022-11-01 ENCOUNTER — OFFICE VISIT (OUTPATIENT)
Dept: CARDIOLOGY | Facility: CLINIC | Age: 82
End: 2022-11-01
Attending: INTERNAL MEDICINE
Payer: COMMERCIAL

## 2022-11-01 VITALS
RESPIRATION RATE: 16 BRPM | BODY MASS INDEX: 38.09 KG/M2 | DIASTOLIC BLOOD PRESSURE: 80 MMHG | HEART RATE: 115 BPM | WEIGHT: 215 LBS | HEIGHT: 63 IN | SYSTOLIC BLOOD PRESSURE: 120 MMHG

## 2022-11-01 DIAGNOSIS — I48.0 PAF (PAROXYSMAL ATRIAL FIBRILLATION) (H): ICD-10-CM

## 2022-11-01 DIAGNOSIS — I48.19 PERSISTENT ATRIAL FIBRILLATION (H): Primary | ICD-10-CM

## 2022-11-01 PROCEDURE — 99214 OFFICE O/P EST MOD 30 MIN: CPT | Performed by: INTERNAL MEDICINE

## 2022-11-01 NOTE — PATIENT INSTRUCTIONS
Lluvia Marrufo,    It was a pleasure to see you today at the [facility].     My recommendations after this visit include:    Same heart medications  Consider watchman if bleeding or falling    HORACIO Bourgeois MD, FACC, ZHANG

## 2022-11-01 NOTE — LETTER
11/1/2022    Eddie Diaz MD  [address]  [facility] [address]    RE: Lluvia COLE Niru       Dear Colleague,     I had the pleasure of seeing Lluvia COLE Niru in the [facility].      Cardiology Progress Note     Assessment:  Permanent atrial fibrillation with controlled ventricular response, on chronic anticoagulation with Eliquis, asymptomatic  Hypertension, good control  Asthma  Hypercholesterolemia good control  Minimal nonobstructive coronary atherosclerosis    Plan:  We will continue rate control strategy.  I think we have a low chance of being able to maintain sinus rhythm even with antiarrhythmics or ablation.    We discussed Eliquis versus watchman.  She is happy with Eliquis for now.  She does not have any medical indication to recommend watchman    Follow-up in 1 year    Subjective:   This is 82 year old female who comes in today for follow-up visit.  I saw her last June because of newly documented atrial fibrillation.  She was not truly aware of that at the time.  She was placed on Eliquis and eventually underwent cardioversion.  She had frequent PACs after cardioversion and when she returned for follow-up with A. alena nurse practitioner she was found to be back in A. [facility].  She underwent Holter monitoring [place] that shows the well-controlled ventricular response rate.  She patient remains asymptomatic from the standpoint of arrhythmias.  She denies syncope or near syncope.  She has chronic shortness of breath but denies worsening.  She does not have a weight gain, PND, orthopnea.  Last year she underwent CT coronary angiogram and echo as a part of the work-up of arrhythmias and shortness of breath.  She was found to have essentially normal coronaries and normal LV systolic function with normal valves    Review of Systems:   Negative other than history of present illness    Objective:   /80 (BP Location: Right arm, Patient Position: Sitting, Cuff Size: Adult Large)   " Pulse 115   Resp 16   Ht 1.6 m (5' 3\")   Wt 97.5 kg (215 lb)   LMP  (LMP Unknown)   BMI 38.09 kg/m    Physical Exam:  GENERAL: no distress  NECK: No JVD  LUNGS: Clear to auscultation.  CARDIAC:iregular rhythm, S1 & S2 normal.  No heaves, thrills, gallops or murmurs.  ABDOMEN: flat, negative hepatosplenomegaly, soft and non-tender.  EXTREMITIES: No evidence of cyanosis, clubbing or edema.    Current Outpatient Medications   Medication Sig Dispense Refill     albuterol (PROVENTIL) 2.5 mg /3 mL (0.083 %) nebulizer solution [ALBUTEROL (PROVENTIL) 2.5 MG /3 ML (0.083 %) NEBULIZER SOLUTION] Take 3 mL (2.5 mg total) by nebulization every 4 (four) hours as needed for wheezing or shortness of breath. 75 mL 1     amLODIPine (NORVASC) 10 MG tablet TAKE 1 TABLET (10 MG) BY MOUTH DAILY. 90 tablet 1     atorvastatin (LIPITOR) 20 MG tablet Take 1 tablet (20 mg) by mouth daily 90 tablet 0     benzonatate (TESSALON) 200 MG capsule Take 1 capsule (200 mg) by mouth 3 times daily as needed for cough 90 capsule 0     cetirizine (ZYRTEC) 10 MG tablet Take 1 tablet (10 mg) by mouth daily for 30 days 30 tablet 3     ELIQUIS ANTICOAGULANT 5 MG tablet TAKE 1 TABLET BY MOUTH TWICE A DAY 60 tablet 12     gabapentin (NEURONTIN) 300 MG capsule TAKE 1 CAPSULE BY MOUTH THREE TIMES A DAY (Patient taking differently: Take 300 mg by mouth daily 1 tab by mouth daily) 90 capsule 3     glucosamine-chondroitin 500-400 mg cap [GLUCOSAMINE-CHONDROITIN 500-400 MG CAP] Take 2 capsules by mouth daily.       KLOR-CON 20 MEQ CR tablet TAKE 1 TABLET BY MOUTH THREE TIMES A  tablet 1     lisinopril-hydrochlorothiazide (ZESTORETIC) 20-25 MG tablet TAKE 2 TABLETS BY MOUTH EVERY  tablet 2     metoprolol tartrate (LOPRESSOR) 25 MG tablet TAKE 1/2 TABLET BY MOUTH TWICE A DAY 90 tablet 3     multivitamin with minerals (THERA-M) 9 mg iron-400 mcg Tab tablet Take 1 tablet by mouth daily Chewable         Cardiographics:    EC atrial fibrillation " otherwise normal    Holter: September 2021  Atrial fibrillation with controlled ventricular response    Echocardiogram: July 2021  1.Left ventricular size, wall motion and function are normal. The ejection  fraction is 60-65%.  2.TAPSE is normal, which is consistent with normal right ventricular systolic  function.  3.IVC diameter and respiratory changes fall into an intermediate range  suggesting an RA pressure of 8 mmHg.  4.No hemodynamically significant valvular abnormalities on 2D or color flow  imaging.  5.Compared to the prior study dated 7/13/2018, there have been no changes  other then A Fib now present.    CT coronary angio: July 2021    Total calcium score of 0. A calcium score of zero places the individual in the lowest quartile when compared to an age and gender matched control group.    Minimal nonobstructive atherosclerotic coronary artery disease.     Lab Results    Chemistry/lipid CBC Cardiac Enzymes/BNP/TSH/INR   Recent Labs   Lab Test 04/21/21  0738   CHOL 151   HDL 67   LDL 61   TRIG 114     Recent Labs   Lab Test 04/21/21  0738 12/16/19  0756 07/30/18  0622   LDL 61 62 102     Recent Labs   Lab Test 03/09/22  1428      POTASSIUM 3.6   CHLORIDE 97*   CO2 27   *   BUN 22   CR 0.82   GFRESTIMATED 72   ANNA 10.1     Recent Labs   Lab Test 03/09/22  1428 03/07/22  0625 03/06/22  0545   CR 0.82 0.59* 0.60     Recent Labs   Lab Test 03/09/22  1428 10/04/17  0543   A1C 5.9* 5.4          Recent Labs   Lab Test 07/01/22  0830 03/11/22  1130   WBC  --  16.6*   HGB 14.2 14.6   HCT  --  45.4   MCV  --  91   PLT  --  380     Recent Labs   Lab Test 07/01/22  0830 03/11/22  1130 03/09/22  1428   HGB 14.2 14.6 14.7    Recent Labs   Lab Test 03/03/22  1016   TROPONINI <0.01     Recent Labs   Lab Test 03/09/22  1428 03/05/22  1924 03/03/22  1016   BNP 69 337* 152*     No results for input(s): TSH in the last [id number] hours.  Recent Labs   Lab Test 07/29/18  1951   INR 0.98                        Thank you  for allowing me to participate in the care of your patient.      Sincerely,     Praveen Bourgeois MD     [facility]  cc:   Praveen Bourgeois MD  [address] [address]

## 2022-11-01 NOTE — PROGRESS NOTES
"    Cardiology Progress Note     Assessment:  Permanent atrial fibrillation with controlled ventricular response, on chronic anticoagulation with Eliquis, asymptomatic  Hypertension, good control  Asthma  Hypercholesterolemia good control  Minimal nonobstructive coronary atherosclerosis    Plan:  We will continue rate control strategy.  I think we have a low chance of being able to maintain sinus rhythm even with antiarrhythmics or ablation.    We discussed Eliquis versus watchman.  She is happy with Eliquis for now.  She does not have any medical indication to recommend watchman    Follow-up in 1 year    Subjective:   This is 82 year old female who comes in today for follow-up visit.  I saw her last June because of newly documented atrial fibrillation.  She was not truly aware of that at the time.  She was placed on Eliquis and eventually underwent cardioversion.  She had frequent PACs after cardioversion and [facility] she was found to be back in [facility].  She underwent Holter monitoring [place] that shows the well-controlled ventricular response rate.  She patient remains asymptomatic from the standpoint of arrhythmias.  She denies syncope or near syncope.  She has chronic shortness of breath but denies worsening.  She does not have a weight gain, PND, orthopnea.  Last year she underwent CT coronary angiogram and echo as a part of the work-up of arrhythmias and shortness of breath.  She was found to have essentially normal coronaries and normal LV systolic function with normal valves    Review of Systems:   Negative other than history of present illness    Objective:   /80 (BP Location: Right arm, Patient Position: Sitting, Cuff Size: Adult Large)   Pulse 115   Resp 16   Ht 1.6 m (5' 3\")   Wt 97.5 kg (215 lb)   LMP  (LMP Unknown)   BMI 38.09 kg/m    Physical Exam:  GENERAL: no distress  NECK: No JVD  LUNGS: Clear to auscultation.  CARDIAC:iregular " rhythm, S1 & S2 normal.  No heaves, thrills, gallops or murmurs.  ABDOMEN: flat, negative hepatosplenomegaly, soft and non-tender.  EXTREMITIES: No evidence of cyanosis, clubbing or edema.    Current Outpatient Medications   Medication Sig Dispense Refill     albuterol (PROVENTIL) 2.5 mg /3 mL (0.083 %) nebulizer solution [ALBUTEROL (PROVENTIL) 2.5 MG /3 ML (0.083 %) NEBULIZER SOLUTION] Take 3 mL (2.5 mg total) by nebulization every 4 (four) hours as needed for wheezing or shortness of breath. 75 mL 1     amLODIPine (NORVASC) 10 MG tablet TAKE 1 TABLET (10 MG) BY MOUTH DAILY. 90 tablet 1     atorvastatin (LIPITOR) 20 MG tablet Take 1 tablet (20 mg) by mouth daily 90 tablet 0     benzonatate (TESSALON) 200 MG capsule Take 1 capsule (200 mg) by mouth 3 times daily as needed for cough 90 capsule 0     cetirizine (ZYRTEC) 10 MG tablet Take 1 tablet (10 mg) by mouth daily for 30 days 30 tablet 3     ELIQUIS ANTICOAGULANT 5 MG tablet TAKE 1 TABLET BY MOUTH TWICE A DAY 60 tablet 12     gabapentin (NEURONTIN) 300 MG capsule TAKE 1 CAPSULE BY MOUTH THREE TIMES A DAY (Patient taking differently: Take 300 mg by mouth daily 1 tab by mouth daily) 90 capsule 3     glucosamine-chondroitin 500-400 mg cap [GLUCOSAMINE-CHONDROITIN 500-400 MG CAP] Take 2 capsules by mouth daily.       KLOR-CON 20 MEQ CR tablet TAKE 1 TABLET BY MOUTH THREE TIMES A  tablet 1     lisinopril-hydrochlorothiazide (ZESTORETIC) 20-25 MG tablet TAKE 2 TABLETS BY MOUTH EVERY  tablet 2     metoprolol tartrate (LOPRESSOR) 25 MG tablet TAKE 1/2 TABLET BY MOUTH TWICE A DAY 90 tablet 3     multivitamin with minerals (THERA-M) 9 mg iron-400 mcg Tab tablet Take 1 tablet by mouth daily Chewable         Cardiographics:    EC atrial fibrillation otherwise normal    Holter: 2021  Atrial fibrillation with controlled ventricular response    Echocardiogram: 2021  1.Left ventricular size, wall motion and function are normal. The  ejection  fraction is 60-65%.  2.TAPSE is normal, which is consistent with normal right ventricular systolic  function.  3.IVC diameter and respiratory changes fall into an intermediate range  suggesting an RA pressure of 8 mmHg.  4.No hemodynamically significant valvular abnormalities on 2D or color flow  imaging.  5.Compared to the prior study dated 7/13/2018, there have been no changes  other then A Fib now present.    CT coronary angio: July 2021  Total calcium score of 0. A calcium score of zero places the individual in the lowest quartile when compared to an age and gender matched control group.  Minimal nonobstructive atherosclerotic coronary artery disease.     Lab Results    Chemistry/lipid CBC Cardiac Enzymes/BNP/TSH/INR   Recent Labs   Lab Test 04/21/21  0738   CHOL 151   HDL 67   LDL 61   TRIG 114     Recent Labs   Lab Test 04/21/21  0738 12/16/19  0756 07/30/18  0622   LDL 61 62 102     Recent Labs   Lab Test 03/09/22  1428      POTASSIUM 3.6   CHLORIDE 97*   CO2 27   *   BUN 22   CR 0.82   GFRESTIMATED 72   ANNA 10.1     Recent Labs   Lab Test 03/09/22  1428 03/07/22  0625 03/06/22  0545   CR 0.82 0.59* 0.60     Recent Labs   Lab Test 03/09/22  1428 10/04/17  0543   A1C 5.9* 5.4          Recent Labs   Lab Test 07/01/22  0830 03/11/22  1130   WBC  --  16.6*   HGB 14.2 14.6   HCT  --  45.4   MCV  --  91   PLT  --  380     Recent Labs   Lab Test 07/01/22  0830 03/11/22  1130 03/09/22  1428   HGB 14.2 14.6 14.7    Recent Labs   Lab Test 03/03/22  1016   TROPONINI <0.01     Recent Labs   Lab Test 03/09/22  1428 03/05/22  1924 03/03/22  1016   BNP 69 337* 152*     No results for input(s): TSH in the last 48124 hours.  Recent Labs   Lab Test 07/29/18  1951   INR 0.98

## 2022-11-07 ENCOUNTER — TRANSFERRED RECORDS (OUTPATIENT)
Dept: HEALTH INFORMATION MANAGEMENT | Facility: CLINIC | Age: 82
End: 2022-11-07

## 2022-11-07 DIAGNOSIS — G89.29 CHRONIC BILATERAL LOW BACK PAIN WITH RIGHT-SIDED SCIATICA: ICD-10-CM

## 2022-11-07 DIAGNOSIS — M54.41 CHRONIC BILATERAL LOW BACK PAIN WITH RIGHT-SIDED SCIATICA: ICD-10-CM

## 2022-11-07 DIAGNOSIS — M54.16 RIGHT LUMBAR RADICULITIS: ICD-10-CM

## 2022-11-08 RX ORDER — GABAPENTIN 300 MG/1
300 CAPSULE ORAL DAILY
Qty: 90 CAPSULE | Refills: 1 | Status: SHIPPED | OUTPATIENT
Start: 2022-11-08 | End: 2023-05-19

## 2022-12-06 ENCOUNTER — OFFICE VISIT (OUTPATIENT)
Dept: PULMONOLOGY | Facility: CLINIC | Age: 82
End: 2022-12-06
Payer: COMMERCIAL

## 2022-12-06 VITALS
HEART RATE: 97 BPM | WEIGHT: 220.2 LBS | BODY MASS INDEX: 39.01 KG/M2 | SYSTOLIC BLOOD PRESSURE: 126 MMHG | DIASTOLIC BLOOD PRESSURE: 74 MMHG | OXYGEN SATURATION: 97 %

## 2022-12-06 DIAGNOSIS — R05.1 ACUTE COUGH: Primary | ICD-10-CM

## 2022-12-06 PROCEDURE — 99214 OFFICE O/P EST MOD 30 MIN: CPT | Performed by: INTERNAL MEDICINE

## 2022-12-06 RX ORDER — BUDESONIDE AND FORMOTEROL FUMARATE DIHYDRATE 160; 4.5 UG/1; UG/1
2 AEROSOL RESPIRATORY (INHALATION) 2 TIMES DAILY
Qty: 6 G | Refills: 11 | Status: SHIPPED | OUTPATIENT
Start: 2022-12-06 | End: 2023-07-06

## 2022-12-06 RX ORDER — ALBUTEROL SULFATE 90 UG/1
2 AEROSOL, METERED RESPIRATORY (INHALATION) EVERY 6 HOURS PRN
Qty: 18 G | Refills: 11 | Status: SHIPPED | OUTPATIENT
Start: 2022-12-06

## 2022-12-06 ASSESSMENT — ASTHMA QUESTIONNAIRES
QUESTION_3 LAST FOUR WEEKS HOW OFTEN DID YOUR ASTHMA SYMPTOMS (WHEEZING, COUGHING, SHORTNESS OF BREATH, CHEST TIGHTNESS OR PAIN) WAKE YOU UP AT NIGHT OR EARLIER THAN USUAL IN THE MORNING: TWO OR THREE NIGHTS A WEEK
QUESTION_4 LAST FOUR WEEKS HOW OFTEN HAVE YOU USED YOUR RESCUE INHALER OR NEBULIZER MEDICATION (SUCH AS ALBUTEROL): ONCE A WEEK OR LESS
QUESTION_2 LAST FOUR WEEKS HOW OFTEN HAVE YOU HAD SHORTNESS OF BREATH: ONCE A DAY
ACT_TOTALSCORE: 14
ACT_TOTALSCORE: 14
QUESTION_1 LAST FOUR WEEKS HOW MUCH OF THE TIME DID YOUR ASTHMA KEEP YOU FROM GETTING AS MUCH DONE AT WORK, SCHOOL OR AT HOME: SOME OF THE TIME
QUESTION_5 LAST FOUR WEEKS HOW WOULD YOU RATE YOUR ASTHMA CONTROL: SOMEWHAT CONTROLLED

## 2022-12-06 NOTE — PATIENT INSTRUCTIONS
Please call Cassie if inhaler not cost effective [phone]  Try the new inhaler twice daily, rinse mouth out afterwards.  OK to use albuterol as well.   Please contact the urologist for results.

## 2022-12-06 NOTE — PROGRESS NOTES
Pulmonary Clinic Follow-up Visit    Assessment:  82yoF with history of morbid obesity and reported history of asthma presents for follow up after severe pneumonia requiring hospitalization. Now resolved on CT scan but with persistent coughing paroxysms    Trial of ICS/LABA, potential for cost concerns given she is in donut hole. Will call if she needs to use [facility].   Continue benzonatate and non-drowsy antihistamine.   CT chest to ensure no other process present.     Could consider PSG given her body habitus but not sure patient would be interested in pursuing this.    -Recommended   Pulmonary rehab she has treadmill upstairs prefers this, told her we could revisit if she cannot commit to daily physical activity     -Already had COVID vaccine and influenza vaccine    Follow up 6 months    Mary Jo Lin MD  Pulmonary/Critical Care    ----------------------------    CCx: abnormal CT    HPI: 82yoF with history of CVA but no defects presents for follow up after hospitalization for septic shock from pneumonia.    She contracted COVID after cruise 9/2022, treated paxlovid but has persistent cough. Saw PCP who prescribed benzonatate PRN.     Got non-drowsy antihistamine and tessalon perles from urgent care. Coughing is in paroxysms. She is more wheezing when the coughing fits occur. Worse when laying down at night.     Went to the urologist for cystoscopy--has no results from this yet, BRIAN signed to investigate.     Current Regimen: Albuterol  ACT: previously 17: 14    ROS:  12-point review performed and notable for cough, shortness of breath, itchy eyes with discharge. Frequent urination at night.  The remainder reviewed and negative.       Current Meds:  Current Outpatient Medications   Medication Sig Dispense Refill     albuterol (PROVENTIL) 2.5 mg /3 mL (0.083 %) nebulizer solution [ALBUTEROL (PROVENTIL) 2.5 MG /3 ML (0.083 %) NEBULIZER SOLUTION] Take 3 mL (2.5 mg total) by nebulization every 4 (four) hours as needed  for wheezing or shortness of breath. 75 mL 1     amLODIPine (NORVASC) 10 MG tablet TAKE 1 TABLET (10 MG) BY MOUTH DAILY. 90 tablet 1     atorvastatin (LIPITOR) 20 MG tablet Take 1 tablet (20 mg) by mouth daily 90 tablet 0     benzonatate (TESSALON) 200 MG capsule Take 1 capsule (200 mg) by mouth 3 times daily as needed for cough 90 capsule 0     ELIQUIS ANTICOAGULANT 5 MG tablet TAKE 1 TABLET BY MOUTH TWICE A DAY 60 tablet 12     gabapentin (NEURONTIN) 300 MG capsule Take 1 capsule (300 mg) by mouth daily 1 tab by mouth daily 90 capsule 1     glucosamine-chondroitin 500-400 mg cap [GLUCOSAMINE-CHONDROITIN 500-400 MG CAP] Take 2 capsules by mouth daily.       KLOR-CON 20 MEQ CR tablet TAKE 1 TABLET BY MOUTH THREE TIMES A  tablet 1     lisinopril-hydrochlorothiazide (ZESTORETIC) 20-25 MG tablet TAKE 2 TABLETS BY MOUTH EVERY  tablet 2     metoprolol tartrate (LOPRESSOR) 25 MG tablet TAKE 1/2 TABLET BY MOUTH TWICE A DAY 90 tablet 3     multivitamin with minerals (THERA-M) 9 mg iron-400 mcg Tab tablet Take 1 tablet by mouth daily Chewable         Physical Exam:  /74 (BP Location: Left arm)   Pulse 97   Wt 99.9 kg (220 lb 3.2 oz)   LMP  (LMP Unknown)   SpO2 97%   BMI 39.01 kg/m    Gen: alert, oriented, no distress  HEENT: no lymphadenopathy  Neck: Trachea midline, No Accessory muscle use  CV: irregularly irregular, no M/G/R  Resp: CTAB, no focal crackles or wheezes  Abd: soft, nontender, no palpable organomegaly  Skin: no apparent rashes  Ext: no cyanosis, clubbing or edema  Neuro: alert, nonfocal    Labs:    CBC RESULTS:   Recent Labs   Lab Test 03/11/22  1130   WBC 16.6*   RBC 5.00   HGB 14.6   HCT 45.4   MCV 91   MCH 29.2   MCHC 32.2   RDW 12.8        Sodium   Date Value Ref Range Status   03/09/2022 139 136 - 145 mmol/L Final     Potassium   Date Value Ref Range Status   03/09/2022 3.6 3.5 - 5.0 mmol/L Final     Chloride   Date Value Ref Range Status   03/09/2022 97 (L) 98 - 107 mmol/L  Final     Carbon Dioxide (CO2)   Date Value Ref Range Status   03/09/2022 27 22 - 31 mmol/L Final     Anion Gap   Date Value Ref Range Status   03/09/2022 15 5 - 18 mmol/L Final     Glucose   Date Value Ref Range Status   03/09/2022 166 (H) 70 - 125 mg/dL Final     Urea Nitrogen   Date Value Ref Range Status   03/09/2022 22 8 - 28 mg/dL Final     Creatinine   Date Value Ref Range Status   03/09/2022 0.82 0.60 - 1.10 mg/dL Final     GFR Estimate   Date Value Ref Range Status   03/09/2022 72 >60 mL/min/1.73m2 Final     Comment:     Effective December 21, 2021 eGFRcr in adults is calculated using the 2021 CKD-EPI creatinine equation which includes age and gender (Lc et al., NEJ, DOI: 10.1056/MUHRmn6561868)   04/21/2021 >60 >60 mL/min/1.73m2 Final     GFR, ESTIMATED POCT   Date Value Ref Range Status   07/22/2021 >60 >60 mL/min/1.73m2 Final     Calcium   Date Value Ref Range Status   03/09/2022 10.1 8.5 - 10.5 mg/dL Final         Imaging studies:  Personally reviewed image/s and Personally reviewed impression/s  EXAM: CT CHEST W/O CONTRAST  LOCATION: [facility]  DATE/TIME: 4/4/2022 1:17 PM     INDICATION: Follow-up opacities. Pneumonia.  COMPARISON: 03/05/2022 CT.  TECHNIQUE: CT chest without IV contrast. Multiplanar reformats were obtained. Dose reduction techniques were used.  CONTRAST: None.     FINDINGS:   LUNGS AND PLEURA: Clearing of prior opacities and pleural fluid. Stable anterior right upper lobe 5-6 mm nodule (series 4, image 70).     MEDIASTINUM/AXILLAE: No adenopathy. Nonaneurysmal aorta. Mild pulmonary trunk enlargement at 3.3 cm. Stable right thyroid nodule or cyst.     CORONARY ARTERY CALCIFICATION: Compromised from motion.     UPPER ABDOMEN: Incidental hepatic and renal cysts. Cholecystectomy.     MUSCULOSKELETAL: Hypertrophic degenerative changes. Right shoulder arthroplasty.                                                                      IMPRESSION:      1.   Resolution of prior opacities, edema and pleural fluid.     2.  Stable right upper lobe nodule.     3.  Mild pulmonary trunk enlargement; correlate for pulmonary hypertension.      PFT's  Personally reviewed and interpreted. 7/1/2022  FEV1 1.57L, 83% predicted  FVC 1.91, 77% predicted  FEV1/FVC 82  Normal flow volume loop  No change post-bronchodilator  ELC 3.99L, 83% predicted  RV 1.86, 85% predicted  DLCO 82% predicted  Impression: normal PFTs

## 2022-12-07 ENCOUNTER — HOSPITAL ENCOUNTER (OUTPATIENT)
Dept: CT IMAGING | Facility: HOSPITAL | Age: 82
Discharge: HOME | End: 2022-12-07
Attending: INTERNAL MEDICINE | Admitting: INTERNAL MEDICINE
Payer: COMMERCIAL

## 2022-12-07 DIAGNOSIS — R05.1 ACUTE COUGH: ICD-10-CM

## 2022-12-07 PROCEDURE — 71250 CT THORAX DX C-: CPT

## 2022-12-08 DIAGNOSIS — R05.1 ACUTE COUGH: ICD-10-CM

## 2022-12-09 NOTE — TELEPHONE ENCOUNTER
Routing refill request to provider for review/approval because:  Drug not [place] [facility] [place]     Last Written Prescription Date:  10/5/22  Last Fill Quantity: 90,  # refills: 0   Last office visit provider:  10/13/22     Requested Prescriptions   Pending Prescriptions Disp Refills     benzonatate (TESSALON) 200 MG capsule [Pharmacy Med Name: BENZONATATE 200 MG CAPSULE] 90 capsule 0     Sig: TAKE 1 CAPSULE (200 MG) BY MOUTH 3 TIMES DAILY AS NEEDED FOR COUGH       There is no refill protocol information for this order          KENDRICK COFFEY RN 12/09/22 1:11 PM

## 2022-12-13 ENCOUNTER — TELEPHONE (OUTPATIENT)
Dept: PULMONOLOGY | Facility: CLINIC | Age: 82
End: 2022-12-13

## 2022-12-13 RX ORDER — BENZONATATE 200 MG/1
200 CAPSULE ORAL 3 TIMES DAILY PRN
Qty: 90 CAPSULE | Refills: 0 | Status: SHIPPED | OUTPATIENT
Start: 2022-12-13 | End: 2023-03-01

## 2022-12-13 NOTE — TELEPHONE ENCOUNTER
Patient stated she is looking to have this refilled. Said Dr. Lin was going to fill this but Dr. Vick was the first person to prescribe this so she should be the one to fill it. Please assess and let patient know what can be done for the refill.

## 2022-12-14 ENCOUNTER — MYC MEDICAL ADVICE (OUTPATIENT)
Dept: PULMONOLOGY | Facility: CLINIC | Age: 82
End: 2022-12-14

## 2023-01-09 ENCOUNTER — TELEPHONE (OUTPATIENT)
Dept: GASTROENTEROLOGY | Facility: CLINIC | Age: 83
End: 2023-01-09

## 2023-01-09 NOTE — TELEPHONE ENCOUNTER
LVMx1, sent mychart      Appt on 1/17/23 with Dr. Hudson needs to be rescheduled due to the provider being unavailable. Reschedule as New GI Urgent in the next month, with any General GI provider.

## 2023-01-17 ENCOUNTER — PRE VISIT (OUTPATIENT)
Dept: GASTROENTEROLOGY | Facility: CLINIC | Age: 83
End: 2023-01-17

## 2023-01-20 DIAGNOSIS — I10 ESSENTIAL HYPERTENSION: ICD-10-CM

## 2023-01-20 RX ORDER — POTASSIUM CHLORIDE 1500 MG/1
TABLET, EXTENDED RELEASE ORAL
Qty: 270 TABLET | Refills: 1 | Status: SHIPPED | OUTPATIENT
Start: 2023-01-20 | End: 2023-05-19

## 2023-01-21 NOTE — TELEPHONE ENCOUNTER
"Last Written Prescription Date:  7/23/2022  Last Fill Quantity: 270,  # refills: 1   Last office visit provider:  10/13/2022     Requested Prescriptions   Pending Prescriptions Disp Refills     KLOR-CON 20 MEQ CR tablet [Pharmacy Med Name: KLOR-CON M20 TABLET] 270 tablet 1     Sig: TAKE 1 TABLET BY MOUTH THREE TIMES A DAY       Potassium Supplements Protocol Passed - 1/20/2023 12:21 AM        Passed - Recent (12 mo) or future (30 days) visit within the authorizing provider's department     Patient has had an office visit with the authorizing provider or a provider within the authorizing providers department within the previous 12 mos or has a future within next 30 days. See \"Patient Info\" tab in inbasket, or \"Choose Columns\" in Meds & Orders section of the refill encounter.              Passed - Medication is active on med list        Passed - Patient is age 18 or older        Passed - Normal serum potassium in past 12 months     Recent Labs   Lab Test 03/09/22  1428   POTASSIUM 3.6                         Nicky Luz RN 01/20/23 11:11 PM  "

## 2023-01-25 DIAGNOSIS — I10 ESSENTIAL HYPERTENSION: ICD-10-CM

## 2023-01-25 RX ORDER — AMLODIPINE BESYLATE 10 MG/1
10 TABLET ORAL DAILY
Qty: 90 TABLET | Refills: 0 | Status: SHIPPED | OUTPATIENT
Start: 2023-01-25 | End: 2023-03-01

## 2023-01-26 NOTE — TELEPHONE ENCOUNTER
"Last Written Prescription Date:  7/25/22  Last Fill Quantity: 90,  # refills: 1   Last office visit provider:  10/5/22     Requested Prescriptions   Pending Prescriptions Disp Refills     amLODIPine (NORVASC) 10 MG tablet [Pharmacy Med Name: AMLODIPINE BESYLATE 10 MG TAB] 90 tablet 1     Sig: TAKE 1 TABLET (10 MG) BY MOUTH DAILY.       Calcium Channel Blockers Protocol  Passed - 1/25/2023 12:21 AM        Passed - Blood pressure under 140/90 in past 12 months     BP Readings from Last 3 Encounters:   12/06/22 126/74   11/01/22 120/80   10/13/22 (!) 142/83                 Passed - Recent (12 mo) or future (30 days) visit within the authorizing provider's specialty     Patient has had an office visit with the authorizing provider or a provider within the authorizing providers department within the previous 12 mos or has a future within next 30 days. See \"Patient Info\" tab in inbasket, or \"Choose Columns\" in Meds & Orders section of the refill encounter.              Passed - Medication is active on med list        Passed - Patient is age 18 or older        Passed - No active pregnancy on record        Passed - Normal serum creatinine on file in past 12 months     Recent Labs   Lab Test 03/09/22  1428   CR 0.82       Ok to refill medication if creatinine is low          Passed - No positive pregnancy test in past 12 months             Polly Ryan, RN 01/25/23 7:37 PM  "

## 2023-01-26 NOTE — H&P (VIEW-ONLY)
ADMISSION HISTORY & PHYSICAL      Eddie Diaz, [phone]  ASSESSMENT AND PLAN:  81 year old female presenting with sepsis at this point undetermined source:    1.  Severe sepsis secondary to UTI: Had 1 blood pressure down to 84/54 but improving with IV fluids.  White count elevated at 18.  Lactic acid normal at 2.0.  UA pending.  Chest x-ray shows patchy bibasilar atelectasis but she denies any cough or respiratory symptoms although has some slight hypoxia.  Procalcitonin is 0.8.  Covid negative.  PICC line placed.  Started on Zosyn and vancomycin in the ED.  Also has diarrhea and C. difficile and stool cultures pending.  Consider abdominal CT when blood pressure stabilizes.  Blood culture also pending.    Addendum:           A. Blood pressure still low after 30 cc/kg of IV fluids.  Will start pressors and admit to ICU, discussed with intensivist.  UA is back and appears infected.  We will continue Zosyn and Vanco for now.             B. abdominal CT returned showing masslike wall thickening in the sigmoid.  Once stable would need colonoscopy to evaluate for underlying neoplasm.        2.  Acute hypoxic respiratory failure: O2 sats dipped to 88% and now on 2 L of O2 with good saturations.  Has a history of asthma.  Does have some nonspecific infiltrates on chest x-ray does not have pneumonia symptoms.  BNP is 152 and has required fluid resuscitation, we will continue to monitor, may need diuresis in the future.       3.  Hypertension: We will hold home blood pressure medications given sepsis.  Home blood pressure meds are amlodipine, atenolol and Zestoretic.    4.  Permanent atrial fibrillation: Holding beta-blocker for now to avoid further hypotension, resume when able.  Continue home apixaban.    5.  History of acute CVA    6.  History of asthma: Continue home inhaler    7.  Hypokalemia/hypomagnesemia: Likely secondary to diuretic which is being held, electrolytes are being replaced.  Magnesium was quite  No assistance needed "low at 0.9.  Potassium was 3.1.    8.  Chart history of prediabetes: Blood glucose 158, will check A1c and initiate hyperglycemic protocol.    9.  Diarrhea: C. difficile negative, stool cultures pending.  Will check abdominal CT.    Barriers to discharge: Sepsis      CHIEF COMPLAINT:  Dyspnea, fevers/chills    HISTORY OF PRESENTING ILLNESS:  Lluvia Marrufo is a 81 year old female who felt her usual self until this morning when she felt subjectively febrile alternating with chills, symptoms started around 7 AM this morning.  She states that she has chronic dyspnea from her asthma but felt more short of breath this morning.  She also had some nausea and an episode of emesis.  She reports 2 episodes of watery stool today.  She denies any abdominal pain and currently she states she feels \"much better \"but is still hypotensive in the ED.  No history of similar symptoms.  Her  is present.    PMH/PSH:  Patient Active Problem List   Diagnosis     Obesity     Hyperlipidemia     Prediabetes     Lower Back Pain     Headache     Hypertension goal BP (blood pressure) < 140/90     Dermatitis     Joint Pain, Localized In The Shoulder     Mild intermittent asthma without complication     Spinal stenosis of lumbar region     Primary osteoarthritis of left hip     Hypomagnesemia     Hypokalemia     Expressive aphasia     Acute CVA (cerebrovascular accident) (H)     Obesity (BMI 35.0-39.9) with comorbidity (H)     Dyspnea on exertion     Precordial pain     Persistent atrial fibrillation (H)     Sepsis, due to unspecified organism, unspecified whether acute organ dysfunction present (H)       ALLERGIES:  No Known Allergies    MEDICATIONS:  Reviewed.  Current Facility-Administered Medications   Medication     lidocaine (LMX4) cream     lidocaine 1 % 0.1-5 mL     sodium chloride (PF) 0.9% PF flush 10-20 mL     sodium chloride (PF) 0.9% PF flush 10-40 mL     sodium chloride (PF) 0.9% PF flush 10-40 mL     sodium chloride (PF) " 0.9% PF flush 10-40 mL     vancomycin (VANCOCIN) 1,750 mg in sodium chloride 0.9 % 500 mL intermittent infusion     Current Outpatient Medications   Medication     albuterol (PROVENTIL) 2.5 mg /3 mL (0.083 %) nebulizer solution     amLODIPine (NORVASC) 10 MG tablet     apixaban ANTICOAGULANT (ELIQUIS) 5 mg Tab tablet     atenolol (TENORMIN) 50 MG tablet     atorvastatin (LIPITOR) 20 MG tablet     diazepam (VALIUM) 5 MG tablet     diphenhydrAMINE (BENADRYL) 25 mg tablet     gabapentin (NEURONTIN) 300 MG capsule     glucosamine-chondroitin 500-400 mg cap     KLOR-CON 20 MEQ CR tablet     lisinopril-hydrochlorothiazide (ZESTORETIC) 20-25 MG tablet     multivitamin with minerals (THERA-M) 9 mg iron-400 mcg Tab tablet       SOCIAL HISTORY:  Social History     Socioeconomic History     Marital status:      Spouse name: Not on file     Number of children: Not on file     Years of education: Not on file     Highest education level: Not on file   Occupational History     Not on file   Tobacco Use     Smoking status: Never Smoker     Smokeless tobacco: Never Used   Substance and Sexual Activity     Alcohol use: Yes     Comment: Alcoholic Drinks/day: 1 drink per day     Drug use: No     Sexual activity: Not on file   Other Topics Concern     Not on file   Social History Narrative    [place]  Son-in-law is a .     Social Determinants of Health     Financial Resource Strain: Not on file   Food Insecurity: Not on file   Transportation Needs: Not on file   Physical Activity: Not on file   Stress: Not on file   Social Connections: Not on file   Intimate Partner Violence: Not on file   Housing Stability: Not on file       FAMILY HISTORY:  Family History   Problem Relation Age of Onset     Hypertension Mother      Alcoholism Father      Cirrhosis Father      Anesthesia Reaction No family hx of        ROS:  12 point ROS was performed and found to be negative except for the pertinent  positives mentioned in the HPI.      PHYSICAL EXAM:  BP 91/58   Pulse 95   Temp 100.1  F (37.8  C)   Resp 20   Wt 90.7 kg (200 lb)   LMP  (LMP Unknown)   SpO2 94%   BMI 35.43 kg/m    No intake/output data recorded.  I/O this shift:  In: 1150 [IV Piggyback:1150]  Out: -   CONSTITUTIONAL: No apparent distress  HEENT:       Sclera- anicteric      Mucous membrane-dry  LUNGS: Clear to auscultation bilaterally  CARDIOVASCULAR: S1S2 regular. No murmurs, rubs or gallops,no pedal edema  GI: Soft. Non-tender, non-distended. No organomegaly. No guarding or rigidity. Bowel sounds- active  NEURO: Cranial nerves II-XII grossly intact. No focal neurological deficit. No involuntary movements  INTGM: No pallor,  no cyanosis or clubbing  LYMPH: no adenopathy  MSK: no joint swelling or tenderness  PSYCH: alert and oriented x 3, no agitation      DIAGNOSTIC DATA:  Recent Results (from the past 24 hour(s))   Basic metabolic panel    Collection Time: 03/03/22 10:16 AM   Result Value Ref Range    Sodium 142 136 - 145 mmol/L    Potassium 3.1 (L) 3.5 - 5.0 mmol/L    Chloride 106 98 - 107 mmol/L    Carbon Dioxide (CO2) 22 22 - 31 mmol/L    Anion Gap 14 5 - 18 mmol/L    Urea Nitrogen 15 8 - 28 mg/dL    Creatinine 0.66 0.60 - 1.10 mg/dL    Calcium 9.1 8.5 - 10.5 mg/dL    Glucose 158 (H) 70 - 125 mg/dL    GFR Estimate 89 >60 mL/min/1.73m2   Troponin I    Collection Time: 03/03/22 10:16 AM   Result Value Ref Range    Troponin I <0.01 0.00 - 0.29 ng/mL   B-Type Natriuretic Peptide ([facility] Only)    Collection Time: 03/03/22 10:16 AM   Result Value Ref Range     (H) 0 - 151 pg/mL   Magnesium    Collection Time: 03/03/22 10:16 AM   Result Value Ref Range    Magnesium 0.9 (LL) 1.8 - 2.6 mg/dL   Hepatic function panel    Collection Time: 03/03/22 10:16 AM   Result Value Ref Range    Bilirubin Total 1.1 (H) 0.0 - 1.0 mg/dL    Bilirubin Direct 0.4 <=0.5 mg/dL    Protein Total 7.0 6.0 - 8.0 g/dL    Albumin 3.6 3.5 - 5.0 g/dL    Alkaline  Phosphatase 79 45 - 120 U/L    AST 18 0 - 40 U/L    ALT 22 0 - 45 U/L   Extra Blood Culture Bottle    Collection Time: 03/03/22 10:18 AM   Result Value Ref Range    Hold Specimen JIC    Extra Blue Top Tube    Collection Time: 03/03/22 10:18 AM   Result Value Ref Range    Hold Specimen JIC    CBC with platelets and differential    Collection Time: 03/03/22 10:35 AM   Result Value Ref Range    WBC Count 18.0 (H) 4.0 - 11.0 10e3/uL    RBC Count 4.61 3.80 - 5.20 10e6/uL    Hemoglobin 13.8 11.7 - 15.7 g/dL    Hematocrit 41.9 35.0 - 47.0 %    MCV 91 78 - 100 fL    MCH 29.9 26.5 - 33.0 pg    MCHC 32.9 31.5 - 36.5 g/dL    RDW 12.8 10.0 - 15.0 %    Platelet Count 247 150 - 450 10e3/uL    % Neutrophils 96 %    % Lymphocytes 3 %    % Monocytes 1 %    % Eosinophils 0 %    % Basophils 0 %    % Immature Granulocytes 0 %    NRBCs per 100 WBC 0 <1 /100    Absolute Neutrophils 17.3 (H) 1.6 - 8.3 10e3/uL    Absolute Lymphocytes 0.5 (L) 0.8 - 5.3 10e3/uL    Absolute Monocytes 0.2 0.0 - 1.3 10e3/uL    Absolute Eosinophils 0.0 0.0 - 0.7 10e3/uL    Absolute Basophils 0.0 0.0 - 0.2 10e3/uL    Absolute Immature Granulocytes 0.1 <=0.4 10e3/uL    Absolute NRBCs 0.0 10e3/uL   Symptomatic; Yes; 3/3/2022 Influenza A/B & SARS-CoV2 (COVID-19) Virus PCR Multiplex Nasopharyngeal    Collection Time: 03/03/22 10:37 AM    Specimen: Nasopharyngeal; Swab   Result Value Ref Range    Influenza A PCR Negative Negative    Influenza B PCR Negative Negative    SARS CoV2 PCR Negative Negative   Lactic acid whole blood    Collection Time: 03/03/22 11:24 AM   Result Value Ref Range    Lactic Acid 2.0 0.7 - 2.0 mmol/L   Procalcitonin    Collection Time: 03/03/22 11:48 AM   Result Value Ref Range    Procalcitonin 0.88 (H) 0.00 - 0.49 ng/mL     All lab studies reviewed personally    XR Chest Port 1 View    Result Date: 3/3/2022  EXAM: XR CHEST PORT 1 VIEW LOCATION: [facility] DATE/TIME: 3/3/2022 1:32 PM INDICATION: Check PICC placement.  COMPARISON: 3/3/2022 at 1057 hours.     IMPRESSION: Right-sided PICC tip in good position at cavoatrial junction. Heart size magnified in AP projection with normal vascularity. Lungs are clear with no pneumothorax. Bilateral shoulder arthroplasty redemonstrated.    XR Chest Port 1 View    Result Date: 3/3/2022  EXAM: XR CHEST PORT 1 VIEW LOCATION: [facility] DATE/TIME: 3/3/2022 10:52 AM INDICATION: Shortness of breath COMPARISON: 07/29/2018     IMPRESSION: Patchy bibasilar atelectasis. No effusions or pneumothorax. Heart size at the upper limits of normal although accentuated by technique. Bilateral shoulder arthroplasties. No acute osseous findings.    Radiology report reviewed.        Jay Snider MD  [facility] Medicine Service

## 2023-02-06 ENCOUNTER — TELEPHONE (OUTPATIENT)
Dept: GASTROENTEROLOGY | Facility: CLINIC | Age: 83
End: 2023-02-06
Payer: COMMERCIAL

## 2023-02-06 NOTE — TELEPHONE ENCOUNTER
Called and left voice message for Pt. Called Pt to help get them scheduled with an appointment. Writer left call back number.

## 2023-02-07 DIAGNOSIS — J30.2 SEASONAL ALLERGIC RHINITIS, UNSPECIFIED TRIGGER: ICD-10-CM

## 2023-02-07 RX ORDER — CETIRIZINE HYDROCHLORIDE 10 MG/1
10 TABLET ORAL DAILY
Qty: 90 TABLET | Refills: 3 | Status: SHIPPED | OUTPATIENT
Start: 2023-02-07 | End: 2024-01-31

## 2023-02-07 NOTE — TELEPHONE ENCOUNTER
"Routing refill request to provider for review/approval because:  Drug not active on patient's medication list    Last Written Prescription Date:  10/13/22  Last Fill Quantity: 30,  # refills: 3   Last office visit provider:  10/5/22     Requested Prescriptions   Pending Prescriptions Disp Refills     cetirizine (ZYRTEC) 10 MG tablet 30 tablet 3     Sig: Take 1 tablet (10 mg) by mouth daily       Antihistamines Protocol Failed - 2/7/2023  8:36 AM        Failed - Patient is 3-64 years of age     Apply weight-based dosing for peds patients age 3 - 12 years of age.    Forward request to provider for patients under the age of 3 or over the age of 64.          Failed - Medication is active on med list        Passed - Recent (12 mo) or future (30 days) visit within the authorizing provider's specialty     Patient has had an office visit with the authorizing provider or a provider within the authorizing providers department within the previous 12 mos or has a future within next 30 days. See \"Patient Info\" tab in inbasket, or \"Choose Columns\" in Meds & Orders section of the refill encounter.                   Marbin Anne RN 02/07/23 2:58 PM  "

## 2023-02-14 NOTE — TELEPHONE ENCOUNTER
Called and left voice message for Pt. Called Pt to help get them scheduled for a sooner GI appointment. Writer left call back number.    Writer will send Pt a [employer] message as well.

## 2023-02-28 DIAGNOSIS — I10 ESSENTIAL HYPERTENSION: ICD-10-CM

## 2023-02-28 RX ORDER — LISINOPRIL AND HYDROCHLOROTHIAZIDE 20; 25 MG/1; MG/1
TABLET ORAL
Qty: 180 TABLET | Refills: 2 | Status: SHIPPED | OUTPATIENT
Start: 2023-02-28 | End: 2023-11-01

## 2023-03-01 DIAGNOSIS — R05.1 ACUTE COUGH: ICD-10-CM

## 2023-03-01 DIAGNOSIS — I10 ESSENTIAL HYPERTENSION: ICD-10-CM

## 2023-03-01 DIAGNOSIS — G47.00 INSOMNIA, UNSPECIFIED TYPE: ICD-10-CM

## 2023-03-01 RX ORDER — AMLODIPINE BESYLATE 10 MG/1
10 TABLET ORAL DAILY
Qty: 90 TABLET | Refills: 0 | Status: SHIPPED | OUTPATIENT
Start: 2023-03-01 | End: 2023-07-13

## 2023-03-01 RX ORDER — TRAZODONE HYDROCHLORIDE 50 MG/1
TABLET, FILM COATED ORAL
Qty: 90 TABLET | Refills: 1 | Status: SHIPPED | OUTPATIENT
Start: 2023-03-01 | End: 2023-08-26

## 2023-03-01 RX ORDER — BENZONATATE 200 MG/1
200 CAPSULE ORAL 3 TIMES DAILY PRN
Qty: 90 CAPSULE | Refills: 0 | Status: SHIPPED | OUTPATIENT
Start: 2023-03-01 | End: 2023-05-19

## 2023-03-01 NOTE — TELEPHONE ENCOUNTER
"Last Written Prescription Date:  5/23/2022  Last Fill Quantity: 180,  # refills: 2   Last office visit provider:  10/13/2022     Requested Prescriptions   Pending Prescriptions Disp Refills     lisinopril-hydrochlorothiazide (ZESTORETIC) 20-25 MG tablet [Pharmacy Med Name: LISINOPRIL-HCTZ 20-25 MG TAB] 180 tablet 2     Sig: TAKE 2 TABLETS BY MOUTH EVERY DAY       Diuretics (Including Combos) Protocol Passed - 2/28/2023 12:19 AM        Passed - Blood pressure under 140/90 in past 12 months     BP Readings from Last 3 Encounters:   12/06/22 126/74   11/01/22 120/80   10/13/22 (!) 142/83                 Passed - Recent (12 mo) or future (30 days) visit within the authorizing provider's specialty     Patient has had an office visit with the authorizing provider or a provider within the authorizing providers department within the previous 12 mos or has a future within next 30 days. See \"Patient Info\" tab in inbasket, or \"Choose Columns\" in Meds & Orders section of the refill encounter.              Passed - Medication is active on med list        Passed - Patient is age 18 or older        Passed - No active pregancy on record        Passed - Normal serum creatinine on file in past 12 months     Recent Labs   Lab Test 03/09/22  1428   CR 0.82              Passed - Normal serum potassium on file in past 12 months     Recent Labs   Lab Test 03/09/22  1428   POTASSIUM 3.6                    Passed - Normal serum sodium on file in past 12 months     Recent Labs   Lab Test 03/09/22  1428                 Passed - No positive pregnancy test in past 12 months       ACE Inhibitors (Including Combos) Protocol Passed - 2/28/2023 12:19 AM        Passed - Blood pressure under 140/90 in past 12 months     BP Readings from Last 3 Encounters:   12/06/22 126/74   11/01/22 120/80   10/13/22 (!) 142/83                 Passed - Recent (12 mo) or future (30 days) visit within the authorizing provider's specialty     Patient has had an " "office visit with the authorizing provider or a provider within the authorizing providers department within the previous 12 mos or has a future within next 30 days. See \"Patient Info\" tab in inbasket, or \"Choose Columns\" in Meds & Orders section of the refill encounter.              Passed - Medication is active on med list        Passed - Patient is age 18 or older        Passed - No active pregnancy on record        Passed - Normal serum creatinine on file in past 12 months     Recent Labs   Lab Test 03/09/22  1428   CR 0.82       Ok to refill medication if creatinine is low          Passed - Normal serum potassium on file in past 12 months     Recent Labs   Lab Test 03/09/22  1428   POTASSIUM 3.6             Passed - No positive pregnancy test within past 12 months             Nicky Luz RN 02/28/23 7:50 PM  "

## 2023-03-13 ENCOUNTER — TRANSFERRED RECORDS (OUTPATIENT)
Dept: HEALTH INFORMATION MANAGEMENT | Facility: CLINIC | Age: 83
End: 2023-03-13

## 2023-03-27 NOTE — TELEPHONE ENCOUNTER
Please inform patient that script was sent to the pharmacy.  Pt will need to make an office visit for further refills in the future- will need a controlled substance agreement on file.  Dr. Diaz   Double Island Pedicle Flap Text: The defect edges were debeveled with a #15 scalpel blade. Given the location of the defect, shape of the defect and the proximity to free margins a double island pedicle advancement flap was deemed most appropriate. Using a sterile surgical marker, an appropriate advancement flap was drawn incorporating the defect, outlining the appropriate donor tissue and placing the expected incisions within the relaxed skin tension lines where possible. The area thus outlined was incised deep to adipose tissue with a #15 scalpel blade. The skin margins were undermined to an appropriate distance in all directions around the primary defect and laterally outward around the island pedicle utilizing iris scissors.  There was minimal undermining beneath the pedicle flap. Following this, the flap was carried over into the primary defect and sutured into place.

## 2023-05-19 DIAGNOSIS — G89.29 CHRONIC BILATERAL LOW BACK PAIN WITH RIGHT-SIDED SCIATICA: ICD-10-CM

## 2023-05-19 DIAGNOSIS — M54.41 CHRONIC BILATERAL LOW BACK PAIN WITH RIGHT-SIDED SCIATICA: ICD-10-CM

## 2023-05-19 DIAGNOSIS — I10 ESSENTIAL HYPERTENSION: ICD-10-CM

## 2023-05-19 DIAGNOSIS — M54.16 RIGHT LUMBAR RADICULITIS: ICD-10-CM

## 2023-05-19 DIAGNOSIS — R05.1 ACUTE COUGH: ICD-10-CM

## 2023-05-19 DIAGNOSIS — I63.9 ACUTE CVA (CEREBROVASCULAR ACCIDENT) (H): ICD-10-CM

## 2023-05-19 RX ORDER — POTASSIUM CHLORIDE 1500 MG/1
TABLET, EXTENDED RELEASE ORAL
Qty: 270 TABLET | Refills: 1 | Status: SHIPPED | OUTPATIENT
Start: 2023-05-19 | End: 2024-01-31

## 2023-05-19 RX ORDER — ATORVASTATIN CALCIUM 20 MG/1
TABLET, FILM COATED ORAL
Qty: 90 TABLET | Refills: 1 | Status: SHIPPED | OUTPATIENT
Start: 2023-05-19 | End: 2024-01-31

## 2023-05-19 RX ORDER — GABAPENTIN 300 MG/1
CAPSULE ORAL
Qty: 90 CAPSULE | Refills: 1 | Status: SHIPPED | OUTPATIENT
Start: 2023-05-19 | End: 2023-11-01

## 2023-05-19 RX ORDER — BENZONATATE 200 MG/1
200 CAPSULE ORAL 3 TIMES DAILY PRN
Qty: 90 CAPSULE | Refills: 0 | Status: SHIPPED | OUTPATIENT
Start: 2023-05-19 | End: 2023-10-10

## 2023-05-24 NOTE — PROGRESS NOTES
Assessment:     Diagnoses and all orders for this visit:  Chronic bilateral low back pain with bilateral sciatica  -     PAIN Transforaminal WILBUR Inj Lumbosacral Juan C; Future  Lumbar radiculopathy  -     PAIN Transforaminal WILBUR Inj Lumbosacral Juan C; Future  Spinal stenosis of lumbar region without neurogenic claudication  -     PAIN Transforaminal WILBUR Inj Lumbosacral Juan C; Future  Spondylolisthesis of lumbar region  -     PAIN Transforaminal WILBUR Inj Lumbosacral Juan C; Future  DDD (degenerative disc disease), lumbar     Lluvia Marrufo is a 83 year old y.o. female with past medical history significant for hyperlipidemia, hypertension, mild intermittent asthma, prediabetes, hypomagnesia, hypokalemia, acute CVA 2018 with expressive aphasia, obesity, headache, history of right total knee replacement, left hip replacement, history of bilateral shoulder replacement who presents today for follow-up regarding:    -Chronic bilateral low back pain with lumbar radiculopathy.  MRI was spondylolisthesis at L3-4 with severe spinal stenosis as well as foraminal stenosis.  Patient did well in the past with bilateral L3-4 TFESI.     Plan:     A shared decision making plan was used. The patient's values and choices were respected. Prior medical records were reviewed today. The following represents what was discussed and decided upon by the provider and the patient.        -DIAGNOSTIC TESTS: Images were personally reviewed and interpreted.   --Discussed with patient that if symptoms or not improving with the injection we could consider updated lumbar spine MRI, currently her pain is chronic and recurrent therefore can hold off at this time.  --Lumbar spine MRI 6/14/2021 with multilevel degenerative change, progression since 2016.  L3-4 spondylolisthesis with severe spinal stenosis with mild right and moderate left foraminal stenosis.  Moderate spinal stenosis at I have this like quirky thing where I like bowled all of my new orders  L4-5 and mild at L2-3.  --Lumbar spine MRI 2016 with multilevel spondylolisthesis L2-3, L3-4, L4-5.  Advanced disc height loss at T12-L1.  Moderate to advanced disc height loss at L5-S1, however no nerve compression noted at this level.  L3-4 moderate spinal stenosis with left disc bulge along with facet arthropathy.  L4-5 mild spinal stenosis with mild left foraminal stenosis.    -INTERVENTIONS: Order placed for bilateral L3-4 transforaminal epidural steroid injection to see if we get further relief of her lumbar spine pain as well as radiculopathy, she has done well with this injection in the past and does have severe spinal stenosis at this level.  Patient is hoping to avoid spine surgery at this time.    -MEDICATIONS: No changes to medications today    -PHYSICAL THERAPY: Advised patient continue with home exercises from prior physical therapy sessions.  Discussed the importance of core strengthening, ROM, stretching exercises with the patient and how each of these entities is important in decreasing pain.  Explained to the patient that the purpose of physical therapy is to teach the patient a home exercise program.  These exercises need to be performed every day in order to decrease pain and prevent future occurrences of pain.        -PATIENT EDUCATION:  Total time of 32 minutes, on the day of service, spent with the patient, reviewing the chart, placing orders, and documenting.   -Today we also discussed the issues related to the current COVID-19 pandemic, the pros and cons of the current treatment plan, the CDC guidelines such as social distancing, washing the hands, and covering the cough.    -FOLLOW UP: Follow-up for injection with Dr. Mckeon then 2 weeks postinjection  Advised to contact clinic if symptoms worsen or change.    Subjective:     Lluvia Marrufo is a 83 year old female who presents today for follow-up regarding recurrent bilateral low back pain that has been bothersome for the last  couple of months.  She did have an epidural steroid injection in June 2022 and felt significant relief for many months, she did have a fall landing on her knees in November 2022 however her back pain was still doing well at that time and has been again more bothersome for the last couple of months.  Currently her pain is a 5/10, 9 at its worst, 2 at its best aggravated with walking, transition from sit to stand as well as generalized activity.  Does improve with sitting with her back against the backrest.  Denies any recent trips or falls.  Does report some perceived lower extremity weakness.  Denies any episodes of her legs giving out on her recently with walking on flat ground.    Denies bowel or bladder loss control, denies saddle anesthesia.    -Treatment to Date: No prior spinal surgery.  Physical therapy 2017.  Physical therapy 2021x5 sessions LBP with radiculopathy.     Left L5-S1 TFESI 12/15/2016.  Bilateral L3-4 TFESI 6/29/2022 with 100% relief LBP and radiculopathy leg symptoms.  Preprocedure pain 4/10, post 3/10.     -Medications:  Advil at bedtime    Patient Active Problem List   Diagnosis     Obesity     Prediabetes     Lower Back Pain     Hypertension goal BP (blood pressure) < 140/90     Joint Pain, Localized In The Shoulder     Mild intermittent asthma without complication     Spinal stenosis of lumbar region     Primary osteoarthritis of left hip     Expressive aphasia     Acute CVA (cerebrovascular accident) (H)     Obesity (BMI 35.0-39.9) with comorbidity (H)     Precordial pain     Persistent atrial fibrillation (H)     Mass of colon     Crohn's disease of large bowel (H)     Diverticular disease of large intestine     Colitis       Current Outpatient Medications   Medication     ELIQUIS ANTICOAGULANT 5 MG tablet     gabapentin (NEURONTIN) 300 MG capsule     albuterol (PROAIR HFA/PROVENTIL HFA/VENTOLIN HFA) 108 (90 Base) MCG/ACT inhaler     albuterol (PROVENTIL) 2.5 mg /3 mL (0.083 %) nebulizer  "solution     amLODIPine (NORVASC) 10 MG tablet     atorvastatin (LIPITOR) 20 MG tablet     benzonatate (TESSALON) 200 MG capsule     budesonide-formoterol (SYMBICORT) 160-4.5 MCG/ACT Inhaler     cetirizine (ZYRTEC) 10 MG tablet     glucosamine-chondroitin 500-400 mg cap     KLOR-CON 20 MEQ CR tablet     lisinopril-hydrochlorothiazide (ZESTORETIC) 20-25 MG tablet     metoprolol tartrate (LOPRESSOR) 25 MG tablet     multivitamin with minerals (THERA-M) 9 mg iron-400 mcg Tab tablet     traZODone (DESYREL) 50 MG tablet     No current facility-administered medications for this visit.       Allergies   Allergen Reactions     Azithromycin Nausea     Dizziness, dehydrated.        Past Medical History:   Diagnosis Date     Arthritis      Asthma      Atrial fibrillation (H)      Bronchitis      Earache      Fracture, foot      History of CVA (cerebrovascular accident)      Hyperlipidemia      Hypertension      UTI (urinary tract infection)         Review of Systems  ROS:  Specifically negative for bowel/bladder dysfunction, balance changes, headache, dizziness, foot drop, fevers, chills, appetite changes, nausea/vomiting, unexplained weight loss. Otherwise 13 systems reviewed are negative. Please see the patient's intake questionnaire from today for details.    Reviewed Social, Family, Past Medical and Past Surgical history with patient, no significant changes noted since prior visit.     Objective:     BP (!) 149/82 (BP Location: Right arm, Patient Position: Sitting)   Pulse 87   Ht 5' 1.75\" (1.568 m)   Wt 208 lb (94.3 kg)   LMP  (LMP Unknown)   SpO2 97%   BMI 38.35 kg/m      PHYSICAL EXAMINATION:    --CONSTITUTIONAL: Well developed, well nourished, healthy appearing individual.  --PSYCHIATRIC: Appropriate mood and affect. No difficulty interacting due to temper, social withdrawal, or memory issues.  --SKIN: Lumbar region is dry and intact.   --RESPIRATORY: Normal rhythm and effort. No abnormal accessory muscle " breathing patterns noted.   --MUSCULOSKELETAL:  Normal lumbar lordosis noted, no lateral shift.  --GROSS MOTOR: Easily arises from a seated position. Gait is non-antalgic  --LUMBAR SPINE:  Inspection reveals no evidence of deformity. Range of motion is not limited in lumbar flexion, extension, lateral rotation. No tenderness to palpation lumbar spine.   --LOWER EXTREMITY MOTOR TESTING:  Plantar flexion left 5/5, right 5/5   Dorsiflexion left 5/5, right 5/5   Great toe MTP extension left 5/5, right 5/5  Knee flexion left 5/5, right 5/5  Knee extension left 5/5, right 5/5   Hip flexion left 5/5, right 5/5  Hip abduction left 5/5, right 5/5  Hip adduction left 5/5, right 5/5   --HIPS: Full range of motion bilaterally.   --NEUROLOGIC: Bilateral patellar and achilles reflexes are physiologic and symmetric. Sensation to light touch is intact in the bilateral L4, L5, and S1 dermatomes.    RESULTS:   Imaging: Spine imaging was reviewed today. The images were shown to the patient and the findings were explained using a spine model.      Lumbar spine MRI reviewed from 2021.

## 2023-05-25 ENCOUNTER — OFFICE VISIT (OUTPATIENT)
Dept: PHYSICAL MEDICINE AND REHAB | Facility: CLINIC | Age: 83
End: 2023-05-25
Payer: COMMERCIAL

## 2023-05-25 VITALS
HEIGHT: 62 IN | SYSTOLIC BLOOD PRESSURE: 149 MMHG | OXYGEN SATURATION: 97 % | DIASTOLIC BLOOD PRESSURE: 82 MMHG | BODY MASS INDEX: 38.28 KG/M2 | HEART RATE: 87 BPM | WEIGHT: 208 LBS

## 2023-05-25 DIAGNOSIS — M54.16 LUMBAR RADICULOPATHY: ICD-10-CM

## 2023-05-25 DIAGNOSIS — M43.16 SPONDYLOLISTHESIS OF LUMBAR REGION: ICD-10-CM

## 2023-05-25 DIAGNOSIS — M48.061 SPINAL STENOSIS OF LUMBAR REGION WITHOUT NEUROGENIC CLAUDICATION: ICD-10-CM

## 2023-05-25 DIAGNOSIS — M54.42 CHRONIC BILATERAL LOW BACK PAIN WITH BILATERAL SCIATICA: Primary | ICD-10-CM

## 2023-05-25 DIAGNOSIS — M51.369 DDD (DEGENERATIVE DISC DISEASE), LUMBAR: ICD-10-CM

## 2023-05-25 DIAGNOSIS — G89.29 CHRONIC BILATERAL LOW BACK PAIN WITH BILATERAL SCIATICA: Primary | ICD-10-CM

## 2023-05-25 DIAGNOSIS — M54.41 CHRONIC BILATERAL LOW BACK PAIN WITH BILATERAL SCIATICA: Primary | ICD-10-CM

## 2023-05-25 PROCEDURE — 99214 OFFICE O/P EST MOD 30 MIN: CPT | Performed by: NURSE PRACTITIONER

## 2023-05-25 ASSESSMENT — PAIN SCALES - GENERAL: PAINLEVEL: MODERATE PAIN (5)

## 2023-05-25 ASSESSMENT — PATIENT HEALTH QUESTIONNAIRE - PHQ9: SUM OF ALL RESPONSES TO PHQ QUESTIONS 1-9: 8

## 2023-05-25 NOTE — PATIENT INSTRUCTIONS
~Please call our [facility] Nurse Navigation line [phone] with any questions or concerns about your treatment plan, if symptoms worsen and you would like to be seen urgently, or if you have problems controlling bladder and bowel function.  ~Please note that any My Chart messages may take multiple days for a response due to the high volume of patients seen in clinic.   Anything sent Thursday night or after will be answered the following week when able, as Reyna Weldon CNP does not work in clinic on Fridays.        Importance of specialized Physical Therapy: Discussed the importance of core strengthening, ROM, stretching exercises with the patient and how each of these entities is important in decreasing pain.  Explained to the patient that the purpose of physical therapy is to teach the patient a home exercise program individualized to them and their specific health concerns.  These exercises need to be performed every day in order to decrease pain and prevent future occurrences of pain.           An injection has been ordered today to potentially help with your pain symptoms. These injections do not fix what is going on in your back, therefore they typically do not take away the pain completely, however they can many times help improve symptoms. Injections should always be completed along with other modalities such as physical therapy for the best long term outcomes. If injections alone are done, then pain will likely return.     [facility] Injection Requirements    A  is required for all fluoroscopically-guided injections.  Injection appointments may be cancelled if there are signs/symptoms of an active infection or if the patient is being actively treated with antibiotics for a diagnosed infection.  Patients may have their steroid injection cancelled if they have had another steroid injection within 2 weeks.  Diabetic patients will have their blood glucose  levels checked the day of their injection and the appointment will be rescheduled if the blood glucose level is 300 or higher.  Patients with allergies to cortisone, local anesthetics, iodine, or contrast dye should contact the Spine Center to further discuss these considerations.  Patients scheduled for medial branch block diagnostic injections should refrain from taking pain medication the day of the procedure.  The medial branch block injection appointment will be rescheduled if the patient's pain rating is not 5/10 or greater at the time of the procedure.  Patients taking warfarin/Coumadin will have their INR checked the day of the procedure and the procedure may be rescheduled if the INR is greater than 3.0.  Please contact the Spine Center [phone]) if you are taking any prescription blood-thinning medications (warfarin, Plavix, Lovenox, Eliquis, Brilinta, Effient, etc.) as special dosing adjustments may need to be made depending on the type of injection you are scheduled to receive.  It is recommended that you delay having your steroid injection if you have received a flu shot or shingles vaccine within 2 weeks.

## 2023-05-25 NOTE — LETTER
5/25/2023         RE: Lluvia Marrufo  [address] [facility] [address]        Dear Colleague,    Thank you for referring your patient, Lluvia Marrufo, to the [facility]. Please see a copy of my visit note below.      Assessment:     Diagnoses and all orders for this visit:  Chronic bilateral low back pain with bilateral sciatica  -     PAIN Transforaminal WILBUR Inj Lumbosacral Juan C; Future  Lumbar radiculopathy  -     PAIN Transforaminal WILBUR Inj Lumbosacral Juan C; Future  Spinal stenosis of lumbar region without neurogenic claudication  -     PAIN Transforaminal WILBUR Inj Lumbosacral Juan C; Future  Spondylolisthesis of lumbar region  -     PAIN Transforaminal WILBUR Inj Lumbosacral Juan C; Future  DDD (degenerative disc disease), lumbar     Lluvia Marrufo is a 83 year old y.o. female with past medical history significant for hyperlipidemia, hypertension, mild intermittent asthma, prediabetes, hypomagnesia, hypokalemia, acute CVA 2018 with expressive aphasia, obesity, headache, history of right total knee replacement, left hip replacement, history of bilateral shoulder replacement who presents today for follow-up regarding:    -Chronic bilateral low back pain with lumbar radiculopathy.  MRI was spondylolisthesis at L3-4 with severe spinal stenosis as well as foraminal stenosis.  Patient did well in the past with bilateral L3-4 TFESI.     Plan:     A shared decision making plan was used. The patient's values and choices were respected. Prior medical records were reviewed today. The following represents what was discussed and decided upon by the provider and the patient.        -DIAGNOSTIC TESTS: Images were personally reviewed and interpreted.   --Discussed with patient that if symptoms or not improving with the injection we could consider updated lumbar spine MRI, currently her pain is chronic and recurrent therefore can hold off at this time.  --Lumbar spine MRI  6/14/2021 with multilevel degenerative change, progression since 2016.  L3-4 spondylolisthesis with severe spinal stenosis with mild right and moderate left foraminal stenosis.  Moderate spinal stenosis at I have this like quirky thing where I like bowled all of my new orders L4-5 and mild at L2-3.  --Lumbar spine MRI 2016 with multilevel spondylolisthesis L2-3, L3-4, L4-5.  Advanced disc height loss at T12-L1.  Moderate to advanced disc height loss at L5-S1, however no nerve compression noted at this level.  L3-4 moderate spinal stenosis with left disc bulge along with facet arthropathy.  L4-5 mild spinal stenosis with mild left foraminal stenosis.    -INTERVENTIONS: Order placed for bilateral L3-4 transforaminal epidural steroid injection to see if we get further relief of her lumbar spine pain as well as radiculopathy, she has done well with this injection in the past and does have severe spinal stenosis at this level.  Patient is hoping to avoid spine surgery at this time.    -MEDICATIONS: No changes to medications today    -PHYSICAL THERAPY: Advised patient continue with home exercises from prior physical therapy sessions.  Discussed the importance of core strengthening, ROM, stretching exercises with the patient and how each of these entities is important in decreasing pain.  Explained to the patient that the purpose of physical therapy is to teach the patient a home exercise program.  These exercises need to be performed every day in order to decrease pain and prevent future occurrences of pain.        -PATIENT EDUCATION:  Total time of 32 minutes, on the day of service, spent with the patient, reviewing the chart, placing orders, and documenting.   -Today we also discussed the issues related to the current COVID-19 pandemic, the pros and cons of the current treatment plan, the CDC guidelines such as social distancing, washing the hands, and covering the cough.    -FOLLOW UP: Follow-up for injection with   Linda then 2 weeks postinjection  Advised to contact clinic if symptoms worsen or change.    Subjective:     Lluvia Marrufo is a 83 year old female who presents today for follow-up regarding recurrent bilateral low back pain that has been bothersome for the last couple of months.  She did have an epidural steroid injection in June 2022 and felt significant relief for many months, she did have a fall landing on her knees in November 2022 however her back pain was still doing well at that time and has been again more bothersome for the last couple of months.  Currently her pain is a 5/10, 9 at its worst, 2 at its best aggravated with walking, transition from sit to stand as well as generalized activity.  Does improve with sitting with her back against the backrest.  Denies any recent trips or falls.  Does report some perceived lower extremity weakness.  Denies any episodes of her legs giving out on her recently with walking on flat ground.    Denies bowel or bladder loss control, denies saddle anesthesia.    -Treatment to Date: No prior spinal surgery.  Physical therapy 2017.  Physical therapy 2021x5 sessions LBP with radiculopathy.     Left L5-S1 TFESI 12/15/2016.  Bilateral L3-4 TFESI 6/29/2022 with 100% relief LBP and radiculopathy leg symptoms.  Preprocedure pain 4/10, post 3/10.     -Medications:  Advil at bedtime    Patient Active Problem List   Diagnosis     Obesity     Prediabetes     Lower Back Pain     Hypertension goal BP (blood pressure) < 140/90     Joint Pain, Localized In The Shoulder     Mild intermittent asthma without complication     Spinal stenosis of lumbar region     Primary osteoarthritis of left hip     Expressive aphasia     Acute CVA (cerebrovascular accident) (H)     Obesity (BMI 35.0-39.9) with comorbidity (H)     Precordial pain     Persistent atrial fibrillation (H)     Mass of colon     Crohn's disease of large bowel (H)     Diverticular disease of large intestine     Colitis  "      Current Outpatient Medications   Medication     ELIQUIS ANTICOAGULANT 5 MG tablet     gabapentin (NEURONTIN) 300 MG capsule     albuterol (PROAIR HFA/PROVENTIL HFA/VENTOLIN HFA) 108 (90 Base) MCG/ACT inhaler     albuterol (PROVENTIL) 2.5 mg /3 mL (0.083 %) nebulizer solution     amLODIPine (NORVASC) 10 MG tablet     atorvastatin (LIPITOR) 20 MG tablet     benzonatate (TESSALON) 200 MG capsule     budesonide-formoterol (SYMBICORT) 160-4.5 MCG/ACT Inhaler     cetirizine (ZYRTEC) 10 MG tablet     glucosamine-chondroitin 500-400 mg cap     KLOR-CON 20 MEQ CR tablet     lisinopril-hydrochlorothiazide (ZESTORETIC) 20-25 MG tablet     metoprolol tartrate (LOPRESSOR) 25 MG tablet     multivitamin with minerals (THERA-M) 9 mg iron-400 mcg Tab tablet     traZODone (DESYREL) 50 MG tablet     No current facility-administered medications for this visit.       Allergies   Allergen Reactions     Azithromycin Nausea     Dizziness, dehydrated.        Past Medical History:   Diagnosis Date     Arthritis      Asthma      Atrial fibrillation (H)      Bronchitis      Earache      Fracture, foot      History of CVA (cerebrovascular accident)      Hyperlipidemia      Hypertension      UTI (urinary tract infection)         Review of Systems  ROS:  Specifically negative for bowel/bladder dysfunction, balance changes, headache, dizziness, foot drop, fevers, chills, appetite changes, nausea/vomiting, unexplained weight loss. Otherwise 13 systems reviewed are negative. Please see the patient's intake questionnaire from today for details.    Reviewed Social, Family, Past Medical and Past Surgical history with patient, no significant changes noted since prior visit.     Objective:     BP (!) 149/82 (BP Location: Right arm, Patient Position: Sitting)   Pulse 87   Ht 5' 1.75\" (1.568 m)   Wt 208 lb (94.3 kg)   LMP  (LMP Unknown)   SpO2 97%   BMI 38.35 kg/m      PHYSICAL EXAMINATION:    --CONSTITUTIONAL: Well developed, well nourished, " healthy appearing individual.  --PSYCHIATRIC: Appropriate mood and affect. No difficulty interacting due to temper, social withdrawal, or memory issues.  --SKIN: Lumbar region is dry and intact.   --RESPIRATORY: Normal rhythm and effort. No abnormal accessory muscle breathing patterns noted.   --MUSCULOSKELETAL:  Normal lumbar lordosis noted, no lateral shift.  --GROSS MOTOR: Easily arises from a seated position. Gait is non-antalgic  --LUMBAR SPINE:  Inspection reveals no evidence of deformity. Range of motion is not limited in lumbar flexion, extension, lateral rotation. No tenderness to palpation lumbar spine.   --LOWER EXTREMITY MOTOR TESTING:  Plantar flexion left 5/5, right 5/5   Dorsiflexion left 5/5, right 5/5   Great toe MTP extension left 5/5, right 5/5  Knee flexion left 5/5, right 5/5  Knee extension left 5/5, right 5/5   Hip flexion left 5/5, right 5/5  Hip abduction left 5/5, right 5/5  Hip adduction left 5/5, right 5/5   --HIPS: Full range of motion bilaterally.   --NEUROLOGIC: Bilateral patellar and achilles reflexes are physiologic and symmetric. Sensation to light touch is intact in the bilateral L4, L5, and S1 dermatomes.    RESULTS:   Imaging: Spine imaging was reviewed today. The images were shown to the patient and the findings were explained using a spine model.      Lumbar spine MRI reviewed from 2021.                        Again, thank you for allowing me to participate in the care of your patient.        Sincerely,        Reyna Weldon, CNP

## 2023-06-04 ENCOUNTER — HEALTH MAINTENANCE LETTER (OUTPATIENT)
Age: 83
End: 2023-06-04

## 2023-06-26 ENCOUNTER — RADIOLOGY INJECTION OFFICE VISIT (OUTPATIENT)
Dept: PHYSICAL MEDICINE AND REHAB | Facility: CLINIC | Age: 83
End: 2023-06-26
Payer: COMMERCIAL

## 2023-06-26 VITALS — HEART RATE: 97 BPM | OXYGEN SATURATION: 97 %

## 2023-06-26 DIAGNOSIS — G89.29 CHRONIC BILATERAL LOW BACK PAIN WITH BILATERAL SCIATICA: ICD-10-CM

## 2023-06-26 DIAGNOSIS — M54.16 LUMBAR RADICULOPATHY: ICD-10-CM

## 2023-06-26 DIAGNOSIS — M54.42 CHRONIC BILATERAL LOW BACK PAIN WITH BILATERAL SCIATICA: ICD-10-CM

## 2023-06-26 DIAGNOSIS — M54.41 CHRONIC BILATERAL LOW BACK PAIN WITH BILATERAL SCIATICA: ICD-10-CM

## 2023-06-26 DIAGNOSIS — M43.16 SPONDYLOLISTHESIS OF LUMBAR REGION: ICD-10-CM

## 2023-06-26 DIAGNOSIS — M48.061 SPINAL STENOSIS OF LUMBAR REGION WITHOUT NEUROGENIC CLAUDICATION: ICD-10-CM

## 2023-06-26 PROCEDURE — 2894A PAIN TEFESI INJ LUMBOSACRAL BILATERAL: CPT | Mod: RT | Performed by: PAIN MEDICINE

## 2023-06-26 PROCEDURE — 64483 NJX AA&/STRD TFRM EPI L/S 1: CPT | Mod: 50 | Performed by: PAIN MEDICINE

## 2023-06-26 RX ORDER — DEXAMETHASONE SODIUM PHOSPHATE 10 MG/ML
INJECTION, SOLUTION INTRAMUSCULAR; INTRAVENOUS
Status: COMPLETED | OUTPATIENT
Start: 2023-06-26 | End: 2023-06-26

## 2023-06-26 RX ORDER — LIDOCAINE HYDROCHLORIDE 10 MG/ML
INJECTION, SOLUTION EPIDURAL; INFILTRATION; INTRACAUDAL; PERINEURAL
Status: COMPLETED | OUTPATIENT
Start: 2023-06-26 | End: 2023-06-26

## 2023-06-26 RX ADMIN — DEXAMETHASONE SODIUM PHOSPHATE 20 MG: 10 INJECTION, SOLUTION INTRAMUSCULAR; INTRAVENOUS at 09:05

## 2023-06-26 RX ADMIN — LIDOCAINE HYDROCHLORIDE 4 ML: 10 INJECTION, SOLUTION EPIDURAL; INFILTRATION; INTRACAUDAL; PERINEURAL at 09:05

## 2023-06-26 ASSESSMENT — PAIN SCALES - GENERAL
PAINLEVEL: MILD PAIN (3)
PAINLEVEL: NO PAIN (0)

## 2023-06-26 NOTE — PATIENT INSTRUCTIONS
Follow-up visit in 2-4 weeks with Reyna Weldon CNP to discuss injection outcome and determine care plan going forward.     DISCHARGE INSTRUCTIONS    During office hours (8:00 a.m.- 4:00 p.m.) questions or concerns may be answered  by calling [facility] at  [phone].  Messages received after hours will be returned the following business day.      In the case of an emergency, please dial 911 or seek assistance at the nearest Emergency Room/Urgent Care facility.     All Patients:    You may experience an increase in your symptoms for the first 2 days (It may take anywhere between 2 days- 2 weeks for the steroid to have maximum effect).    You may use ice on the injection site, as frequently as 20 minutes each hour if needed.    You may take your pain medicine.    You may continue taking your regular medication after your injection. If you have had a Medial Branch Block you may resume pain medication once your pain diary is completed.    You may shower. No swimming, tub bath or hot tub for 48 hours.  You may remove your bandaid/bandage as soon as you are home.    You may resume light activities, as tolerated.    Resume your usual diet as tolerated.    It is strongly advised that you do not drive for 1-3 hours post injection.    If you have had oral sedation:  Do not drive for 8 hours post injection.      If you have had IV sedation:  Do not drive for 24 hours post injection.  Do not operate hazardous machinery or make important personal/business decisions for 24 hours.      POSSIBLE STEROID SIDE EFFECTS (If steroid/cortisone was used for your procedure)    -If you experience these symptoms, it should only last for a short period    Swelling of the legs              Skin redness (flushing)     Mouth (oral) irritation   Blood sugar (glucose) levels            Sweats                    Mood changes  Headache  Sleeplessness  Weakened immune system for up to 14 days, which could increase the risk  of jesus alberto the COVID-19 virus and/or experiencing more severe symptoms of the disease, if exposed.  Decreased effectiveness of the flu vaccine if given within 2 weeks of the steroid.         POSSIBLE PROCEDURE SIDE EFFECTS  -Call the [facility] if you are concerned  Increased Pain           Increased numbness/tingling      Nausea/Vomiting          Bruising/bleeding at site      Redness or swelling                                              Difficulty walking      Weakness           Fever greater than 100.5    *In the event of a severe headache after an epidural steroid injection that is relieved by lying down, please call the [facility] to speak with a clinical staff member*

## 2023-07-06 ENCOUNTER — OFFICE VISIT (OUTPATIENT)
Dept: PULMONOLOGY | Facility: CLINIC | Age: 83
End: 2023-07-06
Payer: COMMERCIAL

## 2023-07-06 VITALS
HEART RATE: 95 BPM | SYSTOLIC BLOOD PRESSURE: 128 MMHG | BODY MASS INDEX: 38.59 KG/M2 | DIASTOLIC BLOOD PRESSURE: 84 MMHG | WEIGHT: 209.3 LBS | OXYGEN SATURATION: 96 %

## 2023-07-06 DIAGNOSIS — J45.20 MILD INTERMITTENT ASTHMA WITHOUT COMPLICATION: Primary | Chronic | ICD-10-CM

## 2023-07-06 PROCEDURE — 99213 OFFICE O/P EST LOW 20 MIN: CPT | Performed by: INTERNAL MEDICINE

## 2023-07-06 ASSESSMENT — ASTHMA QUESTIONNAIRES
QUESTION_2 LAST FOUR WEEKS HOW OFTEN HAVE YOU HAD SHORTNESS OF BREATH: ONCE OR TWICE A WEEK
QUESTION_1 LAST FOUR WEEKS HOW MUCH OF THE TIME DID YOUR ASTHMA KEEP YOU FROM GETTING AS MUCH DONE AT WORK, SCHOOL OR AT HOME: A LITTLE OF THE TIME
ACT_TOTALSCORE: 21
QUESTION_5 LAST FOUR WEEKS HOW WOULD YOU RATE YOUR ASTHMA CONTROL: WELL CONTROLLED
QUESTION_4 LAST FOUR WEEKS HOW OFTEN HAVE YOU USED YOUR RESCUE INHALER OR NEBULIZER MEDICATION (SUCH AS ALBUTEROL): NOT AT ALL
QUESTION_3 LAST FOUR WEEKS HOW OFTEN DID YOUR ASTHMA SYMPTOMS (WHEEZING, COUGHING, SHORTNESS OF BREATH, CHEST TIGHTNESS OR PAIN) WAKE YOU UP AT NIGHT OR EARLIER THAN USUAL IN THE MORNING: ONCE OR TWICE
ACT_TOTALSCORE: 21

## 2023-07-06 NOTE — PROGRESS NOTES
"Pulmonary Clinic Follow-up Visit    Assessment:  83yoF with history of morbid obesity and reported history of possible asthma presents for follow up after severe pneumonia requiring hospitalization. Now resolved on CT scan but with persistent coughing paroxysms      No acute pulmonary process present  Stop Symbicort  If coughing returns, would consider stopping her Lisinopril.   Increase activity as able  Encouraged follow up with GI given significant inflammation in her colon.   Follow up as needed    Mary Jo Lin MD  Pulmonary/Critical Care    ----------------------------    CCx: abnormal CT    HPI: 83yoF with history of CVA but no defects presents for follow up after hospitalization for septic shock from pneumonia.    She contracted COVID after cruise 9/2022, treated paxlovid but has persistent cough. Saw PCP who prescribed benzonatate PRN.     Didn't like the Symbicort, doesn't like the taste, makes her feel \"hyper.\"  Cough went away about 1 month ago, no    Went to the urologist for cystoscopy--all cytology negative. Had mass seen on CT abd/pelvis. Colonoscopy done found no evidence of malignancy in 2022.    Got new job working 3 days a week. This has gone well since her cough is gone.     Current Regimen: Albuterol. Stopped the symbicort as she didn't like it.   ACT: previously 14: 21    ROS:  12-point review performed and notable for . Frequent urination at night.  The remainder reviewed and negative.       Current Meds:  Current Outpatient Medications   Medication Sig Dispense Refill     albuterol (PROAIR HFA/PROVENTIL HFA/VENTOLIN HFA) 108 (90 Base) MCG/ACT inhaler Inhale 2 puffs into the lungs every 6 hours as needed for shortness of breath / dyspnea or wheezing 18 g 11     albuterol (PROVENTIL) 2.5 mg /3 mL (0.083 %) nebulizer solution [ALBUTEROL (PROVENTIL) 2.5 MG /3 ML (0.083 %) NEBULIZER SOLUTION] Take 3 mL (2.5 mg total) by nebulization every 4 (four) hours as needed for wheezing or shortness of " breath. 75 mL 1     amLODIPine (NORVASC) 10 MG tablet TAKE 1 TABLET (10 MG) BY MOUTH DAILY. 90 tablet 0     atorvastatin (LIPITOR) 20 MG tablet TAKE 1 TABLET BY MOUTH EVERY DAY 90 tablet 1     benzonatate (TESSALON) 200 MG capsule TAKE 1 CAPSULE (200 MG) BY MOUTH 3 TIMES DAILY AS NEEDED FOR COUGH 90 capsule 0     budesonide-formoterol (SYMBICORT) 160-4.5 MCG/ACT Inhaler Inhale 2 puffs into the lungs 2 times daily 6 g 11     cetirizine (ZYRTEC) 10 MG tablet Take 1 tablet (10 mg) by mouth daily 90 tablet 3     ELIQUIS ANTICOAGULANT 5 MG tablet TAKE 1 TABLET BY MOUTH TWICE A DAY 60 tablet 12     gabapentin (NEURONTIN) 300 MG capsule TAKE 1 CAPSULE BY MOUTH EVERY DAY 90 capsule 1     glucosamine-chondroitin 500-400 mg cap [GLUCOSAMINE-CHONDROITIN 500-400 MG CAP] Take 2 capsules by mouth daily.       KLOR-CON 20 MEQ CR tablet TAKE 1 TABLET BY MOUTH THREE TIMES A  tablet 1     lisinopril-hydrochlorothiazide (ZESTORETIC) 20-25 MG tablet TAKE 2 TABLETS BY MOUTH EVERY  tablet 2     metoprolol tartrate (LOPRESSOR) 25 MG tablet TAKE 1/2 TABLET BY MOUTH TWICE A DAY 90 tablet 3     multivitamin with minerals (THERA-M) 9 mg iron-400 mcg Tab tablet Take 1 tablet by mouth daily Chewable       traZODone (DESYREL) 50 MG tablet TAKE 1 TABLET BY MOUTH AT BEDTIME TRY. HALF TABLET FOR FIRST FEW DAYS 90 tablet 1       Physical Exam:  /84 (BP Location: Right arm, Patient Position: Chair, Cuff Size: Adult Large)   Pulse 95   Wt 94.9 kg (209 lb 4.8 oz)   LMP  (LMP Unknown)   SpO2 96%   BMI 38.59 kg/m    Gen: alert, oriented, no distress  HEENT: no lymphadenopathy  Neck: Trachea midline, No Accessory muscle use  CV: irregularly irregular, no M/G/R  Resp: CTAB, no focal crackles or wheezes  Abd: soft, nontender, no palpable organomegaly  Skin: no apparent rashes  Ext: no cyanosis, clubbing or edema  Neuro: alert, nonfocal    Labs:    CBC RESULTS:   Recent Labs   Lab Test 03/11/22  1130   WBC 16.6*   RBC 5.00   HGB 14.6    HCT 45.4   MCV 91   MCH 29.2   MCHC 32.2   RDW 12.8        Sodium   Date Value Ref Range Status   03/09/2022 139 136 - 145 mmol/L Final     Potassium   Date Value Ref Range Status   03/09/2022 3.6 3.5 - 5.0 mmol/L Final     Chloride   Date Value Ref Range Status   03/09/2022 97 (L) 98 - 107 mmol/L Final     Carbon Dioxide (CO2)   Date Value Ref Range Status   03/09/2022 27 22 - 31 mmol/L Final     Anion Gap   Date Value Ref Range Status   03/09/2022 15 5 - 18 mmol/L Final     Glucose   Date Value Ref Range Status   03/09/2022 166 (H) 70 - 125 mg/dL Final     Urea Nitrogen   Date Value Ref Range Status   03/09/2022 22 8 - 28 mg/dL Final     Creatinine   Date Value Ref Range Status   03/09/2022 0.82 0.60 - 1.10 mg/dL Final     GFR Estimate   Date Value Ref Range Status   03/09/2022 72 >60 mL/min/1.73m2 Final     Comment:     Effective December 21, 2021 eGFRcr in adults is calculated using the 2021 CKD-EPI creatinine equation which includes age and gender (Lc et al., NEJM, DOI: 10.1056/WAXWgj8765419)   04/21/2021 >60 >60 mL/min/1.73m2 Final     GFR, ESTIMATED POCT   Date Value Ref Range Status   07/22/2021 >60 >60 mL/min/1.73m2 Final     Calcium   Date Value Ref Range Status   03/09/2022 10.1 8.5 - 10.5 mg/dL Final         Imaging studies:  Personally reviewed image/s and Personally reviewed impression/s  EXAM: CT CHEST W/O CONTRAST  LOCATION: [facility]  DATE/TIME: 12/7/2022 9:53 AM     INDICATION: Worsening cough with history of pneumonia and COVID  COMPARISON: CT of the chest without contrast 04/04/2022; CT pulmonary angiogram 10/29/2015  TECHNIQUE: CT chest without IV contrast. Multiplanar reformats were obtained. Dose reduction techniques were used.  CONTRAST: None.     FINDINGS:   LUNGS AND PLEURA: A circumscribed solid subpleural nodule in the anterior right lower lobe is stable from 2015 measuring 4 x 5 mm (series 3, image 134). Unchanged benign calcified granuloma in the  lateral segment right middle lobe along the major   fissure. Sparse subpleural atelectasis in the lower lobes near the diaphragmatic pleura/posterior costophrenic sulci. Smaller territories of peripheral atelectasis medial right middle lobe and inferior lingula are unchanged and slightly improved   respectively. There are no new airspace opacities. No interstitial thickening. Trachea and central airways are patent. No bronchial wall thickening, endoluminal debris, or bronchiectasis. No pleural effusion.     MEDIASTINUM: Moderate enlargement of the left atrium. Other cardiac chambers are normal in size. No pericardial effusion. Normal caliber thoracic aorta with conventional arch anatomy. No lymphadenopathy. Esophagus is decompressed.     CORONARY ARTERY CALCIFICATION: Mild.     UPPER ABDOMEN: Stable circumscribed cyst in the superior left lobe of the liver. No new findings in the imaged upper abdomen.     MUSCULOSKELETAL: Flowing degenerative osteophytes through most of the thoracic spine, but no compression deformities. No aggressive or destructive bone lesions.                                                                      IMPRESSION:     1.  No new lung, airway, or pleural inflammation.  2.  Stable 4 x 5 mm subpleural pulmonary nodule anterior right upper lobe over multiple years. Stability favors a benign etiology. No additional imaging follow-up is necessary.      PFT's  Personally reviewed and interpreted. 7/1/2022  FEV1 1.57L, 83% predicted  FVC 1.91, 77% predicted  FEV1/FVC 82  Normal flow volume loop  No change post-bronchodilator  ELC 3.99L, 83% predicted  RV 1.86, 85% predicted  DLCO 82% predicted  Impression: normal PFTs

## 2023-07-13 DIAGNOSIS — I10 ESSENTIAL HYPERTENSION: ICD-10-CM

## 2023-07-13 RX ORDER — AMLODIPINE BESYLATE 10 MG/1
10 TABLET ORAL DAILY
Qty: 90 TABLET | Refills: 0 | Status: SHIPPED | OUTPATIENT
Start: 2023-07-13 | End: 2023-11-29

## 2023-07-13 NOTE — TELEPHONE ENCOUNTER
"Routing refill request to provider for review/approval because:  Labs not current:  Creat    Last Written Prescription Date:  3/1/2023  Last Fill Quantity: 90,  # refills: 0   Last office visit provider:  10/13/2022     Requested Prescriptions   Pending Prescriptions Disp Refills    amLODIPine (NORVASC) 10 MG tablet [Pharmacy Med Name: AMLODIPINE BESYLATE 10 MG TAB] 90 tablet 0     Sig: TAKE 1 TABLET (10 MG) BY MOUTH DAILY.       Calcium Channel Blockers Protocol  Failed - 7/13/2023 12:26 PM        Failed - Normal serum creatinine on file in past 12 months     Recent Labs   Lab Test 03/09/22  1428   CR 0.82       Ok to refill medication if creatinine is low          Passed - Blood pressure under 140/90 in past 12 months     BP Readings from Last 3 Encounters:   07/06/23 128/84   06/26/23 (P) 114/74   05/25/23 (!) 149/82                 Passed - Recent (12 mo) or future (30 days) visit within the authorizing provider's specialty     Patient has had an office visit with the authorizing provider or a provider within the authorizing providers department within the previous 12 mos or has a future within next 30 days. See \"Patient Info\" tab in inbasket, or \"Choose Columns\" in Meds & Orders section of the refill encounter.              Passed - Medication is active on med list        Passed - Patient is age 18 or older        Passed - No active pregnancy on record        Passed - No positive pregnancy test in past 12 months             CORRY PHILLIPS RN 07/13/23 12:39 PM  "

## 2023-07-28 DIAGNOSIS — I48.0 PAF (PAROXYSMAL ATRIAL FIBRILLATION) (H): ICD-10-CM

## 2023-07-31 RX ORDER — APIXABAN 5 MG/1
TABLET, FILM COATED ORAL
Qty: 180 TABLET | Refills: 0 | Status: SHIPPED | OUTPATIENT
Start: 2023-07-31 | End: 2023-10-10

## 2023-08-26 DIAGNOSIS — G47.00 INSOMNIA, UNSPECIFIED TYPE: ICD-10-CM

## 2023-08-26 RX ORDER — TRAZODONE HYDROCHLORIDE 50 MG/1
TABLET, FILM COATED ORAL
Qty: 90 TABLET | Refills: 0 | Status: SHIPPED | OUTPATIENT
Start: 2023-08-26 | End: 2024-04-16

## 2023-08-26 NOTE — TELEPHONE ENCOUNTER
"    Last Written Prescription Date: 03/01/2023  Last Fill Quantity: 90 tabs,  # refills: 1   Last office visit provider:  10/05/2022    Requested Prescriptions   Pending Prescriptions Disp Refills    traZODone (DESYREL) 50 MG tablet [Pharmacy Med Name: TRAZODONE 50 MG TABLET] 90 tablet 1     Sig: TAKE 1 TABLET BY MOUTH AT BEDTIME TRY HALF TABLET FOR FIRST FEW DAYS       Serotonin Modulators Passed - 8/26/2023  7:43 AM        Passed - Recent (12 mo) or future (30 days) visit within the authorizing provider's specialty     Patient has had an office visit with the authorizing provider or a provider within the authorizing providers department within the previous 12 mos or has a future within next 30 days. See \"Patient Info\" tab in inbasket, or \"Choose Columns\" in Meds & Orders section of the refill encounter.              Passed - Medication is active on med list        Passed - Patient is age 18 or older        Passed - No active pregnancy on record        Passed - No positive pregnancy test in past 12 months             Jovita Hansen RN 08/26/23 8:01 AM  "

## 2023-10-07 ENCOUNTER — OFFICE VISIT (OUTPATIENT)
Dept: FAMILY MEDICINE | Facility: CLINIC | Age: 83
End: 2023-10-07
Payer: COMMERCIAL

## 2023-10-07 VITALS
OXYGEN SATURATION: 94 % | HEART RATE: 154 BPM | WEIGHT: 214 LBS | BODY MASS INDEX: 39.46 KG/M2 | SYSTOLIC BLOOD PRESSURE: 155 MMHG | RESPIRATION RATE: 18 BRPM | TEMPERATURE: 97.9 F | DIASTOLIC BLOOD PRESSURE: 92 MMHG

## 2023-10-07 DIAGNOSIS — N30.00 ACUTE CYSTITIS WITHOUT HEMATURIA: Primary | ICD-10-CM

## 2023-10-07 DIAGNOSIS — R30.0 DYSURIA: ICD-10-CM

## 2023-10-07 LAB
ALBUMIN UR-MCNC: 100 MG/DL
APPEARANCE UR: ABNORMAL
BACTERIA #/AREA URNS HPF: ABNORMAL /HPF
BILIRUB UR QL STRIP: ABNORMAL
COLOR UR AUTO: YELLOW
GLUCOSE UR STRIP-MCNC: NEGATIVE MG/DL
HGB UR QL STRIP: ABNORMAL
KETONES UR STRIP-MCNC: 40 MG/DL
LEUKOCYTE ESTERASE UR QL STRIP: NEGATIVE
MUCOUS THREADS #/AREA URNS LPF: PRESENT /LPF
NITRATE UR QL: NEGATIVE
PH UR STRIP: 6 [PH] (ref 5–8)
RBC #/AREA URNS AUTO: ABNORMAL /HPF
SP GR UR STRIP: 1.02 (ref 1–1.03)
SQUAMOUS #/AREA URNS AUTO: ABNORMAL /LPF
UROBILINOGEN UR STRIP-ACNC: >=8 E.U./DL
WBC #/AREA URNS AUTO: ABNORMAL /HPF
WBC CLUMPS #/AREA URNS HPF: PRESENT /HPF

## 2023-10-07 PROCEDURE — 87186 SC STD MICRODIL/AGAR DIL: CPT | Performed by: PHYSICIAN ASSISTANT

## 2023-10-07 PROCEDURE — 87086 URINE CULTURE/COLONY COUNT: CPT | Performed by: PHYSICIAN ASSISTANT

## 2023-10-07 PROCEDURE — 81001 URINALYSIS AUTO W/SCOPE: CPT | Performed by: PHYSICIAN ASSISTANT

## 2023-10-07 PROCEDURE — 99213 OFFICE O/P EST LOW 20 MIN: CPT | Performed by: PHYSICIAN ASSISTANT

## 2023-10-07 RX ORDER — CEFDINIR 300 MG/1
300 CAPSULE ORAL 2 TIMES DAILY
Qty: 14 CAPSULE | Refills: 0 | Status: SHIPPED | OUTPATIENT
Start: 2023-10-07 | End: 2023-10-14

## 2023-10-07 NOTE — PROGRESS NOTES
Dysuria  - UA Macroscopic with reflex to Microscopic and Culture - Clinic Collect  - UA Microscopic with Reflex to Culture  - Urine Culture  - cefdinir (OMNICEF) 300 MG capsule; Take 1 capsule (300 mg) by mouth 2 times daily for 7 days    Acute cystitis without hematuria  - cefdinir (OMNICEF) 300 MG capsule; Take 1 capsule (300 mg) by mouth 2 times daily for 7 days    Patient should drink 1.5-2 liters of water per day. Okay to use Azo over the counter and/or cranberry juice for symptomatic relief (Note that Azo will cause urine to become orange/red. If you are a contact lens-wearer, contacts may become discolored.This is normal). Okay to continue taking prescribed antibiotic for full course. Patient was advised to return to clinic if symptoms do not improve in the amount of time specified in the AVS or if symptoms worsen. Patient educated on red flag symptoms and asked to go directly to the ED if symptoms present themselves.      Patient given handout on how to prevent UTIs in the future.    Patient was advised to return to clinic for reevaluation (either UC or PCP) if symptoms do not improve in 5 days. Patient educated on red flag symptoms and asked to go directly to the ED if these symptoms present themselves.       Loy Smith PA-C  [facility]    Subjective   83 year old who presents to clinic today for the following health issues:    Abdominal Pain       HPI     Genitourinary - Female  Onset/Duration: Pt states this has been going on since Thursday and getting worse, lower abdominal pain and it burns while urinating   Description:   Painful urination (Dysuria): YES           Frequency: No  Blood in urine (Hematuria): No  Delay in urine (Hesitency): No  Intensity: moderate  Progression of Symptoms:  worsening  Accompanying Signs & Symptoms:  Fever/chills: No  Flank pain: No  Nausea and vomiting: Nausea but no vomiting   Vaginal symptoms: none  Abdominal/Pelvic Pain: Lower abdomen  cramping   History:   History of frequent UTI s: YES  History of kidney stones: No  Precipitating or alleviating factors: Patient has been slightly dehydrated.   Therapies tried and outcome: None     Review of Systems   Review of Systems   See HPI    Objective    Temp: 97.9  F (36.6  C) Temp src: Oral BP: (!) 155/92 Pulse: (!) 154   Resp: 18 SpO2: 94 %       Physical Exam   Physical Exam  Constitutional:       General: She is not in acute distress.     Appearance: Normal appearance. She is normal weight. She is not ill-appearing, toxic-appearing or diaphoretic.   HENT:      Head: Normocephalic and atraumatic.   Cardiovascular:      Rate and Rhythm: Normal rate.      Pulses: Normal pulses.   Pulmonary:      Effort: Pulmonary effort is normal. No respiratory distress.   Neurological:      General: No focal deficit present.      Mental Status: She is alert and oriented to person, place, and time. Mental status is at baseline.      Gait: Gait normal.   Psychiatric:         Mood and Affect: Mood normal.         Behavior: Behavior normal.         Thought Content: Thought content normal.         Judgment: Judgment normal.          Results for orders placed or performed in visit on 10/07/23 (from the past 24 hour(s))   UA Macroscopic with reflex to Microscopic and Culture - Clinic Collect    Specimen: Urine, Clean Catch   Result Value Ref Range    Color Urine Yellow Colorless, Straw, Light Yellow, Yellow    Appearance Urine Cloudy (A) Clear    Glucose Urine Negative Negative mg/dL    Bilirubin Urine Small (A) Negative    Ketones Urine 40 (A) Negative mg/dL    Specific Gravity Urine 1.020 1.005 - 1.030    Blood Urine Small (A) Negative    pH Urine 6.0 5.0 - 8.0    Protein Albumin Urine 100 (A) Negative mg/dL    Urobilinogen Urine >=8.0 (A) 0.2, 1.0 E.U./dL    Nitrite Urine Negative Negative    Leukocyte Esterase Urine Negative Negative   UA Microscopic with Reflex to Culture   Result Value Ref Range    Bacteria Urine  Moderate (A) None Seen /HPF    RBC Urine 0-2 0-2 /HPF /HPF    WBC Urine 10-25 (A) 0-5 /HPF /HPF    Squamous Epithelials Urine Many (A) None Seen /LPF    WBC Clumps Urine Present (A) None Seen /HPF    Mucus Urine Present (A) None Seen /LPF

## 2023-10-09 DIAGNOSIS — I48.0 PAF (PAROXYSMAL ATRIAL FIBRILLATION) (H): ICD-10-CM

## 2023-10-09 DIAGNOSIS — R05.1 ACUTE COUGH: ICD-10-CM

## 2023-10-09 DIAGNOSIS — I48.19 PERSISTENT ATRIAL FIBRILLATION (H): ICD-10-CM

## 2023-10-09 LAB — BACTERIA UR CULT: ABNORMAL

## 2023-10-10 RX ORDER — APIXABAN 5 MG/1
TABLET, FILM COATED ORAL
Qty: 180 TABLET | Refills: 0 | Status: SHIPPED | OUTPATIENT
Start: 2023-10-10 | End: 2023-11-01

## 2023-10-10 RX ORDER — BENZONATATE 200 MG/1
200 CAPSULE ORAL 3 TIMES DAILY PRN
Qty: 30 CAPSULE | Refills: 0 | Status: SHIPPED | OUTPATIENT
Start: 2023-10-10 | End: 2023-12-21

## 2023-10-10 RX ORDER — METOPROLOL TARTRATE 25 MG/1
TABLET, FILM COATED ORAL
Qty: 90 TABLET | Refills: 3 | Status: SHIPPED | OUTPATIENT
Start: 2023-10-10 | End: 2024-09-12 | Stop reason: SINTOL

## 2023-11-01 DIAGNOSIS — I48.0 PAF (PAROXYSMAL ATRIAL FIBRILLATION) (H): ICD-10-CM

## 2023-11-01 DIAGNOSIS — M54.16 RIGHT LUMBAR RADICULITIS: ICD-10-CM

## 2023-11-01 DIAGNOSIS — G89.29 CHRONIC BILATERAL LOW BACK PAIN WITH RIGHT-SIDED SCIATICA: ICD-10-CM

## 2023-11-01 DIAGNOSIS — G47.00 INSOMNIA, UNSPECIFIED TYPE: ICD-10-CM

## 2023-11-01 DIAGNOSIS — M54.41 CHRONIC BILATERAL LOW BACK PAIN WITH RIGHT-SIDED SCIATICA: ICD-10-CM

## 2023-11-01 DIAGNOSIS — I10 ESSENTIAL HYPERTENSION: ICD-10-CM

## 2023-11-01 RX ORDER — APIXABAN 5 MG/1
TABLET, FILM COATED ORAL
Qty: 180 TABLET | Refills: 0 | Status: SHIPPED | OUTPATIENT
Start: 2023-11-01 | End: 2024-05-14

## 2023-11-01 RX ORDER — TRAZODONE HYDROCHLORIDE 50 MG/1
TABLET, FILM COATED ORAL
Qty: 90 TABLET | Refills: 0 | OUTPATIENT
Start: 2023-11-01

## 2023-11-01 RX ORDER — GABAPENTIN 300 MG/1
CAPSULE ORAL
Qty: 90 CAPSULE | Refills: 1 | Status: SHIPPED | OUTPATIENT
Start: 2023-11-01 | End: 2024-01-31

## 2023-11-01 RX ORDER — LISINOPRIL AND HYDROCHLOROTHIAZIDE 20; 25 MG/1; MG/1
TABLET ORAL
Qty: 180 TABLET | Refills: 2 | Status: SHIPPED | OUTPATIENT
Start: 2023-11-01 | End: 2024-02-16

## 2023-11-29 DIAGNOSIS — I10 ESSENTIAL HYPERTENSION: ICD-10-CM

## 2023-11-29 RX ORDER — AMLODIPINE BESYLATE 10 MG/1
10 TABLET ORAL DAILY
Qty: 90 TABLET | Refills: 0 | Status: SHIPPED | OUTPATIENT
Start: 2023-11-29 | End: 2024-01-31

## 2023-12-21 DIAGNOSIS — R05.1 ACUTE COUGH: ICD-10-CM

## 2023-12-21 RX ORDER — BENZONATATE 200 MG/1
200 CAPSULE ORAL 3 TIMES DAILY PRN
Qty: 30 CAPSULE | Refills: 0 | Status: SHIPPED | OUTPATIENT
Start: 2023-12-21 | End: 2024-02-16

## 2023-12-29 ENCOUNTER — NURSE TRIAGE (OUTPATIENT)
Dept: NURSING | Facility: CLINIC | Age: 83
End: 2023-12-29
Payer: COMMERCIAL

## 2023-12-29 NOTE — TELEPHONE ENCOUNTER
Patient calling. She called in yesterday because her  tested positive for COVID. She woke this afternoon with terrible chills. She is testing negative this afternoon. She has a history of asthma and previous pneumonia. Afebrile. She is also fatigued.     Care given for home care, patient was instructed to re-test in 2 days. Routed to Dr. DEMETRIUS Diaz due to patient's history of asthma and her concern that she is having a false negative with her test this afternoon. She is asking for prophylactic prescription of Paxlovid.    Afua Skinner RN  [facility]  December 29, 2023, 3:05 PM    Reason for Disposition   Does not meet COVID-19 EXPOSURE criteria BUT caller still concerned about COVID-19 EXPOSURE (e.g., living with someone who was exposed and who has no symptoms of COVID-19)    Additional Information   Negative: COVID-19 lab test positive   Negative: Lives with someone known to have influenza (flu test positive) and flu-like symptoms (e.g., cough, runny nose, sore throat, SOB; with or without fever)   Negative: Symptoms of COVID-19 (e.g., cough, fever, SOB, or others) and doctor (or NP/PA) diagnosed COVID-19 based on symptoms   Negative: Symptoms of COVID-19 (e.g., cough, fever, SOB, or others) and within 14 days of COVID-19 EXPOSURE   Negative: Symptoms of COVID-19 (e.g., cough, fever, SOB, or others) and within 14 days of being in a high-risk area for COVID-19 community spread (identified by CDC)   Negative: Difficulty breathing (shortness of breath) occurs and onset > 14 days after COVID-19 EXPOSURE   Negative: Cough occurs and onset > 14 days after COVID-19 EXPOSURE   Negative: Common cold symptoms and onset > 14 days after COVID-19 EXPOSURE   Negative: COVID-19 vaccine reaction suspected (e.g., fever, headache, muscle aches) occurring during days 1-3 after getting vaccine   Negative: COVID-19 vaccine, questions about   Negative: COVID-19 EXPOSURE within last 14 days and requests COVID-19  lab test to return to work and NO symptoms   Negative: COVID-19 EXPOSURE within last 14 days and weak immune system (e.g., HIV positive, cancer chemo, splenectomy, organ transplant, chronic steroids) and NO symptoms   Negative: Living or working in a correctional facility, long-term care facility, or shelter (i.e., group setting; densely populated) where an outbreak has occurred and NO symptoms   Negative: COVID-19 EXPOSURE within last 14 days and NO symptoms   Negative: COVID-19 EXPOSURE 15 or more days ago and NO symptoms   Negative: International travel and arrived home within last 14 days    Protocols used: Coronavirus (COVID-19) Exposure-A-[place]

## 2023-12-29 NOTE — TELEPHONE ENCOUNTER
Please let patient know that she should not take the paxlovid if she is not positive for COVID- it is not approved for prophylaxis- if she tests positive this weekend she can do a virtual nurse visit to be assessed for possible treatment.

## 2023-12-29 NOTE — TELEPHONE ENCOUNTER
Called patient and discussed message from PCP.  Patient understands instructions.  Highly unlikely that [facility] will have any access over the weekend virtually for treatments as such I provided the [facility] treatment phone number.

## 2024-01-02 ENCOUNTER — NURSE TRIAGE (OUTPATIENT)
Dept: NURSING | Facility: CLINIC | Age: 84
End: 2024-01-02
Payer: COMMERCIAL

## 2024-01-02 NOTE — TELEPHONE ENCOUNTER
"Triage Call:     Pt calling to report on and off nausea for the past week, but less than 7 days    Pt reports the following\"   Had COVID-19 before [facility] and had a cough    Nausea comes and goes for the past week  No vomiting or diarrhea  Some stomach cramping  Last night she got sweaty; no fever    Ate dinner than the next day had no appetite.     Pt was given care advice. Declined an appt for now.   Reason for Disposition   Unexplained nausea    Additional Information   Negative: Shock suspected (e.g., cold/pale/clammy skin, too weak to stand, low BP, rapid pulse)   Negative: Sounds like a life-threatening emergency to the triager   Negative: Other symptom is present, see that guideline.  (e.g., chest pain, headache, dizziness, abdominal pain, colds, sore throat, etc.).   Negative: Unable to walk, or can only walk with assistance (e.g., requires support)   Negative: Difficulty breathing   Negative: Insulin-dependent diabetes (Type I) and glucose > 400 mg/dL (22 mmol/L)   Negative: Drinking very little and dehydration suspected (e.g., no urine > 12 hours, very dry mouth, very lightheaded)   Negative: Patient sounds very sick or weak to the triager   Negative: Fever > 100.0 F  (37.8 C) and diabetes mellitus or weak immune system (e.g., HIV positive, cancer chemo, splenectomy, chronic steroids)   Negative: Fever > 101 F  (38.3 C) and over 60 years of age   Negative: Fever > 100.0 F  (37.8 C) and bedridden (e.g., CVA, chronic illness, recovering from surgery)   Negative: Fever > 104 F  (40 C)   Negative: Taking any of the following medications: digoxin (Lanoxin), lithium, theophylline, phenytoin (Dilantin)   Negative: Yellowish color of the skin or white of the eye (i.e., jaundice)   Negative: Fever present > 3 days (72 hours)   Negative: Patient wants to be seen   Negative: Receiving cancer chemotherapy medication   Negative: Taking prescription medication that could cause nausea (e.g., narcotics/opiates, " antibiotics, OCPs, many others)   Negative: Nausea lasts > 1 week   Negative: Substance use (drug use) or unhealthy alcohol use, known or suspected   Negative: Nausea is a chronic symptom (recurrent or ongoing AND present > 4 weeks)    Protocols used: Nausea-A-[place]  Mayelin Claire RN  [facility] 10:05 AM 1/2/2024

## 2024-01-24 ASSESSMENT — ASTHMA QUESTIONNAIRES
QUESTION_5 LAST FOUR WEEKS HOW WOULD YOU RATE YOUR ASTHMA CONTROL: WELL CONTROLLED
ACT_TOTALSCORE: 21
QUESTION_3 LAST FOUR WEEKS HOW OFTEN DID YOUR ASTHMA SYMPTOMS (WHEEZING, COUGHING, SHORTNESS OF BREATH, CHEST TIGHTNESS OR PAIN) WAKE YOU UP AT NIGHT OR EARLIER THAN USUAL IN THE MORNING: NOT AT ALL
ACT_TOTALSCORE: 21
QUESTION_2 LAST FOUR WEEKS HOW OFTEN HAVE YOU HAD SHORTNESS OF BREATH: THREE TO SIX TIMES A WEEK
QUESTION_1 LAST FOUR WEEKS HOW MUCH OF THE TIME DID YOUR ASTHMA KEEP YOU FROM GETTING AS MUCH DONE AT WORK, SCHOOL OR AT HOME: NONE OF THE TIME
QUESTION_4 LAST FOUR WEEKS HOW OFTEN HAVE YOU USED YOUR RESCUE INHALER OR NEBULIZER MEDICATION (SUCH AS ALBUTEROL): ONCE A WEEK OR LESS

## 2024-01-26 DIAGNOSIS — I10 ESSENTIAL HYPERTENSION: ICD-10-CM

## 2024-01-26 RX ORDER — POTASSIUM CHLORIDE 1500 MG/1
TABLET, EXTENDED RELEASE ORAL
Qty: 270 TABLET | Refills: 1 | OUTPATIENT
Start: 2024-01-26

## 2024-01-31 ENCOUNTER — OFFICE VISIT (OUTPATIENT)
Dept: FAMILY MEDICINE | Facility: CLINIC | Age: 84
End: 2024-01-31
Payer: COMMERCIAL

## 2024-01-31 VITALS
RESPIRATION RATE: 20 BRPM | SYSTOLIC BLOOD PRESSURE: 139 MMHG | DIASTOLIC BLOOD PRESSURE: 86 MMHG | HEIGHT: 62 IN | HEART RATE: 98 BPM | WEIGHT: 215 LBS | OXYGEN SATURATION: 97 % | BODY MASS INDEX: 39.56 KG/M2

## 2024-01-31 DIAGNOSIS — E66.01 MORBID OBESITY (H): ICD-10-CM

## 2024-01-31 DIAGNOSIS — I48.19 PERSISTENT ATRIAL FIBRILLATION (H): ICD-10-CM

## 2024-01-31 DIAGNOSIS — R73.03 PREDIABETES: ICD-10-CM

## 2024-01-31 DIAGNOSIS — E87.6 HYPOKALEMIA: ICD-10-CM

## 2024-01-31 DIAGNOSIS — Z86.73 HISTORY OF CVA (CEREBROVASCULAR ACCIDENT): ICD-10-CM

## 2024-01-31 DIAGNOSIS — M54.41 CHRONIC BILATERAL LOW BACK PAIN WITH RIGHT-SIDED SCIATICA: ICD-10-CM

## 2024-01-31 DIAGNOSIS — I10 ESSENTIAL HYPERTENSION: Primary | ICD-10-CM

## 2024-01-31 DIAGNOSIS — G89.29 CHRONIC BILATERAL LOW BACK PAIN WITH RIGHT-SIDED SCIATICA: ICD-10-CM

## 2024-01-31 PROBLEM — K50.10 CROHN'S DISEASE OF LARGE BOWEL (H): Status: RESOLVED | Noted: 2022-03-31 | Resolved: 2024-01-31

## 2024-01-31 LAB
ERYTHROCYTE [DISTWIDTH] IN BLOOD BY AUTOMATED COUNT: 13 % (ref 10–15)
HBA1C MFR BLD: 6.1 % (ref 0–5.6)
HCT VFR BLD AUTO: 44.1 % (ref 35–47)
HGB BLD-MCNC: 14.6 G/DL (ref 11.7–15.7)
MCH RBC QN AUTO: 29.2 PG (ref 26.5–33)
MCHC RBC AUTO-ENTMCNC: 33.1 G/DL (ref 31.5–36.5)
MCV RBC AUTO: 88 FL (ref 78–100)
PLATELET # BLD AUTO: 244 10E3/UL (ref 150–450)
RBC # BLD AUTO: 5 10E6/UL (ref 3.8–5.2)
WBC # BLD AUTO: 8.3 10E3/UL (ref 4–11)

## 2024-01-31 PROCEDURE — 80061 LIPID PANEL: CPT | Performed by: FAMILY MEDICINE

## 2024-01-31 PROCEDURE — 83036 HEMOGLOBIN GLYCOSYLATED A1C: CPT | Performed by: FAMILY MEDICINE

## 2024-01-31 PROCEDURE — 85027 COMPLETE CBC AUTOMATED: CPT | Performed by: FAMILY MEDICINE

## 2024-01-31 PROCEDURE — 36415 COLL VENOUS BLD VENIPUNCTURE: CPT | Performed by: FAMILY MEDICINE

## 2024-01-31 PROCEDURE — 99214 OFFICE O/P EST MOD 30 MIN: CPT | Performed by: FAMILY MEDICINE

## 2024-01-31 PROCEDURE — 80053 COMPREHEN METABOLIC PANEL: CPT | Performed by: FAMILY MEDICINE

## 2024-01-31 RX ORDER — POTASSIUM CHLORIDE 1500 MG/1
20 TABLET, EXTENDED RELEASE ORAL 3 TIMES DAILY
Qty: 270 TABLET | Refills: 1 | Status: SHIPPED | OUTPATIENT
Start: 2024-01-31 | End: 2024-08-05

## 2024-01-31 RX ORDER — GABAPENTIN 300 MG/1
300 CAPSULE ORAL DAILY
Qty: 90 CAPSULE | Refills: 3 | Status: SHIPPED | OUTPATIENT
Start: 2024-01-31

## 2024-01-31 RX ORDER — AMLODIPINE BESYLATE 10 MG/1
10 TABLET ORAL DAILY
Qty: 90 TABLET | Refills: 3 | Status: SHIPPED | OUTPATIENT
Start: 2024-01-31

## 2024-01-31 RX ORDER — ATORVASTATIN CALCIUM 20 MG/1
20 TABLET, FILM COATED ORAL DAILY
Qty: 90 TABLET | Refills: 3 | Status: SHIPPED | OUTPATIENT
Start: 2024-01-31

## 2024-01-31 ASSESSMENT — ENCOUNTER SYMPTOMS: NAUSEA: 1

## 2024-01-31 NOTE — PROGRESS NOTES
Assessment & Plan     Essential hypertension  Hypokalemia  Blood pressure within goal today on amlodipine, lisinopril-hydrochlorothiazide, metoprolol.  Also takes potassium supplementation due to chronic hypokalemia.  Update monitoring labs today and refill amlodipine and potassium supplement.  Plan to adjust potassium dose if needed.  - potassium chloride ER (KLOR-CON) 20 MEQ CR tablet; Take 1 tablet (20 mEq) by mouth 3 times daily  - amLODIPine (NORVASC) 10 MG tablet; Take 1 tablet (10 mg) by mouth daily    Morbid obesity (H)  Prediabetes  Previously prediabetic but this has not been checked since 2022, update today.  BMI elevated at 39.64.  Continue to address weight loss through diet/exercise.  Schedule patient for follow-up Medicare annual wellness.  - Hemoglobin A1c    Chronic bilateral low back pain with right-sided sciatica  Uses gabapentin nightly for chronic low back pain, refills provided.  - gabapentin (NEURONTIN) 300 MG capsule; Take 1 capsule (300 mg) by mouth daily    History of CVA (cerebrovascular accident)  Persistent atrial fibrillation (H)  Persistent atrial fibrillation rate controlled with metoprolol and on Eliquis for stroke prophylaxis.  Has had prior CVAs remains on atorvastatin 20 mg daily, reassess nonfasting lipids today.  Also is on Eliquis for history of CVA.  - Lipid Profile (Chol, Trig, HDL, LDL calc)  - Comprehensive metabolic panel (BMP + Alb, Alk Phos, ALT, AST, Total. Bili, TP)  - CBC with platelets      Subjective   Lexii is a 83 year old, presenting for the following health issues:  Nausea (Nausea and lack of appetite, x 10 days, patient states symptoms have improved since making appointment ) and Recheck Medication (Refill potassium )        1/31/2024     3:05 PM   Additional Questions   Roomed by KELSEY De La Torre CMA   Accompanied by -     History of Present Illness       Reason for visit:  Wellness check    She eats 2-3 servings of fruits and vegetables daily.She consumes 1  "sweetened beverage(s) daily.She exercises with enough effort to increase her heart rate 20 to 29 minutes per day.  She exercises with enough effort to increase her heart rate 5 days per week.   She is taking medications regularly.     Initially scheduled the appointment for nausea x 10 days but feels she had a GI bug from her grandchildren.  Would like to change to a medication check.  Needing refills of a few medications including potassium supplement, Gabapentin, atorvastatin, amlodipine.  Notes issues with hypokalemia in the past and reports getting ill when her potassium is too low.  Uses gabapentin for chronic low back pain only taking 300 mg nightly.     Follows with cardiology for atrial fibrillation.  Occasionally gets winded with activities.  On Eliquis.    Remains on statin given history of CVA.       Objective    /86   Pulse 102   Resp 20   Ht 1.568 m (5' 1.75\")   Wt 97.5 kg (215 lb)   LMP  (LMP Unknown)   SpO2 97%   BMI 39.64 kg/m    Body mass index is 39.64 kg/m .  Physical Exam   GENERAL: alert and no distress  RESP: lungs clear to auscultation - no rales, rhonchi or wheezes  CV: irregularly irregular rhythm and no murmur, click or rub  PSYCH: mentation appears normal, affect normal/bright    No results found for this or any previous visit (from the past 24 hour(s)).        Signed Electronically by: Dorothea Vargas DO    "

## 2024-02-01 LAB
ALBUMIN SERPL BCG-MCNC: 4.4 G/DL (ref 3.5–5.2)
ALP SERPL-CCNC: 84 U/L (ref 40–150)
ALT SERPL W P-5'-P-CCNC: 19 U/L (ref 0–50)
ANION GAP SERPL CALCULATED.3IONS-SCNC: 12 MMOL/L (ref 7–15)
AST SERPL W P-5'-P-CCNC: 20 U/L (ref 0–45)
BILIRUB SERPL-MCNC: 1 MG/DL
BUN SERPL-MCNC: 12.2 MG/DL (ref 8–23)
CALCIUM SERPL-MCNC: 9.8 MG/DL (ref 8.8–10.2)
CHLORIDE SERPL-SCNC: 98 MMOL/L (ref 98–107)
CHOLEST SERPL-MCNC: 171 MG/DL
CREAT SERPL-MCNC: 0.59 MG/DL (ref 0.51–0.95)
DEPRECATED HCO3 PLAS-SCNC: 29 MMOL/L (ref 22–29)
EGFRCR SERPLBLD CKD-EPI 2021: 90 ML/MIN/1.73M2
FASTING STATUS PATIENT QL REPORTED: NO
GLUCOSE SERPL-MCNC: 108 MG/DL (ref 70–99)
HDLC SERPL-MCNC: 86 MG/DL
LDLC SERPL CALC-MCNC: 64 MG/DL
NONHDLC SERPL-MCNC: 85 MG/DL
POTASSIUM SERPL-SCNC: 3.3 MMOL/L (ref 3.4–5.3)
PROT SERPL-MCNC: 7.3 G/DL (ref 6.4–8.3)
SODIUM SERPL-SCNC: 139 MMOL/L (ref 135–145)
TRIGL SERPL-MCNC: 104 MG/DL

## 2024-02-09 SDOH — HEALTH STABILITY: PHYSICAL HEALTH: ON AVERAGE, HOW MANY DAYS PER WEEK DO YOU ENGAGE IN MODERATE TO STRENUOUS EXERCISE (LIKE A BRISK WALK)?: 3 DAYS

## 2024-02-09 SDOH — HEALTH STABILITY: PHYSICAL HEALTH: ON AVERAGE, HOW MANY MINUTES DO YOU ENGAGE IN EXERCISE AT THIS LEVEL?: 30 MIN

## 2024-02-09 ASSESSMENT — SOCIAL DETERMINANTS OF HEALTH (SDOH): HOW OFTEN DO YOU GET TOGETHER WITH FRIENDS OR RELATIVES?: TWICE A WEEK

## 2024-02-16 ENCOUNTER — MYC REFILL (OUTPATIENT)
Dept: FAMILY MEDICINE | Facility: CLINIC | Age: 84
End: 2024-02-16

## 2024-02-16 ENCOUNTER — OFFICE VISIT (OUTPATIENT)
Dept: FAMILY MEDICINE | Facility: CLINIC | Age: 84
End: 2024-02-16
Payer: COMMERCIAL

## 2024-02-16 VITALS
DIASTOLIC BLOOD PRESSURE: 80 MMHG | WEIGHT: 214 LBS | HEIGHT: 61 IN | RESPIRATION RATE: 20 BRPM | SYSTOLIC BLOOD PRESSURE: 132 MMHG | OXYGEN SATURATION: 96 % | HEART RATE: 96 BPM | TEMPERATURE: 97.9 F | BODY MASS INDEX: 40.4 KG/M2

## 2024-02-16 DIAGNOSIS — R05.1 ACUTE COUGH: ICD-10-CM

## 2024-02-16 DIAGNOSIS — Z00.00 HEALTH CARE MAINTENANCE: ICD-10-CM

## 2024-02-16 DIAGNOSIS — M54.41 CHRONIC BILATERAL LOW BACK PAIN WITH RIGHT-SIDED SCIATICA: ICD-10-CM

## 2024-02-16 DIAGNOSIS — I48.19 PERSISTENT ATRIAL FIBRILLATION (H): ICD-10-CM

## 2024-02-16 DIAGNOSIS — E78.2 MIXED HYPERLIPIDEMIA: ICD-10-CM

## 2024-02-16 DIAGNOSIS — G89.29 CHRONIC BILATERAL LOW BACK PAIN WITH RIGHT-SIDED SCIATICA: ICD-10-CM

## 2024-02-16 DIAGNOSIS — I10 ESSENTIAL HYPERTENSION: Primary | ICD-10-CM

## 2024-02-16 DIAGNOSIS — E66.01 CLASS 2 SEVERE OBESITY DUE TO EXCESS CALORIES WITH SERIOUS COMORBIDITY AND BODY MASS INDEX (BMI) OF 39.0 TO 39.9 IN ADULT (H): ICD-10-CM

## 2024-02-16 DIAGNOSIS — E66.812 CLASS 2 SEVERE OBESITY DUE TO EXCESS CALORIES WITH SERIOUS COMORBIDITY AND BODY MASS INDEX (BMI) OF 39.0 TO 39.9 IN ADULT (H): ICD-10-CM

## 2024-02-16 PROCEDURE — G0438 PPPS, INITIAL VISIT: HCPCS | Performed by: FAMILY MEDICINE

## 2024-02-16 PROCEDURE — 99214 OFFICE O/P EST MOD 30 MIN: CPT | Mod: 25 | Performed by: FAMILY MEDICINE

## 2024-02-16 RX ORDER — HYDROCHLOROTHIAZIDE 50 MG/1
50 TABLET ORAL DAILY
Qty: 30 TABLET | Refills: 0 | Status: SHIPPED | OUTPATIENT
Start: 2024-02-16 | End: 2024-02-29

## 2024-02-16 RX ORDER — LOSARTAN POTASSIUM 100 MG/1
100 TABLET ORAL DAILY
Qty: 30 TABLET | Refills: 0 | Status: SHIPPED | OUTPATIENT
Start: 2024-02-16 | End: 2024-02-28

## 2024-02-16 RX ORDER — GABAPENTIN 300 MG/1
300 CAPSULE ORAL DAILY
Qty: 90 CAPSULE | Refills: 3 | OUTPATIENT
Start: 2024-02-16

## 2024-02-16 RX ORDER — BENZONATATE 200 MG/1
200 CAPSULE ORAL 3 TIMES DAILY PRN
Qty: 30 CAPSULE | Refills: 3 | Status: SHIPPED | OUTPATIENT
Start: 2024-02-16

## 2024-02-16 NOTE — PATIENT INSTRUCTIONS
Hold combination lisinopril - hydrochlorothiazide    Start hydrochlorothiazide and losartan  Update Dr. Diaz with cough in a couple of weeks

## 2024-02-16 NOTE — PROGRESS NOTES
"Preventive Care Visit  [facility]  Eddie Diaz MD, Family Medicine  Feb 16, 2024    Assessment & Plan     Health care maintenance  Reviewed labs from recent visit  UTD with immunizations    Acute cough  Pt has been using tessalon capsules which has been helping with cough- on further discussion concern cough could be secondary to lisinopril- will transition off BP medication with alternative  - benzonatate (TESSALON) 200 MG capsule  Dispense: 30 capsule; Refill: 3    Essential hypertension  BP at goal- but due to suspected dry cough due to lisinopril- will transition to losartan  Recheck blood pressure in 2 weeks  - losartan (COZAAR) 100 MG tablet  Dispense: 30 tablet; Refill: 0  - hydroCHLOROthiazide (HYDRODIURIL) 50 MG tablet  Dispense: 30 tablet; Refill: 0    Mixed hyperlipidemia  Continue with statin  Lipid levels at goal range    Persistent atrial fibrillation (H)  On anti-coagulation  Rate controlled    Class 2 severe obesity due to excess calories with serious comorbidity and body mass index (BMI) of 39.0 to 39.9 in adult (H)  Pt aware of weight- discussed diet and exercise        BMI  Estimated body mass index is 39.89 kg/m  as calculated from the following:    Height as of this encounter: 1.56 m (5' 1.42\").    Weight as of this encounter: 97.1 kg (214 lb).       Counseling  Appropriate preventive services were discussed with this patient, including applicable screening as appropriate for fall prevention, nutrition, physical activity, Tobacco-use cessation, weight loss and cognition.  Checklist reviewing preventive services available has been given to the patient.    Patient has been advised of split billing requirements and indicates understanding: Yes        Erick Shultz is a 83 year old, presenting for the following:  Wellness Visit (Not fasting )        2/16/2024    10:19 AM   Additional Questions   Roomed by Latasha COLE CMA         Health Care Directive  Patient " does not have a Health Care Directive or Living Will: Discussed advance care planning with patient; information given to patient to review.    HPI  Pt is here for annual exam- recently had labs obtained which we reviewed.  Has been having problems with dry non-productive cough- discussed switching off lisinopril to see if cough improves.  Tolerating anti-coagulaiton- rate controlled- discussed diet, exercise and physical activity.  Blood pressure at goal range- recheck in 2 weeks once medications changed.  Continue with statin.  Discussed physical and mental activity.        2/9/2024   General Health   How would you rate your overall physical health? Good   Feel stress (tense, anxious, or unable to sleep) To some extent   (!) STRESS CONCERN      2/9/2024   Nutrition   Diet: Regular (no restrictions)         2/9/2024   Exercise   Days per week of moderate/strenous exercise 3 days   Average minutes spent exercising at this level 30 min         2/9/2024   Social Factors   Frequency of gathering with friends or relatives Twice a week   Worry food won't last until get money to buy more No   Food not last or not have enough money for food? No   Do you have housing?  Yes   Are you worried about losing your housing? No   Lack of transportation? No   Unable to get utilities (heat,electricity)? No         2/16/2024   Fall Risk   Reason Gait Speed Test Not Completed Patient declines   Reason for decline A-Fib, knee replacement          2/9/2024   Activities of Daily Living- Home Safety   Needs help with the following daily activites None of the above   Safety concerns in the home None of the above         2/9/2024   Dental   Dentist two times every year? (!) NO         2/9/2024   Hearing Screening   Hearing concerns? (!) IT'S HARD TO FOLLOW A CONVERSATION IN A NOISY RESTAURANT OR CROWDED ROOM.         2/9/2024   Driving Risk Screening   Patient/family members have concerns about driving No         2/9/2024   General  Alertness/Fatigue Screening   Have you been more tired than usual lately? No         2/9/2024   Urinary Incontinence Screening   Bothered by leaking urine in past 6 months Yes         2/9/2024   TB Screening   Were you born outside of [place]?  No         Today's PHQ-2 Score:       2/16/2024    10:14 AM   PHQ-2 ( 1999 [employer])   Q1: Little interest or pleasure in doing things 0   Q2: Feeling down, depressed or hopeless 1   PHQ-2 Score 1   Q1: Little interest or pleasure in doing things Not at all   Q2: Feeling down, depressed or hopeless Several days   PHQ-2 Score 1           2/9/2024   Substance Use   Alcohol more than 3/day or more than 7/wk No   Do you have a current opioid prescription? No   How severe/bad is pain from 1 to 10? 3/10   Do you use any other substances recreationally? (!) SYNTHETIC MARIJUANA     Social History     Tobacco Use    Smoking status: Never    Smokeless tobacco: Never   Vaping Use    Vaping Use: Never used   Substance Use Topics    Alcohol use: Yes     Comment: Alcoholic Drinks/day: 1 drink per day    Drug use: No                        Reviewed and updated as needed this visit by Provider                      Current providers sharing in care for this patient include:  Patient Care Team:  Eddie Diaz MD as PCP - General (Family Medicine)  Mary Jo Lin MD as Assigned Pulmonology Provider  Nazanin Hudson MD as Fellow (Internal Medicine)  Addie Rodrigues DO as Assigned PCP  Marcelino Bourgeois MD as Assigned Heart and Vascular Provider  Reyna Weldon CNP as Assigned Neuroscience Provider    The following health maintenance items are reviewed in Epic and correct as of today:  Health Maintenance   Topic Date Due    DEXA  Never done    ASTHMA ACTION PLAN  Never done    RSV VACCINE (Pregnancy & 60+) (1 - 1-dose 60+ series) Never done    MEDICARE ANNUAL WELLNESS VISIT  04/19/2022    ANNUAL REVIEW OF HM ORDERS  04/26/2023    ASTHMA CONTROL TEST  07/31/2024    LIPID  01/31/2025  "   FALL RISK ASSESSMENT  02/16/2025    ADVANCE CARE PLANNING  04/19/2026    DTAP/TDAP/TD IMMUNIZATION (3 - Td or Tdap) 10/09/2027    PHQ-2 (once per calendar year)  Completed    INFLUENZA VACCINE  Completed    Pneumococcal Vaccine: 65+ Years  Completed    ZOSTER IMMUNIZATION  Completed    COVID-19 Vaccine  Completed    IPV IMMUNIZATION  Aged Out    HPV IMMUNIZATION  Aged Out    MENINGITIS IMMUNIZATION  Aged Out    RSV MONOCLONAL ANTIBODY  Aged Out            Objective    Exam  /80 (BP Location: Left arm, Patient Position: Sitting, Cuff Size: Adult Large)   Pulse 96   Temp 97.9  F (36.6  C) (Oral)   Resp 20   Ht 1.56 m (5' 1.42\")   Wt 97.1 kg (214 lb)   LMP  (LMP Unknown)   SpO2 96%   BMI 39.89 kg/m     Estimated body mass index is 39.89 kg/m  as calculated from the following:    Height as of this encounter: 1.56 m (5' 1.42\").    Weight as of this encounter: 97.1 kg (214 lb).    Physical Exam  GENERAL: alert and no distress  EYES: Eyes grossly normal to inspection, PERRL and conjunctivae and sclerae normal  HENT: ear canals and TM's normal, nose and mouth without ulcers or lesions  RESP: lungs clear to auscultation - no rales, rhonchi or wheezes  CV: irregularly irregular rhythm and no peripheral edema  ABDOMEN: bowel sounds normal  MS: no gross musculoskeletal defects noted, no edema  NEURO: Normal strength and tone, mentation intact and speech normal  PSYCH: mentation appears normal, affect normal/bright         2/16/2024   Mini Cog   Clock Draw Score 2 Normal   3 Item Recall 3 objects recalled   Mini Cog Total Score 5            Signed Electronically by: Eddie Diaz MD    "

## 2024-02-28 ENCOUNTER — MYC MEDICAL ADVICE (OUTPATIENT)
Dept: FAMILY MEDICINE | Facility: CLINIC | Age: 84
End: 2024-02-28
Payer: COMMERCIAL

## 2024-02-28 DIAGNOSIS — I10 ESSENTIAL HYPERTENSION: ICD-10-CM

## 2024-02-28 RX ORDER — LOSARTAN POTASSIUM 100 MG/1
100 TABLET ORAL DAILY
Qty: 90 TABLET | Refills: 3 | Status: SHIPPED | OUTPATIENT
Start: 2024-02-28

## 2024-02-29 DIAGNOSIS — G25.81 RESTLESS LEGS SYNDROME: Primary | ICD-10-CM

## 2024-02-29 DIAGNOSIS — I10 ESSENTIAL HYPERTENSION: ICD-10-CM

## 2024-02-29 RX ORDER — HYDROCHLOROTHIAZIDE 50 MG/1
50 TABLET ORAL DAILY
Qty: 90 TABLET | Refills: 3 | Status: SHIPPED | OUTPATIENT
Start: 2024-02-29 | End: 2024-09-24

## 2024-02-29 RX ORDER — PRAMIPEXOLE DIHYDROCHLORIDE 0.12 MG/1
0.12 TABLET ORAL 3 TIMES DAILY
Qty: 30 TABLET | Refills: 1 | Status: SHIPPED | OUTPATIENT
Start: 2024-02-29 | End: 2024-04-22

## 2024-04-15 DIAGNOSIS — G47.00 INSOMNIA, UNSPECIFIED TYPE: ICD-10-CM

## 2024-04-16 RX ORDER — TRAZODONE HYDROCHLORIDE 50 MG/1
TABLET, FILM COATED ORAL
Qty: 90 TABLET | Refills: 0 | Status: SHIPPED | OUTPATIENT
Start: 2024-04-16 | End: 2024-05-14

## 2024-04-19 DIAGNOSIS — G25.81 RESTLESS LEGS SYNDROME: ICD-10-CM

## 2024-04-22 RX ORDER — PRAMIPEXOLE DIHYDROCHLORIDE 0.12 MG/1
0.12 TABLET ORAL 3 TIMES DAILY
Qty: 30 TABLET | Refills: 1 | Status: SHIPPED | OUTPATIENT
Start: 2024-04-22 | End: 2024-05-02

## 2024-04-27 ENCOUNTER — MYC REFILL (OUTPATIENT)
Dept: FAMILY MEDICINE | Facility: CLINIC | Age: 84
End: 2024-04-27
Payer: COMMERCIAL

## 2024-04-27 DIAGNOSIS — G25.81 RESTLESS LEGS SYNDROME: ICD-10-CM

## 2024-04-29 RX ORDER — PRAMIPEXOLE DIHYDROCHLORIDE 0.12 MG/1
0.12 TABLET ORAL 3 TIMES DAILY
Qty: 30 TABLET | Refills: 1 | OUTPATIENT
Start: 2024-04-29

## 2024-05-02 NOTE — TELEPHONE ENCOUNTER
"I spoke with Lexii.  She states pharmacy was looking for clarification on directions.  Previous prescription was \"Take one tablet at bedtime as needed\"  This is written \"Take 1 tablet 3 times daily as needed.\"    Pharmacy will not fill rx now due to discrepancy.      Please send new rx with previous directions \"Take one tablet at bed time as needed\" for clarification.  "

## 2024-05-03 RX ORDER — PRAMIPEXOLE DIHYDROCHLORIDE 0.12 MG/1
0.12 TABLET ORAL
Qty: 30 TABLET | Refills: 1 | Status: SHIPPED | OUTPATIENT
Start: 2024-05-03 | End: 2024-07-02

## 2024-05-13 DIAGNOSIS — I48.0 PAF (PAROXYSMAL ATRIAL FIBRILLATION) (H): ICD-10-CM

## 2024-05-13 DIAGNOSIS — G47.00 INSOMNIA, UNSPECIFIED TYPE: ICD-10-CM

## 2024-05-14 RX ORDER — TRAZODONE HYDROCHLORIDE 50 MG/1
TABLET, FILM COATED ORAL
Qty: 90 TABLET | Refills: 0 | Status: SHIPPED | OUTPATIENT
Start: 2024-05-14

## 2024-06-17 DIAGNOSIS — I48.0 PAF (PAROXYSMAL ATRIAL FIBRILLATION) (H): ICD-10-CM

## 2024-06-19 RX ORDER — APIXABAN 5 MG/1
TABLET, FILM COATED ORAL
Qty: 60 TABLET | Refills: 0 | Status: SHIPPED | OUTPATIENT
Start: 2024-06-19 | End: 2024-06-21

## 2024-06-21 DIAGNOSIS — I48.0 PAF (PAROXYSMAL ATRIAL FIBRILLATION) (H): ICD-10-CM

## 2024-06-21 NOTE — TELEPHONE ENCOUNTER
Received a fax that insurance requires 90 day supply for refills. 90 days granted, but pt is very overdue for follow up. She is scheduled with JUANA next available in September, so this was granted. -[facility] [place]

## 2024-07-01 ENCOUNTER — OFFICE VISIT (OUTPATIENT)
Dept: PHYSICAL MEDICINE AND REHAB | Facility: CLINIC | Age: 84
End: 2024-07-01
Payer: COMMERCIAL

## 2024-07-01 VITALS
HEIGHT: 61 IN | WEIGHT: 214 LBS | DIASTOLIC BLOOD PRESSURE: 88 MMHG | SYSTOLIC BLOOD PRESSURE: 134 MMHG | BODY MASS INDEX: 40.4 KG/M2 | HEART RATE: 93 BPM

## 2024-07-01 DIAGNOSIS — G25.81 RESTLESS LEGS SYNDROME: ICD-10-CM

## 2024-07-01 DIAGNOSIS — M54.42 CHRONIC BILATERAL LOW BACK PAIN WITH BILATERAL SCIATICA: Primary | ICD-10-CM

## 2024-07-01 DIAGNOSIS — M48.061 SPINAL STENOSIS OF LUMBAR REGION WITHOUT NEUROGENIC CLAUDICATION: ICD-10-CM

## 2024-07-01 DIAGNOSIS — M43.16 SPONDYLOLISTHESIS OF LUMBAR REGION: ICD-10-CM

## 2024-07-01 DIAGNOSIS — G89.29 CHRONIC BILATERAL LOW BACK PAIN WITH BILATERAL SCIATICA: Primary | ICD-10-CM

## 2024-07-01 DIAGNOSIS — M51.369 DDD (DEGENERATIVE DISC DISEASE), LUMBAR: ICD-10-CM

## 2024-07-01 DIAGNOSIS — M54.41 CHRONIC BILATERAL LOW BACK PAIN WITH BILATERAL SCIATICA: Primary | ICD-10-CM

## 2024-07-01 DIAGNOSIS — M54.16 LUMBAR RADICULOPATHY: ICD-10-CM

## 2024-07-01 PROCEDURE — 99214 OFFICE O/P EST MOD 30 MIN: CPT | Performed by: NURSE PRACTITIONER

## 2024-07-01 RX ORDER — ACETAMINOPHEN AND CODEINE PHOSPHATE 300; 30 MG/1; MG/1
1 TABLET ORAL EVERY 12 HOURS PRN
Qty: 10 TABLET | Refills: 0 | Status: SHIPPED | OUTPATIENT
Start: 2024-07-01 | End: 2024-07-06

## 2024-07-01 ASSESSMENT — PAIN SCALES - GENERAL: PAINLEVEL: MODERATE PAIN (5)

## 2024-07-01 NOTE — LETTER
7/1/2024      Llvuia Marrufo  [address] [facility] [place] [address]      Dear Colleague,    Thank you for referring your patient, Lluvia Marrufo, to the [facility]. Please see a copy of my visit note below.      Assessment:     Diagnoses and all orders for this visit:  Chronic bilateral low back pain with bilateral sciatica  -     PAIN Transforaminal WILBUR Inj Lumbosacral Juan C; Future  Lumbar radiculopathy  -     PAIN Transforaminal WILBUR Inj Lumbosacral Juan C; Future  -     acetaminophen-codeine (TYLENOL #3) 300-30 MG per tablet; Take 1 tablet by mouth every 12 hours as needed for severe pain  Spinal stenosis of lumbar region without neurogenic claudication  Spondylolisthesis of lumbar region  DDD (degenerative disc disease), lumbar     Lluvia Marrufo is a 84 year old y.o. 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  MRI with L3-4 severe spinal stenosis with foraminal stenosis bilaterally.     Plan:     A shared decision making plan was used. The patient's values and choices were respected. Prior medical records were reviewed today. The following represents what was discussed and decided upon by the provider and the patient.        -DIAGNOSTIC TESTS: Images were personally reviewed and interpreted.   --Lumbar spine MRI 6/14/2021 with multilevel degenerative change, progression since 2016.  L3-4 spondylolisthesis with severe spinal stenosis with mild right and moderate left foraminal stenosis.  Moderate spinal stenosis at I have this like quirky thing where I like bowled  all of my new orders L4-5 and mild at L2-3.  --Lumbar spine MRI 2016 with multilevel spondylolisthesis L2-3, L3-4, L4-5.  Advanced disc height loss at T12-L1.  Moderate to advanced disc height loss at L5-S1, however no nerve compression noted at this level.  L3-4 moderate spinal stenosis with left disc bulge along with facet arthropathy.  L4-5 mild spinal stenosis with mild left foraminal stenosis.    -INTERVENTIONS: Ordered Bilateral L3-4 transforaminal epidural steroid injection to see if we get further relief of lumbar radiculopathy.  Patient received significant relief from this injection for over 1 year previously and does have severe central and bilateral foraminal stenosis at this level.  -If no benefit with this injection I would recommend updated imaging.    -MEDICATIONS:  Prescribed Tylenol 3, 1 tablet twice daily as needed for severe breakthrough pain number 10 tablets given for 5 days worth.  Patient did not not want a large prescription of this and is for severe breakthrough pain only.  [place]  checked.  This is for acute pain only.  Refills will not be given over the telephone.  Discussed the risks (eg, addiction, overdose, worsening pain, death) verses benefit of opioid use with patient today.  Patient is aware that cannot drive while taking this medication.  Explained that this medication will not be a long term solution to ongoing pain. Discussed using lowest effective dose and the importance of other measures for pain management including PT, other non-opioid medications, behavioral treatments, and other procedure options.   Discussed side effects of medications and proper use. Patient verbalized understanding.    -PHYSICAL THERAPY: Encourage patient continue with home exercises from prior physical therapy sessions, she is part of a home physical therapy program.  Discussed the importance of core strengthening, ROM, stretching exercises with the patient and how each of these entities is important in  decreasing pain.  Explained to the patient that the purpose of physical therapy is to teach the patient a home exercise program.  These exercises need to be performed every day in order to decrease pain and prevent future occurrences of pain.        -PATIENT EDUCATION:  Total time of 32 minutes, on the day of service, spent with the patient, reviewing the chart, placing orders, and documenting.     -FOLLOW UP: Follow-up for injection at the spine center than 2 weeks postinjection.  Advised to contact clinic if symptoms worsen or change.    Subjective:     Lluvia Marrufo is a 84 year old female who presents today for follow-up regarding recurrent bilateral low back pain that is greater on the right that radiates in the lateral thigh and anterior thigh.  She does report that pain has been significant for the last couple of weeks however she was doing well for the last year since the injection up until recent reaggravation of her current symptoms.  Currently her pain today is a 7/10, and 8 at its worst, 2 at its best.  Aggravated with moving and walking, does improve with sitting.    Patient's  was present today during entire visit and added to past medical/surgical/family/social history and history of presenting illness.     -Treatment to Date: No prior spinal surgery.  Physical therapy 2017.  Physical therapy 2021x5 sessions LBP with radiculopathy.     Left L5-S1 TFESI 12/15/2016.  Bilateral L3-4 TFESI 6/29/2022 with 100% relief LBP and radiculopathy leg symptoms.   Bilateral L3-4 TFESI 6/26/2023 with significant relief.     -Medications:  Advil at bedtime    Patient Active Problem List   Diagnosis     Obesity     Prediabetes     Lower Back Pain     Hypertension goal BP (blood pressure) < 140/90     Joint Pain, Localized In The Shoulder     Mild intermittent asthma without complication     Spinal stenosis of lumbar region     Primary osteoarthritis of left hip     Expressive aphasia     Acute CVA  (cerebrovascular accident) (H)     Obesity (BMI 35.0-39.9) with comorbidity (H)     Precordial pain     Persistent atrial fibrillation (H)     Mass of colon     Diverticular disease of large intestine     Colitis       Current Outpatient Medications   Medication Sig Dispense Refill     acetaminophen-codeine (TYLENOL #3) 300-30 MG per tablet Take 1 tablet by mouth every 12 hours as needed for severe pain 10 tablet 0     apixaban ANTICOAGULANT (ELIQUIS ANTICOAGULANT) 5 MG tablet Take 1 tablet (5 mg) by mouth 2 times daily 180 tablet 0     gabapentin (NEURONTIN) 300 MG capsule Take 1 capsule (300 mg) by mouth daily 90 capsule 3     albuterol (PROAIR HFA/PROVENTIL HFA/VENTOLIN HFA) 108 (90 Base) MCG/ACT inhaler Inhale 2 puffs into the lungs every 6 hours as needed for shortness of breath / dyspnea or wheezing 18 g 11     albuterol (PROVENTIL) 2.5 mg /3 mL (0.083 %) nebulizer solution [ALBUTEROL (PROVENTIL) 2.5 MG /3 ML (0.083 %) NEBULIZER SOLUTION] Take 3 mL (2.5 mg total) by nebulization every 4 (four) hours as needed for wheezing or shortness of breath. 75 mL 1     amLODIPine (NORVASC) 10 MG tablet Take 1 tablet (10 mg) by mouth daily 90 tablet 3     atorvastatin (LIPITOR) 20 MG tablet Take 1 tablet (20 mg) by mouth daily 90 tablet 3     benzonatate (TESSALON) 200 MG capsule Take 1 capsule (200 mg) by mouth 3 times daily as needed for cough 30 capsule 3     glucosamine-chondroitin 500-400 mg cap [GLUCOSAMINE-CHONDROITIN 500-400 MG CAP] Take 2 capsules by mouth daily.       hydrochlorothiazide (HYDRODIURIL) 50 MG tablet Take 1 tablet (50 mg) by mouth daily 90 tablet 3     losartan (COZAAR) 100 MG tablet Take 1 tablet (100 mg) by mouth daily 90 tablet 3     metoprolol tartrate (LOPRESSOR) 25 MG tablet TAKE 1/2 TABLET BY MOUTH TWICE A DAY 90 tablet 3     multivitamin with minerals (THERA-M) 9 mg iron-400 mcg Tab tablet Take 1 tablet by mouth daily Chewable       potassium chloride ER (KLOR-CON) 20 MEQ CR tablet Take 1  "tablet (20 mEq) by mouth 3 times daily 270 tablet 1     pramipexole (MIRAPEX) 0.125 MG tablet Take 1 tablet (0.125 mg) by mouth nightly as needed 30 tablet 1     traZODone (DESYREL) 50 MG tablet TAKE 1 TABLET BY MOUTH AT BEDTIME - TRY 1/2 TABLET FOR FIRST FEW DAYS 90 tablet 0     No current facility-administered medications for this visit.       Allergies   Allergen Reactions     Azithromycin Nausea     Dizziness, dehydrated.        Past Medical History:   Diagnosis Date     Arthritis      Asthma      Atrial fibrillation (H)      Bronchitis      Earache      Fracture, foot      History of CVA (cerebrovascular accident)      Hyperlipidemia      Hypertension      UTI (urinary tract infection)         Review of Systems  ROS:  Specifically negative for bowel/bladder dysfunction, balance changes, headache, dizziness, foot drop, fevers, chills, appetite changes, nausea/vomiting, unexplained weight loss. Otherwise 13 systems reviewed are negative. Please see the patient's intake questionnaire from today for details.    Reviewed Social, Family, Past Medical and Past Surgical history with patient, no significant changes noted since prior visit.     Objective:     /88 (BP Location: Left arm, Patient Position: Sitting, Cuff Size: Adult Regular)   Pulse 93   Ht 5' 1.42\" (1.56 m)   Wt 214 lb (97.1 kg)   LMP  (LMP Unknown)   BMI 39.88 kg/m      PHYSICAL EXAMINATION:    --CONSTITUTIONAL: Well developed, well nourished, healthy appearing individual.  --PSYCHIATRIC: Appropriate mood and affect. No difficulty interacting due to temper, social withdrawal, or memory issues.  --SKIN: Lumbar region is dry and intact.   --RESPIRATORY: Normal rhythm and effort. No abnormal accessory muscle breathing patterns noted.   --MUSCULOSKELETAL:  Normal lumbar lordosis noted, no lateral shift.  --GROSS MOTOR: Easily arises from a seated position. Gait is non-antalgic  --LUMBAR SPINE:  Inspection reveals no evidence of deformity.   --LOWER " EXTREMITY MOTOR TESTING:  Plantar flexion left 5/5, right 5/5   Dorsiflexion left 5/5, right 5/5   Great toe MTP extension left 5/5, right 5/5  Knee flexion left 5/5, right 5/5  Knee extension left 5/5, right 5/5   Hip flexion left 5/5, right 5/5  Hip abduction left 5/5, right 5/5  Hip adduction left 5/5, right 5/5   --HIPS: Full range of motion bilaterally.   --NEUROLOGIC: Bilateral patellar and achilles reflexes are physiologic and symmetric. Sensation to light touch is intact in the bilateral L4, L5, and S1 dermatomes.    RESULTS:   Imaging: Spine imaging was reviewed today. The images were shown to the patient and the findings were explained using a spine model.      Lumbar spine MRI reviewed                        Again, thank you for allowing me to participate in the care of your patient.        Sincerely,        Reyna Weldon, CNP

## 2024-07-01 NOTE — PROGRESS NOTES
Assessment:     Diagnoses and all orders for this visit:  Chronic bilateral low back pain with bilateral sciatica  -     PAIN Transforaminal WILBUR Inj Lumbosacral Juan C; Future  Lumbar radiculopathy  -     PAIN Transforaminal WILBUR Inj Lumbosacral Juan C; Future  -     acetaminophen-codeine (TYLENOL #3) 300-30 MG per tablet; Take 1 tablet by mouth every 12 hours as needed for severe pain  Spinal stenosis of lumbar region without neurogenic claudication  Spondylolisthesis of lumbar region  DDD (degenerative disc disease), lumbar     Lluvia Marrufo is a 84 year old y.o. 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  MRI with L3-4 severe spinal stenosis with foraminal stenosis bilaterally.     Plan:     A shared decision making plan was used. The patient's values and choices were respected. Prior medical records were reviewed today. The following represents what was discussed and decided upon by the provider and the patient.        -DIAGNOSTIC TESTS: Images were personally reviewed and interpreted.   --Lumbar spine MRI 6/14/2021 with multilevel degenerative change, progression since 2016.  L3-4 spondylolisthesis with severe spinal stenosis with mild right and moderate left foraminal stenosis.  Moderate spinal stenosis at L4-5 and mild at L2-3.  --Lumbar spine MRI 2016 with multilevel spondylolisthesis L2-3, L3-4, L4-5.  Advanced disc height loss at T12-L1.  Moderate to advanced disc height loss at L5-S1, however no nerve compression noted at this level.  L3-4 moderate spinal stenosis with left disc bulge along with facet  arthropathy.  L4-5 mild spinal stenosis with mild left foraminal stenosis.    -INTERVENTIONS: Ordered Bilateral L3-4 transforaminal epidural steroid injection to see if we get further relief of lumbar radiculopathy.  Patient received significant relief from this injection for over 1 year previously and does have severe central and bilateral foraminal stenosis at this level.  -If no benefit with this injection I would recommend updated imaging.    -MEDICATIONS:  Prescribed Tylenol 3, 1 tablet twice daily as needed for severe breakthrough pain number 10 tablets given for 5 days worth.  Patient did not not want a large prescription of this and is for severe breakthrough pain only.  [place]  checked.  This is for acute pain only.  Refills will not be given over the telephone.  Discussed the risks (eg, addiction, overdose, worsening pain, death) verses benefit of opioid use with patient today.  Patient is aware that cannot drive while taking this medication.  Explained that this medication will not be a long term solution to ongoing pain. Discussed using lowest effective dose and the importance of other measures for pain management including PT, other non-opioid medications, behavioral treatments, and other procedure options.   Discussed side effects of medications and proper use. Patient verbalized understanding.    -PHYSICAL THERAPY: Encourage patient continue with home exercises from prior physical therapy sessions, she is part of a home physical therapy program.  Discussed the importance of core strengthening, ROM, stretching exercises with the patient and how each of these entities is important in decreasing pain.  Explained to the patient that the purpose of physical therapy is to teach the patient a home exercise program.  These exercises need to be performed every day in order to decrease pain and prevent future occurrences of pain.        -PATIENT EDUCATION:  Total time of 32 minutes, on the day of service, spent  with the patient, reviewing the chart, placing orders, and documenting.     -FOLLOW UP: Follow-up for injection at the spine center than 2 weeks postinjection.  Advised to contact clinic if symptoms worsen or change.    Subjective:     Lluvia Marrufo is a 84 year old female who presents today for follow-up regarding recurrent bilateral low back pain that is greater on the right that radiates in the lateral thigh and anterior thigh.  She does report that pain has been significant for the last couple of weeks however she was doing well for the last year since the injection up until recent reaggravation of her current symptoms.  Currently her pain today is a 7/10, and 8 at its worst, 2 at its best.  Aggravated with moving and walking, does improve with sitting.    Patient's  was present today during entire visit and added to past medical/surgical/family/social history and history of presenting illness.     -Treatment to Date: No prior spinal surgery.  Physical therapy 2017.  Physical therapy 2021x5 sessions LBP with radiculopathy.     Left L5-S1 TFESI 12/15/2016.  Bilateral L3-4 TFESI 6/29/2022 with 100% relief LBP and radiculopathy leg symptoms.   Bilateral L3-4 TFESI 6/26/2023 with significant relief.     -Medications:  Advil at bedtime    Patient Active Problem List   Diagnosis    Obesity    Prediabetes    Lower Back Pain    Hypertension goal BP (blood pressure) < 140/90    Joint Pain, Localized In The Shoulder    Mild intermittent asthma without complication    Spinal stenosis of lumbar region    Primary osteoarthritis of left hip    Expressive aphasia    Acute CVA (cerebrovascular accident) (H)    Obesity (BMI 35.0-39.9) with comorbidity (H)    Precordial pain    Persistent atrial fibrillation (H)    Mass of colon    Diverticular disease of large intestine    Colitis       Current Outpatient Medications   Medication Sig Dispense Refill    acetaminophen-codeine (TYLENOL #3) 300-30 MG per tablet Take 1  tablet by mouth every 12 hours as needed for severe pain 10 tablet 0    apixaban ANTICOAGULANT (ELIQUIS ANTICOAGULANT) 5 MG tablet Take 1 tablet (5 mg) by mouth 2 times daily 180 tablet 0    gabapentin (NEURONTIN) 300 MG capsule Take 1 capsule (300 mg) by mouth daily 90 capsule 3    albuterol (PROAIR HFA/PROVENTIL HFA/VENTOLIN HFA) 108 (90 Base) MCG/ACT inhaler Inhale 2 puffs into the lungs every 6 hours as needed for shortness of breath / dyspnea or wheezing 18 g 11    albuterol (PROVENTIL) 2.5 mg /3 mL (0.083 %) nebulizer solution [ALBUTEROL (PROVENTIL) 2.5 MG /3 ML (0.083 %) NEBULIZER SOLUTION] Take 3 mL (2.5 mg total) by nebulization every 4 (four) hours as needed for wheezing or shortness of breath. 75 mL 1    amLODIPine (NORVASC) 10 MG tablet Take 1 tablet (10 mg) by mouth daily 90 tablet 3    atorvastatin (LIPITOR) 20 MG tablet Take 1 tablet (20 mg) by mouth daily 90 tablet 3    benzonatate (TESSALON) 200 MG capsule Take 1 capsule (200 mg) by mouth 3 times daily as needed for cough 30 capsule 3    glucosamine-chondroitin 500-400 mg cap [GLUCOSAMINE-CHONDROITIN 500-400 MG CAP] Take 2 capsules by mouth daily.      hydrochlorothiazide (HYDRODIURIL) 50 MG tablet Take 1 tablet (50 mg) by mouth daily 90 tablet 3    losartan (COZAAR) 100 MG tablet Take 1 tablet (100 mg) by mouth daily 90 tablet 3    metoprolol tartrate (LOPRESSOR) 25 MG tablet TAKE 1/2 TABLET BY MOUTH TWICE A DAY 90 tablet 3    multivitamin with minerals (THERA-M) 9 mg iron-400 mcg Tab tablet Take 1 tablet by mouth daily Chewable      potassium chloride ER (KLOR-CON) 20 MEQ CR tablet Take 1 tablet (20 mEq) by mouth 3 times daily 270 tablet 1    pramipexole (MIRAPEX) 0.125 MG tablet Take 1 tablet (0.125 mg) by mouth nightly as needed 30 tablet 1    traZODone (DESYREL) 50 MG tablet TAKE 1 TABLET BY MOUTH AT BEDTIME - TRY 1/2 TABLET FOR FIRST FEW DAYS 90 tablet 0     No current facility-administered medications for this visit.       Allergies  "  Allergen Reactions    Azithromycin Nausea     Dizziness, dehydrated.        Past Medical History:   Diagnosis Date    Arthritis     Asthma     Atrial fibrillation (H)     Bronchitis     Earache     Fracture, foot     History of CVA (cerebrovascular accident)     Hyperlipidemia     Hypertension     UTI (urinary tract infection)         Review of Systems  ROS:  Specifically negative for bowel/bladder dysfunction, balance changes, headache, dizziness, foot drop, fevers, chills, appetite changes, nausea/vomiting, unexplained weight loss. Otherwise 13 systems reviewed are negative. Please see the patient's intake questionnaire from today for details.    Reviewed Social, Family, Past Medical and Past Surgical history with patient, no significant changes noted since prior visit.     Objective:     /88 (BP Location: Left arm, Patient Position: Sitting, Cuff Size: Adult Regular)   Pulse 93   Ht 5' 1.42\" (1.56 m)   Wt 214 lb (97.1 kg)   LMP  (LMP Unknown)   BMI 39.88 kg/m      PHYSICAL EXAMINATION:    --CONSTITUTIONAL: Well developed, well nourished, healthy appearing individual.  --PSYCHIATRIC: Appropriate mood and affect. No difficulty interacting due to temper, social withdrawal, or memory issues.  --SKIN: Lumbar region is dry and intact.   --RESPIRATORY: Normal rhythm and effort. No abnormal accessory muscle breathing patterns noted.   --MUSCULOSKELETAL:  Normal lumbar lordosis noted, no lateral shift.  --GROSS MOTOR: Easily arises from a seated position. Gait is non-antalgic  --LUMBAR SPINE:  Inspection reveals no evidence of deformity.   --LOWER EXTREMITY MOTOR TESTING:  Plantar flexion left 5/5, right 5/5   Dorsiflexion left 5/5, right 5/5   Great toe MTP extension left 5/5, right 5/5  Knee flexion left 5/5, right 5/5  Knee extension left 5/5, right 5/5   Hip flexion left 5/5, right 5/5  Hip abduction left 5/5, right 5/5  Hip adduction left 5/5, right 5/5   --HIPS: Full range of motion bilaterally. "   --NEUROLOGIC: Bilateral patellar and achilles reflexes are physiologic and symmetric. Sensation to light touch is intact in the bilateral L4, L5, and S1 dermatomes.    RESULTS:   Imaging: Spine imaging was reviewed today. The images were shown to the patient and the findings were explained using a spine model.      Lumbar spine MRI reviewed

## 2024-07-01 NOTE — PATIENT INSTRUCTIONS
~Spine Center Scheduling #[phone].  ~Please call our [facility] Spine Nurse Navigation #[phone] with any questions or concerns about your treatment plan, if symptoms worsen and you would like to be seen urgently, or if you have problems controlling bladder and bowel function.  ~For any future flareups or new symptoms, recommend follow-up in clinic or contact the nurse navigator line.  ~Please note that any My Chart messages may take multiple days for a response due to the high volume of patients seen in clinic.  Anything sent Thursday night or after will be answered the following week when able, as Reyna Welodn CNP does not work in clinic on Fridays.   ~Reyna Weldon CNP is at the [facility] on Tuesdays, otherwise primarily at the [facility].       An injection has been ordered today to potentially help with your pain symptoms. These injections do not fix what is going on in your back, therefore they typically do not take away the pain completely, however they can many times help improve symptoms. Injections should always be completed along with other modalities such as physical therapy for the best long term outcomes. If injections alone are done, then pain will likely return.     [facility] Injection Requirements    A  is required for all fluoroscopically-guided injections.  Injection appointments may be cancelled if there are signs/symptoms of an active infection or if the patient is being actively treated with antibiotics for a diagnosed infection.  Patients may have their steroid injection cancelled if they have had another steroid injection within 2 weeks.  Diabetic patients will have their blood glucose levels checked the day of their injection and the appointment will be rescheduled if the blood glucose level is 300 or higher.  Patients with allergies to cortisone, local anesthetics, iodine, or contrast dye should contact the  Spine Center to further discuss these considerations.  Patients scheduled for medial branch block diagnostic injections should refrain from taking pain medication the day of the procedure.  The medial branch block injection appointment will be rescheduled if the patient's pain rating is not 5/10 or greater at the time of the procedure.  Patients taking warfarin/Coumadin will have their INR checked the day of the procedure and the procedure may be rescheduled if the INR is greater than 3.0.  Please contact the Spine Center (#[phone]) if you are taking any prescription blood-thinning medications (warfarin, Plavix, Lovenox, Eliquis, Brilinta, Effient, etc.) as special dosing adjustments may need to be made depending on the type of injection you are scheduled to receive.  It is recommended that you delay having your steroid injection if you have received a flu shot or shingles vaccine within 2 weeks.

## 2024-07-02 RX ORDER — PRAMIPEXOLE DIHYDROCHLORIDE 0.12 MG/1
0.12 TABLET ORAL
Qty: 90 TABLET | Refills: 1 | Status: SHIPPED | OUTPATIENT
Start: 2024-07-02

## 2024-07-25 ENCOUNTER — RADIOLOGY INJECTION OFFICE VISIT (OUTPATIENT)
Dept: PHYSICAL MEDICINE AND REHAB | Facility: CLINIC | Age: 84
End: 2024-07-25
Attending: NURSE PRACTITIONER
Payer: COMMERCIAL

## 2024-07-25 VITALS
HEART RATE: 106 BPM | DIASTOLIC BLOOD PRESSURE: 80 MMHG | OXYGEN SATURATION: 96 % | SYSTOLIC BLOOD PRESSURE: 144 MMHG | TEMPERATURE: 97.9 F | RESPIRATION RATE: 22 BRPM

## 2024-07-25 DIAGNOSIS — M54.42 CHRONIC BILATERAL LOW BACK PAIN WITH BILATERAL SCIATICA: ICD-10-CM

## 2024-07-25 DIAGNOSIS — G89.29 CHRONIC BILATERAL LOW BACK PAIN WITH BILATERAL SCIATICA: ICD-10-CM

## 2024-07-25 DIAGNOSIS — M54.16 LUMBAR RADICULOPATHY: ICD-10-CM

## 2024-07-25 DIAGNOSIS — M54.41 CHRONIC BILATERAL LOW BACK PAIN WITH BILATERAL SCIATICA: ICD-10-CM

## 2024-07-25 PROCEDURE — 64483 NJX AA&/STRD TFRM EPI L/S 1: CPT | Mod: 50 | Performed by: PAIN MEDICINE

## 2024-07-25 RX ORDER — LIDOCAINE HYDROCHLORIDE 10 MG/ML
INJECTION, SOLUTION EPIDURAL; INFILTRATION; INTRACAUDAL; PERINEURAL
Status: COMPLETED | OUTPATIENT
Start: 2024-07-25 | End: 2024-07-25

## 2024-07-25 RX ORDER — DEXAMETHASONE SODIUM PHOSPHATE 10 MG/ML
INJECTION, SOLUTION INTRAMUSCULAR; INTRAVENOUS
Status: COMPLETED | OUTPATIENT
Start: 2024-07-25 | End: 2024-07-25

## 2024-07-25 RX ADMIN — LIDOCAINE HYDROCHLORIDE 4 ML: 10 INJECTION, SOLUTION EPIDURAL; INFILTRATION; INTRACAUDAL; PERINEURAL at 14:44

## 2024-07-25 RX ADMIN — DEXAMETHASONE SODIUM PHOSPHATE 20 MG: 10 INJECTION, SOLUTION INTRAMUSCULAR; INTRAVENOUS at 14:45

## 2024-07-25 ASSESSMENT — PAIN SCALES - GENERAL
PAINLEVEL: MODERATE PAIN (4)
PAINLEVEL: NO PAIN (0)

## 2024-07-25 NOTE — PATIENT INSTRUCTIONS
DISCHARGE INSTRUCTIONS    During office hours (8:00 a.m.- 4:00 p.m.) questions or concerns may be answered  by calling Spine Center Navigation Nurses at  [phone].  Messages received after hours will be returned the following business day.      In the case of an emergency, please dial 911 or seek assistance at the nearest Emergency Room/Urgent Care facility.     All Patients:    You may experience an increase in your symptoms for the first 2 days (It may take anywhere between 2 days- 2 weeks for the steroid to have maximum effect).    You may use ice on the injection site, as frequently as 20 minutes each hour if needed.    You may take your pain medicine.    You may continue taking your regular medication after your injection. If you have had a Medial Branch Block you may resume pain medication once your pain diary is completed.    You may shower. No swimming, tub bath or hot tub for 48 hours.  You may remove your bandaid/bandage as soon as you are home.    You may resume light activities, as tolerated.    Resume your usual diet as tolerated.    If you were told to hold any blood thinning medications you may resume taking them 24 hours after your procedure as prescribed.    It is strongly advised that you do not drive for 1-3 hours post injection.    If you have had oral sedation:  Do not drive for 8 hours post injection.      If you have had IV sedation:  Do not drive for 24 hours post injection.  Do not operate hazardous machinery or make important personal/business decisions for 24 hours.      POSSIBLE STEROID SIDE EFFECTS (If steroid/cortisone was used for your procedure)    -If you experience these symptoms, it should only last for a short period    Swelling of the legs              Skin redness (flushing)     Mouth (oral) irritation   Blood sugar (glucose) levels            Sweats                    Mood changes  Headache  Sleeplessness  Weakened immune system for up to 14 days, which could increase  the risk of jesus alberto the COVID-19 virus and/or experiencing more severe symptoms of the disease, if exposed.  Decreased effectiveness of the flu vaccine if given within 2 weeks of the steroid.         POSSIBLE PROCEDURE SIDE EFFECTS  -Call the Spine Center if you are concerned  Increased Pain           Increased numbness/tingling      Nausea/Vomiting          Bruising/bleeding at site      Redness or swelling                                              Difficulty walking      Weakness           Fever greater than 100.5    *In the event of a severe headache after an epidural steroid injection that is relieved by lying down, please call the [facility] to speak with a clinical staff member*

## 2024-08-05 DIAGNOSIS — I10 ESSENTIAL HYPERTENSION: ICD-10-CM

## 2024-08-05 RX ORDER — POTASSIUM CHLORIDE 1500 MG/1
20 TABLET, EXTENDED RELEASE ORAL 3 TIMES DAILY
Qty: 270 TABLET | Refills: 1 | Status: SHIPPED | OUTPATIENT
Start: 2024-08-05

## 2024-08-14 NOTE — PROGRESS NOTES
Assessment:     Diagnoses and all orders for this visit:  Lumbar radiculopathy  -     acetaminophen-codeine (TYLENOL #3) 300-30 MG per tablet; Take 1 tablet by mouth 2 times daily as needed for severe pain     Lluvia Marrufo is a 84 year old y.o. female with past medical history significant for hyperlipidemia, hypertension, mild intermittent asthma, prediabetes, hypomagnesia, hypokalemia, acute CVA 2018 with expressive aphasia, obesity, headache, history of right total knee replacement, left hip replacement, history of bilateral shoulder replacement who presents today for follow-up regarding:    -Chronic recurrent bilateral low back pain with lumbar radiculopathy with 60% relief post bilateral L3-4 TFESI 7/25/2024.     Plan:     A shared decision making plan was used. The patient's values and choices were respected. Prior medical records were reviewed today. The following represents what was discussed and decided upon by the provider and the patient.        -DIAGNOSTIC TESTS: Images were personally reviewed and interpreted.   --Lumbar spine MRI 6/14/2021 with multilevel degenerative change, progression since 2016.  L3-4 spondylolisthesis with severe spinal stenosis with mild right and moderate left foraminal stenosis.  Moderate spinal stenosis at L4-5 and mild at L2-3.  --Lumbar spine MRI 2016 with multilevel spondylolisthesis L2-3, L3-4, L4-5.  Advanced disc height loss at T12-L1.  Moderate to advanced disc height loss at L5-S1, however no nerve compression noted at this level.  L3-4 moderate spinal stenosis with left disc bulge along with facet arthropathy.  L4-5 mild spinal stenosis with mild left foraminal stenosis.    -INTERVENTIONS: Discussed with patient that if her pain worsens within the next 3 months we could trial a bilateral L4-5 TFESI.  Otherwise if her pain recurs in 3 months or longer we can repeat the bilateral L3-4 TFESI.  She does have spinal stenosis at both L3-4 and L4-5 and bilateral  foraminal stenosis.    -MEDICATIONS: Refilled Tylenol 3 with codeine 1 tablet twice daily as needed for severe breakthrough pain number 14 tablets given for 7 days worth.  MN  checked.  This is for acute pain only.  Refills will not be given over the telephone.  Discussed the risks (eg, addiction, overdose, worsening pain, death) verses benefit of opioid use with patient today.  Patient is aware that cannot drive while taking this medication.  Explained that this medication will not be a long term solution to ongoing pain. Discussed using lowest effective dose and the importance of other measures for pain management including PT, other non-opioid medications, behavioral treatments, and other procedure options.   Discussed side effects of medications and proper use. Patient verbalized understanding.    -PHYSICAL THERAPY: Encourage patient to revisit her home exercises from prior PT sessions.  She is not doing them at this time, but is going to restart them at home.  If she is having a hard time finding her previous exercises on paper or difficult doing them advised her to notify us and we can put a referral in for a refresher course for PT sessions.  Discussed the importance of core strengthening, ROM, stretching exercises with the patient and how each of these entities is important in decreasing pain.  Explained to the patient that the purpose of physical therapy is to teach the patient a home exercise program.  These exercises need to be performed every day in order to decrease pain and prevent future occurrences of pain.        -PATIENT EDUCATION:  Total time of 32 minutes, on the day of service, spent with the patient, reviewing the chart, placing orders, and documenting.     -FOLLOW UP: Follow-up as needed  Advised to contact clinic if symptoms worsen or change.    Subjective:     Lluvia Marrufo is a 84 year old female who presents today for follow-up regarding ongoing bilateral low back pain greater on  the left that radiates to the bilateral lower extremities.  Overall she did receive approximately 60% relief with recent bilateral L3-4 TFESI, slight less relief in comparison to the previous injections.  However overall her symptoms are more tolerable.  Currently her pain is a 2/10 up to a 5 at its worst specifically with standing and walking, sitting does make her pain better.    She otherwise denies any recent trips or falls or balance changes.  Denies bowel or bladder loss control.    -Treatment to Date: No prior spinal surgery.  Physical therapy 2017.  Physical therapy 2021x5 sessions LBP with radiculopathy.     Left L5-S1 TFESI 12/15/2016.  Bilateral L3-4 TFESI 6/29/2022 with 100% relief LBP and radiculopathy leg symptoms.   Bilateral L3-4 TFESI 6/26/2023 with significant relief.  Bilateral L3-4 TFESI 7/25/2024 with 60% relief.     -Medications:  Advil at bedtime    Patient Active Problem List   Diagnosis    Obesity    Prediabetes    Lower Back Pain    Hypertension goal BP (blood pressure) < 140/90    Joint Pain, Localized In The Shoulder    Mild intermittent asthma without complication    Spinal stenosis of lumbar region    Primary osteoarthritis of left hip    Expressive aphasia    Acute CVA (cerebrovascular accident) (H)    Obesity (BMI 35.0-39.9) with comorbidity (H)    Precordial pain    Persistent atrial fibrillation (H)    Mass of colon    Diverticular disease of large intestine    Colitis       Current Outpatient Medications   Medication Sig Dispense Refill    acetaminophen-codeine (TYLENOL #3) 300-30 MG per tablet Take 1 tablet by mouth 2 times daily as needed for severe pain 14 tablet 0    apixaban ANTICOAGULANT (ELIQUIS ANTICOAGULANT) 5 MG tablet Take 1 tablet (5 mg) by mouth 2 times daily 180 tablet 0    gabapentin (NEURONTIN) 300 MG capsule Take 1 capsule (300 mg) by mouth daily 90 capsule 3    albuterol (PROAIR HFA/PROVENTIL HFA/VENTOLIN HFA) 108 (90 Base) MCG/ACT inhaler Inhale 2 puffs into the  lungs every 6 hours as needed for shortness of breath / dyspnea or wheezing 18 g 11    albuterol (PROVENTIL) 2.5 mg /3 mL (0.083 %) nebulizer solution [ALBUTEROL (PROVENTIL) 2.5 MG /3 ML (0.083 %) NEBULIZER SOLUTION] Take 3 mL (2.5 mg total) by nebulization every 4 (four) hours as needed for wheezing or shortness of breath. 75 mL 1    amLODIPine (NORVASC) 10 MG tablet Take 1 tablet (10 mg) by mouth daily 90 tablet 3    atorvastatin (LIPITOR) 20 MG tablet Take 1 tablet (20 mg) by mouth daily 90 tablet 3    benzonatate (TESSALON) 200 MG capsule Take 1 capsule (200 mg) by mouth 3 times daily as needed for cough 30 capsule 3    glucosamine-chondroitin 500-400 mg cap [GLUCOSAMINE-CHONDROITIN 500-400 MG CAP] Take 2 capsules by mouth daily.      hydrochlorothiazide (HYDRODIURIL) 50 MG tablet Take 1 tablet (50 mg) by mouth daily 90 tablet 3    KLOR-CON M20 20 MEQ CR tablet TAKE 1 TABLET (20 MEQ) BY MOUTH 3 TIMES DAILY 270 tablet 1    losartan (COZAAR) 100 MG tablet Take 1 tablet (100 mg) by mouth daily 90 tablet 3    metoprolol tartrate (LOPRESSOR) 25 MG tablet TAKE 1/2 TABLET BY MOUTH TWICE A DAY 90 tablet 3    multivitamin with minerals (THERA-M) 9 mg iron-400 mcg Tab tablet Take 1 tablet by mouth daily Chewable      pramipexole (MIRAPEX) 0.125 MG tablet TAKE 1 TABLET BY MOUTH NIGHTLY AS NEEDED 90 tablet 1    traZODone (DESYREL) 50 MG tablet TAKE 1 TABLET BY MOUTH AT BEDTIME - TRY 1/2 TABLET FOR FIRST FEW DAYS 90 tablet 0     No current facility-administered medications for this visit.       Allergies   Allergen Reactions    Azithromycin Nausea     Dizziness, dehydrated.        Past Medical History:   Diagnosis Date    Arthritis     Asthma     Atrial fibrillation (H)     Bronchitis     Earache     Fracture, foot     History of CVA (cerebrovascular accident)     Hyperlipidemia     Hypertension     UTI (urinary tract infection)         Review of Systems  ROS:  Specifically negative for bowel/bladder dysfunction, balance  "changes, headache, dizziness, foot drop, fevers, chills, appetite changes, nausea/vomiting, unexplained weight loss. Otherwise 13 systems reviewed are negative. Please see the patient's intake questionnaire from today for details.    Reviewed Social, Family, Past Medical and Past Surgical history with patient, no significant changes noted since prior visit.     Objective:     BP (!) 141/82 (BP Location: Left arm, Patient Position: Sitting, Cuff Size: Adult Large)   Pulse 90   Ht 5' 1.42\" (1.56 m)   Wt 214 lb (97.1 kg)   LMP  (LMP Unknown)   BMI 39.88 kg/m      PHYSICAL EXAMINATION:    --CONSTITUTIONAL: Well developed, well nourished, healthy appearing individual.  --PSYCHIATRIC: Appropriate mood and affect. No difficulty interacting due to temper, social withdrawal, or memory issues.  --SKIN: Lumbar region is dry and intact.   --RESPIRATORY: Normal rhythm and effort. No abnormal accessory muscle breathing patterns noted.   --MUSCULOSKELETAL:  Normal lumbar lordosis noted, no lateral shift.  --GROSS MOTOR: Easily arises from a seated position. Gait is non-antalgic  --LUMBAR SPINE:  Inspection reveals no evidence of deformity.      RESULTS:   Imaging: Spine imaging was reviewed today. The images were shown to the patient and the findings were explained using a spine model.      Lumbar MRI reviewed                    "

## 2024-08-15 ENCOUNTER — OFFICE VISIT (OUTPATIENT)
Dept: PHYSICAL MEDICINE AND REHAB | Facility: CLINIC | Age: 84
End: 2024-08-15
Payer: COMMERCIAL

## 2024-08-15 VITALS
HEART RATE: 90 BPM | WEIGHT: 214 LBS | HEIGHT: 61 IN | SYSTOLIC BLOOD PRESSURE: 141 MMHG | DIASTOLIC BLOOD PRESSURE: 82 MMHG | BODY MASS INDEX: 40.4 KG/M2

## 2024-08-15 DIAGNOSIS — M54.16 LUMBAR RADICULOPATHY: Primary | ICD-10-CM

## 2024-08-15 PROCEDURE — 99214 OFFICE O/P EST MOD 30 MIN: CPT | Performed by: NURSE PRACTITIONER

## 2024-08-15 RX ORDER — ACETAMINOPHEN AND CODEINE PHOSPHATE 300; 30 MG/1; MG/1
1 TABLET ORAL 2 TIMES DAILY PRN
Qty: 14 TABLET | Refills: 0 | Status: SHIPPED | OUTPATIENT
Start: 2024-08-15 | End: 2024-08-22

## 2024-08-15 ASSESSMENT — PAIN SCALES - GENERAL: PAINLEVEL: MILD PAIN (2)

## 2024-08-15 NOTE — LETTER
8/15/2024      Lluvia Marrufo  [address] [facility] [address]      Dear Colleague,    Thank you for referring your patient, Lluvia Marrufo, to the [facility]. Please see a copy of my visit note below.      Assessment:     Diagnoses and all orders for this visit:  Lumbar radiculopathy  -     acetaminophen-codeine (TYLENOL #3) 300-30 MG per tablet; Take 1 tablet by mouth 2 times daily as needed for severe pain     Lluvia Marrufo is a 84 year old y.o. female with past medical history significant for hyperlipidemia, hypertension, mild intermittent asthma, prediabetes, hypomagnesia, hypokalemia, acute CVA 2018 with expressive aphasia, obesity, headache, history of right total knee replacement, left hip replacement, history of bilateral shoulder replacement who presents today for follow-up regarding:    -Chronic recurrent bilateral low back pain with lumbar radiculopathy with 60% relief post bilateral L3-4 TFESI 7/25/2024.     Plan:     A shared decision making plan was used. The patient's values and choices were respected. Prior medical records were reviewed today. The following represents what was discussed and decided upon by the provider and the patient.        -DIAGNOSTIC TESTS: Images were personally reviewed and interpreted.   --Lumbar spine MRI 6/14/2021 with multilevel degenerative change, progression since 2016.  L3-4 spondylolisthesis with severe spinal stenosis with mild right and moderate left foraminal stenosis.  Moderate spinal stenosis at L4-5 and mild at L2-3.  --Lumbar spine MRI 2016 with multilevel spondylolisthesis L2-3, L3-4, L4-5.  Advanced disc height loss at T12-L1.  Moderate to advanced disc height loss at L5-S1, however no nerve compression noted at this level.  L3-4 moderate spinal stenosis with left disc bulge along with facet arthropathy.  L4-5 mild spinal stenosis with mild left foraminal stenosis.    -INTERVENTIONS: Discussed with  patient that if her pain worsens within the next 3 months we could trial a bilateral L4-5 TFESI.  Otherwise if her pain recurs in 3 months or longer we can repeat the bilateral L3-4 TFESI.  She does have spinal stenosis at both L3-4 and L4-5 and bilateral foraminal stenosis.    -MEDICATIONS: Refilled Tylenol 3 with codeine 1 tablet twice daily as needed for severe breakthrough pain number 14 tablets given for 7 days worth.  MN  checked.  This is for acute pain only.  Refills will not be given over the telephone.  Discussed the risks (eg, addiction, overdose, worsening pain, death) verses benefit of opioid use with patient today.  Patient is aware that cannot drive while taking this medication.  Explained that this medication will not be a long term solution to ongoing pain. Discussed using lowest effective dose and the importance of other measures for pain management including PT, other non-opioid medications, behavioral treatments, and other procedure options.   Discussed side effects of medications and proper use. Patient verbalized understanding.    -PHYSICAL THERAPY: Encourage patient to revisit her home exercises from prior PT sessions.  She is not doing them at this time, but is going to restart them at home.  If she is having a hard time finding her previous exercises on paper or difficult doing them advised her to notify us and we can put a referral in for a refresher course for PT sessions.  Discussed the importance of core strengthening, ROM, stretching exercises with the patient and how each of these entities is important in decreasing pain.  Explained to the patient that the purpose of physical therapy is to teach the patient a home exercise program.  These exercises need to be performed every day in order to decrease pain and prevent future occurrences of pain.        -PATIENT EDUCATION:  Total time of 32 minutes, on the day of service, spent with the patient, reviewing the chart, placing orders, and  documenting.     -FOLLOW UP: Follow-up as needed  Advised to contact clinic if symptoms worsen or change.    Subjective:     Lluvia Marrufo is a 84 year old female who presents today for follow-up regarding ongoing bilateral low back pain greater on the left that radiates to the bilateral lower extremities.  Overall she did receive approximately 60% relief with recent bilateral L3-4 TFESI, slight less relief in comparison to the previous injections.  However overall her symptoms are more tolerable.  Currently her pain is a 2/10 up to a 5 at its worst specifically with standing and walking, sitting does make her pain better.    She otherwise denies any recent trips or falls or balance changes.  Denies bowel or bladder loss control.    -Treatment to Date: No prior spinal surgery.  Physical therapy 2017.  Physical therapy 2021x5 sessions LBP with radiculopathy.     Left L5-S1 TFESI 12/15/2016.  Bilateral L3-4 TFESI 6/29/2022 with 100% relief LBP and radiculopathy leg symptoms.   Bilateral L3-4 TFESI 6/26/2023 with significant relief.  Bilateral L3-4 TFESI 7/25/2024 with 60% relief.     -Medications:  Advil at bedtime    Patient Active Problem List   Diagnosis     Obesity     Prediabetes     Lower Back Pain     Hypertension goal BP (blood pressure) < 140/90     Joint Pain, Localized In The Shoulder     Mild intermittent asthma without complication     Spinal stenosis of lumbar region     Primary osteoarthritis of left hip     Expressive aphasia     Acute CVA (cerebrovascular accident) (H)     Obesity (BMI 35.0-39.9) with comorbidity (H)     Precordial pain     Persistent atrial fibrillation (H)     Mass of colon     Diverticular disease of large intestine     Colitis       Current Outpatient Medications   Medication Sig Dispense Refill     acetaminophen-codeine (TYLENOL #3) 300-30 MG per tablet Take 1 tablet by mouth 2 times daily as needed for severe pain 14 tablet 0     apixaban ANTICOAGULANT (ELIQUIS  ANTICOAGULANT) 5 MG tablet Take 1 tablet (5 mg) by mouth 2 times daily 180 tablet 0     gabapentin (NEURONTIN) 300 MG capsule Take 1 capsule (300 mg) by mouth daily 90 capsule 3     albuterol (PROAIR HFA/PROVENTIL HFA/VENTOLIN HFA) 108 (90 Base) MCG/ACT inhaler Inhale 2 puffs into the lungs every 6 hours as needed for shortness of breath / dyspnea or wheezing 18 g 11     albuterol (PROVENTIL) 2.5 mg /3 mL (0.083 %) nebulizer solution [ALBUTEROL (PROVENTIL) 2.5 MG /3 ML (0.083 %) NEBULIZER SOLUTION] Take 3 mL (2.5 mg total) by nebulization every 4 (four) hours as needed for wheezing or shortness of breath. 75 mL 1     amLODIPine (NORVASC) 10 MG tablet Take 1 tablet (10 mg) by mouth daily 90 tablet 3     atorvastatin (LIPITOR) 20 MG tablet Take 1 tablet (20 mg) by mouth daily 90 tablet 3     benzonatate (TESSALON) 200 MG capsule Take 1 capsule (200 mg) by mouth 3 times daily as needed for cough 30 capsule 3     glucosamine-chondroitin 500-400 mg cap [GLUCOSAMINE-CHONDROITIN 500-400 MG CAP] Take 2 capsules by mouth daily.       hydrochlorothiazide (HYDRODIURIL) 50 MG tablet Take 1 tablet (50 mg) by mouth daily 90 tablet 3     KLOR-CON M20 20 MEQ CR tablet TAKE 1 TABLET (20 MEQ) BY MOUTH 3 TIMES DAILY 270 tablet 1     losartan (COZAAR) 100 MG tablet Take 1 tablet (100 mg) by mouth daily 90 tablet 3     metoprolol tartrate (LOPRESSOR) 25 MG tablet TAKE 1/2 TABLET BY MOUTH TWICE A DAY 90 tablet 3     multivitamin with minerals (THERA-M) 9 mg iron-400 mcg Tab tablet Take 1 tablet by mouth daily Chewable       pramipexole (MIRAPEX) 0.125 MG tablet TAKE 1 TABLET BY MOUTH NIGHTLY AS NEEDED 90 tablet 1     traZODone (DESYREL) 50 MG tablet TAKE 1 TABLET BY MOUTH AT BEDTIME - TRY 1/2 TABLET FOR FIRST FEW DAYS 90 tablet 0     No current facility-administered medications for this visit.       Allergies   Allergen Reactions     Azithromycin Nausea     Dizziness, dehydrated.        Past Medical History:   Diagnosis Date      "Arthritis      Asthma      Atrial fibrillation (H)      Bronchitis      Earache      Fracture, foot      History of CVA (cerebrovascular accident)      Hyperlipidemia      Hypertension      UTI (urinary tract infection)         Review of Systems  ROS:  Specifically negative for bowel/bladder dysfunction, balance changes, headache, dizziness, foot drop, fevers, chills, appetite changes, nausea/vomiting, unexplained weight loss. Otherwise 13 systems reviewed are negative. Please see the patient's intake questionnaire from today for details.    Reviewed Social, Family, Past Medical and Past Surgical history with patient, no significant changes noted since prior visit.     Objective:     BP (!) 141/82 (BP Location: Left arm, Patient Position: Sitting, Cuff Size: Adult Large)   Pulse 90   Ht 5' 1.42\" (1.56 m)   Wt 214 lb (97.1 kg)   LMP  (LMP Unknown)   BMI 39.88 kg/m      PHYSICAL EXAMINATION:    --CONSTITUTIONAL: Well developed, well nourished, healthy appearing individual.  --PSYCHIATRIC: Appropriate mood and affect. No difficulty interacting due to temper, social withdrawal, or memory issues.  --SKIN: Lumbar region is dry and intact.   --RESPIRATORY: Normal rhythm and effort. No abnormal accessory muscle breathing patterns noted.   --MUSCULOSKELETAL:  Normal lumbar lordosis noted, no lateral shift.  --GROSS MOTOR: Easily arises from a seated position. Gait is non-antalgic  --LUMBAR SPINE:  Inspection reveals no evidence of deformity.      RESULTS:   Imaging: Spine imaging was reviewed today. The images were shown to the patient and the findings were explained using a spine model.      Lumbar MRI reviewed                      Again, thank you for allowing me to participate in the care of your patient.        Sincerely,        Reyna Weldon, CNP  "

## 2024-08-15 NOTE — PATIENT INSTRUCTIONS
~Spine Center Scheduling #[phone].  ~Please call our [facility]ine Nurse Navigation #[phone] with any questions or concerns about your treatment plan, if symptoms worsen and you would like to be seen urgently, or if you have problems controlling bladder and bowel function.  ~For any future flareups or new symptoms, recommend follow-up in clinic or contact the nurse navigator line.  ~Please note that any My Chart messages may take multiple days for a response due to the high volume of patients seen in clinic.  Anything sent Thursday night or after will be answered the following week when able, as Reyna Weldon CNP does not work in clinic on Fridays.   ~Reyna Weldon CNP is at the [facility] on Tuesdays, otherwise primarily at the [facility].       Importance of specialized Physical Therapy: Discussed the importance of core strengthening, ROM, stretching exercises with the patient and how each of these entities is important in decreasing pain.  Explained to the patient that the purpose of physical therapy is to teach the patient a home exercise program individualized to them and their specific health concerns.  These exercises need to be performed every day in order to decrease pain and prevent future occurrences of pain.

## 2024-09-11 NOTE — PROGRESS NOTES
HEART CARE ENCOUNTER NOTE      [facility]  [phone]      Assessment/Recommendations   Assessment:   Minimal coronary artery disease: CT coronary angiogram with calcium score 7/22/2021 showed calcium score of 0 with minimal nonobstructive coronary artery disease.  Denies any anginal symptoms.  Permanent atrial fibrillation: Status post cardioversion 8/11/2021.  Holter monitor 9/9/2021 showed persistent A-fib with controlled ventricular response.  Remains on Eliquis for anticoagulation.  Patient is on metoprolol 12.5 mg twice daily.  Patient notes shortness of breath that has been present for many years and worsening in the last year.  Patient's primary had decreased metoprolol and patient is unsure if this made a difference.  Patient denies palpitations but thinks that shortness of breath may have always been present with atrial fibrillation.  Hypercholesterolemia: Well-controlled on atorvastatin 20 mg daily.  Last lipids 1/31/2024 showed LDL of 64  Hypertension: Well-controlled on hydrochlorothiazide 50 mg daily, losartan 100 mg daily, amlodipine 10 mg daily, metoprolol 12.5 mg twice daily.  BMP 1/31/2024 showed potassium of 3.3.  Will repeat today  Asthma    Plan:   BMP today to recheck potassium  Will stop metoprolol and start diltiazem to see if this improves patient's shortness of breath  Discussed with patient that it is unlikely that she will be able to get out of atrial fibrillation given she has been persistent since 2021.  If shortness of breath does not improve on diltiazem could trial a cardioversion to see if she could temporarily get out of A-fib to see if symptoms improve or not.  Encouraged trying to gradually increase walking though patient notes this is very difficult due to her shortness of breath  Zio monitor to be mailed out to check patient's rates      Follow up in A-fib clinic in 1 month    The longitudinal plan of care for Lluvia COLE Niru was addressed during this  visit. Due to the added complexity in care, I will continue to support Lexii in the subsequent management of this condition(s) and with the ongoing continuity of care of this condition(s).      History of Present Illness/Subjective    HPI: Lluvia Marrufo is a 84 year old female with PMHx of minimal nonobstructive coronary artery disease, permanent atrial fibrillation on anticoagulation, hypercholesterolemia, hypertension, Crohn's disease presents for follow-up.  Patient last saw Dr. Bourgeois 11/1/2022 where patient had been doing well.  Was asymptomatic with her atrial fibrillation and they were pursuing rate control strategy.  There was discussion about watchman but patient was happy with Eliquis at that time.    Today patient notes shortness of breath that has been present for many years but seems to be worsening in the last year.  Patient notes dated 4 years ago she was able to walk 3 miles and now she gets short of breath walking from the parking lot and has to stop and catch her breath.  Patient notes that she is not able to exercise due to her shortness of breath.  Denies heart palpitations, chest discomfort, lightheadedness, lower extremity edema.  Patient notes having asthma as well and does not like using her albuterol inhaler as it makes her jittery.  Medication Wednesday morning patient believes that she has always had shortness of breath with the A-fib but also feels the metoprolol may be contributing.  Patient's primary care provider had decreased it at some point to 12.5 mg twice daily and patient does not remember if it made any difference to symptoms.        Echocardiogram 7/27/2021 results:  1.Left ventricular size, wall motion and function are normal. The ejection  fraction is 60-65%.  2.TAPSE is normal, which is consistent with normal right ventricular systolic  function.  3.IVC diameter and respiratory changes fall into an intermediate range  suggesting an RA pressure of 8 mmHg.  4.No  "hemodynamically significant valvular abnormalities on 2D or color flow  imaging.  5.Compared to the prior study dated 7/13/2018, there have been no changes  other then A Fib now present.    Holter monitor 9/9/2021:  Indication for study: Evaluate for atrial fibrillation  A 24-hour Holter monitor was applied September 9, 2021 with date of interpretation September 14, 2021  Atrial fibrillation is seen throughout the recording; the average ventricular rate is 85 bpm and heart rate ranged between  bpm  No bradycardia or pauses  Rare noncomplex ventricular ectopy consisting of 148 singular PVCs (0.1% ) total heartbeat  Dyspnea on exertion occurs with walking associated with atrial fibrillation with ventricular rate about 110 bpm  Impression:  Persistent atrial fibrillation with controlled ventricular response  Rare noncomplex ventricular ectopy    CT coronary angiogram with calcium score 7/22/2021:  Total calcium score of 0. A calcium score of zero places the individual in the lowest quartile when compared to an age and gender matched control group.  Minimal nonobstructive atherosclerotic coronary artery disease.     Physical Examination  Review of Systems   Vitals: /74 (BP Location: Right arm, Patient Position: Sitting, Cuff Size: Adult Large)   Pulse 86   Resp 14   Ht 1.56 m (5' 1.42\")   Wt 101.2 kg (223 lb)   LMP  (LMP Unknown)   BMI 41.56 kg/m    BMI= There is no height or weight on file to calculate BMI.  Wt Readings from Last 3 Encounters:   08/15/24 97.1 kg (214 lb)   07/01/24 97.1 kg (214 lb)   02/16/24 97.1 kg (214 lb)           ENT/Mouth: membranes moist, no oral lesions or bleeding gums.      EYES:  no scleral icterus, normal conjunctivae       Chest/Lungs:   lungs are clear to auscultation, no rales or wheezing,  equal chest wall expansion    Cardiovascular:   Irregularly irregular normal first and second heart sounds with no murmurs, rubs, or gallops; radial pulses are intact, no edema " bilaterally        Extremities: no cyanosis or clubbing   Skin: no xanthelasma, warm.    Neurologic: no tremors     Psychiatric: alert and oriented x3, calm        Please refer above for cardiac ROS details.        Medical History  Surgical History Family History Social History   Past Medical History:   Diagnosis Date    Arthritis     Asthma     Atrial fibrillation (H)     Bronchitis     Earache     Fracture, foot     History of CVA (cerebrovascular accident)     Hyperlipidemia     Hypertension     UTI (urinary tract infection)      Past Surgical History:   Procedure Laterality Date    APPENDECTOMY      COLONOSCOPY N/A 3/24/2022    Procedure: COLONOSCOPY;  Surgeon: Khang Torres MD;  [place] [facility] [place]GERY      Growth on lateral left eyelid removed.    HYSTERECTOMY      age 57    JOINT REPLACEMENT Left     knee    OOPHORECTOMY      PICC TRIPLE LUMEN PLACEMENT  3/3/2022         ND HALLUX RIGIDUS W/CHEILECTOMY 1ST MP JT W/O IMPLT      Description: Hallux Rigidus Correction W/ Cheilectomy 1st MTP Joint;  Proc Date: 12/03/2010;    TONSILLECTOMY      TOTAL KNEE ARTHROPLASTY      right    TOTAL SHOULDER ARTHROPLASTY      left    [facility] RECONSTR TOTAL SHOULDER IMPLANT Right 10/27/2015    Procedure: RIGHT SHOULDER TOTAL ARTHROPLASTY;  Surgeon: Eddie Payne MD;  Location: [facility];  Service: Orthopedics    [facility] TOTAL HIP ARTHROPLASTY Left 10/3/2017    Procedure:  LEFT TOTAL HIP ARTHROPLASTY;  Surgeon: Mahin Monsalve MD;  Location: [facility];  Service: Orthopedics     Family History   Problem Relation Age of Onset    Hypertension Mother     Alcoholism Father     Cirrhosis Father     Anesthesia Reaction No family hx of         Social History     Socioeconomic History    Marital status:      Spouse name: Not on file    Number of children: Not on file    Years of education: Not on file    Highest education level: Not on file   Occupational History    Not on file   Tobacco Use     Smoking status: Never    Smokeless tobacco: Never   Vaping Use    Vaping status: Never Used   Substance and Sexual Activity    Alcohol use: Yes     Comment: Alcoholic Drinks/day: 1 drink per day    Drug use: No    Sexual activity: Not on file   Other Topics Concern    Not on file   Social History Narrative    [place]  Son-in-law is a .     Social Determinants of Health     Financial Resource Strain: Low Risk  (2/9/2024)    Financial Resource Strain     Within the past 12 months, have you or your family members you live with been unable to get utilities (heat, electricity) when it was really needed?: No   Food Insecurity: Low Risk  (2/9/2024)    Food Insecurity     Within the past 12 months, did you worry that your food would run out before you got money to buy more?: No     Within the past 12 months, did the food you bought just not last and you didn t have money to get more?: No   Transportation Needs: Low Risk  (2/9/2024)    Transportation Needs     Within the past 12 months, has lack of transportation kept you from medical appointments, getting your medicines, non-medical meetings or appointments, work, or from getting things that you need?: No   Physical Activity: Insufficiently Active (2/9/2024)    Exercise Vital Sign     Days of Exercise per Week: 3 days     Minutes of Exercise per Session: 30 min   Stress: Stress Concern Present (2/9/2024)    Citizen of the [place] of Occupational Health - Occupational Stress Questionnaire     Feeling of Stress : To some extent   Social Connections: Unknown (2/9/2024)    Social Connection and Isolation Panel [NHANES]     Frequency of Communication with Friends and Family: Not on file     Frequency of Social Gatherings with Friends and Family: Twice a week     Attends Latter day Services: Not on file     Active Member of Clubs or Organizations: Not on file     Attends Club or Organization Meetings: Not on file     Marital Status: Not on file    Interpersonal Safety: Low Risk  (2/16/2024)    Interpersonal Safety     Do you feel physically and emotionally safe where you currently live?: Yes     Within the past 12 months, have you been hit, slapped, kicked or otherwise physically hurt by someone?: No     Within the past 12 months, have you been humiliated or emotionally abused in other ways by your partner or ex-partner?: No   Housing Stability: Low Risk  (2/9/2024)    Housing Stability     Do you have housing? : Yes     Are you worried about losing your housing?: No           Medications  Allergies   Current Outpatient Medications   Medication Sig Dispense Refill    albuterol (PROAIR HFA/PROVENTIL HFA/VENTOLIN HFA) 108 (90 Base) MCG/ACT inhaler Inhale 2 puffs into the lungs every 6 hours as needed for shortness of breath / dyspnea or wheezing 18 g 11    albuterol (PROVENTIL) 2.5 mg /3 mL (0.083 %) nebulizer solution [ALBUTEROL (PROVENTIL) 2.5 MG /3 ML (0.083 %) NEBULIZER SOLUTION] Take 3 mL (2.5 mg total) by nebulization every 4 (four) hours as needed for wheezing or shortness of breath. 75 mL 1    amLODIPine (NORVASC) 10 MG tablet Take 1 tablet (10 mg) by mouth daily 90 tablet 3    apixaban ANTICOAGULANT (ELIQUIS ANTICOAGULANT) 5 MG tablet Take 1 tablet (5 mg) by mouth 2 times daily 180 tablet 0    atorvastatin (LIPITOR) 20 MG tablet Take 1 tablet (20 mg) by mouth daily 90 tablet 3    benzonatate (TESSALON) 200 MG capsule Take 1 capsule (200 mg) by mouth 3 times daily as needed for cough 30 capsule 3    gabapentin (NEURONTIN) 300 MG capsule Take 1 capsule (300 mg) by mouth daily 90 capsule 3    glucosamine-chondroitin 500-400 mg cap [GLUCOSAMINE-CHONDROITIN 500-400 MG CAP] Take 2 capsules by mouth daily.      hydrochlorothiazide (HYDRODIURIL) 50 MG tablet Take 1 tablet (50 mg) by mouth daily 90 tablet 3    KLOR-CON M20 20 MEQ CR tablet TAKE 1 TABLET (20 MEQ) BY MOUTH 3 TIMES DAILY 270 tablet 1    losartan (COZAAR) 100 MG tablet Take 1 tablet (100 mg) by  "mouth daily 90 tablet 3    metoprolol tartrate (LOPRESSOR) 25 MG tablet TAKE 1/2 TABLET BY MOUTH TWICE A DAY 90 tablet 3    multivitamin with minerals (THERA-M) 9 mg iron-400 mcg Tab tablet Take 1 tablet by mouth daily Chewable      pramipexole (MIRAPEX) 0.125 MG tablet TAKE 1 TABLET BY MOUTH NIGHTLY AS NEEDED 90 tablet 1    traZODone (DESYREL) 50 MG tablet TAKE 1 TABLET BY MOUTH AT BEDTIME - TRY 1/2 TABLET FOR FIRST FEW DAYS 90 tablet 0       Allergies   Allergen Reactions    Azithromycin Nausea     Dizziness, dehydrated.           Lab Results    Chemistry/lipid CBC Cardiac Enzymes/BNP/TSH/INR   Recent Labs   Lab Test 01/31/24  1546   CHOL 171   HDL 86   LDL 64   TRIG 104     Recent Labs   Lab Test 01/31/24  1546 04/21/21  0738 12/16/19  0756   LDL 64 61 62     Recent Labs   Lab Test 01/31/24  1546      POTASSIUM 3.3*   CHLORIDE 98   CO2 29   *   BUN 12.2   CR 0.59   GFRESTIMATED 90   ANNA 9.8     Recent Labs   Lab Test 01/31/24  1546 03/09/22  1428 03/07/22  0625   CR 0.59 0.82 0.59*     Recent Labs   Lab Test 01/31/24  1546 03/09/22  1428 10/04/17  0543   A1C 6.1* 5.9* 5.4          Recent Labs   Lab Test 01/31/24  1546   WBC 8.3   HGB 14.6   HCT 44.1   MCV 88        Recent Labs   Lab Test 01/31/24  1546 07/01/22  0830 03/11/22  1130   HGB 14.6 14.2 14.6    Recent Labs   Lab Test 03/03/22  1016   TROPONINI <0.01     Recent Labs   Lab Test 03/09/22  1428 03/05/22  1924 03/03/22  1016   BNP 69 337* 152*     No results for input(s): \"TSH\" in the last 99941 hours.  Recent Labs   Lab Test 07/29/18  1951   INR 0.98          Nissa Cheema PA-C                                       "

## 2024-09-12 ENCOUNTER — OFFICE VISIT (OUTPATIENT)
Dept: CARDIOLOGY | Facility: CLINIC | Age: 84
End: 2024-09-12
Payer: COMMERCIAL

## 2024-09-12 ENCOUNTER — ORDERS ONLY (AUTO-RELEASED) (OUTPATIENT)
Dept: CARDIOLOGY | Facility: CLINIC | Age: 84
End: 2024-09-12
Payer: COMMERCIAL

## 2024-09-12 VITALS
SYSTOLIC BLOOD PRESSURE: 132 MMHG | HEART RATE: 86 BPM | BODY MASS INDEX: 42.1 KG/M2 | DIASTOLIC BLOOD PRESSURE: 74 MMHG | HEIGHT: 61 IN | RESPIRATION RATE: 14 BRPM | WEIGHT: 223 LBS

## 2024-09-12 DIAGNOSIS — I48.19 PERSISTENT ATRIAL FIBRILLATION (H): ICD-10-CM

## 2024-09-12 DIAGNOSIS — E78.00 HYPERCHOLESTEROLEMIA: ICD-10-CM

## 2024-09-12 DIAGNOSIS — I10 BENIGN ESSENTIAL HYPERTENSION: Primary | ICD-10-CM

## 2024-09-12 LAB
ANION GAP SERPL CALCULATED.3IONS-SCNC: 13 MMOL/L (ref 7–15)
BUN SERPL-MCNC: 17.6 MG/DL (ref 8–23)
CALCIUM SERPL-MCNC: 10 MG/DL (ref 8.8–10.4)
CHLORIDE SERPL-SCNC: 101 MMOL/L (ref 98–107)
CREAT SERPL-MCNC: 0.71 MG/DL (ref 0.51–0.95)
EGFRCR SERPLBLD CKD-EPI 2021: 84 ML/MIN/1.73M2
GLUCOSE SERPL-MCNC: 130 MG/DL (ref 70–99)
HCO3 SERPL-SCNC: 27 MMOL/L (ref 22–29)
POTASSIUM SERPL-SCNC: 3.2 MMOL/L (ref 3.4–5.3)
SODIUM SERPL-SCNC: 141 MMOL/L (ref 135–145)

## 2024-09-12 PROCEDURE — G2211 COMPLEX E/M VISIT ADD ON: HCPCS | Performed by: STUDENT IN AN ORGANIZED HEALTH CARE EDUCATION/TRAINING PROGRAM

## 2024-09-12 PROCEDURE — 99214 OFFICE O/P EST MOD 30 MIN: CPT | Performed by: STUDENT IN AN ORGANIZED HEALTH CARE EDUCATION/TRAINING PROGRAM

## 2024-09-12 PROCEDURE — 80048 BASIC METABOLIC PNL TOTAL CA: CPT | Performed by: STUDENT IN AN ORGANIZED HEALTH CARE EDUCATION/TRAINING PROGRAM

## 2024-09-12 PROCEDURE — 36415 COLL VENOUS BLD VENIPUNCTURE: CPT | Performed by: STUDENT IN AN ORGANIZED HEALTH CARE EDUCATION/TRAINING PROGRAM

## 2024-09-12 RX ORDER — DILTIAZEM HYDROCHLORIDE 120 MG/1
120 CAPSULE, EXTENDED RELEASE ORAL DAILY
Qty: 60 CAPSULE | Refills: 3 | Status: SHIPPED | OUTPATIENT
Start: 2024-09-12 | End: 2024-10-04

## 2024-09-12 NOTE — PATIENT INSTRUCTIONS
Lluvia Marrufo,    It was a pleasure to see you today at the [facility].     My recommendations after this visit include:    - Stop metoprolol  - Start diltiazem 120 mg daily  - Zio patch heart monitor to be mailed out  - BMP today  - Follow up with afib clinic in 1 month    - Please call [phone], if you have any questions or concerns      Nissa Cheema PA-C

## 2024-09-12 NOTE — LETTER
9/12/2024    Eddie Diaz MD  [address]  [facility] [address]    RE: Lluvia COLE Niru       Dear Colleague,     I had the pleasure of seeing Lluvia COLE Niru in the [facility].    HEART CARE ENCOUNTER NOTE      [facility]  [phone]      Assessment/Recommendations   Assessment:   Minimal coronary artery disease: CT coronary angiogram with calcium score 7/22/2021 showed calcium score of 0 with minimal nonobstructive coronary artery disease.  Denies any anginal symptoms.  Permanent atrial fibrillation: Status post cardioversion 8/11/2021.  Holter monitor 9/9/2021 showed persistent A-fib with controlled ventricular response.  Remains on Eliquis for anticoagulation.  Patient is on metoprolol 12.5 mg twice daily.  Patient notes shortness of breath that has been present for many years and worsening in the last year.  Patient's primary had decreased metoprolol and patient is unsure if this made a difference.  Patient denies palpitations but thinks that shortness of breath may have always been present with atrial fibrillation.  Hypercholesterolemia: Well-controlled on atorvastatin 20 mg daily.  Last lipids 1/31/2024 showed LDL of 64  Hypertension: Well-controlled on hydrochlorothiazide 50 mg daily, losartan 100 mg daily, amlodipine 10 mg daily, metoprolol 12.5 mg twice daily.  BMP 1/31/2024 showed potassium of 3.3.  Will repeat today  Asthma    Plan:   BMP today to recheck potassium  Will stop metoprolol and start diltiazem to see if this improves patient's shortness of breath  Discussed with patient that it is unlikely that she will be able to get out of atrial fibrillation given she has been persistent since 2021.  If shortness of breath does not improve on diltiazem could trial a cardioversion to see if she could temporarily get out of A-fib to see if symptoms improve or not.  Encouraged trying to gradually increase walking though patient notes this is very  difficult due to her shortness of breath  Zio monitor to be mailed out to check patient's rates      Follow up in A-fib clinic in 1 month    The longitudinal plan of care for Lluvia Marrufo was addressed during this visit. Due to the added complexity in care, I will continue to support Lexii in the subsequent management of this condition(s) and with the ongoing continuity of care of this condition(s).      History of Present Illness/Subjective    HPI: Lluvia Marrufo is a 84 year old female with PMHx of minimal nonobstructive coronary artery disease, permanent atrial fibrillation on anticoagulation, hypercholesterolemia, hypertension, Crohn's disease presents for follow-up.  Patient last saw Dr. Bourgeois 11/1/2022 where patient had been doing well.  Was asymptomatic with her atrial fibrillation and they were pursuing rate control strategy.  There was discussion about watchman but patient was happy with Eliquis at that time.    Today patient notes shortness of breath that has been present for many years but seems to be worsening in the last year.  Patient notes dated 4 years ago she was able to walk 3 miles and now she gets short of breath walking from the parking lot and has to stop and catch her breath.  Patient notes that she is not able to exercise due to her shortness of breath.  Denies heart palpitations, chest discomfort, lightheadedness, lower extremity edema.  Patient notes having asthma as well and does not like using her albuterol inhaler as it makes her jittery.  Medication Wednesday morning patient believes that she has always had shortness of breath with the A-fib but also feels the metoprolol may be contributing.  Patient's primary care provider had decreased it at some point to 12.5 mg twice daily and patient does not remember if it made any difference to symptoms.        Echocardiogram 7/27/2021 results:  1.Left ventricular size, wall motion and function are normal. The ejection  fraction is  "60-65%.  2.TAPSE is normal, which is consistent with normal right ventricular systolic  function.  3.IVC diameter and respiratory changes fall into an intermediate range  suggesting an RA pressure of 8 mmHg.  4.No hemodynamically significant valvular abnormalities on 2D or color flow  imaging.  5.Compared to the prior study dated 7/13/2018, there have been no changes  other then A Fib now present.    Holter monitor 9/9/2021:  Indication for study: Evaluate for atrial fibrillation  A 24-hour Holter monitor was applied September 9, 2021 with date of interpretation September 14, 2021  Atrial fibrillation is seen throughout the recording; the average ventricular rate is 85 bpm and heart rate ranged between  bpm  No bradycardia or pauses  Rare noncomplex ventricular ectopy consisting of 148 singular PVCs (0.1% ) total heartbeat  Dyspnea on exertion occurs with walking associated with atrial fibrillation with ventricular rate about 110 bpm  Impression:  Persistent atrial fibrillation with controlled ventricular response  Rare noncomplex ventricular ectopy    CT coronary angiogram with calcium score 7/22/2021:  Total calcium score of 0. A calcium score of zero places the individual in the lowest quartile when compared to an age and gender matched control group.  Minimal nonobstructive atherosclerotic coronary artery disease.     Physical Examination  Review of Systems   Vitals: /74 (BP Location: Right arm, Patient Position: Sitting, Cuff Size: Adult Large)   Pulse 86   Resp 14   Ht 1.56 m (5' 1.42\")   Wt 101.2 kg (223 lb)   LMP  (LMP Unknown)   BMI 41.56 kg/m    BMI= There is no height or weight on file to calculate BMI.  Wt Readings from Last 3 Encounters:   08/15/24 97.1 kg (214 lb)   07/01/24 97.1 kg (214 lb)   02/16/24 97.1 kg (214 lb)           ENT/Mouth: membranes moist, no oral lesions or bleeding gums.      EYES:  no scleral icterus, normal conjunctivae       Chest/Lungs:   lungs are clear to " auscultation, no rales or wheezing,  equal chest wall expansion    Cardiovascular:   Irregularly irregular normal first and second heart sounds with no murmurs, rubs, or gallops; radial pulses are intact, no edema bilaterally        Extremities: no cyanosis or clubbing   Skin: no xanthelasma, warm.    Neurologic: no tremors     Psychiatric: alert and oriented x3, calm        Please refer above for cardiac ROS details.        Medical History  Surgical History Family History Social History   Past Medical History:   Diagnosis Date     Arthritis      Asthma      Atrial fibrillation (H)      Bronchitis      Earache      Fracture, foot      History of CVA (cerebrovascular accident)      Hyperlipidemia      Hypertension      UTI (urinary tract infection)      Past Surgical History:   Procedure Laterality Date     APPENDECTOMY       COLONOSCOPY N/A 3/24/2022    Procedure: COLONOSCOPY;  Surgeon: Khang Torres MD;  [place] [facility] [place]     EYE SURGERY      Growth on lateral left eyelid removed.     HYSTERECTOMY      age 57     JOINT REPLACEMENT Left     knee     OOPHORECTOMY       PICC TRIPLE LUMEN PLACEMENT  3/3/2022          [facility] HALLUX RIGIDUS W/CHEILECTOMY 1ST MP JT W/O IMPLT      Description: Hallux Rigidus Correction W/ Cheilectomy 1st MTP Joint;  Proc Date: 12/03/2010;     TONSILLECTOMY       TOTAL KNEE ARTHROPLASTY      right     TOTAL SHOULDER ARTHROPLASTY      left     [facility] RECONSTR TOTAL SHOULDER IMPLANT Right 10/27/2015    Procedure: RIGHT SHOULDER TOTAL ARTHROPLASTY;  Surgeon: Eddie Payne MD;  Location: [facility];  Service: Orthopedics     [facility] TOTAL HIP ARTHROPLASTY Left 10/3/2017    Procedure:  LEFT TOTAL HIP ARTHROPLASTY;  Surgeon: Mahin Monsalve MD;  Location: [facility];  Service: Orthopedics     Family History   Problem Relation Age of Onset     Hypertension Mother      Alcoholism Father      Cirrhosis Father      Anesthesia Reaction No family hx of         Social History      Socioeconomic History     Marital status:      Spouse name: Not on file     Number of children: Not on file     Years of education: Not on file     Highest education level: Not on file   Occupational History     Not on file   Tobacco Use     Smoking status: Never     Smokeless tobacco: Never   Vaping Use     Vaping status: Never Used   Substance and Sexual Activity     Alcohol use: Yes     Comment: Alcoholic Drinks/day: 1 drink per day     Drug use: No     Sexual activity: Not on file   Other Topics Concern     Not on file   Social History Narrative    [place]  Son-in-law is a .     Social Determinants of Health     Financial Resource Strain: Low Risk  (2/9/2024)    Financial Resource Strain      Within the past 12 months, have you or your family members you live with been unable to get utilities (heat, electricity) when it was really needed?: No   Food Insecurity: Low Risk  (2/9/2024)    Food Insecurity      Within the past 12 months, did you worry that your food would run out before you got money to buy more?: No      Within the past 12 months, did the food you bought just not last and you didn t have money to get more?: No   Transportation Needs: Low Risk  (2/9/2024)    Transportation Needs      Within the past 12 months, has lack of transportation kept you from medical appointments, getting your medicines, non-medical meetings or appointments, work, or from getting things that you need?: No   Physical Activity: Insufficiently Active (2/9/2024)    Exercise Vital Sign      Days of Exercise per Week: 3 days      Minutes of Exercise per Session: 30 min   Stress: Stress Concern Present (2/9/2024)    [employer] [place] [employer] - Occupational Stress Questionnaire      Feeling of Stress : To some extent   Social Connections: Unknown (2/9/2024)    Social Connection and Isolation Panel [NHANES]      Frequency of Communication with Friends and Family: Not on  file      Frequency of Social Gatherings with Friends and Family: Twice a week      Attends Restorationism Services: Not on file      Active Member of Clubs or Organizations: Not on file      Attends Club or Organization Meetings: Not on file      Marital Status: Not on file   Interpersonal Safety: Low Risk  (2/16/2024)    Interpersonal Safety      Do you feel physically and emotionally safe where you currently live?: Yes      Within the past 12 months, have you been hit, slapped, kicked or otherwise physically hurt by someone?: No      Within the past 12 months, have you been humiliated or emotionally abused in other ways by your partner or ex-partner?: No   Housing Stability: Low Risk  (2/9/2024)    Housing Stability      Do you have housing? : Yes      Are you worried about losing your housing?: No           Medications  Allergies   Current Outpatient Medications   Medication Sig Dispense Refill     albuterol (PROAIR HFA/PROVENTIL HFA/VENTOLIN HFA) 108 (90 Base) MCG/ACT inhaler Inhale 2 puffs into the lungs every 6 hours as needed for shortness of breath / dyspnea or wheezing 18 g 11     albuterol (PROVENTIL) 2.5 mg /3 mL (0.083 %) nebulizer solution [ALBUTEROL (PROVENTIL) 2.5 MG /3 ML (0.083 %) NEBULIZER SOLUTION] Take 3 mL (2.5 mg total) by nebulization every 4 (four) hours as needed for wheezing or shortness of breath. 75 mL 1     amLODIPine (NORVASC) 10 MG tablet Take 1 tablet (10 mg) by mouth daily 90 tablet 3     apixaban ANTICOAGULANT (ELIQUIS ANTICOAGULANT) 5 MG tablet Take 1 tablet (5 mg) by mouth 2 times daily 180 tablet 0     atorvastatin (LIPITOR) 20 MG tablet Take 1 tablet (20 mg) by mouth daily 90 tablet 3     benzonatate (TESSALON) 200 MG capsule Take 1 capsule (200 mg) by mouth 3 times daily as needed for cough 30 capsule 3     gabapentin (NEURONTIN) 300 MG capsule Take 1 capsule (300 mg) by mouth daily 90 capsule 3     glucosamine-chondroitin 500-400 mg cap [GLUCOSAMINE-CHONDROITIN 500-400 MG CAP]  "Take 2 capsules by mouth daily.       hydrochlorothiazide (HYDRODIURIL) 50 MG tablet Take 1 tablet (50 mg) by mouth daily 90 tablet 3     KLOR-CON M20 20 MEQ CR tablet TAKE 1 TABLET (20 MEQ) BY MOUTH 3 TIMES DAILY 270 tablet 1     losartan (COZAAR) 100 MG tablet Take 1 tablet (100 mg) by mouth daily 90 tablet 3     metoprolol tartrate (LOPRESSOR) 25 MG tablet TAKE 1/2 TABLET BY MOUTH TWICE A DAY 90 tablet 3     multivitamin with minerals (THERA-M) 9 mg iron-400 mcg Tab tablet Take 1 tablet by mouth daily Chewable       pramipexole (MIRAPEX) 0.125 MG tablet TAKE 1 TABLET BY MOUTH NIGHTLY AS NEEDED 90 tablet 1     traZODone (DESYREL) 50 MG tablet TAKE 1 TABLET BY MOUTH AT BEDTIME - TRY 1/2 TABLET FOR FIRST FEW DAYS 90 tablet 0       Allergies   Allergen Reactions     Azithromycin Nausea     Dizziness, dehydrated.           Lab Results    Chemistry/lipid CBC Cardiac Enzymes/BNP/TSH/INR   Recent Labs   Lab Test 01/31/24  1546   CHOL 171   HDL 86   LDL 64   TRIG 104     Recent Labs   Lab Test 01/31/24  1546 04/21/21  0738 12/16/19  0756   LDL 64 61 62     Recent Labs   Lab Test 01/31/24  1546      POTASSIUM 3.3*   CHLORIDE 98   CO2 29   *   BUN 12.2   CR 0.59   GFRESTIMATED 90   ANNA 9.8     Recent Labs   Lab Test 01/31/24  1546 03/09/22  1428 03/07/22  0625   CR 0.59 0.82 0.59*     Recent Labs   Lab Test 01/31/24  1546 03/09/22  1428 10/04/17  0543   A1C 6.1* 5.9* 5.4          Recent Labs   Lab Test 01/31/24  1546   WBC 8.3   HGB 14.6   HCT 44.1   MCV 88        Recent Labs   Lab Test 01/31/24  1546 07/01/22  0830 03/11/22  1130   HGB 14.6 14.2 14.6    Recent Labs   Lab Test 03/03/22  1016   TROPONINI <0.01     Recent Labs   Lab Test 03/09/22  1428 03/05/22  1924 03/03/22  1016   BNP 69 337* 152*     No results for input(s): \"TSH\" in the last 16211 hours.  Recent Labs   Lab Test 07/29/18 1951   INR 0.98          Nissa Cheema PA-C                                         Thank you for allowing me to " participate in the care of your patient.      Sincerely,     Nissa Cheema PA-C     [facility]  cc:   Marcelino Bourgeois MD  [address] [address]

## 2024-09-18 ENCOUNTER — TELEPHONE (OUTPATIENT)
Dept: FAMILY MEDICINE | Facility: CLINIC | Age: 84
End: 2024-09-18
Payer: COMMERCIAL

## 2024-09-18 NOTE — TELEPHONE ENCOUNTER
----- Message from Eddie Diaz sent at 9/13/2024  8:19 AM CDT -----  Please contact patient- cardiology contacted me and let me know her labs are showing low potassium- one option would be to switch hydrochlorothiazide to another medication to help increase potassium and perhaps be able to get her off potassium supplement- I would suggest metoprolol if she would like to change- please let me know what she decides.  Dr. Diaz  ----- Message -----  From: Nissa Cheema PA-C  Sent: 9/12/2024   2:21 PM CDT  To: MD Dr. Joe Wiggins,    I saw Lexii today at heart care clinic and obtained a BMP that showed potassium of 3.2.  Since it is not below 3, I am fine to just keep monitoring this.  However we could consider discontinuing hydrochlorothiazide and switching to spironolactone to spare the potassium and possibly get her off of her potassium supplement or at least decrease it.  I will defer to you on this once.    Thanks, Nissa Cheema PA-C

## 2024-09-23 DIAGNOSIS — I48.0 PAF (PAROXYSMAL ATRIAL FIBRILLATION) (H): ICD-10-CM

## 2024-09-24 DIAGNOSIS — I10 ESSENTIAL HYPERTENSION: Primary | ICD-10-CM

## 2024-09-24 RX ORDER — SPIRONOLACTONE 25 MG/1
25 TABLET ORAL DAILY
Qty: 90 TABLET | Refills: 1 | Status: SHIPPED | OUTPATIENT
Start: 2024-09-24

## 2024-09-24 NOTE — TELEPHONE ENCOUNTER
Called and discussed with patient,    She reports that she has taken metoprolol in the past and it was not a good experience. She had significant fatigue and felt like she would get exhausted walking into a store from a parking lot.    She would prefer to change the hydrochlorothiazide.    Pended pharmacy here. Patient is going out of town on Thursday morning for 9 days and is asking if you could review this message today.    Marbin Anne RN     [facility]

## 2024-09-24 NOTE — TELEPHONE ENCOUNTER
Called patient and relayed information/instructions from provider. Patient has no further questions at this time and will follow up as needed/instructed.    Marbin Anne RN     [facility]

## 2024-09-24 NOTE — TELEPHONE ENCOUNTER
Please inform patient that new medication called spironolactone was sent to the pharmacy this will be taken instead of her hydrochlorothiazide.  We will need to reevaluate her kidney function and potassium levels when she returns from being out of town we might start to decrease her potassium supplement.

## 2024-09-25 RX ORDER — APIXABAN 5 MG/1
5 TABLET, FILM COATED ORAL 2 TIMES DAILY
Qty: 180 TABLET | Refills: 0 | Status: SHIPPED | OUTPATIENT
Start: 2024-09-25

## 2024-09-25 NOTE — TELEPHONE ENCOUNTER
[facility] Call Center    Phone Message    May a detailed message be left on voicemail: yes     Reason for Call: Medication Refill Request    Has the patient contacted the pharmacy for the refill? Yes   Name of medication being requested:   apixaban ANTICOAGULANT (ELIQUIS ANTICOAGULANT) 5 MG tablet   Provider who prescribed the medication: Bk  Pharmacy: [id number] - [address]   Date medication is needed: LESA Teixeira is out and needs this today as she is going out of town    Action Taken: Other: Cardiology    Travel Screening: Not Applicable     Thank you!  [facility]

## 2024-09-27 PROCEDURE — 93244 EXT ECG>48HR<7D REV&INTERPJ: CPT | Performed by: INTERNAL MEDICINE

## 2024-09-30 NOTE — RESULT ENCOUNTER NOTE
Please let patient know that her rates were well-controlled averaging 90 bpm.  Please inquire how her shortness of breath is on diltiazem and if she is having any other side effects.  If she is still short of breath and tolerating the medication we could go up to 240 mg daily and bring down her heart rates a little bit more.  Patient does have follow-up with A-fib clinic on 10/31/2024.

## 2024-09-30 NOTE — RESULT ENCOUNTER NOTE
Hard to know if the shortness of breath is medication side effect (though this is less common with diltiazem) or if it is due to her higher heart rates.  I would favor trying to go up to 240 mg daily to better control her rates and see if this helps.  If patient is hesitant we could try going back to metoprolol and she could discuss this further at her appointment in 1 month.

## 2024-10-04 DIAGNOSIS — I48.19 PERSISTENT ATRIAL FIBRILLATION (H): ICD-10-CM

## 2024-10-04 RX ORDER — DILTIAZEM HYDROCHLORIDE 240 MG/1
240 CAPSULE, EXTENDED RELEASE ORAL DAILY
Qty: 30 CAPSULE | Refills: 0 | Status: SHIPPED | OUTPATIENT
Start: 2024-10-04 | End: 2024-10-31

## 2024-10-12 DIAGNOSIS — G47.00 INSOMNIA, UNSPECIFIED TYPE: ICD-10-CM

## 2024-10-14 RX ORDER — TRAZODONE HYDROCHLORIDE 50 MG/1
TABLET, FILM COATED ORAL
Qty: 90 TABLET | Refills: 0 | Status: SHIPPED | OUTPATIENT
Start: 2024-10-14

## 2024-10-29 NOTE — PROGRESS NOTES
[facility]  Cardiac Electrophysiology  [address]  [place] [address]   Office: [phone]  Fax: [phone]     HEART CARE ELECTROPHYSIOLOGY FOLLOW UP    Primary Care: Eddie Diaz MD      Assessment/Recommendations     Permanent atrial fibrillation/stage 4: In atrial fibrillation since approximately 2021. We discussed pathophysiology and progression of atrial fibrillation, management options including rate control and antiarrhythmic drug therapy.  She is unlikely to maintain sinus rhythm given longstanding history and dyspnea does not correlate to atrial fibrillation    [id number] score of 6 for age >75, gender, CAD, CVA.  She denies bleeding complications    Plan:  Continue Eliquis 5 mg twice a day for stroke prophylaxis  Continue diltiazem 240 mg daily  Repeat 48-hour Zio  Update TTE  EP follow-up pending above     History of Present Illness/Subjective    Lluvia Marrufo is a 84 year old female with past medical history significant for permanent atrial fibrillation, CAD, asthma, CVA 2018, obesity, seen today for EP follow-up. She was diagnosed with atrial fibrillation in the setting of URI symptoms in 2021 and underwent cardioversion 8/11/2021 with early recurrence. She noted dyspnea on exertion for the preceding 1-2 years which was relatively stable following cardioversion and rate control was recommended thereafter. More recently she was evaluated for worsening dyspnea ongoing for several years, unchanged with decreasing metoprolol though may be somewhat better on diltiazem.  She has trouble exercising due to her dyspnea and this significantly interferes with her quality of life. She has pedal edema intermittently. 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 She denies chest discomfort, palpitations, lightheadedness/dizziness, or syncope.     Data Review     EKG 3/3/2022:  bpm, QRS 80 ms, QT/QTc 342/477  "ms  Personally reviewed.     TTE 2021  1.Left ventricular size, wall motion and function are normal. The ejection  fraction is 60-65%.  2.TAPSE is normal, which is consistent with normal right ventricular systolic  function.  3.IVC diameter and respiratory changes fall into an intermediate range  suggesting an RA pressure of 8 mmHg.  4.No hemodynamically significant valvular abnormalities on 2D or color flow  imaging.  5.Compared to the prior study dated 7/13/2018, there have been no changes  other then A Fib now present.    Zio monitoring from 9/16/2024 to 9/23/2024 (duration 6d 22h).  Continuous atrial fibrillation, ventricular rates 43 to 191bpm, average 90bpm.  There were no pauses of greater than 3 seconds.  Rare premature ventricular contractions (isolated <1%).  Symptom triggers and diary entries (10) correlated to atrial fibrillation with ventricular rates 75-177bpm with and without PVCs.    I have reviewed and updated the patient's past medical history, allergy list and medication list.          Physical Examination   BMI= Body mass index is 39.33 kg/m .    Wt Readings from Last 3 Encounters:   10/31/24 100.7 kg (222 lb)   09/12/24 101.2 kg (223 lb)   08/15/24 97.1 kg (214 lb)       Vitals: /82 (BP Location: Right arm, Patient Position: Sitting, Cuff Size: Adult Large)   Pulse 106   Resp 14   Ht 1.6 m (5' 3\")   Wt 100.7 kg (222 lb)   LMP  (LMP Unknown)   BMI 39.33 kg/m    General   Appearance:   Alert and oriented, in no acute distress.    HEENT:  Normocephalic and atraumatic. Conjunctiva and sclera are clear. Moist oral mucosa.    Neck: No JVP, carotid bruit or obvious thyromegaly.   Lungs:   Respirations unlabored. Clear bilaterally with no rales, rhonchi, or wheezes.     Cardiovascular:   Rhythm is irregular. S1 and S2 are normal. No significant murmur is present. Lower extremities demonstrate trace edema. Posterior tibial pulses are intact bilaterally.   Extremities: No cyanosis or clubbing "   Skin: Skin is warm, dry, and otherwise intact.   Neurologic: Gait not assessed. Mood and affect appropriate.           Medical History  Surgical History Family History Social History   Past Medical History:   Diagnosis Date    Arthritis     Asthma     Atrial fibrillation (H)     Bronchitis     Earache     Fracture, foot     History of CVA (cerebrovascular accident)     Hyperlipidemia     Hypertension     UTI (urinary tract infection)     Past Surgical History:   Procedure Laterality Date    APPENDECTOMY      COLONOSCOPY N/A 3/24/2022    Procedure: COLONOSCOPY;  Surgeon: Khang Torres MD;  Location: [facility] [place]    EYE SURGERY      Growth on lateral left eyelid removed.    HYSTERECTOMY      age 57    JOINT REPLACEMENT Left     knee    OOPHORECTOMY      PICC TRIPLE LUMEN PLACEMENT  3/3/2022         [place] HALLUX RIGIDUS W/CHEILECTOMY 1ST MP JT W/O IMPLT      Description: Hallux Rigidus Correction W/ Cheilectomy 1st MTP Joint;  Proc Date: 12/03/2010;    TONSILLECTOMY      TOTAL KNEE ARTHROPLASTY      right    TOTAL SHOULDER ARTHROPLASTY      left    [facility] RECONSTR TOTAL SHOULDER IMPLANT Right 10/27/2015    Procedure: RIGHT SHOULDER TOTAL ARTHROPLASTY;  Surgeon: Eddie Payne MD;  Location: [facility];  Service: Orthopedics    [facility] TOTAL HIP ARTHROPLASTY Left 10/3/2017    Procedure:  LEFT TOTAL HIP ARTHROPLASTY;  Surgeon: Mahin Monsalve MD;  Location: [facility];  Service: Orthopedics    Family History   Problem Relation Age of Onset    Hypertension Mother     Alcoholism Father     Cirrhosis Father     Anesthesia Reaction No family hx of     Social History     Socioeconomic History    Marital status:      Spouse name: Not on file    Number of children: Not on file    Years of education: Not on file    Highest education level: Not on file   Occupational History    Not on file   Tobacco Use    Smoking status: Never    Smokeless tobacco: Never   Vaping Use    Vaping status: Never Used    Substance and Sexual Activity    Alcohol use: Yes     Comment: Alcoholic Drinks/day: 1 drink per day    Drug use: No    Sexual activity: Not on file   Other Topics Concern    Not on file   Social History Narrative    [place]  Son-in-law is a .     Social Drivers of Health     Financial Resource Strain: Low Risk  (2/9/2024)    Financial Resource Strain     Within the past 12 months, have you or your family members you live with been unable to get utilities (heat, electricity) when it was really needed?: No   Food Insecurity: Low Risk  (2/9/2024)    Food Insecurity     Within the past 12 months, did you worry that your food would run out before you got money to buy more?: No     Within the past 12 months, did the food you bought just not last and you didn t have money to get more?: No   Transportation Needs: Low Risk  (2/9/2024)    Transportation Needs     Within the past 12 months, has lack of transportation kept you from medical appointments, getting your medicines, non-medical meetings or appointments, work, or from getting things that you need?: No   Physical Activity: Insufficiently Active (2/9/2024)    Exercise Vital Sign     Days of Exercise per Week: 3 days     Minutes of Exercise per Session: 30 min   Stress: Stress Concern Present (2/9/2024)    [employer] - Occupational Stress Questionnaire     Feeling of Stress : To some extent   Social Connections: Unknown (2/9/2024)    Social Connection and Isolation Panel [NHANES]     Frequency of Communication with Friends and Family: Not on file     Frequency of Social Gatherings with Friends and Family: Twice a week     Attends Yazidism Services: Not on file     Active Member of Clubs or Organizations: Not on file     Attends Club or Organization Meetings: Not on file     Marital Status: Not on file   Interpersonal Safety: Low Risk  (2/16/2024)    Interpersonal Safety     Do you feel physically  and emotionally safe where you currently live?: Yes     Within the past 12 months, have you been hit, slapped, kicked or otherwise physically hurt by someone?: No     Within the past 12 months, have you been humiliated or emotionally abused in other ways by your partner or ex-partner?: No   Housing Stability: Low Risk  (2/9/2024)    Housing Stability     Do you have housing? : Yes     Are you worried about losing your housing?: No          Medications  Allergies   Scheduled Meds:  Current Outpatient Medications   Medication Sig Dispense Refill    albuterol (PROAIR HFA/PROVENTIL HFA/VENTOLIN HFA) 108 (90 Base) MCG/ACT inhaler Inhale 2 puffs into the lungs every 6 hours as needed for shortness of breath / dyspnea or wheezing 18 g 11    albuterol (PROVENTIL) 2.5 mg /3 mL (0.083 %) nebulizer solution [ALBUTEROL (PROVENTIL) 2.5 MG /3 ML (0.083 %) NEBULIZER SOLUTION] Take 3 mL (2.5 mg total) by nebulization every 4 (four) hours as needed for wheezing or shortness of breath. 75 mL 1    amLODIPine (NORVASC) 10 MG tablet Take 1 tablet (10 mg) by mouth daily 90 tablet 3    atorvastatin (LIPITOR) 20 MG tablet Take 1 tablet (20 mg) by mouth daily 90 tablet 3    benzonatate (TESSALON) 200 MG capsule Take 1 capsule (200 mg) by mouth 3 times daily as needed for cough 30 capsule 3    diltiazem ER (DILT-XR) 240 MG 24 hr ER beaded capsule Take 1 capsule (240 mg) by mouth daily. 30 capsule 0    ELIQUIS ANTICOAGULANT 5 MG tablet TAKE 1 TABLET BY MOUTH TWICE A  tablet 0    gabapentin (NEURONTIN) 300 MG capsule Take 1 capsule (300 mg) by mouth daily 90 capsule 3    glucosamine-chondroitin 500-400 mg cap [GLUCOSAMINE-CHONDROITIN 500-400 MG CAP] Take 2 capsules by mouth daily.      KLOR-CON M20 20 MEQ CR tablet TAKE 1 TABLET (20 MEQ) BY MOUTH 3 TIMES DAILY 270 tablet 1    losartan (COZAAR) 100 MG tablet Take 1 tablet (100 mg) by mouth daily 90 tablet 3    multivitamin with minerals (THERA-M) 9 mg iron-400 mcg Tab tablet Take 1  tablet by mouth daily Chewable      pramipexole (MIRAPEX) 0.125 MG tablet TAKE 1 TABLET BY MOUTH NIGHTLY AS NEEDED 90 tablet 1    spironolactone (ALDACTONE) 25 MG tablet Take 1 tablet (25 mg) by mouth daily. 90 tablet 1    traZODone (DESYREL) 50 MG tablet TAKE 1 TABLET BY MOUTH AT BEDTIME - TRY 1/2 TABLET FOR FIRST FEW DAYS (Patient taking differently: as needed for sleep.) 90 tablet 0    Allergies   Allergen Reactions    Azithromycin Nausea     Dizziness, dehydrated.          Lab Results    Chemistry/lipid CBC Cardiac Enzymes/BNP/TSH/INR   Lab Results   Component Value Date    CHOL 171 2024    HDL 86 2024    TRIG 104 2024    BUN 17.6 2024     2024    CO2 27 2024    Lab Results   Component Value Date    WBC 8.3 2024    HGB 14.6 2024    HCT 44.1 2024    MCV 88 2024     2024    @RESUFAST(BMP,CBC,BNP,TSH,  INR)@      30 minutes spent reviewing prior records (including documentation, laboratory studies, cardiac testing/imaging), history and physical exam, planning, and subsequent documentation.     The longitudinal plan of care for the diagnosis(es)/condition(s) as documented were addressed during this visit. Due to the added complexity in care, I will continue to support Lexii in the subsequent management and with ongoing continuity of care.      This note has been dictated using voice recognition software. Any grammatical, typographical, or context distortions are unintentional and inherent to the software.    Daniella Camacho, HOLLIS  Cardiac Electrophysiology  [facility]  Clinic and [phone]  Fax: [phone]  Electrophysiology Nurses: [phone]

## 2024-10-31 ENCOUNTER — OFFICE VISIT (OUTPATIENT)
Dept: CARDIOLOGY | Facility: CLINIC | Age: 84
End: 2024-10-31
Payer: COMMERCIAL

## 2024-10-31 ENCOUNTER — ORDERS ONLY (AUTO-RELEASED) (OUTPATIENT)
Dept: CARDIOLOGY | Facility: CLINIC | Age: 84
End: 2024-10-31
Payer: COMMERCIAL

## 2024-10-31 VITALS
SYSTOLIC BLOOD PRESSURE: 132 MMHG | HEART RATE: 106 BPM | HEIGHT: 63 IN | DIASTOLIC BLOOD PRESSURE: 82 MMHG | WEIGHT: 222 LBS | BODY MASS INDEX: 39.34 KG/M2 | RESPIRATION RATE: 14 BRPM

## 2024-10-31 DIAGNOSIS — I48.19 PERSISTENT ATRIAL FIBRILLATION (H): ICD-10-CM

## 2024-10-31 DIAGNOSIS — I48.11 LONGSTANDING PERSISTENT ATRIAL FIBRILLATION (H): Primary | ICD-10-CM

## 2024-10-31 DIAGNOSIS — I10 HYPERTENSION GOAL BP (BLOOD PRESSURE) < 140/90: Chronic | ICD-10-CM

## 2024-10-31 DIAGNOSIS — I48.11 LONGSTANDING PERSISTENT ATRIAL FIBRILLATION (H): ICD-10-CM

## 2024-10-31 PROCEDURE — 99214 OFFICE O/P EST MOD 30 MIN: CPT | Performed by: NURSE PRACTITIONER

## 2024-10-31 PROCEDURE — G2211 COMPLEX E/M VISIT ADD ON: HCPCS | Performed by: NURSE PRACTITIONER

## 2024-10-31 RX ORDER — DILTIAZEM HYDROCHLORIDE 240 MG/1
240 CAPSULE, EXTENDED RELEASE ORAL DAILY
Qty: 30 CAPSULE | Refills: 11 | Status: SHIPPED | OUTPATIENT
Start: 2024-10-31

## 2024-10-31 NOTE — LETTER
10/31/2024    Eddie Diaz MD  [address]  [facility] [address]    RE: Lluvia Marrufo       Dear Colleague,     I had the pleasure of seeing Lluvia Marrufo in the [facility].     [facility]  Cardiac Electrophysiology  [address]  [place] [address]   Office: [phone]  Fax: [phone]     HEART CARE ELECTROPHYSIOLOGY FOLLOW UP    Primary Care: Eddie Diaz MD      Assessment/Recommendations     Permanent atrial fibrillation/stage 4: In atrial fibrillation since approximately 2021. We discussed pathophysiology and progression of atrial fibrillation, management options including rate control and antiarrhythmic drug therapy.  She is unlikely to maintain sinus rhythm given longstanding history and dyspnea does not correlate to atrial fibrillation    [id number] score of 6 for age >75, gender, CAD, CVA.  She denies bleeding complications    Plan:  Continue Eliquis 5 mg twice a day for stroke prophylaxis  Continue diltiazem 240 mg daily  Repeat 48-hour Zio  Update TTE  EP follow-up pending above     History of Present Illness/Subjective    Lluvia Marrufo is a 84 year old female with past medical history significant for permanent atrial fibrillation, CAD, asthma, CVA 2018, obesity, seen today for EP follow-up. She was diagnosed with atrial fibrillation in the setting of URI symptoms in 2021 and underwent cardioversion 8/11/2021 with early recurrence. She noted dyspnea on exertion for the preceding 1-2 years which was relatively stable following cardioversion and rate control was recommended thereafter. More recently she was evaluated for worsening dyspnea ongoing for several years, unchanged with decreasing metoprolol though may be somewhat better on diltiazem.  She has trouble exercising due to her dyspnea and this significantly interferes with her quality of life. She has pedal edema intermittently. 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 She denies chest discomfort, palpitations, lightheadedness/dizziness, or syncope.     Data Review     EKG 3/3/2022:  bpm, QRS 80 ms, QT/QTc 342/477 ms  Personally reviewed.     TTE 2021  1.Left ventricular size, wall motion and function are normal. The ejection  fraction is 60-65%.  2.TAPSE is normal, which is consistent with normal right ventricular systolic  function.  3.IVC diameter and respiratory changes fall into an intermediate range  suggesting an RA pressure of 8 mmHg.  4.No hemodynamically significant valvular abnormalities on 2D or color flow  imaging.  5.Compared to the prior study dated 7/13/2018, there have been no changes  other then A Fib now present.    Zio monitoring from 9/16/2024 to 9/23/2024 (duration 6d 22h).  Continuous atrial fibrillation, ventricular rates 43 to 191bpm, average 90bpm.  There were no pauses of greater than 3 seconds.  Rare premature ventricular contractions (isolated <1%).  Symptom triggers and diary entries (10) correlated to atrial fibrillation with ventricular rates 75-177bpm with and without PVCs.    I have reviewed and updated the patient's past medical history, allergy list and medication list.          Physical Examination   BMI= Body mass index is 39.33 kg/m .    Wt Readings from Last 3 Encounters:   10/31/24 100.7 kg (222 lb)   09/12/24 101.2 kg (223 lb)   08/15/24 97.1 kg (214 lb)       Vitals: /82 (BP Location: Right arm, Patient Position: Sitting, Cuff Size: Adult Large)   Pulse 106   Resp 14   Ht 1.6 m (5' 3\")   Wt 100.7 kg (222 lb)   LMP  (LMP Unknown)   BMI 39.33 kg/m    General   Appearance:   Alert and oriented, in no acute distress.    HEENT:  Normocephalic and atraumatic. Conjunctiva and sclera are clear. Moist oral mucosa.    Neck: No JVP, carotid bruit or obvious thyromegaly.   Lungs:   Respirations unlabored. Clear bilaterally with no rales, rhonchi, or wheezes.   "   Cardiovascular:   Rhythm is irregular. S1 and S2 are normal. No significant murmur is present. Lower extremities demonstrate trace edema. Posterior tibial pulses are intact bilaterally.   Extremities: No cyanosis or clubbing   Skin: Skin is warm, dry, and otherwise intact.   Neurologic: Gait not assessed. Mood and affect appropriate.           Medical History  Surgical History Family History Social History   Past Medical History:   Diagnosis Date     Arthritis      Asthma      Atrial fibrillation (H)      Bronchitis      Earache      Fracture, foot      History of CVA (cerebrovascular accident)      Hyperlipidemia      Hypertension      UTI (urinary tract infection)     Past Surgical History:   Procedure Laterality Date     APPENDECTOMY       COLONOSCOPY N/A 3/24/2022    Procedure: COLONOSCOPY;  Surgeon: Khang Torres MD;  Location: [facility]     EYE SURGERY      Growth on lateral left eyelid removed.     HYSTERECTOMY      age 57     JOINT REPLACEMENT Left     knee     OOPHORECTOMY       PICC TRIPLE LUMEN PLACEMENT  3/3/2022          [place] HALLUX RIGIDUS W/CHEILECTOMY 1ST MP JT W/O IMPLT      Description: Hallux Rigidus Correction W/ Cheilectomy 1st MTP Joint;  Proc Date: 12/03/2010;     TONSILLECTOMY       TOTAL KNEE ARTHROPLASTY      right     TOTAL SHOULDER ARTHROPLASTY      left     [facility] RECONSTR TOTAL SHOULDER IMPLANT Right 10/27/2015    Procedure: RIGHT SHOULDER TOTAL ARTHROPLASTY;  Surgeon: Eddie Payne MD;  Location: [facility];  Service: Orthopedics     [facility] TOTAL HIP ARTHROPLASTY Left 10/3/2017    Procedure:  LEFT TOTAL HIP ARTHROPLASTY;  Surgeon: Mahin Monsalve MD;  Location: [facility];  Service: Orthopedics    Family History   Problem Relation Age of Onset     Hypertension Mother      Alcoholism Father      Cirrhosis Father      Anesthesia Reaction No family hx of     Social History     Socioeconomic History     Marital status:      Spouse name: Not on file      Number of children: Not on file     Years of education: Not on file     Highest education level: Not on file   Occupational History     Not on file   Tobacco Use     Smoking status: Never     Smokeless tobacco: Never   Vaping Use     Vaping status: Never Used   Substance and Sexual Activity     Alcohol use: Yes     Comment: Alcoholic Drinks/day: 1 drink per day     Drug use: No     Sexual activity: Not on file   Other Topics Concern     Not on file   Social History Narrative    [place]  Son-in-law is a .     Social Drivers of Health     Financial Resource Strain: Low Risk  (2/9/2024)    Financial Resource Strain      Within the past 12 months, have you or your family members you live with been unable to get utilities (heat, electricity) when it was really needed?: No   Food Insecurity: Low Risk  (2/9/2024)    Food Insecurity      Within the past 12 months, did you worry that your food would run out before you got money to buy more?: No      Within the past 12 months, did the food you bought just not last and you didn t have money to get more?: No   Transportation Needs: Low Risk  (2/9/2024)    Transportation Needs      Within the past 12 months, has lack of transportation kept you from medical appointments, getting your medicines, non-medical meetings or appointments, work, or from getting things that you need?: No   Physical Activity: Insufficiently Active (2/9/2024)    Exercise Vital Sign      Days of Exercise per Week: 3 days      Minutes of Exercise per Session: 30 min   Stress: Stress Concern Present (2/9/2024)    [employer] - Occupational Stress Questionnaire      Feeling of Stress : To some extent   Social Connections: Unknown (2/9/2024)    Social Connection and Isolation Panel [NHANES]      Frequency of Communication with Friends and Family: Not on file      Frequency of Social Gatherings with Friends and Family: Twice a week      Attends  [employer] Services: Not on file      Active Member of Clubs or Organizations: Not on file      Attends Club or Organization Meetings: Not on file      Marital Status: Not on file   Interpersonal Safety: Low Risk  (2/16/2024)    Interpersonal Safety      Do you feel physically and emotionally safe where you currently live?: Yes      Within the past 12 months, have you been hit, slapped, kicked or otherwise physically hurt by someone?: No      Within the past 12 months, have you been humiliated or emotionally abused in other ways by your partner or ex-partner?: No   Housing Stability: Low Risk  (2/9/2024)    Housing Stability      Do you have housing? : Yes      Are you worried about losing your housing?: No          Medications  Allergies   Scheduled Meds:  Current Outpatient Medications   Medication Sig Dispense Refill     albuterol (PROAIR HFA/PROVENTIL HFA/VENTOLIN HFA) 108 (90 Base) MCG/ACT inhaler Inhale 2 puffs into the lungs every 6 hours as needed for shortness of breath / dyspnea or wheezing 18 g 11     albuterol (PROVENTIL) 2.5 mg /3 mL (0.083 %) nebulizer solution [ALBUTEROL (PROVENTIL) 2.5 MG /3 ML (0.083 %) NEBULIZER SOLUTION] Take 3 mL (2.5 mg total) by nebulization every 4 (four) hours as needed for wheezing or shortness of breath. 75 mL 1     amLODIPine (NORVASC) 10 MG tablet Take 1 tablet (10 mg) by mouth daily 90 tablet 3     atorvastatin (LIPITOR) 20 MG tablet Take 1 tablet (20 mg) by mouth daily 90 tablet 3     benzonatate (TESSALON) 200 MG capsule Take 1 capsule (200 mg) by mouth 3 times daily as needed for cough 30 capsule 3     diltiazem ER (DILT-XR) 240 MG 24 hr ER beaded capsule Take 1 capsule (240 mg) by mouth daily. 30 capsule 0     ELIQUIS ANTICOAGULANT 5 MG tablet TAKE 1 TABLET BY MOUTH TWICE A  tablet 0     gabapentin (NEURONTIN) 300 MG capsule Take 1 capsule (300 mg) by mouth daily 90 capsule 3     glucosamine-chondroitin 500-400 mg cap [GLUCOSAMINE-CHONDROITIN 500-400 MG CAP]  Take 2 capsules by mouth daily.       KLOR-CON M20 20 MEQ CR tablet TAKE 1 TABLET (20 MEQ) BY MOUTH 3 TIMES DAILY 270 tablet 1     losartan (COZAAR) 100 MG tablet Take 1 tablet (100 mg) by mouth daily 90 tablet 3     multivitamin with minerals (THERA-M) 9 mg iron-400 mcg Tab tablet Take 1 tablet by mouth daily Chewable       pramipexole (MIRAPEX) 0.125 MG tablet TAKE 1 TABLET BY MOUTH NIGHTLY AS NEEDED 90 tablet 1     spironolactone (ALDACTONE) 25 MG tablet Take 1 tablet (25 mg) by mouth daily. 90 tablet 1     traZODone (DESYREL) 50 MG tablet TAKE 1 TABLET BY MOUTH AT BEDTIME - TRY 1/2 TABLET FOR FIRST FEW DAYS (Patient taking differently: as needed for sleep.) 90 tablet 0    Allergies   Allergen Reactions     Azithromycin Nausea     Dizziness, dehydrated.          Lab Results    Chemistry/lipid CBC Cardiac Enzymes/BNP/TSH/INR   Lab Results   Component Value Date    CHOL 171 2024    HDL 86 2024    TRIG 104 2024    BUN 17.6 2024     2024    CO2 27 2024    Lab Results   Component Value Date    WBC 8.3 2024    HGB 14.6 2024    HCT 44.1 2024    MCV 88 2024     2024    @RESUFAST(BMP,CBC,BNP,TSH,  INR)@      30 minutes spent reviewing prior records (including documentation, laboratory studies, cardiac testing/imaging), history and physical exam, planning, and subsequent documentation.     The longitudinal plan of care for the diagnosis(es)/condition(s) as documented were addressed during this visit. Due to the added complexity in care, I will continue to support Lexii in the subsequent management and with ongoing continuity of care.      This note has been dictated using voice recognition software. Any grammatical, typographical, or context distortions are unintentional and inherent to the software.    Daniella Camacho, HOLLIS  Cardiac Electrophysiology  [facility]  Clinic and [phone]  Fax:  [phone]  Electrophysiology Nurses: [phone]          Thank you for allowing me to participate in the care of your patient.      Sincerely,     ARIELLA Coates [facility]  cc:   Nissa Cheema PA-C  [address] [place] [address]

## 2024-11-13 LAB — CV ZIO PRELIM RESULTS: NORMAL

## 2024-11-13 PROCEDURE — 93244 EXT ECG>48HR<7D REV&INTERPJ: CPT | Performed by: INTERNAL MEDICINE

## 2024-11-22 ENCOUNTER — HOSPITAL ENCOUNTER (OUTPATIENT)
Dept: CARDIOLOGY | Facility: HOSPITAL | Age: 84
Discharge: HOME | End: 2024-11-22
Attending: NURSE PRACTITIONER | Admitting: NURSE PRACTITIONER
Payer: COMMERCIAL

## 2024-11-22 DIAGNOSIS — I48.11 LONGSTANDING PERSISTENT ATRIAL FIBRILLATION (H): ICD-10-CM

## 2024-11-22 DIAGNOSIS — I10 HYPERTENSION GOAL BP (BLOOD PRESSURE) < 140/90: Chronic | ICD-10-CM

## 2024-11-22 PROCEDURE — 93306 TTE W/DOPPLER COMPLETE: CPT

## 2024-11-22 PROCEDURE — 93306 TTE W/DOPPLER COMPLETE: CPT | Mod: 26 | Performed by: INTERNAL MEDICINE

## 2024-12-21 DIAGNOSIS — I48.0 PAF (PAROXYSMAL ATRIAL FIBRILLATION) (H): ICD-10-CM

## 2024-12-23 ENCOUNTER — TELEPHONE (OUTPATIENT)
Dept: CARDIOLOGY | Facility: CLINIC | Age: 84
End: 2024-12-23
Payer: COMMERCIAL

## 2024-12-23 RX ORDER — APIXABAN 5 MG/1
5 TABLET, FILM COATED ORAL 2 TIMES DAILY
Qty: 180 TABLET | Refills: 1 | Status: SHIPPED | OUTPATIENT
Start: 2024-12-23

## 2024-12-23 NOTE — TELEPHONE ENCOUNTER
[facility] Call Center    Phone Message    May a detailed message be left on voicemail: yes     Reason for Call: Medication Refill Request    Has the patient contacted the pharmacy for the refill? Yes   Name of medication being requested: Eliquis  Provider who prescribed the medication: Bk  Pharmacy: [facility]ACY [id number] [place] [id number]    Date medication is needed: asap. Pt was denied by pharmacy, she is under the impression she should not stop medication. She wants to know why it was denied. Did Dr Bourgeois stop medication?        Action Taken: Other: cardio    Travel Screening: Not Applicable     Date of Service:

## 2024-12-23 NOTE — TELEPHONE ENCOUNTER
Noted in chart Eliquis was filled today and sent to pharmacy.     Returned phone call to pt and left detailed message that script was sent to pharmacy today. Return number provided if any further questions.     Mahnaz Shipman RN

## 2024-12-25 DIAGNOSIS — G25.81 RESTLESS LEGS SYNDROME: ICD-10-CM

## 2024-12-26 RX ORDER — PRAMIPEXOLE DIHYDROCHLORIDE 0.12 MG/1
0.12 TABLET ORAL
Qty: 90 TABLET | Refills: 0 | Status: SHIPPED | OUTPATIENT
Start: 2024-12-26

## 2025-02-09 DIAGNOSIS — I10 ESSENTIAL HYPERTENSION: ICD-10-CM

## 2025-02-09 DIAGNOSIS — I63.9 ACUTE CVA (CEREBROVASCULAR ACCIDENT) (H): Primary | ICD-10-CM

## 2025-02-10 RX ORDER — POTASSIUM CHLORIDE 1500 MG/1
20 TABLET, EXTENDED RELEASE ORAL 3 TIMES DAILY
Qty: 270 TABLET | Refills: 1 | Status: SHIPPED | OUTPATIENT
Start: 2025-02-10

## 2025-02-10 RX ORDER — ATORVASTATIN CALCIUM 20 MG/1
20 TABLET, FILM COATED ORAL DAILY
Qty: 90 TABLET | Refills: 2 | Status: SHIPPED | OUTPATIENT
Start: 2025-02-10

## 2025-03-08 DIAGNOSIS — I10 ESSENTIAL HYPERTENSION: ICD-10-CM

## 2025-03-10 DIAGNOSIS — I10 ESSENTIAL HYPERTENSION: ICD-10-CM

## 2025-03-10 RX ORDER — AMLODIPINE BESYLATE 10 MG/1
10 TABLET ORAL DAILY
Qty: 90 TABLET | Refills: 3 | Status: SHIPPED | OUTPATIENT
Start: 2025-03-10

## 2025-03-10 RX ORDER — LOSARTAN POTASSIUM 100 MG/1
100 TABLET ORAL DAILY
Qty: 90 TABLET | Refills: 3 | Status: SHIPPED | OUTPATIENT
Start: 2025-03-10

## 2025-03-10 RX ORDER — SPIRONOLACTONE 25 MG/1
25 TABLET ORAL DAILY
Qty: 90 TABLET | Refills: 1 | Status: SHIPPED | OUTPATIENT
Start: 2025-03-10

## 2025-03-16 ENCOUNTER — HEALTH MAINTENANCE LETTER (OUTPATIENT)
Age: 85
End: 2025-03-16

## 2025-03-25 DIAGNOSIS — G25.81 RESTLESS LEGS SYNDROME: ICD-10-CM

## 2025-03-25 RX ORDER — PRAMIPEXOLE DIHYDROCHLORIDE 0.12 MG/1
0.12 TABLET ORAL
Qty: 60 TABLET | Refills: 0 | Status: SHIPPED | OUTPATIENT
Start: 2025-03-25

## 2025-04-01 ENCOUNTER — TELEPHONE (OUTPATIENT)
Dept: CARDIOLOGY | Facility: CLINIC | Age: 85
End: 2025-04-01
Payer: COMMERCIAL

## 2025-04-01 DIAGNOSIS — I48.0 PAF (PAROXYSMAL ATRIAL FIBRILLATION) (H): ICD-10-CM

## 2025-04-01 NOTE — TELEPHONE ENCOUNTER
[facility] Call Center    Phone Message    May a detailed message be left on voicemail: yes     Reason for Call: Medication Refill Request    Has the patient contacted the pharmacy for the refill? Yes   Name of medication being requested: apixaban ANTICOAGULANT (ELIQUIS ANTICOAGULANT) 5 MG tablet   Provider who prescribed the medication: Daniella Camacho  Pharmacy: [facility]/PHARMACY #[id number] [place] [address]    Date medication is needed: 04/01/25       Action Taken: Other: cardiology    Travel Screening: Not Applicable  Thank you!  [facility]       Date of Service:

## 2025-06-25 SDOH — HEALTH STABILITY: PHYSICAL HEALTH: ON AVERAGE, HOW MANY DAYS PER WEEK DO YOU ENGAGE IN MODERATE TO STRENUOUS EXERCISE (LIKE A BRISK WALK)?: 3 DAYS

## 2025-06-25 SDOH — HEALTH STABILITY: PHYSICAL HEALTH: ON AVERAGE, HOW MANY MINUTES DO YOU ENGAGE IN EXERCISE AT THIS LEVEL?: 20 MIN

## 2025-06-25 ASSESSMENT — SOCIAL DETERMINANTS OF HEALTH (SDOH): HOW OFTEN DO YOU GET TOGETHER WITH FRIENDS OR RELATIVES?: THREE TIMES A WEEK

## 2025-06-25 ASSESSMENT — ASTHMA QUESTIONNAIRES
EMERGENCY_ROOM_LAST_YEAR_TOTAL: ONE
QUESTION_3 LAST FOUR WEEKS HOW OFTEN DID YOUR ASTHMA SYMPTOMS (WHEEZING, COUGHING, SHORTNESS OF BREATH, CHEST TIGHTNESS OR PAIN) WAKE YOU UP AT NIGHT OR EARLIER THAN USUAL IN THE MORNING: ONCE OR TWICE
QUESTION_4 LAST FOUR WEEKS HOW OFTEN HAVE YOU USED YOUR RESCUE INHALER OR NEBULIZER MEDICATION (SUCH AS ALBUTEROL): TWO OR THREE TIMES PER WEEK
QUESTION_1 LAST FOUR WEEKS HOW MUCH OF THE TIME DID YOUR ASTHMA KEEP YOU FROM GETTING AS MUCH DONE AT WORK, SCHOOL OR AT HOME: SOME OF THE TIME
QUESTION_2 LAST FOUR WEEKS HOW OFTEN HAVE YOU HAD SHORTNESS OF BREATH: MORE THAN ONCE A DAY
QUESTION_5 LAST FOUR WEEKS HOW WOULD YOU RATE YOUR ASTHMA CONTROL: SOMEWHAT CONTROLLED
ACT_TOTALSCORE: 14

## 2025-06-26 ENCOUNTER — TELEPHONE (OUTPATIENT)
Dept: FAMILY MEDICINE | Facility: CLINIC | Age: 85
End: 2025-06-26

## 2025-06-26 ENCOUNTER — OFFICE VISIT (OUTPATIENT)
Dept: FAMILY MEDICINE | Facility: CLINIC | Age: 85
End: 2025-06-26
Payer: COMMERCIAL

## 2025-06-26 VITALS
BODY MASS INDEX: 41.75 KG/M2 | RESPIRATION RATE: 16 BRPM | OXYGEN SATURATION: 98 % | HEART RATE: 75 BPM | TEMPERATURE: 97.8 F | SYSTOLIC BLOOD PRESSURE: 133 MMHG | WEIGHT: 221.13 LBS | DIASTOLIC BLOOD PRESSURE: 64 MMHG | HEIGHT: 61 IN

## 2025-06-26 DIAGNOSIS — G25.81 RESTLESS LEGS SYNDROME: ICD-10-CM

## 2025-06-26 DIAGNOSIS — Z00.00 HEALTH CARE MAINTENANCE: Primary | ICD-10-CM

## 2025-06-26 DIAGNOSIS — G47.00 INSOMNIA, UNSPECIFIED TYPE: ICD-10-CM

## 2025-06-26 DIAGNOSIS — I48.19 PERSISTENT ATRIAL FIBRILLATION (H): ICD-10-CM

## 2025-06-26 DIAGNOSIS — E66.813 CLASS 3 SEVERE OBESITY DUE TO EXCESS CALORIES WITH SERIOUS COMORBIDITY AND BODY MASS INDEX (BMI) OF 40.0 TO 44.9 IN ADULT (H): ICD-10-CM

## 2025-06-26 DIAGNOSIS — E78.00 HYPERCHOLESTEROLEMIA: ICD-10-CM

## 2025-06-26 DIAGNOSIS — I10 PRIMARY HYPERTENSION: ICD-10-CM

## 2025-06-26 DIAGNOSIS — R05.1 ACUTE COUGH: ICD-10-CM

## 2025-06-26 RX ORDER — HYDROXYZINE HYDROCHLORIDE 25 MG/1
25 TABLET, FILM COATED ORAL AT BEDTIME
Qty: 90 TABLET | Refills: 1 | Status: SHIPPED | OUTPATIENT
Start: 2025-06-26

## 2025-06-26 RX ORDER — ALBUTEROL SULFATE 90 UG/1
2 INHALANT RESPIRATORY (INHALATION) EVERY 6 HOURS PRN
Qty: 18 G | Refills: 11 | Status: SHIPPED | OUTPATIENT
Start: 2025-06-26

## 2025-06-26 RX ORDER — PRAMIPEXOLE DIHYDROCHLORIDE 0.25 MG/1
0.25 TABLET ORAL DAILY
Qty: 90 TABLET | Refills: 3 | Status: SHIPPED | OUTPATIENT
Start: 2025-06-26

## 2025-06-26 NOTE — PROGRESS NOTES
"Preventive Care Visit  [facility]  Eddie Diaz MD, Family Medicine  Jun 26, 2025      Assessment & Plan     Health care maintenance  Here for annual exam  Interested in fasting blood work  Up-to-date on immunizations  - CBC with platelets  - Comprehensive metabolic panel (BMP + Alb, Alk Phos, ALT, AST, Total. Bili, TP)    Acute cough  Patient using albuterol inhaler lungs are clear today  Periodically [place]    - albuterol (PROAIR HFA/PROVENTIL HFA/VENTOLIN HFA) 108 (90 Base) MCG/ACT inhaler  Dispense: 18 g; Refill: 11    Hypercholesterolemia  Patient on statin therapy check lipid levels  - Lipid panel reflex to direct LDL Non-fasting    Restless legs syndrome  Patient feels Mirapex is helping but still has some breakthrough symptoms at times discussed increasing dosing  - pramipexole (MIRAPEX) 0.25 MG tablet  Dispense: 90 tablet; Refill: 3    Insomnia, unspecified type  Patient having trouble falling asleep when she gets to sleep she does well  Discussed trial with Vistaril  - hydrOXYzine HCl (ATARAX) 25 MG tablet  Dispense: 90 tablet; Refill: 1    Primary hypertension  Blood pressure at goal range we will continue with current medications    Persistent atrial fibrillation (H)  Following with cardiology  She is symptomatic with shortness of breath with her A-fib  She wants to discuss with them options such as ablation and the Watchman device      Patient has been advised of split billing requirements and indicates understanding: Yes        BMI  Estimated body mass index is 42.47 kg/m  as calculated from the following:    Height as of this encounter: 1.537 m (5' 0.5\").    Weight as of this encounter: 100.3 kg (221 lb 2 oz).   Weight management plan: Discussed healthy diet and exercise guidelines  Reviewed preventive health counseling, as reflected in patient instructions       Regular exercise       Healthy diet/nutrition  Counseling  Appropriate " preventive services were addressed with this patient via screening, questionnaire, or discussion as appropriate for fall prevention, nutrition, physical activity, Tobacco-use cessation, social engagement, weight loss and cognition.  Checklist reviewing preventive services available has been given to the patient.  Reviewed patient's diet, addressing concerns and/or questions.   She is at risk for lack of exercise and has been provided with information to increase physical activity for the benefit of her well-being.   She is at risk for psychosocial distress and has been provided with information to reduce risk.   Discussed possible causes of fatigue. The patient was provided with written information regarding signs of hearing loss.   Information on urinary incontinence and treatment options given to patient.             Erick Shultz is a 85 year old, presenting for the following:  Wellness Visit        6/26/2025     9:21 AM   Additional Questions   Roomed by Dina HYLTON CMA   Accompanied by Self         6/26/2025     9:30 AM   Patient Reported Additional Medications   Patient reports taking the following new medications Benadryl          HPI  Patient here for annual exam  Interested in fasting blood work  Requesting refills of inhaler  On statin therapy we will check labs  Trouble with insomnia discussed Vistaril  A-fib is persistent and causing symptoms she is following up with cardiology in the near future to discuss options-continue with blood thinner at this time  Patient is aware of her weight we discussed the need for diet and exercise not only to improve cardiovascular health but to help lose weight and to improve lung function  Restless legs is somewhat helpful with Mirapex but will increase dosing to help further.        Advance Care Planning    Discussed advance care planning with patient; informed AVS has link to [facility].        6/25/2025   General Health   How would you rate your overall  physical health? (!) FAIR    Feel stress (tense, anxious, or unable to sleep) To some extent        Proxy-reported   (!) STRESS CONCERN      6/25/2025   Nutrition   Diet: I don't know        Proxy-reported         6/25/2025   Exercise   Days per week of moderate/strenous exercise 3 days    Average minutes spent exercising at this level 20 min        Proxy-reported         6/25/2025   Social Factors   Frequency of gathering with friends or relatives Three times a week    Worry food won't last until get money to buy more No    Food not last or not have enough money for food? No    Do you have housing? (Housing is defined as stable permanent housing and does not include staying outside in a car, in a tent, in an abandoned building, in an overnight shelter, or couch-surfing.) Yes    Are you worried about losing your housing? No    Lack of transportation? No    Unable to get utilities (heat,electricity)? No        Proxy-reported         6/26/2025   Fall Risk   Gait Speed Test (Document in seconds) 6.41   Gait Speed Test Interpretation Greater than 5.01 seconds - ABNORMAL          6/25/2025   Activities of Daily Living- Home Safety   Needs help with the following daily activites None of the above    Safety concerns in the home None of the above        Proxy-reported         6/25/2025   Dental   Dentist two times every year? Yes        Proxy-reported         6/25/2025   Hearing Screening   Hearing concerns? (!) TROUBLE UNDESTANDING A SPEAKER IN A PUBLIC MEETING OR [employer] SERVICE.     (!) TROUBLE FOLLOWING DIALOGUE IN THE THEATHER.    Would you like a referral for hearing testing? No        Proxy-reported    Multiple values from one day are sorted in reverse-chronological order         6/25/2025   Driving Risk Screening   Patient/family members have concerns about driving No        Proxy-reported         6/25/2025   General Alertness/Fatigue Screening   Have you been more tired than usual lately? (!) YES         Proxy-reported         6/25/2025   Urinary Incontinence Screening   Bothered by leaking urine in past 6 months Yes        Proxy-reported         Today's PHQ-2 Score:       6/25/2025     5:34 PM   PHQ-2 ( 1999 [employer])   Q1: Little interest or pleasure in doing things 0    Q2: Feeling down, depressed or hopeless 1    PHQ-2 Score 1    Q1: Little interest or pleasure in doing things Not at all    Q2: Feeling down, depressed or hopeless Several days    PHQ-2 Score 1        Proxy-reported           6/25/2025   Substance Use   Alcohol more than 3/day or more than 7/wk No    Do you have a current opioid prescription? No    How severe/bad is pain from 1 to 10? 4/10    Do you use any other substances recreationally? No        Proxy-reported     Social History     Tobacco Use    Smoking status: Never     Passive exposure: Never    Smokeless tobacco: Never   Vaping Use    Vaping status: Never Used   Substance Use Topics    Alcohol use: Yes     Comment: Alcoholic Drinks/day: 1 drink per day    Drug use: No                        Reviewed and updated as needed this visit by Provider                      Current providers sharing in care for this patient include:  Patient Care Team:  Eddie Diaz MD as PCP - General (Family Medicine)  Nazanin Hudson MD as Fellow (Internal Medicine)  Reyna Weldon CNP as Assigned Neuroscience Provider  Eddie Diaz MD as Assigned PCP  Daniella Camacho APRN CNP as Assigned Heart and Vascular Provider  Ngoc Bhandari MD as MD (Cardiovascular Disease)    The following health maintenance items are reviewed in Epic and correct as of today:  Health Maintenance   Topic Date Due    DEXA  Never done    ASTHMA ACTION PLAN  Never done    RSV VACCINE (1 - 1-dose 75+ series) Never done    ANNUAL REVIEW OF HM ORDERS  04/26/2023    COVID-19 VACCINE (7 - 2024-25 season) 09/01/2024    LIPID  01/31/2025    MEDICARE ANNUAL WELLNESS VISIT  02/16/2025    BMP  09/12/2025    ASTHMA CONTROL  "TEST  12/26/2025    FALL RISK ASSESSMENT  06/26/2026    DTAP/TDAP/TD VACCINE (3 - Td or Tdap) 10/09/2027    ADVANCE CARE PLANNING  02/16/2029    PHQ-2 (once per calendar year)  Completed    INFLUENZA VACCINE  Completed    PNEUMOCOCCAL VACCINE 50+ YEARS  Completed    ZOSTER VACCINE  Completed    HPV VACCINE  Aged Out    MENINGITIS VACCINE  Aged Out            Objective    Exam  /64 (BP Location: Left arm, Patient Position: Sitting, Cuff Size: Adult Regular)   Pulse 75   Temp 97.8  F (36.6  C) (Oral)   Resp 16   Ht 1.537 m (5' 0.5\")   Wt 100.3 kg (221 lb 2 oz)   LMP  (LMP Unknown)   SpO2 98%   BMI 42.47 kg/m     Estimated body mass index is 42.47 kg/m  as calculated from the following:    Height as of this encounter: 1.537 m (5' 0.5\").    Weight as of this encounter: 100.3 kg (221 lb 2 oz).    Physical Exam  GENERAL: alert and no distress  EYES: Eyes grossly normal to inspection, PERRL and conjunctivae and sclerae normal  RESP: lungs clear to auscultation - no rales, rhonchi or wheezes  CV: irregularly irregular rhythm and no peripheral edema  MS: no gross musculoskeletal defects noted, no edema  NEURO: Normal strength and tone, mentation intact and speech normal  PSYCH: mentation appears normal, affect normal/bright        6/26/2025   Mini Cog   Clock Draw Score 2 Normal   3 Item Recall 3 objects recalled   Mini Cog Total Score 5            The longitudinal plan of care for the diagnosis(es)/condition(s) as documented were addressed during this visit. Due to the added complexity in care, I will continue to support Lexii in the subsequent management and with ongoing continuity of care.  Signed Electronically by: Eddie Diaz MD    "

## 2025-06-26 NOTE — TELEPHONE ENCOUNTER
Please inform patient that sleeping medication denied by insurance- I would check with [employer] for price option to pay with cash-

## 2025-06-26 NOTE — TELEPHONE ENCOUNTER
PRIOR AUTHORIZATION DENIED    Medication: HYDROXYZINE HCL 25 MG PO TABS  Insurance Company: [employer]ty [employer] - Phone [phone] Fax [phone]  Denial Date: 6/26/2025  Denial Reason(s):     Appeal Information:     Patient Notified: No

## 2025-07-03 ENCOUNTER — RESULTS FOLLOW-UP (OUTPATIENT)
Dept: FAMILY MEDICINE | Facility: CLINIC | Age: 85
End: 2025-07-03

## 2025-07-03 ENCOUNTER — LAB (OUTPATIENT)
Dept: LAB | Facility: CLINIC | Age: 85
End: 2025-07-03
Payer: COMMERCIAL

## 2025-07-03 DIAGNOSIS — Z00.00 HEALTH CARE MAINTENANCE: ICD-10-CM

## 2025-07-03 DIAGNOSIS — E78.00 HYPERCHOLESTEROLEMIA: ICD-10-CM

## 2025-07-03 LAB
ALBUMIN SERPL BCG-MCNC: 4.1 G/DL (ref 3.5–5.2)
ALP SERPL-CCNC: 88 U/L (ref 40–150)
ALT SERPL W P-5'-P-CCNC: 16 U/L (ref 0–50)
ANION GAP SERPL CALCULATED.3IONS-SCNC: 11 MMOL/L (ref 7–15)
AST SERPL W P-5'-P-CCNC: 20 U/L (ref 0–45)
BILIRUB SERPL-MCNC: 0.9 MG/DL
BUN SERPL-MCNC: 18.4 MG/DL (ref 8–23)
CALCIUM SERPL-MCNC: 10.2 MG/DL (ref 8.8–10.4)
CHLORIDE SERPL-SCNC: 104 MMOL/L (ref 98–107)
CHOLEST SERPL-MCNC: 169 MG/DL
CREAT SERPL-MCNC: 0.72 MG/DL (ref 0.51–0.95)
EGFRCR SERPLBLD CKD-EPI 2021: 83 ML/MIN/1.73M2
ERYTHROCYTE [DISTWIDTH] IN BLOOD BY AUTOMATED COUNT: 12.4 % (ref 10–15)
FASTING STATUS PATIENT QL REPORTED: YES
FASTING STATUS PATIENT QL REPORTED: YES
GLUCOSE SERPL-MCNC: 109 MG/DL (ref 70–99)
HCO3 SERPL-SCNC: 24 MMOL/L (ref 22–29)
HCT VFR BLD AUTO: 46.5 % (ref 35–47)
HDLC SERPL-MCNC: 91 MG/DL
HGB BLD-MCNC: 15.2 G/DL (ref 11.7–15.7)
LDLC SERPL CALC-MCNC: 63 MG/DL
MCH RBC QN AUTO: 29.7 PG (ref 26.5–33)
MCHC RBC AUTO-ENTMCNC: 32.7 G/DL (ref 31.5–36.5)
MCV RBC AUTO: 91 FL (ref 78–100)
NONHDLC SERPL-MCNC: 78 MG/DL
PLATELET # BLD AUTO: 284 10E3/UL (ref 150–450)
POTASSIUM SERPL-SCNC: 5.2 MMOL/L (ref 3.4–5.3)
PROT SERPL-MCNC: 7 G/DL (ref 6.4–8.3)
RBC # BLD AUTO: 5.11 10E6/UL (ref 3.8–5.2)
SODIUM SERPL-SCNC: 139 MMOL/L (ref 135–145)
TRIGL SERPL-MCNC: 73 MG/DL
WBC # BLD AUTO: 6 10E3/UL (ref 4–11)

## 2025-07-31 ENCOUNTER — OFFICE VISIT (OUTPATIENT)
Dept: CARDIOLOGY | Facility: CLINIC | Age: 85
End: 2025-07-31
Payer: COMMERCIAL

## 2025-07-31 VITALS
HEART RATE: 96 BPM | BODY MASS INDEX: 39.51 KG/M2 | RESPIRATION RATE: 20 BRPM | SYSTOLIC BLOOD PRESSURE: 128 MMHG | HEIGHT: 63 IN | WEIGHT: 223 LBS | DIASTOLIC BLOOD PRESSURE: 66 MMHG

## 2025-07-31 DIAGNOSIS — I10 PRIMARY HYPERTENSION: ICD-10-CM

## 2025-07-31 DIAGNOSIS — I48.11 LONGSTANDING PERSISTENT ATRIAL FIBRILLATION (H): Primary | ICD-10-CM

## 2025-07-31 DIAGNOSIS — R06.09 DOE (DYSPNEA ON EXERTION): ICD-10-CM

## 2025-07-31 DIAGNOSIS — E78.5 HYPERLIPIDEMIA LDL GOAL <100: ICD-10-CM

## 2025-07-31 LAB — NT-PROBNP SERPL-MCNC: 446 PG/ML (ref 0–624)

## 2025-07-31 RX ORDER — AMIODARONE HYDROCHLORIDE 200 MG/1
200 TABLET ORAL DAILY
Qty: 30 TABLET | Refills: 11 | Status: SHIPPED | OUTPATIENT
Start: 2025-07-31

## 2025-07-31 NOTE — LETTER
7/31/2025    Eddie Diaz MD  [address]  [facility] [address]    RE: Lluvia KELSEY Marrufo       Dear Colleague,     I had the pleasure of seeing Lluvia Marrufo in the [facility].  HEART CARE FOLLOW UP NOTE      [facility]  [phone]      Assessment/Recommendations   Assessment: 85 year old female with atrial fibrillation     Plan:  Dyspnea on exertion   Unclear etiology, though the atrial fibrillation seems unlikely to be the source given good rate control.  Suspect a pulmonary component and deconditioning , vs CHF, unlikely CAD given negative CTA result      -Follow-up with pulmonary      -Check BNP, if elevated can add diuretic, weight 223      -As below for atrial fibrillation    Atrial fibrillation, permanent  Rate controlled, some dyspnea that is not clear if due to atrial fibrillation.  She failed CDV in 2021 and has been in atrial fibrillation since.  We  discussed that restoration of NSR will be difficult after this long in atrial fibrillation but she is adamant she wishes to try as she feels her atrial fibrillation is the source of her symptoms      -Add Amiodarone 400mg BID x 1 week, 200mg BID x 1 week, then 200mg daily      -CDV in 1 month       -Continue Diltiazem 240mg, Eliquis 5mg BID, she will check cost of other DOACs    HTN  Well controlled      -Continue Spironolactone 25mg, Norvasc 10mg, Diltiazem 240mg, Losartan 100mg    Hyperlipidemia   LDL = 63      -Continue Lipitor 20mg    The longitudinal plan of care for the diagnosis(es)/condition(s) as documented were addressed during this visit. Due to the added complexity in care, I will continue to support Lexii in the subsequent management and with ongoing continuity of care.          History of Present Illness/Subjective    Indication for visit:  Lluvia Marrufo returns for follow up of atrial fibrillation and was last seen on 10/31/2024 by Daniella Camacho - [facility].      HPI: Lluvia Marrufo  "is a 85 year old female with a history of atrial fibrillation, HTN, hyperlipidemia who returns for follow up.    She reports she is gasping for air and feels this is due to her atrial fibrillation being improperly treated.  She had a CDV in 8/2021, by 9/2021 was back in atrial fibrillation and persistent since then.  Can't walk very far due to dyspnea, has been the case since 9/2021, has been progressively worse since then.  No chest pain, no palpitations, no orthopnea or PND.           I reviewed notes from PCP prior to this visit.         Physical Examination  Past Cardiac History   /66 (BP Location: Right arm, Patient Position: Sitting, Cuff Size: Adult Large)   Pulse 96   Resp 20   Ht 1.6 m (5' 3\")   Wt 101.2 kg (223 lb)   LMP  (LMP Unknown)   BMI 39.50 kg/m       Wt Readings from Last 3 Encounters:   06/26/25 100.3 kg (221 lb 2 oz)   10/31/24 100.7 kg (222 lb)   09/12/24 101.2 kg (223 lb)       General Appearance:   no distress, normal body habitus   ENT/Mouth: membranes moist, no oral lesions or bleeding gums.      EYES:  no scleral icterus, normal conjunctivae   Neck: no carotid bruits or thyromegaly   Chest/Lungs:   lungs are clear to auscultation, no rales or wheezing,  sternal scar, equal chest wall expansion    Cardiovascular:   Irregular. Normal first and second heart sounds with no murmurs, rubs, or gallops; the carotid, radial and posterior tibial pulses are intact, Jugular venous pressure normal , no edema bilaterally    Abdomen:  no organomegaly, masses, bruits, or tenderness; bowel sounds are present   Extremities: no cyanosis or clubbing   Skin: no xanthelasma, warm.    Neurologic: normal  bilateral, no tremors           Atrial fibrillation, permanent     Zio: 11/13/2024  Zio monitoring from 11/6/2024 to 11/8/2024 (duration 2d).  Continuous atrial fibrillation, 49 to 147bpm, average 82bpm.  There were no pauses of greater than 3 seconds.  Rare premature ventricular contractions " (<1%).  Symptom triggers correlated with atrial fibrillation with RVR .    Most Recent Echocardiogram: 11/22/2024  1. Normal left ventricular size and systolic performance with a visually  estimated ejection fraction of 65%.  2. No significant valvular heart disease is identified on this study.  3. Normal right ventricular size and systolic performance.  4. There is mild-moderate biatrial enlargement.     When compared to the prior real-time echocardiogram dated 27 July 2021, there  has been no major interval change.    CTA 7/22/2021  Total calcium score of 0. A calcium score of zero places the individual in the lowest quartile when compared to an age and gender matched control group.  Minimal nonobstructive atherosclerotic coronary artery disease.    Most Recent Stress Test: None    Most Recent Angiogram: None             Medical History  Family History Social History   Past Medical History:   Diagnosis Date     Arthritis      Asthma      Atrial fibrillation (H)      Bronchitis      Earache      Fracture, foot      History of CVA (cerebrovascular accident)      Hyperlipidemia      Hypertension      UTI (urinary tract infection)      Family History   Problem Relation Age of Onset     Hypertension Mother      Alcoholism Father      Cirrhosis Father      Anesthesia Reaction No family hx of         Social History     Socioeconomic History     Marital status:      Spouse name: Not on file     Number of children: Not on file     Years of education: Not on file     Highest education level: Not on file   Occupational History     Not on file   Tobacco Use     Smoking status: Never     Passive exposure: Never     Smokeless tobacco: Never   Vaping Use     Vaping status: Never Used   Substance and Sexual Activity     Alcohol use: Yes     Comment: Alcoholic Drinks/day: 1 drink per day     Drug use: No     Sexual activity: Not on file   Other Topics Concern     Not on file   Social History Narrative    [place]  Son-in-law is a .     Social Drivers of Health     Financial Resource Strain: Low Risk  (6/25/2025)    Financial Resource Strain      Within the past 12 months, have you or your family members you live with been unable to get utilities (heat, electricity) when it was really needed?: No   Food Insecurity: Low Risk  (6/25/2025)    Food Insecurity      Within the past 12 months, did you worry that your food would run out before you got money to buy more?: No      Within the past 12 months, did the food you bought just not last and you didn t have money to get more?: No   Transportation Needs: Low Risk  (6/25/2025)    Transportation Needs      Within the past 12 months, has lack of transportation kept you from medical appointments, getting your medicines, non-medical meetings or appointments, work, or from getting things that you need?: No   Physical Activity: Insufficiently Active (6/25/2025)    Exercise Vital Sign      Days of Exercise per Week: 3 days      Minutes of Exercise per Session: 20 min   Stress: Stress Concern Present (6/25/2025)    [place] [employer] - Occupational Stress Questionnaire      Feeling of Stress : To some extent   Social Connections: Unknown (6/25/2025)    Social Connection and Isolation Panel [NHANES]      Frequency of Communication with Friends and Family: Not on file      Frequency of Social Gatherings with Friends and Family: Three times a week      Attends [employer] Services: Not on file      Active Member of Clubs or Organizations: Not on file      Attends Club or Organization Meetings: Not on file      Marital Status: Not on file   Interpersonal Safety: Low Risk  (6/26/2025)    Interpersonal Safety      Do you feel physically and emotionally safe where you currently live?: Yes      Within the past 12 months, have you been hit, slapped, kicked or otherwise physically hurt by someone?: No      Within the past 12 months, have you  been humiliated or emotionally abused in other ways by your partner or ex-partner?: No   Housing Stability: Low Risk  (6/25/2025)    Housing Stability      Do you have housing? : Yes      Are you worried about losing your housing?: No           Medications  Allergies   Current Outpatient Medications   Medication Sig Dispense Refill     albuterol (PROAIR HFA/PROVENTIL HFA/VENTOLIN HFA) 108 (90 Base) MCG/ACT inhaler Inhale 2 puffs into the lungs every 6 hours as needed for shortness of breath or wheezing. 18 g 11     albuterol (PROVENTIL) 2.5 mg /3 mL (0.083 %) nebulizer solution [ALBUTEROL (PROVENTIL) 2.5 MG /3 ML (0.083 %) NEBULIZER SOLUTION] Take 3 mL (2.5 mg total) by nebulization every 4 (four) hours as needed for wheezing or shortness of breath. 75 mL 1     amLODIPine (NORVASC) 10 MG tablet TAKE 1 TABLET (10 MG) BY MOUTH DAILY. 90 tablet 3     apixaban ANTICOAGULANT (ELIQUIS ANTICOAGULANT) 5 MG tablet Take 1 tablet (5 mg) by mouth 2 times daily. 180 tablet 1     atorvastatin (LIPITOR) 20 MG tablet TAKE 1 TABLET BY MOUTH EVERY DAY 90 tablet 2     diltiazem ER (DILT-XR) 240 MG 24 hr ER beaded capsule Take 1 capsule (240 mg) by mouth daily. 30 capsule 11     gabapentin (NEURONTIN) 300 MG capsule Take 1 capsule (300 mg) by mouth daily 90 capsule 3     glucosamine-chondroitin 500-400 mg cap [GLUCOSAMINE-CHONDROITIN 500-400 MG CAP] Take 2 capsules by mouth daily.       hydrOXYzine HCl (ATARAX) 25 MG tablet Take 1 tablet (25 mg) by mouth at bedtime. 90 tablet 1     KLOR-CON M20 20 MEQ CR tablet TAKE 1 TABLET (20 MEQ) BY MOUTH 3 TIMES DAILY 270 tablet 1     losartan (COZAAR) 100 MG tablet TAKE 1 TABLET BY MOUTH EVERY DAY 90 tablet 3     pramipexole (MIRAPEX) 0.25 MG tablet Take 1 tablet (0.25 mg) by mouth daily. 90 tablet 3     spironolactone (ALDACTONE) 25 MG tablet TAKE 1 TABLET BY MOUTH EVERY DAY 90 tablet 1     No current facility-administered medications for this visit.        Allergies   Allergen Reactions      "Azithromycin Nausea     Dizziness, dehydrated.           Lab Results    Chemistry/lipid CBC Cardiac Enzymes/BNP/TSH/INR   Recent Labs   Lab Test 07/03/25  0752   CHOL 169   HDL 91   LDL 63   TRIG 73     Recent Labs   Lab Test 07/03/25  0752 01/31/24  1546 04/21/21  0738   LDL 63 64 61     Recent Labs   Lab Test 07/03/25  0752      POTASSIUM 5.2   CHLORIDE 104   CO2 24   *   BUN 18.4   CR 0.72   GFRESTIMATED 83   ANNA 10.2     Recent Labs   Lab Test 07/03/25  0752 09/12/24  1028 01/31/24  1546   CR 0.72 0.71 0.59     Recent Labs   Lab Test 01/31/24  1546 03/09/22  1428 10/04/17  0543   A1C 6.1* 5.9* 5.4          Recent Labs   Lab Test 07/03/25  0752   WBC 6.0   HGB 15.2   HCT 46.5   MCV 91        Recent Labs   Lab Test 07/03/25  0752 01/31/24  1546 07/01/22  0830   HGB 15.2 14.6 14.2    Recent Labs   Lab Test 03/03/22  1016   TROPONINI <0.01     Recent Labs   Lab Test 03/09/22  1428 03/05/22  1924 03/03/22  1016   BNP 69 337* 152*     No results for input(s): \"TSH\" in the last 83806 hours.  Recent Labs   Lab Test 07/29/18  1951   INR 0.98        Ngoc Bhandari MD  Noninvasive Cardiologist   [facility]                Thank you for allowing me to participate in the care of your patient.      Sincerely,     Ngoc Bhandari MD     [facility]  cc:   No referring provider defined for this encounter.      "

## 2025-07-31 NOTE — PROGRESS NOTES
HEART CARE FOLLOW UP NOTE      [facility]  [phone]      Assessment/Recommendations   Assessment: 85 year old female with atrial fibrillation     Plan:  Dyspnea on exertion   Unclear etiology, though the atrial fibrillation seems unlikely to be the source given good rate control.  Suspect a pulmonary component and deconditioning , vs CHF, unlikely CAD given negative CTA result      -Follow-up with pulmonary      -Check BNP, if elevated can add diuretic, weight 223      -As below for atrial fibrillation    Atrial fibrillation, permanent  Rate controlled, some dyspnea that is not clear if due to atrial fibrillation.  She failed CDV in 2021 and has been in atrial fibrillation since.  We  discussed that restoration of NSR will be difficult after this long in atrial fibrillation but she is adamant she wishes to try as she feels her atrial fibrillation is the source of her symptoms      -Add Amiodarone 400mg BID x 1 week, 200mg BID x 1 week, then 200mg daily      -CDV in 1 month       -Continue Diltiazem 240mg, Eliquis 5mg BID, she will check cost of other DOACs    HTN  Well controlled      -Continue Spironolactone 25mg, Norvasc 10mg, Diltiazem 240mg, Losartan 100mg    Hyperlipidemia   LDL = 63      -Continue Lipitor 20mg    The longitudinal plan of care for the diagnosis(es)/condition(s) as documented were addressed during this visit. Due to the added complexity in care, I will continue to support Lexii in the subsequent management and with ongoing continuity of care.          History of Present Illness/Subjective    Indication for visit:  Lluvia Marrufo returns for follow up of atrial fibrillation and was last seen on 10/31/2024 by Daneilla Camacho - RICHY.      HPI: Lluvia Marrufo is a 85 year old female with a history of atrial fibrillation, HTN, hyperlipidemia who returns for follow up.    She reports she is gasping for air and feels this is due to her atrial fibrillation being improperly  "treated.  She had a CDV in 8/2021, by 9/2021 was back in atrial fibrillation and persistent since then.  Can't walk very far due to dyspnea, has been the case since 9/2021, has been progressively worse since then.  No chest pain, no palpitations, no orthopnea or PND.           I reviewed notes from PCP prior to this visit.         Physical Examination  Past Cardiac History   /66 (BP Location: Right arm, Patient Position: Sitting, Cuff Size: Adult Large)   Pulse 96   Resp 20   Ht 1.6 m (5' 3\")   Wt 101.2 kg (223 lb)   LMP  (LMP Unknown)   BMI 39.50 kg/m       Wt Readings from Last 3 Encounters:   06/26/25 100.3 kg (221 lb 2 oz)   10/31/24 100.7 kg (222 lb)   09/12/24 101.2 kg (223 lb)       General Appearance:   no distress, normal body habitus   ENT/Mouth: membranes moist, no oral lesions or bleeding gums.      EYES:  no scleral icterus, normal conjunctivae   Neck: no carotid bruits or thyromegaly   Chest/Lungs:   lungs are clear to auscultation, no rales or wheezing,  sternal scar, equal chest wall expansion    Cardiovascular:   Irregular. Normal first and second heart sounds with no murmurs, rubs, or gallops; the carotid, radial and posterior tibial pulses are intact, Jugular venous pressure normal , no edema bilaterally    Abdomen:  no organomegaly, masses, bruits, or tenderness; bowel sounds are present   Extremities: no cyanosis or clubbing   Skin: no xanthelasma, warm.    Neurologic: normal  bilateral, no tremors           Atrial fibrillation, permanent     Zio: 11/13/2024  Zio monitoring from 11/6/2024 to 11/8/2024 (duration 2d).  Continuous atrial fibrillation, 49 to 147bpm, average 82bpm.  There were no pauses of greater than 3 seconds.  Rare premature ventricular contractions (<1%).  Symptom triggers correlated with atrial fibrillation with RVR .    Most Recent Echocardiogram: 11/22/2024  1. Normal left ventricular size and systolic performance with a visually  estimated ejection fraction " of 65%.  2. No significant valvular heart disease is identified on this study.  3. Normal right ventricular size and systolic performance.  4. There is mild-moderate biatrial enlargement.     When compared to the prior real-time echocardiogram dated 27 July 2021, there  has been no major interval change.    CTA 7/22/2021  Total calcium score of 0. A calcium score of zero places the individual in the lowest quartile when compared to an age and gender matched control group.  Minimal nonobstructive atherosclerotic coronary artery disease.    Most Recent Stress Test: None    Most Recent Angiogram: None             Medical History  Family History Social History   Past Medical History:   Diagnosis Date    Arthritis     Asthma     Atrial fibrillation (H)     Bronchitis     Earache     Fracture, foot     History of CVA (cerebrovascular accident)     Hyperlipidemia     Hypertension     UTI (urinary tract infection)      Family History   Problem Relation Age of Onset    Hypertension Mother     Alcoholism Father     Cirrhosis Father     Anesthesia Reaction No family hx of         Social History     Socioeconomic History    Marital status:      Spouse name: Not on file    Number of children: Not on file    Years of education: Not on file    Highest education level: Not on file   Occupational History    Not on file   Tobacco Use    Smoking status: Never     Passive exposure: Never    Smokeless tobacco: Never   Vaping Use    Vaping status: Never Used   Substance and Sexual Activity    Alcohol use: Yes     Comment: Alcoholic Drinks/day: 1 drink per day    Drug use: No    Sexual activity: Not on file   Other Topics Concern    Not on file   Social History Narrative    [place]  Son-in-law is a .     Social Drivers of Health     Financial Resource Strain: Low Risk  (6/25/2025)    Financial Resource Strain     Within the past 12 months, have you or your family members you live with been  unable to get utilities (heat, electricity) when it was really needed?: No   Food Insecurity: Low Risk  (6/25/2025)    Food Insecurity     Within the past 12 months, did you worry that your food would run out before you got money to buy more?: No     Within the past 12 months, did the food you bought just not last and you didn t have money to get more?: No   Transportation Needs: Low Risk  (6/25/2025)    Transportation Needs     Within the past 12 months, has lack of transportation kept you from medical appointments, getting your medicines, non-medical meetings or appointments, work, or from getting things that you need?: No   Physical Activity: Insufficiently Active (6/25/2025)    Exercise Vital Sign     Days of Exercise per Week: 3 days     Minutes of Exercise per Session: 20 min   Stress: Stress Concern Present (6/25/2025)    [employer] - Occupational Stress Questionnaire     Feeling of Stress : To some extent   Social Connections: Unknown (6/25/2025)    Social Connection and Isolation Panel [NHANES]     Frequency of Communication with Friends and Family: Not on file     Frequency of Social Gatherings with Friends and Family: Three times a week     Attends Confucianist Services: Not on file     Active Member of Clubs or Organizations: Not on file     Attends Club or Organization Meetings: Not on file     Marital Status: Not on file   Interpersonal Safety: Low Risk  (6/26/2025)    Interpersonal Safety     Do you feel physically and emotionally safe where you currently live?: Yes     Within the past 12 months, have you been hit, slapped, kicked or otherwise physically hurt by someone?: No     Within the past 12 months, have you been humiliated or emotionally abused in other ways by your partner or ex-partner?: No   Housing Stability: Low Risk  (6/25/2025)    Housing Stability     Do you have housing? : Yes     Are you worried about losing your housing?: No           Medications  Allergies    Current Outpatient Medications   Medication Sig Dispense Refill    albuterol (PROAIR HFA/PROVENTIL HFA/VENTOLIN HFA) 108 (90 Base) MCG/ACT inhaler Inhale 2 puffs into the lungs every 6 hours as needed for shortness of breath or wheezing. 18 g 11    albuterol (PROVENTIL) 2.5 mg /3 mL (0.083 %) nebulizer solution [ALBUTEROL (PROVENTIL) 2.5 MG /3 ML (0.083 %) NEBULIZER SOLUTION] Take 3 mL (2.5 mg total) by nebulization every 4 (four) hours as needed for wheezing or shortness of breath. 75 mL 1    amLODIPine (NORVASC) 10 MG tablet TAKE 1 TABLET (10 MG) BY MOUTH DAILY. 90 tablet 3    apixaban ANTICOAGULANT (ELIQUIS ANTICOAGULANT) 5 MG tablet Take 1 tablet (5 mg) by mouth 2 times daily. 180 tablet 1    atorvastatin (LIPITOR) 20 MG tablet TAKE 1 TABLET BY MOUTH EVERY DAY 90 tablet 2    diltiazem ER (DILT-XR) 240 MG 24 hr ER beaded capsule Take 1 capsule (240 mg) by mouth daily. 30 capsule 11    gabapentin (NEURONTIN) 300 MG capsule Take 1 capsule (300 mg) by mouth daily 90 capsule 3    glucosamine-chondroitin 500-400 mg cap [GLUCOSAMINE-CHONDROITIN 500-400 MG CAP] Take 2 capsules by mouth daily.      hydrOXYzine HCl (ATARAX) 25 MG tablet Take 1 tablet (25 mg) by mouth at bedtime. 90 tablet 1    KLOR-CON M20 20 MEQ CR tablet TAKE 1 TABLET (20 MEQ) BY MOUTH 3 TIMES DAILY 270 tablet 1    losartan (COZAAR) 100 MG tablet TAKE 1 TABLET BY MOUTH EVERY DAY 90 tablet 3    pramipexole (MIRAPEX) 0.25 MG tablet Take 1 tablet (0.25 mg) by mouth daily. 90 tablet 3    spironolactone (ALDACTONE) 25 MG tablet TAKE 1 TABLET BY MOUTH EVERY DAY 90 tablet 1     No current facility-administered medications for this visit.        Allergies   Allergen Reactions    Azithromycin Nausea     Dizziness, dehydrated.           Lab Results    Chemistry/lipid CBC Cardiac Enzymes/BNP/TSH/INR   Recent Labs   Lab Test 07/03/25  0752   CHOL 169   HDL 91   LDL 63   TRIG 73     Recent Labs   Lab Test 07/03/25  0752 01/31/24  1546 04/21/21  0738   LDL 63 64 61  "    Recent Labs   Lab Test 07/03/25  0752      POTASSIUM 5.2   CHLORIDE 104   CO2 24   *   BUN 18.4   CR 0.72   GFRESTIMATED 83   ANNA 10.2     Recent Labs   Lab Test 07/03/25  0752 09/12/24  1028 01/31/24  1546   CR 0.72 0.71 0.59     Recent Labs   Lab Test 01/31/24  1546 03/09/22  1428 10/04/17  0543   A1C 6.1* 5.9* 5.4          Recent Labs   Lab Test 07/03/25  0752   WBC 6.0   HGB 15.2   HCT 46.5   MCV 91        Recent Labs   Lab Test 07/03/25  0752 01/31/24  1546 07/01/22  0830   HGB 15.2 14.6 14.2    Recent Labs   Lab Test 03/03/22  1016   TROPONINI <0.01     Recent Labs   Lab Test 03/09/22  1428 03/05/22  1924 03/03/22  1016   BNP 69 337* 152*     No results for input(s): \"TSH\" in the last 95591 hours.  Recent Labs   Lab Test 07/29/18 1951   INR 0.98        Ngoc Bhandari MD  Noninvasive Cardiologist   [facility]              "

## 2025-07-31 NOTE — PATIENT INSTRUCTIONS
I am not certain your short of breath is actually from the atrial fibrillation.  The reason I think it might not be is because you have been in atrial fibrillation constantly since 9/2021, additionally your heart rate is overall well controlled (average heart rate was 82).  However, the only way to know if the atrial fibrillation is causing your shortness of breath is to get rid of the atrial fibrillation and see if you feel better. That was attempted in 2021 and did not last.  Because you have been in atrial fibrillation for many years it will be harder to get rid of it.    Start Amiodarone  400mg twice daily for 1 week, then 200mg twice daily for a week, then 200mg daily   Will schedule a cardioversion in 1 month   Check on the cost of Xarelto or Pradaxa and if either are cheaper than Eliquis let Dr Bhandari know     Let's also check a blood test to look for fluid retention that might also cause shortness of breath     Would also follow up Dr Lin to see if the asthma might be causing some of your symptoms

## 2025-08-06 ENCOUNTER — PREP FOR PROCEDURE (OUTPATIENT)
Dept: CARDIOLOGY | Facility: CLINIC | Age: 85
End: 2025-08-06
Payer: COMMERCIAL

## 2025-08-06 ENCOUNTER — DOCUMENTATION ONLY (OUTPATIENT)
Dept: CARDIOLOGY | Facility: CLINIC | Age: 85
End: 2025-08-06
Payer: COMMERCIAL

## 2025-08-06 DIAGNOSIS — I48.11 LONGSTANDING PERSISTENT ATRIAL FIBRILLATION (H): Primary | ICD-10-CM

## 2025-08-06 RX ORDER — LIDOCAINE 40 MG/G
CREAM TOPICAL
OUTPATIENT
Start: 2025-08-06

## 2025-08-09 DIAGNOSIS — I10 ESSENTIAL HYPERTENSION: ICD-10-CM

## 2025-08-12 RX ORDER — POTASSIUM CHLORIDE 1500 MG/1
20 TABLET, EXTENDED RELEASE ORAL 3 TIMES DAILY
Qty: 270 TABLET | Refills: 1 | Status: SHIPPED | OUTPATIENT
Start: 2025-08-12

## 2025-08-25 ENCOUNTER — OFFICE VISIT (OUTPATIENT)
Dept: FAMILY MEDICINE | Facility: CLINIC | Age: 85
End: 2025-08-25
Payer: COMMERCIAL

## 2025-08-25 VITALS
BODY MASS INDEX: 39.87 KG/M2 | WEIGHT: 225 LBS | RESPIRATION RATE: 16 BRPM | OXYGEN SATURATION: 94 % | HEIGHT: 63 IN | SYSTOLIC BLOOD PRESSURE: 166 MMHG | DIASTOLIC BLOOD PRESSURE: 99 MMHG | HEART RATE: 94 BPM | TEMPERATURE: 98 F

## 2025-08-25 DIAGNOSIS — I10 PRIMARY HYPERTENSION: ICD-10-CM

## 2025-08-25 DIAGNOSIS — Z01.818 PREOPERATIVE EXAMINATION: Primary | ICD-10-CM

## 2025-08-25 DIAGNOSIS — I48.20 CHRONIC ATRIAL FIBRILLATION (H): ICD-10-CM

## 2025-08-25 DIAGNOSIS — R06.02 SHORTNESS OF BREATH: ICD-10-CM

## 2025-08-25 LAB
ERYTHROCYTE [DISTWIDTH] IN BLOOD BY AUTOMATED COUNT: 12.7 % (ref 10–15)
HCT VFR BLD AUTO: 45.7 % (ref 35–47)
HGB BLD-MCNC: 15.1 G/DL (ref 11.7–15.7)
MCH RBC QN AUTO: 29.8 PG (ref 26.5–33)
MCHC RBC AUTO-ENTMCNC: 33 G/DL (ref 31.5–36.5)
MCV RBC AUTO: 90.3 FL (ref 78–100)
PLATELET # BLD AUTO: 302 10E3/UL (ref 150–450)
RBC # BLD AUTO: 5.06 10E6/UL (ref 3.8–5.2)
WBC # BLD AUTO: 9.21 10E3/UL (ref 4–11)

## 2025-08-25 PROCEDURE — 80048 BASIC METABOLIC PNL TOTAL CA: CPT | Performed by: FAMILY MEDICINE

## 2025-08-25 PROCEDURE — 85027 COMPLETE CBC AUTOMATED: CPT | Performed by: FAMILY MEDICINE

## 2025-08-25 PROCEDURE — 36415 COLL VENOUS BLD VENIPUNCTURE: CPT | Performed by: FAMILY MEDICINE

## 2025-08-25 PROCEDURE — 3080F DIAST BP >= 90 MM HG: CPT | Performed by: FAMILY MEDICINE

## 2025-08-25 PROCEDURE — 99214 OFFICE O/P EST MOD 30 MIN: CPT | Performed by: FAMILY MEDICINE

## 2025-08-25 PROCEDURE — 3077F SYST BP >= 140 MM HG: CPT | Performed by: FAMILY MEDICINE

## 2025-08-25 ASSESSMENT — ASTHMA QUESTIONNAIRES
QUESTION_4 LAST FOUR WEEKS HOW OFTEN HAVE YOU USED YOUR RESCUE INHALER OR NEBULIZER MEDICATION (SUCH AS ALBUTEROL): NOT AT ALL
QUESTION_5 LAST FOUR WEEKS HOW WOULD YOU RATE YOUR ASTHMA CONTROL: SOMEWHAT CONTROLLED
QUESTION_1 LAST FOUR WEEKS HOW MUCH OF THE TIME DID YOUR ASTHMA KEEP YOU FROM GETTING AS MUCH DONE AT WORK, SCHOOL OR AT HOME: NONE OF THE TIME
QUESTION_2 LAST FOUR WEEKS HOW OFTEN HAVE YOU HAD SHORTNESS OF BREATH: ONCE A DAY
EMERGENCY_ROOM_LAST_YEAR_TOTAL: ONE
QUESTION_3 LAST FOUR WEEKS HOW OFTEN DID YOUR ASTHMA SYMPTOMS (WHEEZING, COUGHING, SHORTNESS OF BREATH, CHEST TIGHTNESS OR PAIN) WAKE YOU UP AT NIGHT OR EARLIER THAN USUAL IN THE MORNING: NOT AT ALL
ACT_TOTALSCORE: 20

## 2025-08-26 LAB
ANION GAP SERPL CALCULATED.3IONS-SCNC: 14 MMOL/L (ref 7–15)
BUN SERPL-MCNC: 23.7 MG/DL (ref 8–23)
CALCIUM SERPL-MCNC: 10.7 MG/DL (ref 8.8–10.4)
CHLORIDE SERPL-SCNC: 104 MMOL/L (ref 98–107)
CREAT SERPL-MCNC: 0.8 MG/DL (ref 0.51–0.95)
EGFRCR SERPLBLD CKD-EPI 2021: 73 ML/MIN/1.73M2
GLUCOSE SERPL-MCNC: 112 MG/DL (ref 70–99)
HCO3 SERPL-SCNC: 21 MMOL/L (ref 22–29)
POTASSIUM SERPL-SCNC: 5 MMOL/L (ref 3.4–5.3)
SODIUM SERPL-SCNC: 139 MMOL/L (ref 135–145)

## 2025-09-04 ENCOUNTER — ANESTHESIA (OUTPATIENT)
Dept: CARDIOLOGY | Facility: HOSPITAL | Age: 85
End: 2025-09-04
Payer: COMMERCIAL

## 2025-09-04 ENCOUNTER — ANESTHESIA EVENT (OUTPATIENT)
Dept: CARDIOLOGY | Facility: HOSPITAL | Age: 85
End: 2025-09-04
Payer: COMMERCIAL

## 2025-09-04 ENCOUNTER — HOSPITAL ENCOUNTER (OUTPATIENT)
Dept: CARDIOLOGY | Facility: HOSPITAL | Age: 85
Discharge: HOME | End: 2025-09-04
Attending: INTERNAL MEDICINE | Admitting: INTERNAL MEDICINE
Payer: COMMERCIAL

## 2025-09-04 VITALS
TEMPERATURE: 98.1 F | HEIGHT: 63 IN | HEART RATE: 69 BPM | DIASTOLIC BLOOD PRESSURE: 77 MMHG | WEIGHT: 220 LBS | SYSTOLIC BLOOD PRESSURE: 130 MMHG | OXYGEN SATURATION: 95 % | BODY MASS INDEX: 38.98 KG/M2 | RESPIRATION RATE: 19 BRPM

## 2025-09-04 DIAGNOSIS — I48.11 LONGSTANDING PERSISTENT ATRIAL FIBRILLATION (H): ICD-10-CM

## 2025-09-04 LAB
ATRIAL RATE - MUSE: 69 BPM
DIASTOLIC BLOOD PRESSURE - MUSE: NORMAL MMHG
INTERPRETATION ECG - MUSE: NORMAL
P AXIS - MUSE: 53 DEGREES
POTASSIUM SERPL-SCNC: 4.5 MMOL/L (ref 3.4–5.3)
PR INTERVAL - MUSE: 266 MS
QRS DURATION - MUSE: 84 MS
QT - MUSE: 382 MS
QTC - MUSE: 409 MS
R AXIS - MUSE: 88 DEGREES
SYSTOLIC BLOOD PRESSURE - MUSE: NORMAL MMHG
T AXIS - MUSE: 32 DEGREES
VENTRICULAR RATE- MUSE: 69 BPM

## 2025-09-04 PROCEDURE — 250N000009 HC RX 250

## 2025-09-04 PROCEDURE — 36415 COLL VENOUS BLD VENIPUNCTURE: CPT | Performed by: INTERNAL MEDICINE

## 2025-09-04 PROCEDURE — 84132 ASSAY OF SERUM POTASSIUM: CPT | Performed by: INTERNAL MEDICINE

## 2025-09-04 PROCEDURE — 92960 CARDIOVERSION ELECTRIC EXT: CPT

## 2025-09-04 RX ADMIN — METHOHEXITAL SODIUM 50 MG: 500 INJECTION, POWDER, LYOPHILIZED, FOR SOLUTION INTRAMUSCULAR; INTRAVENOUS; RECTAL at 09:44

## 2025-09-04 ASSESSMENT — ACTIVITIES OF DAILY LIVING (ADL)
ADLS_ACUITY_SCORE: 55

## 2025-09-04 ASSESSMENT — ENCOUNTER SYMPTOMS: ORTHOPNEA: 0

## 2025-09-04 ASSESSMENT — COPD QUESTIONNAIRES: COPD: 1

## (undated) DEVICE — SNARE OVAL SMALL DISP 100600

## (undated) DEVICE — SOL WATER IRRIG 1000ML BOTTLE 2F7114

## (undated) DEVICE — TUBING SUCTION MEDI-VAC 1/4"X20' N620A - HE

## (undated) DEVICE — SUCTION MANIFOLD NEPTUNE 2 SYS 1 PORT 702-025-000

## (undated) RX ORDER — METHOHEXITAL IN WATER/PF 100MG/10ML
SYRINGE (ML) INTRAVENOUS
Status: DISPENSED
Start: 2021-08-11

## (undated) RX ORDER — METHOHEXITAL IN WATER/PF 100MG/10ML
SYRINGE (ML) INTRAVENOUS
Status: DISPENSED
Start: 2025-09-04